# Patient Record
Sex: MALE | Race: WHITE | Employment: OTHER | URBAN - METROPOLITAN AREA
[De-identification: names, ages, dates, MRNs, and addresses within clinical notes are randomized per-mention and may not be internally consistent; named-entity substitution may affect disease eponyms.]

---

## 2017-06-28 PROBLEM — Z79.01 ANTICOAGULANT LONG-TERM USE: Status: ACTIVE | Noted: 2017-06-28

## 2017-09-25 PROCEDURE — 88305 TISSUE EXAM BY PATHOLOGIST: CPT | Performed by: SURGERY

## 2017-09-26 ENCOUNTER — HOSPITAL ENCOUNTER (OUTPATIENT)
Dept: LAB | Age: 82
Discharge: HOME | End: 2017-09-26

## 2018-09-19 ENCOUNTER — HOSPITAL ENCOUNTER (OUTPATIENT)
Dept: CT IMAGING | Age: 83
Discharge: HOME | End: 2018-09-19
Attending: NURSE PRACTITIONER
Payer: MEDICARE

## 2018-09-19 DIAGNOSIS — R31.29 MICROHEMATURIA: ICD-10-CM

## 2018-09-19 PROCEDURE — 74176 CT ABD & PELVIS W/O CONTRAST: CPT

## 2018-10-02 PROBLEM — R31.29 MICROHEMATURIA: Status: ACTIVE | Noted: 2018-10-02

## 2018-10-02 PROBLEM — Z92.29 HX OF LONG TERM USE OF BLOOD THINNERS: Status: ACTIVE | Noted: 2018-10-02

## 2018-10-02 PROBLEM — Z87.438 HISTORY OF BPH: Status: ACTIVE | Noted: 2018-10-02

## 2019-04-08 PROBLEM — R06.09 DYSPNEA ON EXERTION: Status: ACTIVE | Noted: 2019-04-08

## 2019-09-17 ENCOUNTER — APPOINTMENT (OUTPATIENT)
Dept: GENERAL RADIOLOGY | Age: 84
DRG: 069 | End: 2019-09-17
Attending: EMERGENCY MEDICINE
Payer: MEDICARE

## 2019-09-17 ENCOUNTER — HOSPITAL ENCOUNTER (INPATIENT)
Age: 84
LOS: 1 days | Discharge: HOME | DRG: 069 | End: 2019-09-19
Attending: EMERGENCY MEDICINE | Admitting: INTERNAL MEDICINE
Payer: MEDICARE

## 2019-09-17 ENCOUNTER — APPOINTMENT (OUTPATIENT)
Dept: CT IMAGING | Age: 84
DRG: 069 | End: 2019-09-17
Attending: EMERGENCY MEDICINE
Payer: MEDICARE

## 2019-09-17 DIAGNOSIS — G45.9 TIA (TRANSIENT ISCHEMIC ATTACK): Primary | ICD-10-CM

## 2019-09-17 LAB
APTT PPP: 44.9 SEC (ref 24.7–39.8)
BASOPHILS # BLD: 0 K/UL (ref 0–0.2)
BASOPHILS NFR BLD: 0 % (ref 0–2)
DIFFERENTIAL METHOD BLD: ABNORMAL
EOSINOPHIL # BLD: 0.1 K/UL (ref 0–0.8)
EOSINOPHIL NFR BLD: 1 % (ref 0.5–7.8)
ERYTHROCYTE [DISTWIDTH] IN BLOOD BY AUTOMATED COUNT: 17.2 % (ref 11.9–14.6)
GLUCOSE BLD STRIP.AUTO-MCNC: 226 MG/DL (ref 65–100)
HCT VFR BLD AUTO: 36.2 % (ref 41.1–50.3)
HGB BLD-MCNC: 11.5 G/DL (ref 13.6–17.2)
IMM GRANULOCYTES # BLD AUTO: 0.1 K/UL (ref 0–0.5)
IMM GRANULOCYTES NFR BLD AUTO: 1 % (ref 0–5)
INR PPP: 3.1
LYMPHOCYTES # BLD: 0.8 K/UL (ref 0.5–4.6)
LYMPHOCYTES NFR BLD: 5 % (ref 13–44)
MAGNESIUM SERPL-MCNC: 1.8 MG/DL (ref 1.8–2.4)
MCH RBC QN AUTO: 31.4 PG (ref 26.1–32.9)
MCHC RBC AUTO-ENTMCNC: 31.8 G/DL (ref 31.4–35)
MCV RBC AUTO: 98.9 FL (ref 79.6–97.8)
MONOCYTES # BLD: 1.4 K/UL (ref 0.1–1.3)
MONOCYTES NFR BLD: 9 % (ref 4–12)
NEUTS SEG # BLD: 12.9 K/UL (ref 1.7–8.2)
NEUTS SEG NFR BLD: 84 % (ref 43–78)
NRBC # BLD: 0 K/UL (ref 0–0.2)
PLATELET # BLD AUTO: 219 K/UL (ref 150–450)
PMV BLD AUTO: 9.7 FL (ref 9.4–12.3)
PROTHROMBIN TIME: 32.5 SEC (ref 11.7–14.5)
RBC # BLD AUTO: 3.66 M/UL (ref 4.23–5.6)
WBC # BLD AUTO: 15.3 K/UL (ref 4.3–11.1)

## 2019-09-17 PROCEDURE — 85730 THROMBOPLASTIN TIME PARTIAL: CPT

## 2019-09-17 PROCEDURE — 99285 EMERGENCY DEPT VISIT HI MDM: CPT | Performed by: EMERGENCY MEDICINE

## 2019-09-17 PROCEDURE — 82962 GLUCOSE BLOOD TEST: CPT

## 2019-09-17 PROCEDURE — 93005 ELECTROCARDIOGRAM TRACING: CPT | Performed by: EMERGENCY MEDICINE

## 2019-09-17 PROCEDURE — 80053 COMPREHEN METABOLIC PANEL: CPT

## 2019-09-17 PROCEDURE — 70450 CT HEAD/BRAIN W/O DYE: CPT

## 2019-09-17 PROCEDURE — 74011250636 HC RX REV CODE- 250/636: Performed by: EMERGENCY MEDICINE

## 2019-09-17 PROCEDURE — 71045 X-RAY EXAM CHEST 1 VIEW: CPT

## 2019-09-17 PROCEDURE — 83735 ASSAY OF MAGNESIUM: CPT

## 2019-09-17 PROCEDURE — 85610 PROTHROMBIN TIME: CPT

## 2019-09-17 PROCEDURE — 85025 COMPLETE CBC W/AUTO DIFF WBC: CPT

## 2019-09-17 RX ADMIN — SODIUM CHLORIDE 1000 ML: 900 INJECTION, SOLUTION INTRAVENOUS at 01:15

## 2019-09-18 ENCOUNTER — APPOINTMENT (OUTPATIENT)
Dept: ULTRASOUND IMAGING | Age: 84
DRG: 069 | End: 2019-09-18
Attending: INTERNAL MEDICINE
Payer: MEDICARE

## 2019-09-18 PROBLEM — N17.9 ACUTE RENAL FAILURE (ARF) (HCC): Status: ACTIVE | Noted: 2019-09-18

## 2019-09-18 PROBLEM — I48.19 PERSISTENT ATRIAL FIBRILLATION (HCC): Status: ACTIVE | Noted: 2019-09-18

## 2019-09-18 PROBLEM — G45.9 TIA (TRANSIENT ISCHEMIC ATTACK): Status: ACTIVE | Noted: 2019-09-18

## 2019-09-18 PROBLEM — R20.0 NUMBNESS AND TINGLING IN RIGHT HAND: Status: ACTIVE | Noted: 2019-09-18

## 2019-09-18 PROBLEM — R20.2 NUMBNESS AND TINGLING IN RIGHT HAND: Status: ACTIVE | Noted: 2019-09-18

## 2019-09-18 LAB
ALBUMIN SERPL-MCNC: 3.4 G/DL (ref 3.2–4.6)
ALBUMIN/GLOB SERPL: 1 {RATIO} (ref 1.2–3.5)
ALP SERPL-CCNC: 125 U/L (ref 50–136)
ALT SERPL-CCNC: 34 U/L (ref 12–65)
ANION GAP SERPL CALC-SCNC: 6 MMOL/L (ref 7–16)
APPEARANCE UR: CLEAR
AST SERPL-CCNC: 23 U/L (ref 15–37)
ATRIAL RATE: 187 BPM
BACTERIA URNS QL MICRO: 0 /HPF
BILIRUB SERPL-MCNC: 0.9 MG/DL (ref 0.2–1.1)
BILIRUB UR QL: NEGATIVE
BUN SERPL-MCNC: 23 MG/DL (ref 8–23)
CALCIUM SERPL-MCNC: 8.9 MG/DL (ref 8.3–10.4)
CALCULATED R AXIS, ECG10: -103 DEGREES
CALCULATED T AXIS, ECG11: 87 DEGREES
CASTS URNS QL MICRO: 0 /LPF
CHLORIDE SERPL-SCNC: 104 MMOL/L (ref 98–107)
CO2 SERPL-SCNC: 27 MMOL/L (ref 21–32)
COLOR UR: YELLOW
CREAT SERPL-MCNC: 1.93 MG/DL (ref 0.8–1.5)
DIAGNOSIS, 93000: NORMAL
EPI CELLS #/AREA URNS HPF: 0 /HPF
GLOBULIN SER CALC-MCNC: 3.3 G/DL (ref 2.3–3.5)
GLUCOSE BLD STRIP.AUTO-MCNC: 114 MG/DL (ref 65–100)
GLUCOSE BLD STRIP.AUTO-MCNC: 189 MG/DL (ref 65–100)
GLUCOSE BLD STRIP.AUTO-MCNC: 191 MG/DL (ref 65–100)
GLUCOSE BLD STRIP.AUTO-MCNC: 227 MG/DL (ref 65–100)
GLUCOSE SERPL-MCNC: 235 MG/DL (ref 65–100)
GLUCOSE UR STRIP.AUTO-MCNC: NEGATIVE MG/DL
HGB UR QL STRIP: ABNORMAL
KETONES UR QL STRIP.AUTO: NEGATIVE MG/DL
LACTATE SERPL-SCNC: 1.1 MMOL/L (ref 0.4–2)
LEUKOCYTE ESTERASE UR QL STRIP.AUTO: NEGATIVE
NITRITE UR QL STRIP.AUTO: NEGATIVE
PH UR STRIP: 6 [PH] (ref 5–9)
POTASSIUM SERPL-SCNC: 4.3 MMOL/L (ref 3.5–5.1)
PROT SERPL-MCNC: 6.7 G/DL (ref 6.3–8.2)
PROT UR STRIP-MCNC: NEGATIVE MG/DL
Q-T INTERVAL, ECG07: 436 MS
QRS DURATION, ECG06: 170 MS
QTC CALCULATION (BEZET), ECG08: 467 MS
RBC #/AREA URNS HPF: ABNORMAL /HPF
SODIUM SERPL-SCNC: 137 MMOL/L (ref 136–145)
SP GR UR REFRACTOMETRY: 1.01 (ref 1–1.02)
TSH SERPL DL<=0.005 MIU/L-ACNC: 1.85 UIU/ML (ref 0.36–3.74)
UROBILINOGEN UR QL STRIP.AUTO: 0.2 EU/DL (ref 0.2–1)
VENTRICULAR RATE, ECG03: 69 BPM
WBC URNS QL MICRO: 0 /HPF

## 2019-09-18 PROCEDURE — 96361 HYDRATE IV INFUSION ADD-ON: CPT | Performed by: EMERGENCY MEDICINE

## 2019-09-18 PROCEDURE — 99218 HC RM OBSERVATION: CPT

## 2019-09-18 PROCEDURE — 97161 PT EVAL LOW COMPLEX 20 MIN: CPT

## 2019-09-18 PROCEDURE — 92610 EVALUATE SWALLOWING FUNCTION: CPT

## 2019-09-18 PROCEDURE — C8929 TTE W OR WO FOL WCON,DOPPLER: HCPCS

## 2019-09-18 PROCEDURE — 99222 1ST HOSP IP/OBS MODERATE 55: CPT | Performed by: PSYCHIATRY & NEUROLOGY

## 2019-09-18 PROCEDURE — 81001 URINALYSIS AUTO W/SCOPE: CPT

## 2019-09-18 PROCEDURE — 74011250637 HC RX REV CODE- 250/637: Performed by: HOSPITALIST

## 2019-09-18 PROCEDURE — 74011250636 HC RX REV CODE- 250/636: Performed by: HOSPITALIST

## 2019-09-18 PROCEDURE — 84443 ASSAY THYROID STIM HORMONE: CPT

## 2019-09-18 PROCEDURE — 82962 GLUCOSE BLOOD TEST: CPT

## 2019-09-18 PROCEDURE — 87086 URINE CULTURE/COLONY COUNT: CPT

## 2019-09-18 PROCEDURE — 36415 COLL VENOUS BLD VENIPUNCTURE: CPT

## 2019-09-18 PROCEDURE — 93880 EXTRACRANIAL BILAT STUDY: CPT

## 2019-09-18 PROCEDURE — 87186 SC STD MICRODIL/AGAR DIL: CPT

## 2019-09-18 PROCEDURE — 87088 URINE BACTERIA CULTURE: CPT

## 2019-09-18 PROCEDURE — 96360 HYDRATION IV INFUSION INIT: CPT | Performed by: EMERGENCY MEDICINE

## 2019-09-18 PROCEDURE — 97530 THERAPEUTIC ACTIVITIES: CPT

## 2019-09-18 PROCEDURE — 83605 ASSAY OF LACTIC ACID: CPT

## 2019-09-18 PROCEDURE — 97165 OT EVAL LOW COMPLEX 30 MIN: CPT

## 2019-09-18 PROCEDURE — 74011250637 HC RX REV CODE- 250/637: Performed by: EMERGENCY MEDICINE

## 2019-09-18 PROCEDURE — 74011636637 HC RX REV CODE- 636/637: Performed by: HOSPITALIST

## 2019-09-18 RX ORDER — METOPROLOL SUCCINATE 50 MG/1
50 TABLET, EXTENDED RELEASE ORAL DAILY
Status: DISCONTINUED | OUTPATIENT
Start: 2019-09-18 | End: 2019-09-19 | Stop reason: HOSPADM

## 2019-09-18 RX ORDER — NITROGLYCERIN 0.4 MG/1
0.4 TABLET SUBLINGUAL AS NEEDED
Status: DISCONTINUED | OUTPATIENT
Start: 2019-09-18 | End: 2019-09-19 | Stop reason: HOSPADM

## 2019-09-18 RX ORDER — FERROUS SULFATE 324(65)MG
TABLET, DELAYED RELEASE (ENTERIC COATED) ORAL 2 TIMES DAILY WITH MEALS
COMMUNITY
End: 2021-06-22 | Stop reason: ALTCHOICE

## 2019-09-18 RX ORDER — ASPIRIN 325 MG
325 TABLET ORAL
Status: COMPLETED | OUTPATIENT
Start: 2019-09-18 | End: 2019-09-18

## 2019-09-18 RX ORDER — FINASTERIDE 5 MG/1
5 TABLET, FILM COATED ORAL DAILY
Status: DISCONTINUED | OUTPATIENT
Start: 2019-09-18 | End: 2019-09-19 | Stop reason: HOSPADM

## 2019-09-18 RX ORDER — SODIUM CHLORIDE 0.9 % (FLUSH) 0.9 %
5-40 SYRINGE (ML) INJECTION EVERY 8 HOURS
Status: DISCONTINUED | OUTPATIENT
Start: 2019-09-18 | End: 2019-09-19 | Stop reason: HOSPADM

## 2019-09-18 RX ORDER — SODIUM CHLORIDE 0.9 % (FLUSH) 0.9 %
5-40 SYRINGE (ML) INJECTION AS NEEDED
Status: DISCONTINUED | OUTPATIENT
Start: 2019-09-18 | End: 2019-09-19 | Stop reason: HOSPADM

## 2019-09-18 RX ORDER — INSULIN LISPRO 100 [IU]/ML
INJECTION, SOLUTION INTRAVENOUS; SUBCUTANEOUS
Status: DISCONTINUED | OUTPATIENT
Start: 2019-09-18 | End: 2019-09-19 | Stop reason: HOSPADM

## 2019-09-18 RX ORDER — ATORVASTATIN CALCIUM 40 MG/1
40 TABLET, FILM COATED ORAL
Status: DISCONTINUED | OUTPATIENT
Start: 2019-09-18 | End: 2019-09-19 | Stop reason: HOSPADM

## 2019-09-18 RX ORDER — SODIUM CHLORIDE, SODIUM LACTATE, POTASSIUM CHLORIDE, CALCIUM CHLORIDE 600; 310; 30; 20 MG/100ML; MG/100ML; MG/100ML; MG/100ML
100 INJECTION, SOLUTION INTRAVENOUS CONTINUOUS
Status: DISCONTINUED | OUTPATIENT
Start: 2019-09-18 | End: 2019-09-18

## 2019-09-18 RX ORDER — ONDANSETRON 2 MG/ML
4 INJECTION INTRAMUSCULAR; INTRAVENOUS
Status: DISCONTINUED | OUTPATIENT
Start: 2019-09-18 | End: 2019-09-19 | Stop reason: HOSPADM

## 2019-09-18 RX ORDER — ASPIRIN 81 MG/1
81 TABLET ORAL DAILY
Status: DISCONTINUED | OUTPATIENT
Start: 2019-09-18 | End: 2019-09-19 | Stop reason: HOSPADM

## 2019-09-18 RX ORDER — AMOXICILLIN 875 MG/1
500 TABLET, FILM COATED ORAL 3 TIMES DAILY
COMMUNITY
Start: 2019-09-16 | End: 2019-09-23

## 2019-09-18 RX ORDER — NATEGLINIDE 120 MG/1
120 TABLET ORAL
Status: DISCONTINUED | OUTPATIENT
Start: 2019-09-18 | End: 2019-09-19 | Stop reason: HOSPADM

## 2019-09-18 RX ADMIN — INSULIN LISPRO 2 UNITS: 100 INJECTION, SOLUTION INTRAVENOUS; SUBCUTANEOUS at 16:53

## 2019-09-18 RX ADMIN — ATORVASTATIN CALCIUM 40 MG: 40 TABLET, FILM COATED ORAL at 20:42

## 2019-09-18 RX ADMIN — METOPROLOL SUCCINATE 50 MG: 50 TABLET, EXTENDED RELEASE ORAL at 09:29

## 2019-09-18 RX ADMIN — RIVAROXABAN 20 MG: 20 TABLET, FILM COATED ORAL at 16:53

## 2019-09-18 RX ADMIN — INSULIN LISPRO 2 UNITS: 100 INJECTION, SOLUTION INTRAVENOUS; SUBCUTANEOUS at 21:55

## 2019-09-18 RX ADMIN — SODIUM CHLORIDE, SODIUM LACTATE, POTASSIUM CHLORIDE, AND CALCIUM CHLORIDE 100 ML/HR: 600; 310; 30; 20 INJECTION, SOLUTION INTRAVENOUS at 03:54

## 2019-09-18 RX ADMIN — Medication 10 ML: at 13:30

## 2019-09-18 RX ADMIN — FINASTERIDE 5 MG: 5 TABLET, FILM COATED ORAL at 09:29

## 2019-09-18 RX ADMIN — ASPIRIN 325 MG ORAL TABLET 325 MG: 325 PILL ORAL at 02:14

## 2019-09-18 RX ADMIN — INSULIN LISPRO 4 UNITS: 100 INJECTION, SOLUTION INTRAVENOUS; SUBCUTANEOUS at 11:52

## 2019-09-18 RX ADMIN — Medication 10 ML: at 20:54

## 2019-09-18 RX ADMIN — PERFLUTREN 1 ML: 6.52 INJECTION, SUSPENSION INTRAVENOUS at 11:38

## 2019-09-18 RX ADMIN — ASPIRIN 81 MG: 81 TABLET ORAL at 09:29

## 2019-09-18 NOTE — PROGRESS NOTES
09/18/19 0529   NIH Stroke Scale   Interval Other (comment)  (arrived to floor)   LOC 0   LOC Questions 0   LOC Commands 0   Best Gaze 0   Visual 0   Facial Palsy 0   Motor Right Arm 0   Motor Left Arm 0   Motor Right Leg 0   Motor Left Leg 0   Limb Ataxia 0   Sensory 1  (right hand)   Best Language 0   Dysarthria 0   Extinction and Inattention 0   Total 1     Ischemic Stroke without Activase/TIA    VTE Prophylaxis: No    Antiplatelet: Yes: Xarelto    Statin if LDL Greater Than or Equal to100: Yes: Lipitor    BP Parameters: Less Than 220/120 for 24 hours, then consult MD for parameters    Controlled With: Scheduled PO    Dysphagia Screen Completed: Yes: Pass  Dysphagia Screening  Vocal Quality/Secretions: Normal  History of Dysphagia: No  O2 Saturation: Normal  Alertness: Normal  Pre-Swallow Assessment Score: 0  Purees: No difficulty noted  Water by Cup: No difficulty noted  Water by Straw: No difficulty noted    Patient has PEG, NG Tube, Feeding Tube: No    Medication orders per above route: PO per orders    Nutrition Status: PO    NIH Stroke Scale Complete: Yes: 1    Frequency of Vital Signs: Every 4 hours     Frequency of Neuro Checks: Every 4 hours    Daily Education/Care Plan Updated: Yes    Reynaldo Ash

## 2019-09-18 NOTE — ED NOTES
TRANSFER - OUT REPORT:    Verbal report given to Carlos Saunders RN on [address]  being transferred to [facility] 7th floor for routine progression of care       Report consisted of patients Situation, Background, Assessment and   Recommendations(SBAR). Information from the following report(s) SBAR, Kardex, ED Summary and MAR was reviewed with the receiving nurse. Lines:   Peripheral IV 09/17/19 Left Antecubital (Active)   Site Assessment Clean, dry, & intact 9/17/2019 11:23 PM   Phlebitis Assessment 0 9/17/2019 11:23 PM   Infiltration Assessment 0 9/17/2019 11:23 PM   Dressing Status Clean, dry, & intact 9/17/2019 11:23 PM   Dressing Type Transparent 9/17/2019 11:23 PM   Hub Color/Line Status Green 9/17/2019 11:23 PM        Opportunity for questions and clarification was provided.       Patient transported with:   Registered Nurse

## 2019-09-18 NOTE — PROGRESS NOTES
Problem: Mobility Impaired (Adult and Pediatric)  Goal: *Acute Goals and Plan of Care  Description  Acute PT Goals:  (1.)Claude Cleavon Apgar  will move from supine to sit and sit to supine , scoot up and down and roll side to side with INDEPENDENCE within 7 treatment day(s). (2.)Claude Cleavon Apgar will transfer from bed to chair and chair to bed with MODIFIED INDEPENDENCE using the least restrictive device within 7 treatment day(s). (3.)Claude Cleavon Apgar will ambulate with MODIFIED INDEPENDENCE for 500+ feet with the least restrictive device within 7 treatment day(s). (4.)Claude Cleavon Apgar will perform standing static and dynamic balance activities x 25 minutes with MODIFIED INDEPENDENCE to improve safety and activity tolerance within 7 treatment day(s). (5.)Claude Cleavon Apgar will ascend and descend 1 stairs using no hand rail(s) with MODIFIED INDEPENDENCE to improve functional mobility and safety within 7 treatment day(s). (6.)Claude Cleavon Apgar will perform bilateral lower extremity exercises x 20 min for HEP with INDEPENDENCE to improve strength, endurance, and functional mobility within 7 treatment day(s).      PHYSICAL THERAPY: Initial Assessment, Daily Note and PM 2019  OBSERVATION: PT Visit Days : 1  [place] / [place] / Product Type: Medicare /       NAME/AGE/GENDER: Alejandro Malone is a 80 y.o. male   PRIMARY DIAGNOSIS: TIA (transient ischemic attack) [G45.9]  Numbness and tingling in right hand [R20.0, R20.2]  Persistent atrial fibrillation (HCC) [I48.1]  Acute renal failure (ARF) ([facility] [N17.9] <principal problem not specified>   <principal problem not specified>          ICD-10: Treatment Diagnosis:   Other lack of cordination (R27.8)  Difficulty in walking, Not elsewhere classified (R26.2)  Other abnormalities of gait and mobility (R26.89)   Precaution/Allergies:  Iodinated contrast media      ASSESSMENT:     Mr. Ayana Zepeda is an 80year old M who presented to hospital with R hand numbness and intermittent speech difficulty past two days. Admitted for TIA workup. PMH includes DM-2, HTN, A-Fib, and systolic CHF. Prior to hospital admission pt lives alone in a one story home with one step(s) to enter. Pt endorses one  fall in past 6 months. He does report experiencing unsteadiness on his feet, particularly when his eyes are closed. Prior to admission Mr. Adalgisa Cm uses no DME for mobility. He works part time. Upon entering, pt resting in bed, agreeable to PT evaluation. he reports no pain at rest. BLE assessment indicates sensation to light touch decreased distally BLE, AROM WFL, and strength WFL. Pt performed supine > sit with SBA, sitting EOB with good sitting balance control. Sit > stand with CGA using no assistive device. Gait x 10 ft in room with CGA. Treatment initiated to include gait x250 ft CGA with no assistive device in hallway. Cues for safety awareness and activity and breathing pacing as pt became noticeably SOB with exertion. O2 >90% even with activity on room air. At end of session pt sitting up in bedside chair with all needs within reach, alarm activated for safety, RN notified, OT entering room to perform evaluation. Pt presents as functioning slightly below his baseline, with deficits in mobility including transfers, gait, balance, and activity tolerance. Pt will benefit from skilled therapy services to address stated deficits to promote return to highest level of function, independence, and safety. Will continue to follow.     This section established at most recent assessment   PROBLEM LIST (Impairments causing functional limitations):  Decreased Transfer Abilities  Decreased Ambulation Ability/Technique  Decreased Balance  Decreased Activity Tolerance   INTERVENTIONS PLANNED: (Benefits and precautions of physical therapy have been discussed with the patient.)  Balance Exercise  Bed Mobility  Family Education  Gait Training  Home Exercise Program (HEP)  Neuromuscular Re-education/Strengthening  Range of Motion (ROM)  Therapeutic Activites  Therapeutic Exercise/Strengthening  Transfer Training     TREATMENT PLAN: Frequency/Duration: 3 times a week for duration of hospital stay  Rehabilitation Potential For Stated Goals: Good     REHAB RECOMMENDATIONS (at time of discharge pending progress):    Placement: It is my opinion, based on this patient's performance to date, that Mr. Carrillo Romo may benefit from [facility] after discharge due to the functional deficits listed above that are likely to improve with skilled rehabilitation because because he/she is able to safely attend regular and reoccurring therapy sessions at an outpatient facility and because he/she will benefit from the individualized approach tailored to his/her deficits vs. NO NEEDS pending progress with therapy, MD recommendations and pt wishes. Equipment:   None at this time              HISTORY:   History of Present Injury/Illness (Reason for Referral):  Per Chart: \" Patient is an 80years old male with hx of HTN, Atrial fib/flutter s/p AICD (on [address] [place] [address] Road), systolic CHF, DM-2, dyslipidemia, presented with right hand numbness and intermittent speech difficulty for past 2 days. currently pt is asymptomatic, but earlier he experienced right hand numbness and tingling, along with abnormal speech. These symptoms lasted for few minutes and resolved completely. Pt denies chest pain, SOB, headache, blurry/double vision, nausea/vomiting, headaches, weakness in extrimities, falls, lightheadedness or dizziness, palpitations. Pt is being admitted for TIA work up. \"  Past Medical History/Comorbidities:   Mr. Carrillo Romo  has a past medical history of Abnormal EKG, Acute diastolic heart failure [facility]), Atrial fibrillation ([facility] (9/10/2011), Atrial flutter ([facility] (9/2013), Automatic implantable cardioverter-defibrillator in situ (3/30/2016), Breast lump, CAD (coronary artery disease), Cardiomyopathy, ischemic (3/30/2016), Chest pain, Chronic systolic heart failure ([facility] CKD (chronic kidney disease) stage 3, GFR 30-59 ml/min: Metformin discontinued (9/12/2011), Coronary artery disease (9/10/2011), Diabetes ([facility] (diag 2002), Heart attack ([facility] (1995/2008), History of kidney stones, Hypercholesteremia (9/10/2011), Hypercholesterolemia, Hypertension (diag 1981), Kidney stone, LBBB (left bundle branch block) (9/17/2013), Orthostatic hypotension (3/30/2016), Osteoarthritis, Pneumonia (9/10/2011), Preop cardiovascular exam, Renal insufficiency, S/P total knee arthroplasty (12/11/2015), TIA (transient ischemic attack) (9/18/2019), and Unstable angina ([facility] He also has no past medical history of Adverse effect of anesthesia, Dementia, Difficult intubation, Gastrointestinal disorder, Infectious disease, Malignant hyperthermia due to anesthesia, Nausea & vomiting, Pseudocholinesterase deficiency, Sleep disorder, or Unspecified adverse effect of anesthesia. Mr. Andrew Gordillo  has a past surgical history that includes hx coronary artery bypass graft (12/19/2008); hx carpal tunnel release (Bilateral, 1972); hx colonoscopy (2003/2010); hx implantable cardioverter defibrillator (9/17/2013); hx bunionectomy (Left, 2001); hx hip replacement (Right, 12/28/2005); hx breast biopsy (Right, 7/2013); hx other surgical (2002); hx lithotripsy; pr total knee arthroplasty (Left, 4/8/2010); hx knee arthroscopy (Left, 9/17/2009); hx ankle fracture tx (Right, 5/11/2005); hx knee arthroscopy (Right, 4/30/2014); hx knee replacement (Right, 12/2015); hx orthopaedic (2005); hx cataract removal (2006/2009); and hx pacemaker.   Social History/Living Environment:   Home Environment: Private residence  # Steps to Enter: 1  Wheelchair Ramp: No  One/Two Story Residence: One story  Living Alone: Yes  Support Systems: Child(aleksey)  Patient Expects to be Discharged to[de-identified] Private residence  Current DME Used/Available at Home: jade Camargo Walker, rolling  Prior Level of Function/Work/Activity:  Prior to hospital admission pt lives alone in a one story home with one step(s) to enter. His son lives nearby. Pt endorses one  fall in past 6 months. He does report experiencing unsteadiness on his feet, particularly when his eyes are closed. Prior to admission Mr. Shahana Zamorano uses no DME for mobility. He works part time. Dominant Side:         RIGHT   Number of Personal Factors/Comorbidities that affect the Plan of Care: 1-2: MODERATE COMPLEXITY   EXAMINATION:   Most Recent Physical Functioning:   Gross Assessment:  AROM: Within functional limits  Strength: Within functional limits  Sensation: Impaired(decreased to light touch distally BLE (neuropathy))               Posture:     Balance:  Sitting: Intact  Standing: Impaired  Standing - Static: Good  Standing - Dynamic : Fair Bed Mobility:  Rolling: Stand-by assistance  Supine to Sit: Stand-by assistance  Sit to Supine: Stand-by assistance  Scooting: Stand-by assistance  Wheelchair Mobility:     Transfers:  Sit to Stand: Contact guard assistance  Stand to Sit: Contact guard assistance  Bed to Chair: Contact guard assistance  Interventions: Safety awareness training;Verbal cues  Gait:     Base of Support: Center of gravity altered  Step Length: Right shortened;Left shortened  Gait Abnormalities: Decreased step clearance; Path deviations;Trunk sway increased; Shuffling gait  Distance (ft): 250 Feet (ft)  Assistive Device: Gait belt  Ambulation - Level of Assistance: Contact guard assistance      Body Structures Involved:  Nerves  Heart  Muscles Body Functions Affected:  Neuromusculoskeletal  Movement Related Activities and Participation Affected:  General Tasks and Demands  Mobility   Number of elements that affect the Plan of Care: 4+: HIGH COMPLEXITY   CLINICAL PRESENTATION:   Presentation: Stable and uncomplicated: LOW COMPLEXITY   CLINICAL DECISION MAKIN [facility] [address] [id number] AM-PAC 6 Clicks   Basic Mobility Inpatient Short Form  How much difficulty does the patient currently have. .. Unable A Lot A Little None   1. Turning over in bed (including adjusting bedclothes, sheets and blankets)? ? 1   ? 2   ? 3   ? 4   2. Sitting down on and standing up from a chair with arms ( e.g., wheelchair, bedside commode, etc.)   ? 1   ? 2   ? 3   ? 4   3. Moving from lying on back to sitting on the side of the bed?   ? 1   ? 2   ? 3   ? 4   How much help from another person does the patient currently need. .. Total A Lot A Little None   4. Moving to and from a bed to a chair (including a wheelchair)? ? 1   ? 2   ? 3   ? 4   5. Need to walk in hospital room? ? 1   ? 2   ? 3   ? 4   6. Climbing 3-5 steps with a railing? ? 1   ? 2   ? 3   ? 4   © 2007, Trustees of [address] [place] [address], under license to [employer]. All rights reserved      Score:  Initial: 20 Most Recent: X (Date: -- )    Interpretation of Tool:  Represents activities that are increasingly more difficult (i.e. Bed mobility, Transfers, Gait). Medical Necessity:     Patient is expected to demonstrate progress in strength, balance, coordination and functional technique   to improve safety during all mobility   . Reason for Services/Other Comments:  Patient continues to require skilled intervention due to medical complications and mobility deficits which impact his level of function, safety, and independence as indicated above. .   Use of outcome tool(s) and clinical judgement create a POC that gives a: Clear prediction of patient's progress: LOW COMPLEXITY        TREATMENT:   (In addition to Assessment/Re-Assessment sessions the following treatments were rendered)   Pre-treatment Symptoms/Complaints:  No complaints, pleasant & agreeable  Pain: Initial:   Pain Intensity 1: 0  Post Session:  0/10     Therapeutic Activity: (   10 Minutes):   Therapeutic activities including Bed transfers, Chair transfers, Ambulation on level ground and instruction in activity pacing and breathing technique to improve mobility, strength, balance, coordination and activity tolerance . Required minimal   cues/instruction with CGA to promote static and dynamic balance in standing and promote coordination of bilateral, lower extremity(s). Braces/Orthotics/Lines/Etc:   IV  O2 Device: Room air  Treatment/Session Assessment:    Response to Treatment:  See above  Interdisciplinary Collaboration:   Physical Therapist  Registered Nurse  After treatment position/precautions:   Up in chair  Bed alarm/tab alert on  Bed/Chair-wheels locked  Call light within reach  RN notified  OT entering room    Compliance with Program/Exercises: Will assess as treatment progresses  Recommendations/Intent for next treatment session: \"Next visit will focus on advancements to more challenging activities and reduction in assistance provided\".     Total Treatment Duration:  PT Patient Time In/Time Out  Time In: 4651  Time Out: [address] PT

## 2019-09-18 NOTE — H&P
HOSPITALIST H&P/CONSULT  NAME:  Brittney Littlejohn   Age:  80 y.o.  :   1934   MRN:   [id number]  PCP: Amanda Lam MD  Consulting MD:  Treatment Team: Attending Provider: Sammy Aguilar MD; Primary Nurse: Gina Mixon RN  HPI:   Patient is an 80years old male with hx of HTN, Atrial fib/flutter s/p AICD (on [address]), systolic CHF, DM-2, dyslipidemia, presented with right hand numbness and intermittent speech difficulty for past 2 days. currently pt is asymptomatic, but earlier he experienced right hand numbness and tingling, along with abnormal speech. These symptoms lasted for few minutes and resolved completely. Pt denies chest pain, SOB, headache, blurry/double vision, nausea/vomiting, headaches, weakness in extrimities, falls, lightheadedness or dizziness, palpitations. Pt is being admitted for TIA work up. Complete ROS done and is as stated in HPI or otherwise negative  Past Medical History:   Diagnosis Date    Abnormal EKG     Acute diastolic heart failure (HCC)     Atrial fibrillation ([address] 9/10/2011    Atrial flutter ([address] 2013    Biotronik biventricular implantable cardioverter defibrillator    Automatic implantable cardioverter-defibrillator in situ 3/30/2016    Breast lump     right- pt states bx was neg-- m. d. \"released\" him    CAD (coronary artery disease)     mi w angioplasty------ mi then cabg--    Cardiomyopathy, ischemic 3/30/2016    Chronic    Chest pain     Chronic systolic heart failure ([address]     \"stable\" per cardiology office note (2015) and on lasix daily    CKD (chronic kidney disease) stage 3, GFR 30-59 ml/min: Metformin discontinued 2011    Coronary artery disease 9/10/2011    Diabetes ([address] diag     type2, oral meds, range 96-98, does not know last hgba1c, hypo s/s <70    Heart attack ([address]     History of kidney stones     none in years     Hypercholesteremia 9/10/2011    Hypercholesterolemia     no meds currently  Hypertension diag 1981    controlled with med    Kidney stone     LBBB (left bundle branch block) 9/17/2013    Orthostatic hypotension 3/30/2016    Osteoarthritis     hands    Pneumonia 9/10/2011    Preop cardiovascular exam     Renal insufficiency     S/P total knee arthroplasty 12/11/2015    TIA (transient ischemic attack) 9/18/2019    Unstable angina [facility])       Past Surgical History:   Procedure Laterality Date    HX ANKLE FRACTURE TX Right 5/11/2005    HX BREAST BIOPSY Right 7/2013    HX BUNIONECTOMY Left 2001    HX CARPAL TUNNEL RELEASE Bilateral 1972    HX CATARACT REMOVAL  2006/2009    HX COLONOSCOPY  2003/2010    HX CORONARY ARTERY BYPASS GRAFT  12/19/2008    3 vessel    HX HIP REPLACEMENT Right 12/28/2005    HX IMPLANTABLE CARDIOVERTER DEFIBRILLATOR  9/17/2013    biotronik    HX KNEE ARTHROSCOPY Left 9/17/2009    HX KNEE ARTHROSCOPY Right 4/30/2014    HX KNEE REPLACEMENT Right 12/2015    HX LITHOTRIPSY      x 5    HX ORTHOPAEDIC  2005    right hip    HX OTHER SURGICAL  2002    hydrocele repair and circumcision    HX PACEMAKER      pacemaker- defib    TOTAL KNEE ARTHROPLASTY Left 4/8/2010      Prior to Admission Medications   Prescriptions Last Dose Informant Patient Reported? Taking? FOLIC ACID/MULTIVIT-MIN/LUTEIN (CENTRUM SILVER PO)   Yes No   Sig: Take  by mouth daily. Last taken 4/7/16   TYLENOL ARTHRITIS PAIN 650 mg TbSR  Self Yes No   Sig: Take 650 mg by mouth as needed. 2 tabs twice daily   aspirin delayed-release 81 mg tablet   Yes No   Sig: Take  by mouth daily. finasteride (PROSCAR) 5 mg tablet   No No   Sig: Take 1 Tab by mouth daily. furosemide (LASIX) 40 mg tablet  Self Yes No   Sig: Take  by mouth every morning. glucosamine (GLUCOSAMINE RELIEF) 1,000 mg tab   Yes No   Sig: Take 1,000 mg by mouth two (2) times a day. metFORMIN (GLUCOPHAGE) 500 mg tablet   Yes No   Sig: Take 500 mg by mouth two (2) times daily (with meals).    metoprolol succinate (TOPROL-XL) 50 mg XL tablet   Yes No   Sig: Take 50 mg by mouth daily. nateglinide (STARLIX) 120 mg tablet   Yes No   Sig: Take 120 mg by mouth Before breakfast, lunch, and dinner. Indications: TYPE 2 DIABETES MELLITUS   nitroglycerin (NITROSTAT) 0.4 mg SL tablet   No No   Sig: Place 1 sl under the tongue q 5 min prn cp, max 3 sl in a 15-min time period. Call 911 if no relief after the 3rd sl.   rivaroxaban (XARELTO) 20 mg tab tablet   No No   Sig: Take 1 Tab by mouth nightly. Facility-Administered Medications: None     Allergies   Allergen Reactions    Iodinated Contrast Media Swelling      Social History     Tobacco Use    Smoking status: Never Smoker    Smokeless tobacco: Never Used   Substance Use Topics    Alcohol use: No      Family History   Problem Relation Age of Onset    Heart Disease Sister     Heart Disease Brother     Cancer Brother     Heart Disease Brother     Stroke Sister     Asthma Father     Heart Disease Other     Malignant Hyperthermia Neg Hx     Pseudocholinesterase Deficiency Neg Hx     Delayed Awakening Neg Hx     Post-op Nausea/Vomiting Neg Hx     Emergence Delirium Neg Hx     Post-op Cognitive Dysfunction Neg Hx     Other Neg Hx       Objective:     Visit Vitals  /72   Pulse 69   Temp 98.6 °F (37 °C)   Resp 13   Ht 6' 1\" (1.854 m)   Wt 93.4 kg (206 lb)   SpO2 95%   BMI 27.18 kg/m²      Temp (24hrs), Av.6 °F (37 °C), Min:98.6 °F (37 °C), Max:98.6 °F (37 °C)    Oxygen Therapy  O2 Sat (%): 95 % (19 0358)  Pulse via Oximetry: 70 beats per minute (19 0358)  O2 Device: Room air (19 8495)  Physical Exam:  General:    Alert, cooperative, no distress, appears stated age. Head:   Normocephalic, without obvious abnormality, atraumatic. Nose:  Nares normal. No drainage or sinus tenderness. Lungs:   Clear to auscultation bilaterally. No Wheezing or Rhonchi. No rales. Heart:   IRIR,  no murmur, rub or gallop. Abdomen:   Soft, non-tender.  Not distended. Bowel sounds normal.   Extremities: No cyanosis. No edema. No clubbing  Skin:     Texture, turgor normal. No rashes or lesions. Not Jaundiced  Neurologic: Alert and oriented x 3, no focal deficits   Data Review:   Recent Results (from the past 24 hour(s))   GLUCOSE, POC    Collection Time: 09/17/19 11:22 PM   Result Value Ref Range    Glucose (POC) 226 (H) 65 - 100 mg/dL   CBC WITH AUTOMATED DIFF    Collection Time: 09/17/19 11:24 PM   Result Value Ref Range    WBC 15.3 (H) 4.3 - 11.1 K/uL    RBC 3.66 (L) 4.23 - 5.6 M/uL    HGB 11.5 (L) 13.6 - 17.2 g/dL    HCT 36.2 (L) 41.1 - 50.3 %    MCV 98.9 (H) 79.6 - 97.8 FL    MCH 31.4 26.1 - 32.9 PG    MCHC 31.8 31.4 - 35.0 g/dL    RDW 17.2 (H) 11.9 - 14.6 %    PLATELET 383 442 - 689 K/uL    MPV 9.7 9.4 - 12.3 FL    ABSOLUTE NRBC 0.00 0.0 - 0.2 K/uL    DF AUTOMATED      NEUTROPHILS 84 (H) 43 - 78 %    LYMPHOCYTES 5 (L) 13 - 44 %    MONOCYTES 9 4.0 - 12.0 %    EOSINOPHILS 1 0.5 - 7.8 %    BASOPHILS 0 0.0 - 2.0 %    IMMATURE GRANULOCYTES 1 0.0 - 5.0 %    ABS. NEUTROPHILS 12.9 (H) 1.7 - 8.2 K/UL    ABS. LYMPHOCYTES 0.8 0.5 - 4.6 K/UL    ABS. MONOCYTES 1.4 (H) 0.1 - 1.3 K/UL    ABS. EOSINOPHILS 0.1 0.0 - 0.8 K/UL    ABS. BASOPHILS 0.0 0.0 - 0.2 K/UL    ABS. IMM.  GRANS. 0.1 0.0 - 0.5 K/UL   PROTHROMBIN TIME + INR    Collection Time: 09/17/19 11:24 PM   Result Value Ref Range    Prothrombin time 32.5 (H) 11.7 - 14.5 sec    INR 3.1     PTT    Collection Time: 09/17/19 11:24 PM   Result Value Ref Range    aPTT 44.9 (H) 24.7 - 49.9 SEC   METABOLIC PANEL, COMPREHENSIVE    Collection Time: 09/17/19 11:24 PM   Result Value Ref Range    Sodium 137 136 - 145 mmol/L    Potassium 4.3 3.5 - 5.1 mmol/L    Chloride 104 98 - 107 mmol/L    CO2 27 21 - 32 mmol/L    Anion gap 6 (L) 7 - 16 mmol/L    Glucose 235 (H) 65 - 100 mg/dL    BUN 23 8 - 23 MG/DL    Creatinine 1.93 (H) 0.8 - 1.5 MG/DL    GFR est AA 43 (L) >60 ml/min/1.73m2    GFR est non-AA 35 (L) >60 ml/min/1.73m2    Calcium 8.9 8.3 - 10.4 MG/DL    Bilirubin, total 0.9 0.2 - 1.1 MG/DL    ALT (SGPT) 34 12 - 65 U/L    AST (SGOT) 23 15 - 37 U/L    Alk. phosphatase 125 50 - 136 U/L    Protein, total 6.7 6.3 - 8.2 g/dL    Albumin 3.4 3.2 - 4.6 g/dL    Globulin 3.3 2.3 - 3.5 g/dL    A-G Ratio 1.0 (L) 1.2 - 3.5     MAGNESIUM    Collection Time: 09/17/19 11:24 PM   Result Value Ref Range    Magnesium 1.8 1.8 - 2.4 mg/dL   LACTIC ACID    Collection Time: 09/18/19  2:12 AM   Result Value Ref Range    Lactic acid 1.1 0.4 - 2.0 MMOL/L     Imaging /Procedures /Studies   All diagnostic imaging personally reviewed by me. Assessment and Plan: Active Hospital Problems    Diagnosis Date Noted    TIA (transient ischemic attack) 09/18/2019    Persistent atrial fibrillation (HCC) 09/18/2019    Numbness and tingling in right hand 09/18/2019    Acute renal failure (ARF) ([address] 09/18/2019       PLAN:  Admit as obs for TIA work up and ARF  f/u with MRI and MRA brain  remote tele monitoring  continue [address] and aspirin  check A1c and lipid panel  start high intensity statin  Neurology eval in AM  pt is on [facility] medical maangement.    cautious IV fluids for ARF  continue other home meds as reconciled in [facility]  pt/ot eval  DVT prophylaxis with xarelto    Code Status: full    Anticipated discharge: 1-2 days    Signed By: Kasie Mcdonald MD     September 18, 2019

## 2019-09-18 NOTE — PROGRESS NOTES
Spiritual Care Visit, initial visit. Visited with patient at bedside. Prayed for patient's healing and health. Visit by Yifan Sood, Staff .  Tristin, Jason.B., B.A.

## 2019-09-18 NOTE — PROGRESS NOTES
09/18/19 1849   NIH Stroke Scale   Interval Other (comment)   LOC 0   LOC Questions 0   LOC Commands 0   Best Gaze 0   Visual 0   Facial Palsy 0   Motor Right Arm 0   Motor Left Arm 0   Motor Right Leg 0   Motor Left Leg 0   Limb Ataxia 0   Sensory 0   Best Language 0   Dysarthria 0   Extinction and Inattention 0   Total 0

## 2019-09-18 NOTE — PROGRESS NOTES
Pt admitted earlier this AM for TIA eval. Unable to obtain MRI due to PPM. ECHO, neuro consult pending.

## 2019-09-18 NOTE — PROGRESS NOTES
Pt is an 81 yo male admitted due to a TIA/CVA. Pt reports that he lives alone in his own home. His son lives 4 miles away. 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 Pt reported that he was independent with his ADL's and ambulation prior to admissions. PT/OT evaluations pending. Pt could not think of any discharge needs at present. No apparent needs at this time. SW will continue to monitor and assist as needed.       Care Management Interventions  Mode of Transport at Discharge: Self(family)  Transition of Care Consult (CM Consult): Discharge Planning  Discharge Durable Medical Equipment: No  Physical Therapy Consult: Yes  Occupational Therapy Consult: Yes  Speech Therapy Consult: Yes  Current Support Network: Own Home, Family Lives Nearby, Lives Alone  Confirm Follow Up Transport: Self  Plan discussed with Pt/Family/Caregiver: Yes  Freedom of Choice Offered: Yes  Discharge Location  Discharge Placement: Home

## 2019-09-18 NOTE — CONSULTS
Consult    Patient: Laura Mueller MRN: [id number]  SSN: [id number]    YOB: 1934  Age: 80 y.o. Sex: male        Assessment:     TIA versus cerebral infarction with transient symptoms. Since at least 1 of the episodes lasted several hours it is highly probable that a degree of cerebral infarction is present. 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 However the patient also has chronic A. fib and has been off of anticoagulation for a few days.     Chronic iron deficiency anemia requiring iron replacement therapy    Type 2 DM  Hospital Problems  Date Reviewed: 5/23/2019          Codes Class Noted POA    TIA (transient ischemic attack) ICD-10-CM: G45.9  ICD-9-CM: 435.9  9/18/2019 Unknown        Persistent atrial fibrillation ([facility] ICD-10-CM: I48.1  ICD-9-CM: 427.31  9/18/2019 Unknown        Numbness and tingling in right hand ICD-10-CM: R20.0, R20.2  ICD-9-CM: 782.0  9/18/2019 Unknown        Acute renal failure (ARF) ([facility] ICD-10-CM: N17.9  ICD-9-CM: 584.9  9/18/2019 Unknown              Plan:     Patient should be seen by cardiology as an outpatient for consideration of watchman device due to combination of chronic A. fib iron deficiency anemia    Continue DOAC    If the patient is successfully treated with a watchman device with consider long-term antiplatelet therapy    · Start ASA 81 mg daily   · Initiate high intensity statin   · Neurochecks Q4H  · Carotid doppler  · Bedside swallow test   · Labs: A1c, FLP, TSH, Cardiac enzymes, BMP, CBC  ·  echocardiogram with bubble study  · PT/OT/ST  · DVT prophylaxis   · BP management - normotensive, with long-term goal <140/90  · Smoking cessation if applicable   · Diabetes education if applicable   · Depression Screening prior to discharge      Subjective:      Laura Mueller is a 80 y.o. male who is being seen for neurological consultation for strokelike symptoms. The patient states that he experienced several episodes of slurred speech and weakness of the right hand beginning on September 16, the longest lasting over an hour. The patient is known to have chronic atrial fibrillation and has been treated successfully with rivaroxaban complicated by chronic iron deficiency anemia requiring iron replacement therapy. He is never had an overt GI hemorrhage. Evaluation for sources of GI blood loss is unclear. Patient denies any prior history of stroke or TIA symptoms    Past Medical History:   Diagnosis Date    Abnormal EKG     Acute diastolic heart failure (HCC)     Atrial fibrillation ([address] 9/10/2011    Atrial flutter ([address] 9/2013    Biotronik biventricular implantable cardioverter defibrillator    Automatic implantable cardioverter-defibrillator in situ 3/30/2016    Breast lump     right- pt states bx was neg-- m. d. \"released\" him    CAD (coronary artery disease)     mi w angioplasty--1995---- mi then cabg--2008    Cardiomyopathy, ischemic 3/30/2016    Chronic    Chest pain     Chronic systolic heart failure ([address]     \"stable\" per cardiology office note (1/2015) and on lasix daily    CKD (chronic kidney disease) stage 3, GFR 30-59 ml/min: Metformin discontinued 9/12/2011    Coronary artery disease 9/10/2011    Diabetes ([address] diag 2002    type2, oral meds, range 96-98, does not know last hgba1c, hypo s/s <70    Heart attack ([address] 1995/2008    History of kidney stones     none in years     Hypercholesteremia 9/10/2011    Hypercholesterolemia     no meds currently    Hypertension diag 1981    controlled with med    Kidney stone     LBBB (left bundle branch block) 9/17/2013    Orthostatic hypotension 3/30/2016    Osteoarthritis     hands    Pneumonia 9/10/2011    Preop cardiovascular exam     Renal insufficiency     S/P total knee arthroplasty 12/11/2015    TIA (transient ischemic attack) 9/18/2019    Unstable angina [facility])      Past Surgical History:   Procedure Laterality Date    HX ANKLE FRACTURE TX Right 5/11/2005    HX BREAST BIOPSY Right 7/2013    HX BUNIONECTOMY Left 2001    HX CARPAL TUNNEL RELEASE Bilateral 1972    HX CATARACT REMOVAL  2006/2009    HX COLONOSCOPY  2003/2010    HX CORONARY ARTERY BYPASS GRAFT  12/19/2008    3 vessel    HX HIP REPLACEMENT Right 12/28/2005    HX IMPLANTABLE CARDIOVERTER DEFIBRILLATOR  9/17/2013    biotronik    HX KNEE ARTHROSCOPY Left 9/17/2009    HX KNEE ARTHROSCOPY Right 4/30/2014    HX KNEE REPLACEMENT Right 12/2015    HX LITHOTRIPSY      x 5    HX ORTHOPAEDIC  2005    right hip    HX OTHER SURGICAL  2002    hydrocele repair and circumcision    HX PACEMAKER      pacemaker- defib    TOTAL KNEE ARTHROPLASTY Left 4/8/2010      Family History   Problem Relation Age of Onset    Heart Disease Sister     Heart Disease Brother     Cancer Brother     Heart Disease Brother     Stroke Sister     Asthma Father     Heart Disease Other     Malignant Hyperthermia Neg Hx     Pseudocholinesterase Deficiency Neg Hx     Delayed Awakening Neg Hx     Post-op Nausea/Vomiting Neg Hx     Emergence Delirium Neg Hx     Post-op Cognitive Dysfunction Neg Hx     Other Neg Hx      Social History     Tobacco Use    Smoking status: Never Smoker    Smokeless tobacco: Never Used   Substance Use Topics    Alcohol use: No      Current Facility-Administered Medications   Medication Dose Route Frequency Provider Last Rate Last Dose    aspirin delayed-release tablet 81 mg  81 mg Oral DAILY Guero Crowell MD   81 mg at 09/18/19 3537    finasteride (PROSCAR) tablet 5 mg  5 mg Oral DAILY Guero Crowell MD   5 mg at 09/18/19 0929    metoprolol succinate (TOPROL-XL) XL tablet 50 mg  50 mg Oral DAILY Guero Crowell MD   50 mg at 09/18/19 0929    nateglinide (STARLIX) 120 mg tablet 120 mg (Patient Supplied)  120 mg Oral TID WITH MEALS Bala Lackey MD        nitroglycerin (NITROSTAT) tablet 0.4 mg  0.4 mg SubLINGual PRN Bala Lackey MD        rivaroxaban (XARELTO) tablet 20 mg  20 mg Oral DAILY WITH Foy Reins, Leopoldo Duster, MD        sodium chloride (NS) flush 5-40 mL  5-40 mL IntraVENous Q8H Bala Lackey MD        sodium chloride (NS) flush 5-40 mL  5-40 mL IntraVENous PRN Bala Lackey MD        ondansetron [facility]) injection 4 mg  4 mg IntraVENous Q6H PRN Bala Lackey MD        atorvastatin (LIPITOR) tablet 40 mg  40 mg Oral QHS Bala Lackey MD        insulin lispro (HUMALOG) injection   SubCUTAneous AC&HS Bala Lackey MD   4 Units at 09/18/19 1152    influenza vaccine 2019-20 (6 mos+)(PF) (FLUARIX/FLULAVAL/FLUZONE QUAD) injection 0.5 mL  0.5 mL IntraMUSCular PRIOR TO DISCHARGE Bala Lackey MD            Allergies   Allergen Reactions    Iodinated Contrast Media Swelling       Review of Systems:  12 point review of systems otherwise negative    Objective:     Vitals:    09/18/19 0358 09/18/19 0458 09/18/19 0529 09/18/19 0800   BP: 154/72 152/70 158/72 161/78   Pulse: 69 73 72 70   Resp: 13 17 18 18   Temp:  98.3 °F (36.8 °C) 97.4 °F (36.3 °C) 98.2 °F (36.8 °C)   SpO2: 95% 97% 95% 97%   Weight:       Height:            Physical Exam:      General: well nourished, appears stated age    Eyes: no proptosis or exophthalmos; conjunctivae clear, sclerae non-icteric    Chest: clear to auscultation    Cardiac: normal S1 S2; no murmurs gallop or rubs    Neurological:    MSE: alert, oriented times 3; fluent speech; no paraphasic errors; follows commands without difficulty    CN 2: visual fields full; no afferent pupillary defect; VA not checked; fundoscopic exam ... CN 3,4,6: Pupils symmetrical in size, reactive to light directly and consensually; no ptosis; full versions and ductions  CN 5: facial sensation intact to light touch and pin prick. Corneal reflex . ..   CN 7: Symmetrical facial tone and movements  CN 8 responds to spoken voice  CN 9,10; palate symmetrical gag intact  CN 11: head turn and shoulder shrug intact  CN 12: tongue midline without atrophy or fasiculations    Motor:  Power 5/5 UE and LE proximal to distal  Fine motor movements symmetrical  Tone normal  Atrophy: absent  Gait: symmetrical arm swing, rises on heels and toes    Cerebellar:  Finger to nose; heel to shin intact  Tandem intact    Sensory  Romberg negative  Intact to primary modalities in all 4 extremities    Reflexes    Symmetrical and normally active at 2+ in UE and LE  Plantar response flexor bilaterally    Recent Results (from the past 24 hour(s))   GLUCOSE, POC    Collection Time: 09/17/19 11:22 PM   Result Value Ref Range    Glucose (POC) 226 (H) 65 - 100 mg/dL   CBC WITH AUTOMATED DIFF    Collection Time: 09/17/19 11:24 PM   Result Value Ref Range    WBC 15.3 (H) 4.3 - 11.1 K/uL    RBC 3.66 (L) 4.23 - 5.6 M/uL    HGB 11.5 (L) 13.6 - 17.2 g/dL    HCT 36.2 (L) 41.1 - 50.3 %    MCV 98.9 (H) 79.6 - 97.8 FL    MCH 31.4 26.1 - 32.9 PG    MCHC 31.8 31.4 - 35.0 g/dL    RDW 17.2 (H) 11.9 - 14.6 %    PLATELET 357 279 - 560 K/uL    MPV 9.7 9.4 - 12.3 FL    ABSOLUTE NRBC 0.00 0.0 - 0.2 K/uL    DF AUTOMATED      NEUTROPHILS 84 (H) 43 - 78 %    LYMPHOCYTES 5 (L) 13 - 44 %    MONOCYTES 9 4.0 - 12.0 %    EOSINOPHILS 1 0.5 - 7.8 %    BASOPHILS 0 0.0 - 2.0 %    IMMATURE GRANULOCYTES 1 0.0 - 5.0 %    ABS. NEUTROPHILS 12.9 (H) 1.7 - 8.2 K/UL    ABS. LYMPHOCYTES 0.8 0.5 - 4.6 K/UL    ABS. MONOCYTES 1.4 (H) 0.1 - 1.3 K/UL    ABS. EOSINOPHILS 0.1 0.0 - 0.8 K/UL    ABS. BASOPHILS 0.0 0.0 - 0.2 K/UL    ABS. IMM.  GRANS. 0.1 0.0 - 0.5 K/UL   PROTHROMBIN TIME + INR    Collection Time: 09/17/19 11:24 PM   Result Value Ref Range    Prothrombin time 32.5 (H) 11.7 - 14.5 sec    INR 3.1     PTT    Collection Time: 09/17/19 11:24 PM   Result Value Ref Range    aPTT 44.9 (H) 24.7 - 75.4 SEC   METABOLIC PANEL, COMPREHENSIVE    Collection Time: 09/17/19 11:24 PM   Result Value Ref Range    Sodium 137 136 - 145 mmol/L    Potassium 4.3 3.5 - 5.1 mmol/L    Chloride 104 98 - 107 mmol/L    CO2 27 21 - 32 mmol/L    Anion gap 6 (L) 7 - 16 mmol/L    Glucose 235 (H) 65 - 100 mg/dL    BUN 23 8 - 23 MG/DL    Creatinine 1.93 (H) 0.8 - 1.5 MG/DL    GFR est AA 43 (L) >60 ml/min/1.73m2    GFR est non-AA 35 (L) >60 ml/min/1.73m2    Calcium 8.9 8.3 - 10.4 MG/DL    Bilirubin, total 0.9 0.2 - 1.1 MG/DL    ALT (SGPT) 34 12 - 65 U/L    AST (SGOT) 23 15 - 37 U/L    Alk.  phosphatase 125 50 - 136 U/L    Protein, total 6.7 6.3 - 8.2 g/dL    Albumin 3.4 3.2 - 4.6 g/dL    Globulin 3.3 2.3 - 3.5 g/dL    A-G Ratio 1.0 (L) 1.2 - 3.5     MAGNESIUM    Collection Time: 09/17/19 11:24 PM   Result Value Ref Range    Magnesium 1.8 1.8 - 2.4 mg/dL   EKG, 12 LEAD, INITIAL    Collection Time: 09/17/19 11:53 PM   Result Value Ref Range    Ventricular Rate 69 BPM    Atrial Rate 187 BPM    QRS Duration 170 ms    Q-T Interval 436 ms    QTC Calculation (Bezet) 467 ms    Calculated R Axis -103 degrees    Calculated T Axis 87 degrees    Diagnosis       Electronic ventricular pacemaker    Confirmed by Lesli Bagley ([id number]) on 9/18/2019 7:33:21 AM     LACTIC ACID    Collection Time: 09/18/19  2:12 AM   Result Value Ref Range    Lactic acid 1.1 0.4 - 2.0 MMOL/L   URINALYSIS W/ RFLX MICROSCOPIC    Collection Time: 09/18/19  4:59 AM   Result Value Ref Range    Color YELLOW      Appearance CLEAR      Specific gravity 1.014 1.001 - 1.023      pH (UA) 6.0 5.0 - 9.0      Protein NEGATIVE  NEG mg/dL    Glucose NEGATIVE  mg/dL    Ketone NEGATIVE  NEG mg/dL    Bilirubin NEGATIVE  NEG      Blood MODERATE (A) NEG      Urobilinogen 0.2 0.2 - 1.0 EU/dL    Nitrites NEGATIVE  NEG      Leukocyte Esterase NEGATIVE  NEG      WBC 0 0 /hpf    RBC 10-20 0 /hpf    Epithelial cells 0 0 /hpf    Bacteria 0 0 /hpf    Casts 0 0 /lpf   CULTURE, URINE    Collection Time: 09/18/19  4:59 AM   Result Value Ref Range    Special Requests: NO SPECIAL REQUESTS      Culture result:        NO GROWTH AFTER SHORT PERIOD OF INCUBATION. FURTHER RESULTS TO FOLLOW AFTER OVERNIGHT INCUBATION. GLUCOSE, POC    Collection Time: 09/18/19  7:17 AM   Result Value Ref Range    Glucose (POC) 114 (H) 65 - 100 mg/dL   GLUCOSE, POC    Collection Time: 09/18/19 11:42 AM   Result Value Ref Range    Glucose (POC) 227 (H) 65 - 100 mg/dL       No results found for: CHOL, CHOLPOCT, CHOLX, CHLST, CHOLV, HDL, HDLPOC, HDLP, LDL, LDLCPOC, LDLC, DLDLP, VLDLC, VLDL, TGLX, TRIGL, TRIGP, TGLPOCT, CHHD, CHHDX     Lab Results   Component Value Date/Time    Hemoglobin A1c 7.5 (H) 12/12/2015 04:43 AM        CT Results (most recent):  Results from [place] encounter on 09/17/19   CT HEAD WO CONT    Narrative CT HEAD WITHOUT CONTRAST     HISTORY:  CVA. COMPARISON: 10/30/2015    TECHNIQUE: Axial imaging was performed without intravenous contrast utilizing  5mm slice thickness. Sagittal and coronal reformats were performed. Radiation  dose reduction techniques were used for this study. Our CT scanner uses one or  all of the following:    Automated exposure control, adjustment of the MAS or KUB according to patient's  size and iterative reconstruction. FINDINGS:        *BRAIN:      -  There are no early signs of territorial or lacunar infarction by CT.     -  No intracranial mass, hematoma, or hydrocephalus. -  For patient's age, the scattered areas of white matter hypodensities may  represent a chronic small vessel white matter ischemia. However this is  nonspecific. *VISUALIZED PARANASAL SINUSES: Well aerated. *MASTOIDS:  Clear. *CALVARIUM AND SCALP: Unremarkable. Impression IMPRESSION:    No acute intracranial abnormalities.     Date of Dictation: 9/18/2019 12:16 AM         Results for orders placed or performed during the hospital encounter of 09/17/19   EKG, 12 LEAD, INITIAL   Result Value Ref Range    Ventricular Rate 69 BPM    Atrial Rate 187 BPM    QRS Duration 170 ms    Q-T Interval 436 ms    QTC Calculation (Bezet) 467 ms    Calculated R Axis -103 degrees    Calculated T Axis 87 degrees    Diagnosis       Electronic ventricular pacemaker    Confirmed by Jacqui Santos ([id number]) on 9/18/2019 7:33:21 AM          MRI Results (most recent): Unable to have MRI due to ACDF    CTA not performed due to renal insufficiency        Most recent MRI  No results found for this or any previous visit.         Signed By: Asha Lopez MD     September 18, 2019

## 2019-09-18 NOTE — INTERDISCIPLINARY ROUNDS
Interdisciplinary team rounds were held 9/18/2019 with the following team members:  Care Management, Physical Therapy, Physician and . Plan of care discussed. See clinical pathway and/or care plan for interventions and desired outcomes.

## 2019-09-18 NOTE — PROGRESS NOTES
OCCUPATIONAL THERAPY: Initial Assessment and Discharge 9/18/2019  OBSERVATION:    [place] / [place] / Product Type: Medicare /      NAME/AGE/GENDER: Alejandro Malone is a 80 y.o. male   PRIMARY DIAGNOSIS:  TIA (transient ischemic attack) [G45.9]  Numbness and tingling in right hand [R20.0, R20.2]  Persistent atrial fibrillation (HCC) [I48.1]  Acute renal failure (ARF) ([facility] [N17.9] <principal problem not specified>   <principal problem not specified>          ICD-10: Treatment Diagnosis:    Other lack of cordination (R27.8)   Precautions/Allergies:     Iodinated contrast media      ASSESSMENT:     Mr. Ayana Zepeda is an 80year old male admitted with R hand numbness, weakness, and difficulty speaking. Currently undergoing workup for possible TIA. At baseline patient lives alone, but his son lives nearby. 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 He is R hand dominant. Patient seated in chair upon arrival after having just finished with PT. Pleasant and agreeable to OT evaluation. Reports no pain. BUE assessment reveals ROM, strength, coordination are all WFL. Sensation intact to light touch. Balance is intact in sitting, impaired in standing. Defer to PT to address balance/ activity tolerance. In terms of ADLs, patient appears to be functioning at or very near his functional baseline. He feels that his RUE deficits have resolved. We discussed warning signs an symptoms of CVA for future reference. No additional OT needs identified at this time. Will sign off.      This section established at most recent assessment   PROBLEM LIST (Impairments causing functional limitations):  Decreased Balance  Decreased Activity Tolerance  (DEFER TO PT TO ADDRESS)    INTERVENTIONS PLANNED: (Benefits and precautions of occupational therapy have been discussed with the patient.)  NONE           REHAB RECOMMENDATIONS (at time of discharge pending progress):    Placement: It is my opinion, based on this patient's performance to date, that Mr. Chelo Daniels may benefit from being discharged with NO further skilled therapy due to the high likelihood of returning to baseline. Equipment:   None at this time              [facility]:   History of Present Injury/Illness (Reason for Referral):  TIA/ CVA workup  Past Medical History/Comorbidities:   Mr. Chelo Daniels  has a past medical history of Abnormal EKG, Acute diastolic heart failure ([address] Atrial fibrillation ([address] (9/10/2011), Atrial flutter ([address] (9/2013), Automatic implantable cardioverter-defibrillator in situ (3/30/2016), Breast lump, CAD (coronary artery disease), Cardiomyopathy, ischemic (3/30/2016), Chest pain, Chronic systolic heart failure ([address] CKD (chronic kidney disease) stage 3, GFR 30-59 ml/min: Metformin discontinued (9/12/2011), Coronary artery disease (9/10/2011), Diabetes ([address] (diag 2002), Heart attack ([address] (1995/2008), History of kidney stones, Hypercholesteremia (9/10/2011), Hypercholesterolemia, Hypertension (diag 1981), Kidney stone, LBBB (left bundle branch block) (9/17/2013), Orthostatic hypotension (3/30/2016), Osteoarthritis, Pneumonia (9/10/2011), Preop cardiovascular exam, Renal insufficiency, S/P total knee arthroplasty (12/11/2015), TIA (transient ischemic attack) (9/18/2019), and Unstable angina ([address] He also has no past medical history of Adverse effect of anesthesia, Dementia, Difficult intubation, Gastrointestinal disorder, Infectious disease, Malignant hyperthermia due to anesthesia, Nausea & vomiting, Pseudocholinesterase deficiency, Sleep disorder, or Unspecified adverse effect of anesthesia.   Mr. Chelo Daniels  has a past surgical history that includes hx coronary artery bypass graft (12/19/2008); hx carpal tunnel release (Bilateral, 1972); hx colonoscopy (2003/2010); hx implantable cardioverter defibrillator (9/17/2013); hx bunionectomy (Left, 2001); hx hip replacement (Right, 12/28/2005); hx breast biopsy (Right, 7/2013); hx other surgical (2002); hx lithotripsy; pr total knee arthroplasty (Left, 4/8/2010); hx knee arthroscopy (Left, 9/17/2009); hx ankle fracture tx (Right, 5/11/2005); hx knee arthroscopy (Right, 4/30/2014); hx knee replacement (Right, 12/2015); hx orthopaedic (2005); hx cataract removal (2006/2009); and hx pacemaker. Social History/Living Environment:   Home Environment: Private residence  # Steps to Enter: 1  Wheelchair Ramp: No  One/Two Story Residence: One story  Living Alone: Yes  Support Systems: Child(aleksey)  Patient Expects to be Discharged to[de-identified] Private residence  Current DME Used/Available at Home: Cane, straight, Walker, rolling  Prior Level of Function/Work/Activity:  At baseline patient lives alone, but his son lives nearby. 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 He is R hand dominant. Number of Personal Factors/Comorbidities that affect the Plan of Care: Brief history (0):  LOW COMPLEXITY   ASSESSMENT OF OCCUPATIONAL PERFORMANCE[de-identified]   Activities of Daily Living:   Basic ADLs (From Assessment) Complex ADLs (From Assessment)   Feeding: Independent  Oral Facial Hygiene/Grooming: Independent  Bathing: Modified independent  Upper Body Dressing: Independent  Lower Body Dressing: Modified independent  Toileting: Modified independent Instrumental ADL  Meal Preparation: Minimum assistance  Homemaking: Maximum assistance   Grooming/Bathing/Dressing Activities of Daily Living     Cognitive Retraining  Safety/Judgement: Awareness of environment; Fall prevention                       Bed/Mat Mobility  Rolling: Stand-by assistance  Supine to Sit: Stand-by assistance  Sit to Supine: Stand-by assistance  Sit to Stand: Contact guard assistance  Stand to Sit: Contact guard assistance  Bed to Chair: Contact guard assistance  Scooting: Stand-by assistance     Most Recent Physical Functioning:   Gross Assessment:  AROM: Within functional limits  Strength: Within functional limits  Coordination: Within functional limits  Sensation: Intact               Posture:     Balance:  Sitting: Intact  Standing: Impaired  Standing - Static: Good  Standing - Dynamic : Fair Bed Mobility:  Rolling: Stand-by assistance  Supine to Sit: Stand-by assistance  Sit to Supine: Stand-by assistance  Scooting: Stand-by assistance  Wheelchair Mobility:     Transfers:  Sit to Stand: Contact guard assistance  Stand to Sit: Contact guard assistance  Bed to Chair: Contact guard assistance  Interventions: Safety awareness training;Verbal cues            Patient Vitals for the past 6 hrs:   BP BP Patient Position SpO2 Pulse   19 1200 156/75 At rest 94 % 70   19 1404 -- -- 97 % 68       Mental Status  Neurologic State: Alert  Orientation Level: Oriented X4  Cognition: Follows commands  Perception: Appears intact  Perseveration: No perseveration noted  Safety/Judgement: Awareness of environment, Fall prevention                          Physical Skills Involved:  Balance  Activity Tolerance Cognitive Skills Affected (resulting in the inability to perform in a timely and safe manner):  NONE  Psychosocial Skills Affected:  Habits/Routines  Self-Awareness   Number of elements that affect the Plan of Care: 3-5:  MODERATE COMPLEXITY   CLINICAL DECISION MAKIN [facility] [address] [address] AM-PAC 6 Clicks   Daily Activity Inpatient Short Form  How much help from another person does the patient currently need. .. Total A Lot A Little None   1. Putting on and taking off regular lower body clothing? ? 1   ? 2   ? 3   ? 4   2. Bathing (including washing, rinsing, drying)? ? 1   ? 2   ? 3   ? 4   3. Toileting, which includes using toilet, bedpan or urinal?   ? 1   ? 2   ? 3   ? 4   4. Putting on and taking off regular upper body clothing? ? 1   ? 2   ? 3   ? 4   5.  Taking care of personal grooming such as brushing teeth? ? 1   ? 2   ? 3   ? 4   6. Eating meals? ? 1   ? 2   ? 3   ? 4   © 2007, Trustees of [address] [place] [address] [address] [address], under license to [employer]. All rights reserved      Score:  Initial: 24 Most Recent: X (Date: -- )    Interpretation of Tool:  Represents activities that are increasingly more difficult (i.e. Bed mobility, Transfers, Gait).        Use of outcome tool(s) and clinical judgement create a POC that gives a: LOW COMPLEXITY         TREATMENT:   (In addition to Assessment/Re-Assessment sessions the following treatments were rendered)     Pre-treatment Symptoms/Complaints:    Pain: Initial:   Pain Intensity 1: 0  Post Session:  none      Assessment/Reassessment only, no treatment provided today    Braces/Orthotics/Lines/Etc:   IV  O2 Device: Room air  Treatment/Session Assessment:    Response to Treatment:  tolerated well   Interdisciplinary Collaboration:   Physical Therapist  Occupational Therapist  Registered Nurse  After treatment position/precautions:   Up in chair  Bed alarm/tab alert on  Bed/Chair-wheels locked  Call light within reach  RN notified     Total Treatment Duration:  OT Patient Time In/Time Out  Time In: 1431  Time Out: [address], OTR/L

## 2019-09-18 NOTE — PROGRESS NOTES
09/18/19 0529   Dual Skin Pressure Injury Assessment   Dual Skin Pressure Injury Assessment WDL   Second Care Provider (Based on [address] [place] [address]) [place]   Skin Integumentary   Skin Integumentary (WDL) X   Skin Color Appropriate for ethnicity   Skin Condition/Temp Warm;Dry;Petechiae  (red spots all over abd, back, neck head, scabs on back)   Skin Integrity Abrasion  (above right eyebrow)   Turgor Non-tenting   Hair Growth Present   Varicosities Present   Wound Prevention and Protection Methods   Orientation of Wound Prevention Posterior   Location of Wound Prevention Sacrum/Coccyx   Dressing Present  No   Wound Offloading (Prevention Methods) Repositioning  (turns self)

## 2019-09-18 NOTE — PROGRESS NOTES
TRANSFER - IN REPORT:    Verbal report received from Luis E Abreu on [address]  being received from ER for routine progression of care      Report consisted of patients Situation, Background, Assessment and   Recommendations(SBAR). Information from the following report(s) SBAR, Kardex, ED Summary, Intake/Output, MAR and Recent Results was reviewed with the receiving nurse. Opportunity for questions and clarification was provided. Assessment will be completed completed upon patients arrival to unit and care assumed.

## 2019-09-18 NOTE — PROGRESS NOTES
Problem: Dysphagia (Adult)  Goal: *Acute Goals and Plan of Care (Insert Text)  Description  LTG: Patient will tolerate least restrictive diet without overt signs or symptoms of airway compromise. STG: Patient will tolerate regular diet and thin liquids without overt signs or symptoms of airway compromise. STG: Patient will participate in modified barium swallow study as clinically indicated. Outcome: Progressing Towards Goal  STG: Patient will participate in speech/language/cognitive evaluation if deemed appropriate pending imaging   OBSERVATION STATUS  SPEECH LANGUAGE PATHOLOGY: DYSPHAGIA- Initial Assessment    NAME/AGE/GENDER: Karen Diez is a 80 y.o. male  DATE: 9/18/2019  PRIMARY DIAGNOSIS: TIA (transient ischemic attack) [G45.9]  Numbness and tingling in right hand [R20.0, R20.2]  Persistent atrial fibrillation ([address] [I48.1]  Acute renal failure (ARF) ([address] [N17.9]       ICD-10: Treatment Diagnosis: R13.12 Dysphagia, Oropharyngeal Phase    INTERDISCIPLINARY COLLABORATION: n/a   PRECAUTIONS/ALLERGIES: Iodinated contrast media       SUBJECTIVE   Sitting edge of bed upon entry, just finished breakfast tray. Pleasant and very talkative throughout evaluation. Reports some word finding difficulty.       Diet Prior to Evaluation: diabetic regular/thin liquids      History of Present Injury/Illness: Mr. Chelo Daniels  has a past medical history of Abnormal EKG, Acute diastolic heart failure ([address] Atrial fibrillation ([address] (9/10/2011), Atrial flutter ([address] (9/2013), Automatic implantable cardioverter-defibrillator in situ (3/30/2016), Breast lump, CAD (coronary artery disease), Cardiomyopathy, ischemic (3/30/2016), Chest pain, Chronic systolic heart failure ([address] CKD (chronic kidney disease) stage 3, GFR 30-59 ml/min: Metformin discontinued (9/12/2011), Coronary artery disease (9/10/2011), Diabetes ([address] (diag 2002), Heart attack ([address] (1995/2008), History of kidney stones, Hypercholesteremia (9/10/2011), Hypercholesterolemia, Hypertension (diag 1981), Kidney stone, LBBB (left bundle branch block) (9/17/2013), Orthostatic hypotension (3/30/2016), Osteoarthritis, Pneumonia (9/10/2011), Preop cardiovascular exam, Renal insufficiency, S/P total knee arthroplasty (12/11/2015), TIA (transient ischemic attack) (9/18/2019), and Unstable angina ([facility] He also has no past medical history of Adverse effect of anesthesia, Dementia, Difficult intubation, Gastrointestinal disorder, Infectious disease, Malignant hyperthermia due to anesthesia, Nausea & vomiting, Pseudocholinesterase deficiency, Sleep disorder, or Unspecified adverse effect of anesthesia. Symonea January He also  has a past surgical history that includes hx coronary artery bypass graft (12/19/2008); hx carpal tunnel release (Bilateral, 1972); hx colonoscopy (2003/2010); hx implantable cardioverter defibrillator (9/17/2013); hx bunionectomy (Left, 2001); hx hip replacement (Right, 12/28/2005); hx breast biopsy (Right, 7/2013); hx other surgical (2002); hx lithotripsy; pr total knee arthroplasty (Left, 4/8/2010); hx knee arthroscopy (Left, 9/17/2009); hx ankle fracture tx (Right, 5/11/2005); hx knee arthroscopy (Right, 4/30/2014); hx knee replacement (Right, 12/2015); hx orthopaedic (2005); hx cataract removal (2006/2009); and hx pacemaker. Previous Dysphagia: NONE REPORTED    Problem List:  (Impairments causing functional limitations):  Functional oral  ? pharyngeal    Orientation:   Person  Place  Time  Situation     Pain: Pain Scale 1: Numeric (0 - 10)  Pain Intensity 1: 0         OBJECTIVE   Oral Motor Assessment:  Labial: No impairment  Dentition: Natural and upper and lower partials at home   Oral Hygiene: Adequate  Lingual: No impairment     Swallow assessment:   Patient presented with thin liquid via cup and straw, mixed, and solid consistencies. Appropriate oral prep with all textures. Timely swallow initiation, and single swallows upon palpation.  Adequate oral clearing. Throat clearing on 2 occasions across container of pears and cracker. Single delayed cough with initial serial sips of thin liquids, however tolerated additional 6oz via straw without overt s/sx of airway compromise. ASSESSMENT   Patient presents with functional oral phase, despite missing partials and ? Pharyngeal dysphagia. Throat clearing did not appear directly related to po intake and patient tolerated additional serial sips in isolation and as liquid wash without difficulty. Continue current diet: regular/thin liquids. Medications with liquid wash. Will follow up x1 for diet tolerance. Tool Used: Dysphagia Outcome and Severity Scale (CHAR)    Score Comments   Normal Diet  ? 7 With no strategies or extra time needed   Functional Swallow  ? 6 May have mild oral or pharyngeal delay   Mild Dysphagia  ? 5 Which may require one diet consistency restricted    Mild-Moderate Dysphagia  ? 4 With 1-2 diet consistencies restricted   Moderate Dysphagia  ? 3 With 2 or more diet consistencies restricted   Moderate-Severe Dysphagia  ? 2 With partial PO strategies (trials with ST only)   Severe Dysphagia  ? 1 With inability to tolerate any PO safely      Score:  Initial: 6 Most Recent:  (Date 09/18/19 )   Interpretation of Tool: The Dysphagia Outcome and Severity Scale (CHAR) is a simple, easy-to-use, 7-point scale developed to systematically rate the functional severity of dysphagia based on objective assessment and make recommendations for diet level, independence level, and type of nutrition. Current Medications:   No current facility-administered medications on file prior to encounter. Current Outpatient Medications on File Prior to Encounter   Medication Sig Dispense Refill    ferrous sulfate 324 mg (65 mg iron) tablet Take  by mouth two (2) times daily (with meals). amoxicillin (AMOXIL) 875 mg tablet Take 500 mg by mouth three (3) times daily.  Indications: tooth infection glucosamine (GLUCOSAMINE RELIEF) 1,000 mg tab Take 1,000 mg by mouth two (2) times a day. rivaroxaban (XARELTO) 20 mg tab tablet Take 1 Tab by mouth nightly. 30 Tab 11    metoprolol succinate (TOPROL-XL) 50 mg XL tablet Take 50 mg by mouth daily. finasteride (PROSCAR) 5 mg tablet Take 1 Tab by mouth daily. 30 Tab 12    aspirin delayed-release 81 mg tablet Take  by mouth daily. FOLIC ACID/MULTIVIT-MIN/LUTEIN (CENTRUM SILVER PO) Take  by mouth daily. Last taken 4/7/16      nateglinide (STARLIX) 120 mg tablet Take 120 mg by mouth Before breakfast, lunch, and dinner. Indications: TYPE 2 DIABETES MELLITUS      TYLENOL ARTHRITIS PAIN 650 mg TbSR Take 650 mg by mouth as needed. 2 tabs twice daily      nitroglycerin (NITROSTAT) 0.4 mg SL tablet Place 1 sl under the tongue q 5 min prn cp, max 3 sl in a 15-min time period. Call 911 if no relief after the 3rd sl. 1 Bottle prn    metFORMIN (GLUCOPHAGE) 500 mg tablet Take 500 mg by mouth two (2) times daily (with meals). furosemide (LASIX) 40 mg tablet Take  by mouth every morning. PLAN    FREQUENCY/DURATION: Continue to follow patient 2 times a week for duration of hospital stay to address above goals. - Recommendations for next treatment session: Next treatment will address diet tolerance and cognitive linguistic evaluation      REHABILITATION POTENTIAL FOR STATED GOALS: Excellent     COMPLIANCE WITH PROGRAM/EXERCISES: Will assess as treatment progresses    CONTINUATION OF SKILLED SERVICES/MEDICAL NECESSITY:  Patient is expected to demonstrate progress in  swallow strength, swallow timeliness, swallow function, diet tolerance and swallow safety in order to  improve swallow safety and decrease aspiration risk. Patient continues to require skilled intervention due to inconsistent findings.           RECOMMENDATIONS   DIET:   PO:  Regular  Liquids:  regular thin    MEDICATIONS: With liquid     ASPIRATION PRECAUTIONS  Slow rate of intake  Small bites/sips  Upright at 90 degrees during meal     COMPENSATORY STRATEGIES/MODIFICATIONS  Small sips and bites     EDUCATION:  Recommendations discussed with Patient     RECOMMENDATIONS for CONTINUED SPEECH THERAPY:   YES: Anticipate need for ongoing speech therapy during this hospitalization. Potentially at next level pending cognitive linguistic evaluation.       SAFETY:  After treatment position/precautions:  Call light within reach      Total Treatment Duration:   Time In: 1057  Time Out: 100 [facility] [address] [facility]

## 2019-09-18 NOTE — ED TRIAGE NOTES
Pt arrives with son with c/o right hand numbness and difficulty speaking. Onset of symptoms Monday AM. States intermittent since onset with worsening tonight. Reports constant since approx 2030 tonight. Pt with delayed speech in triage, no slurring noted on arrival. Denies numbness other than to right hand. Denies chest pain or shortness of breath. Denies head pain, vision changes, dizziness. Ambulatory into triage without difficulty. Reports unsteady gait at home. States seen by pmd  This morning for same complaints, states is supposed to be scheduling MRI. Reports low hgb this AM. Hx DM. No facial droop, no arm drift, left  strength noted weak.  A/o x3. bgl [id number]

## 2019-09-18 NOTE — ED PROVIDER NOTES
80-year-old male presenting for transient episode of word finding difficulty, slurred speech and weak right hand. Most recent episode was about 930 lasted about an hour and has now resolved. He denies any vision changes, headache, nausea, vomiting, palpitations or any other symptoms along with it. He has had several episodes of this over the past 24 hours. He was actually seen by his doctor yesterday for weakness in the right hand that had resolved by the time he was seen and had an outpatient MRI scheduled. He is been mostly preoccupied recent with a tooth that is been hurting him and his inability to be seen by a dentist.      The history is provided by the patient and a relative. Numbness   This is a new problem. The current episode started 1 to 2 hours ago. The problem has been resolved. There was left upper extremity focality noted. Primary symptoms include focal weakness, slurred speech and speech difficulty. Pertinent negatives include no loss of sensation, no loss of balance, no memory loss, no movement disorder, no agitation, no visual change, no auditory change, no mental status change, no unresponsiveness and no disorientation. There has been no fever. Pertinent negatives include no shortness of breath, no chest pain, no vomiting, no altered mental status, no confusion, no headaches, no choking, no nausea, no bowel incontinence and no bladder incontinence. Associated medical issues do not include trauma, mood changes, bleeding disorder, seizures, dementia, CVA or clotting disorder. Past Medical History:   Diagnosis Date    Abnormal EKG     Acute diastolic heart failure (HCC)     Atrial fibrillation (HCC) 9/10/2011    Atrial flutter ([address] 9/2013    Biotronik biventricular implantable cardioverter defibrillator    Automatic implantable cardioverter-defibrillator in situ 3/30/2016    Breast lump     right- pt states bx was neg-- m. d. \"released\" him    CAD (coronary artery disease)     mi w angioplasty--1995---- mi then cabg--2008    Cardiomyopathy, ischemic 3/30/2016    Chronic    Chest pain     Chronic systolic heart failure (HCC)     \"stable\" per cardiology office note (1/2015) and on lasix daily    CKD (chronic kidney disease) stage 3, GFR 30-59 ml/min: Metformin discontinued 9/12/2011    Coronary artery disease 9/10/2011    Diabetes ([facility] diag 2002    type2, oral meds, range 96-98, does not know last hgba1c, hypo s/s <70    Heart attack ([facility] [address] 1995/2008    History of kidney stones     none in years     Hypercholesteremia 9/10/2011    Hypercholesterolemia     no meds currently    Hypertension diag 1981    controlled with med    Kidney stone     LBBB (left bundle branch block) 9/17/2013    Orthostatic hypotension 3/30/2016    Osteoarthritis     hands    Pneumonia 9/10/2011    Preop cardiovascular exam     Renal insufficiency     S/P total knee arthroplasty 12/11/2015    Unstable angina [facility])        Past Surgical History:   Procedure Laterality Date    HX ANKLE FRACTURE TX Right 5/11/2005    HX BREAST BIOPSY Right 7/2013    HX BUNIONECTOMY Left 2001    HX CARPAL TUNNEL RELEASE Bilateral 1972    HX CATARACT REMOVAL  2006/2009    HX COLONOSCOPY  2003/2010    HX CORONARY ARTERY BYPASS GRAFT  12/19/2008    3 vessel    HX HIP REPLACEMENT Right 12/28/2005    HX IMPLANTABLE CARDIOVERTER DEFIBRILLATOR  9/17/2013    biotronik    HX KNEE ARTHROSCOPY Left 9/17/2009    HX KNEE ARTHROSCOPY Right 4/30/2014    HX KNEE REPLACEMENT Right 12/2015    HX LITHOTRIPSY      x 5    HX ORTHOPAEDIC  2005    right hip    HX OTHER SURGICAL  2002    hydrocele repair and circumcision    HX PACEMAKER      pacemaker- defib    TOTAL KNEE ARTHROPLASTY Left 4/8/2010         Family History:   Problem Relation Age of Onset    Heart Disease Sister     Heart Disease Brother     Cancer Brother     Heart Disease Brother     Stroke Sister     Asthma Father     Heart Disease Other     Malignant Hyperthermia Neg Hx     Pseudocholinesterase Deficiency Neg Hx     Delayed Awakening Neg Hx     Post-op Nausea/Vomiting Neg Hx     Emergence Delirium Neg Hx     Post-op Cognitive Dysfunction Neg Hx     Other Neg Hx        Social History     Socioeconomic History    Marital status:      Spouse name: Not on file    Number of children: Not on file    Years of education: Not on file    Highest education level: Not on file   Occupational History    Not on file   Social Needs    Financial resource strain: Not on file    Food insecurity:     Worry: Not on file     Inability: Not on file    Transportation needs:     Medical: Not on file     Non-medical: Not on file   Tobacco Use    Smoking status: Never Smoker    Smokeless tobacco: Never Used   Substance and Sexual Activity    Alcohol use: No    Drug use: No    Sexual activity: Not on file   Lifestyle    Physical activity:     Days per week: Not on file     Minutes per session: Not on file    Stress: Not on file   Relationships    Social connections:     Talks on phone: Not on file     Gets together: Not on file     Attends Yarsani service: Not on file     Active member of club or organization: Not on file     Attends meetings of clubs or organizations: Not on file     Relationship status: Not on file    Intimate partner violence:     Fear of current or ex partner: Not on file     Emotionally abused: Not on file     Physically abused: Not on file     Forced sexual activity: Not on file   Other Topics Concern    Not on file   Social History Narrative    , lives with wife. Was in [address] [place] [address] for 4 years. Worked in sales. Now works with [employer] part time. ALLERGIES: Iodinated contrast media    Review of Systems   Respiratory: Negative for choking and shortness of breath. Cardiovascular: Negative for chest pain. Gastrointestinal: Negative for bowel incontinence, nausea and vomiting.    Genitourinary: Negative for bladder incontinence. Neurological: Positive for focal weakness, speech difficulty and numbness. Negative for headaches and loss of balance. Psychiatric/Behavioral: Negative for agitation, confusion and memory loss. All other systems reviewed and are negative. Vitals:    09/17/19 2314 09/17/19 2345 09/17/19 2351   BP: 157/67 138/65    Pulse: 72 70    Resp: 20 16    Temp: 98.6 °F (37 °C)     SpO2: 97% 95% 96%   Weight: 93.4 kg (206 lb)     Height: 6' 1\" (1.854 m)              Physical Exam   Constitutional: He is oriented to person, place, and time. He appears well-developed and well-nourished. HENT:   Head: Normocephalic and atraumatic. Eyes: Pupils are equal, round, and reactive to light. Conjunctivae and EOM are normal.   Neck: Normal range of motion. Neck supple. Cardiovascular: Normal rate, regular rhythm, normal heart sounds and intact distal pulses. Pulmonary/Chest: Effort normal and breath sounds normal.   Abdominal: Soft. Bowel sounds are normal.   Musculoskeletal: Normal range of motion. He exhibits no deformity. Neurological: He is alert and oriented to person, place, and time. No cranial nerve deficit. Skin: Skin is warm and dry. Psychiatric: He has a normal mood and affect. His behavior is normal.   Nursing note and vitals reviewed. MDM  Number of Diagnoses or Management Options  TIA (transient ischemic attack):   Diagnosis management comments: 80-year-old male presenting for transient episode of right hand weakness, slurred speech, and word finding if occult he. Concern for TIA given that his symptoms have resolved. He is on Xarelto so this precludes him from being a TPA candidate. We will get a head CT, chest x-ray, EKG, basic labs and likely bring the patient into the hospital for TIA work-up.        Amount and/or Complexity of Data Reviewed  Clinical lab tests: ordered and reviewed (Results for orders placed or performed during the hospital encounter of 09/17/19  -CBC WITH AUTOMATED DIFF       Result                      Value             Ref Range           WBC                         15.3 (H)          4.3 - 11.1 K*       RBC                         3.66 (L)          4.23 - 5.6 M*       HGB                         11.5 (L)          13.6 - 17.2 *       HCT                         36.2 (L)          41.1 - 50.3 %       MCV                         98.9 (H)          79.6 - 97.8 *       MCH                         31.4              26.1 - 32.9 *       MCHC                        31.8              31.4 - 35.0 *       RDW                         17.2 (H)          11.9 - 14.6 %       PLATELET                    219               150 - 450 K/*       MPV                         9.7               9.4 - 12.3 FL       ABSOLUTE NRBC               0.00              0.0 - 0.2 K/*       DF                          AUTOMATED                             NEUTROPHILS                 84 (H)            43 - 78 %           LYMPHOCYTES                 5 (L)             13 - 44 %           MONOCYTES                   9                 4.0 - 12.0 %        EOSINOPHILS                 1                 0.5 - 7.8 %         BASOPHILS                   0                 0.0 - 2.0 %         IMMATURE GRANULOCYTES       1                 0.0 - 5.0 %         ABS. NEUTROPHILS            12.9 (H)          1.7 - 8.2 K/*       ABS. LYMPHOCYTES            0.8               0.5 - 4.6 K/*       ABS. MONOCYTES              1.4 (H)           0.1 - 1.3 K/*       ABS. EOSINOPHILS            0.1               0.0 - 0.8 K/*       ABS. BASOPHILS              0.0               0.0 - 0.2 K/*       ABS. IMM.  GRANS.            0.1               0.0 - 0.5 K/*  -PROTHROMBIN TIME + INR       Result                      Value             Ref Range           Prothrombin time            32.5 (H)          11.7 - 14.5 *       INR                         3.1                              -PTT       Result                      Value             Ref Range           aPTT                        44.9 (H)          24.7 - 06.4 *  -METABOLIC PANEL, COMPREHENSIVE       Result                      Value             Ref Range           Sodium                      137               136 - 145 mm*       Potassium                   4.3               3.5 - 5.1 mm*       Chloride                    104               98 - 107 mmo*       CO2                         27                21 - 32 mmol*       Anion gap                   6 (L)             7 - 16 mmol/L       Glucose                     235 (H)           65 - 100 mg/*       BUN                         23                8 - 23 MG/DL        Creatinine                  1.93 (H)          0.8 - 1.5 MG*       GFR est AA                  43 (L)            >60 ml/min/1*       GFR est non-AA              35 (L)            >60 ml/min/1*       Calcium                     8.9               8.3 - 10.4 M*       Bilirubin, total            0.9               0.2 - 1.1 MG*       ALT (SGPT)                  34                12 - 65 U/L         AST (SGOT)                  23                15 - 37 U/L         Alk. phosphatase            125               50 - 136 U/L        Protein, total              6.7               6.3 - 8.2 g/*       Albumin                     3.4               3.2 - 4.6 g/*       Globulin                    3.3               2.3 - 3.5 g/*       A-G Ratio                   1.0 (L)           1.2 - 3.5      -MAGNESIUM       Result                      Value             Ref Range           Magnesium                   1.8               1.8 - 2.4 mg*  -GLUCOSE, POC       Result                      Value             Ref Range           Glucose (POC)               226 (H)           65 - 100 mg/*  )  Tests in the radiology section of CPT®: ordered and reviewed (Ct Head Wo Cont    Result Date: 9/18/2019  CT HEAD WITHOUT CONTRAST HISTORY:  CVA.  COMPARISON: 10/30/2015 TECHNIQUE: Axial imaging was performed without intravenous contrast utilizing 5mm slice thickness. Sagittal and coronal reformats were performed. Radiation dose reduction techniques were used for this study. Our CT scanner uses one or all of the following: Automated exposure control, adjustment of the MAS or KUB according to patient's size and iterative reconstruction. FINDINGS:    *BRAIN:    -  There are no early signs of territorial or lacunar infarction by CT.    -  No intracranial mass, hematoma, or hydrocephalus. -  For patient's age, the scattered areas of white matter hypodensities may represent a chronic small vessel white matter ischemia. However this is nonspecific. *VISUALIZED PARANASAL SINUSES: Well aerated. *MASTOIDS:  Clear. *CALVARIUM AND SCALP: Unremarkable. IMPRESSION: No acute intracranial abnormalities. Date of Dictation: 9/18/2019 12:16 AM     Xr Chest Port    Result Date: 9/18/2019  EXAM: XR CHEST PORT INDICATION: Patient alteration of awareness COMPARISON: 12/3/2015 FINDINGS: A portable AP radiograph of the chest was obtained at 0001 hours. The patient is on a cardiac monitor. The lungs are clear. The cardiac and mediastinal contours and pulmonary vascularity are normal.  Median sternotomy. Unremarkable AICD. IMPRESSION: No acute cardiopulmonary disease.    )  Tests in the medicine section of CPT®: ordered and reviewed  Decide to obtain previous medical records or to obtain history from someone other than the patient: yes  Discuss the patient with other providers: yes (Discussed with the hospitalist who will assume care for TIA work-up and risk stratification)  Independent visualization of images, tracings, or specimens: yes    Risk of Complications, Morbidity, and/or Mortality  Presenting problems: high  Diagnostic procedures: high  Management options: high  General comments: I personally reviewed the patient's vital signs, laboratory tests, and/or radiological findings.   I discussed these findings with the patient and their significance. I answered all questions and explained that given these findings there is significant concern for increased morbidity and/or mortality without immediate intervention. As a result, I recommended admission to the hospital, consulted the appropriate service, and transitioned care to that service in improved condition      Patient Progress  Patient progress: improved    ED Course as of Sep 18 0043   Wed Sep 18, 2019   0035 Labs do show hyperglycemia, mild LALY, leukocytosis of unclear etiology. He has no signs of infection other than complaints about his tooth so is possible its mostly from that.     [JS]   0036 Consulting the hospitalist for admission    [JS]   [phone] I discussed the patient's elevated white blood cell count and should blood glucose with him and he admits that he is recently treated with a course of steroids for some arthritis in his thumb    [JS]      ED Course User Index  [JS] Andrea Lawrence MD       Procedures

## 2019-09-18 NOTE — PROGRESS NOTES
09/18/19 0700   NIH Stroke Scale   Interval Other (comment)  (shift change with Neoma Rogelio)   LOC 0   LOC Questions 0   LOC Commands 0   Best Gaze 0   Visual 0   Facial Palsy 0   Motor Right Arm 0   Motor Left Arm 0   Motor Right Leg 0   Motor Left Leg 0   Limb Ataxia 0   Sensory 0   Best Language 0   Dysarthria 0   Extinction and Inattention 0   Total 0

## 2019-09-19 VITALS
WEIGHT: 206 LBS | DIASTOLIC BLOOD PRESSURE: 90 MMHG | OXYGEN SATURATION: 97 % | RESPIRATION RATE: 18 BRPM | HEIGHT: 73 IN | BODY MASS INDEX: 27.3 KG/M2 | HEART RATE: 72 BPM | TEMPERATURE: 98.1 F | SYSTOLIC BLOOD PRESSURE: 163 MMHG

## 2019-09-19 PROBLEM — N17.9 AKI (ACUTE KIDNEY INJURY) (HCC): Status: ACTIVE | Noted: 2019-09-19

## 2019-09-19 LAB
CHOLEST SERPL-MCNC: 104 MG/DL
CREAT SERPL-MCNC: 1.2 MG/DL (ref 0.8–1.5)
ERYTHROCYTE [DISTWIDTH] IN BLOOD BY AUTOMATED COUNT: 17.1 % (ref 11.9–14.6)
EST. AVERAGE GLUCOSE BLD GHB EST-MCNC: 166 MG/DL
GLUCOSE BLD STRIP.AUTO-MCNC: 165 MG/DL (ref 65–100)
GLUCOSE BLD STRIP.AUTO-MCNC: 192 MG/DL (ref 65–100)
HBA1C MFR BLD: 7.4 % (ref 4.8–6)
HCT VFR BLD AUTO: 33.2 % (ref 41.1–50.3)
HDLC SERPL-MCNC: 49 MG/DL (ref 40–60)
HDLC SERPL: 2.1 {RATIO}
HGB BLD-MCNC: 10.5 G/DL (ref 13.6–17.2)
LDLC SERPL CALC-MCNC: 40 MG/DL
LIPID PROFILE,FLP: NORMAL
MCH RBC QN AUTO: 31.5 PG (ref 26.1–32.9)
MCHC RBC AUTO-ENTMCNC: 31.6 G/DL (ref 31.4–35)
MCV RBC AUTO: 99.7 FL (ref 79.6–97.8)
NRBC # BLD: 0 K/UL (ref 0–0.2)
PLATELET # BLD AUTO: 177 K/UL (ref 150–450)
PMV BLD AUTO: 9.2 FL (ref 9.4–12.3)
RBC # BLD AUTO: 3.33 M/UL (ref 4.23–5.6)
TRIGL SERPL-MCNC: 75 MG/DL (ref 35–150)
VLDLC SERPL CALC-MCNC: 15 MG/DL (ref 6–23)
WBC # BLD AUTO: 8.2 K/UL (ref 4.3–11.1)

## 2019-09-19 PROCEDURE — 83036 HEMOGLOBIN GLYCOSYLATED A1C: CPT

## 2019-09-19 PROCEDURE — 36415 COLL VENOUS BLD VENIPUNCTURE: CPT

## 2019-09-19 PROCEDURE — 85027 COMPLETE CBC AUTOMATED: CPT

## 2019-09-19 PROCEDURE — 74011250637 HC RX REV CODE- 250/637: Performed by: HOSPITALIST

## 2019-09-19 PROCEDURE — 65660000000 HC RM CCU STEPDOWN

## 2019-09-19 PROCEDURE — 99218 HC RM OBSERVATION: CPT

## 2019-09-19 PROCEDURE — 82962 GLUCOSE BLOOD TEST: CPT

## 2019-09-19 PROCEDURE — 80061 LIPID PANEL: CPT

## 2019-09-19 PROCEDURE — 82565 ASSAY OF CREATININE: CPT

## 2019-09-19 PROCEDURE — 74011636637 HC RX REV CODE- 636/637: Performed by: HOSPITALIST

## 2019-09-19 RX ORDER — LISINOPRIL 20 MG/1
20 TABLET ORAL DAILY
Qty: 30 TAB | Refills: 0 | Status: SHIPPED | OUTPATIENT
Start: 2019-09-19 | End: 2019-11-06

## 2019-09-19 RX ORDER — ATORVASTATIN CALCIUM 40 MG/1
80 TABLET, FILM COATED ORAL
Qty: 30 TAB | Refills: 0 | Status: ON HOLD | OUTPATIENT
Start: 2019-09-19 | End: 2019-12-17 | Stop reason: DRUGHIGH

## 2019-09-19 RX ADMIN — NATEGLINIDE 120 MG: 120 TABLET ORAL at 08:00

## 2019-09-19 RX ADMIN — ASPIRIN 81 MG: 81 TABLET ORAL at 07:39

## 2019-09-19 RX ADMIN — METOPROLOL SUCCINATE 50 MG: 50 TABLET, EXTENDED RELEASE ORAL at 07:40

## 2019-09-19 RX ADMIN — FINASTERIDE 5 MG: 5 TABLET, FILM COATED ORAL at 07:39

## 2019-09-19 RX ADMIN — INSULIN LISPRO 2 UNITS: 100 INJECTION, SOLUTION INTRAVENOUS; SUBCUTANEOUS at 07:45

## 2019-09-19 RX ADMIN — Medication 10 ML: at 05:10

## 2019-09-19 NOTE — PROGRESS NOTES
Patient resting in bed side chair. Denies any discomfort. Will continue to monitor.        Rosalie PIMENTEL

## 2019-09-19 NOTE — DISCHARGE INSTRUCTIONS
Stroke: After Your Visit     Your Care Instructions     You have had a stroke. Risk factors for stroke include being overweight, smoking, and sedentary lifestyle. This means that the blood flow to a part of your brain was blocked for some time, which damages the nerve cells in that part of the brain. The part of your body controlled by that part of your brain may not function properly now. The brain is an amazing organ that can heal itself to some degree. The stroke you had damaged part of your brain, but other parts of your brain may take over in some way for the damaged areas. You have already started this process. Going home may be hard for you and your family. The more you can try to do for yourself, the better. Remember to take each day one at a time. Follow-up care is a key part of your treatment and safety. Be sure to make and go to all appointments, and call your doctor if you are having problems. Its also a good idea to know your test results and keep a list of the medicines you take. How can you care for yourself at home? Enter a stroke rehabilitation (rehab) program, if your doctor recommends it. Physical, speech, and occupational therapies can help you manage bathing, dressing, eating, and other basics of daily living. Eat a heart-healthy diet that is low in cholesterol, saturated fat, and salt. Eat lots of fresh fruits and vegetables and foods high in fiber. Increase your activities slowly. Take short rest breaks when you get tired. Gradually increase the amount you walk. Start out by walking a little more than you did the day before. Do not drive until your doctor says it is okay. It is normal to feel sad or depressed after a stroke. If the blues last, talk to your doctor. If you are having problems with urine leakage, go to the bathroom at regular times, including when you first wake up and at bedtime. Also, limit fluids after dinner.   If you are constipated, drink plenty of fluids, enough so that your urine is light yellow or clear like water. If you have kidney, heart, or liver disease and have to limit fluids, talk with your doctor before you increase the amount of fluids you drink. Set up a regular time for using the toilet. If you continue to have constipation, your doctor may suggest using a bulking agent, such as Metamucil, or a stool softener, laxative, or enema. Medicines  Take your medicines exactly as prescribed. Call your doctor if you think you are having a problem with your medicine. You may be taking several medicines. ACE (angiotensin-converting enzyme) inhibitors, angiotensin II receptor blockers (ARBs), beta-blockers, diuretics (water pills), and calcium channel blockers control your blood pressure. Statins help lower cholesterol. Your doctor may also prescribe medicines for depression, pain, sleep problems, anxiety, or agitation. If your doctor has given you medicine that prevents blood clots, such as warfarin (Coumadin), aspirin combined with extended-release dipyridamole (Aggrenox), clopidogrel (Plavix), or aspirin to prevent another stroke, you should:  Tell your dentist, pharmacist, and other health professionals that you take these medicines. Watch for unusual bruising or bleeding, such as blood in your urine, red or black stools, or bleeding from your nose or gums. Get regular blood tests to check your clotting time if you are taking Coumadin. Wear medical alert jewelry that says you take blood thinners. You can buy this at most drugstores. Do not take any over-the-counter medicines or herbal products without talking to your doctor first.  If you take birth control pills or hormone replacement therapy, talk to your doctor about whether they are right for you. For family members and caregivers  Make the home safe. Set up a room so that your loved one does not have to climb stairs. Be sure the bathroom is on the same floor.  Move throw rugs and furniture that could cause falls, and make sure that the lighting is good. Put grab bars and seats in tubs and showers. Find out what your loved one can do and what he or she needs help with. Try not to do things for your loved one that your loved one can do on his or her own. Help him or her learn and practice new skills. Visit and talk with your loved one often. Try doing activities together that you both enjoy, such as playing cards or board games. Keep in touch with your loved one's friends as much as you can, and encourage them to visit. Take care of yourself. Do not try to do everything yourself. Ask other family members to help. Eat well, get enough rest, and take time to do things that you enjoy. Keep up with your own doctor visits, and make sure to take your medicines regularly. Get out of the house as much as you can. Join a local support group. Find out if you qualify for home health care visits to help with rehab or for adult day care. When should you call for help? Call 911 anytime you think you may need emergency care. For example, call if:  You have signs of another stroke. These may include:  Sudden numbness, paralysis, or weakness in your face, arm, or leg, especially on only one side of your body. New problems with walking or balance. Sudden vision changes. Drooling or slurred speech. New problems speaking or understanding simple statements, or you feel confused. A sudden, severe headache that is different from past headaches. Call 911 even if these symptoms go away in a few minutes. You cough up blood. You vomit blood or what looks like coffee grounds. You pass maroon or very bloody stools. Call your doctor now or seek immediate medical care if:  You have new bruises or blood spots under your skin. You have a nosebleed. Your gums bleed when you brush your teeth. You have blood in your urine. Your stools are black and tarlike or have streaks of blood.   You have vaginal bleeding when you are not having your period, or heavy period bleeding. You have new symptoms that may be related to your stroke, such as falls or trouble swallowing. Watch closely for changes in your health, and be sure to contact your doctor if you have any problems. Where can you learn more? Go to DotBlu.be    Enter C294  in the search box to learn more about \"Stroke: After Your Visit\". © 4934-5100 Health[employer], [employer]orporated. Care instructions adapted under license by [facility] (which disclaims liability or warranty for this information). This care instruction is for use with your licensed healthcare professional. If you have questions about a medical condition or this instruction, always ask your healthcare professional. Aziza Dickson any warranty or liability for your use of this information. DISCHARGE SUMMARY from Nurse    PATIENT INSTRUCTIONS:    After general anesthesia or intravenous sedation, for 24 hours or while taking prescription Narcotics:  · Limit your activities  · Do not drive and operate hazardous machinery  · Do not make important personal or business decisions  · Do  not drink alcoholic beverages  · If you have not urinated within 8 hours after discharge, please contact your surgeon on call.     Report the following to your surgeon:  · Excessive pain, swelling, redness or odor of or around the surgical area  · Temperature over 100.5  · Nausea and vomiting lasting longer than 4 hours or if unable to take medications  · Any signs of decreased circulation or nerve impairment to extremity: change in color, persistent  numbness, tingling, coldness or increase pain  · Any questions    What to do at Home:  Recommended activity: Activity as tolerated, per MD instructions    If you experience any of the following symptoms fever > 100.5, nausea, vomiting, pain to MD, chest pain and/or shortness of breath and further numbness and tingling back to ER please follow up with MD.    *  Please give a list of your current medications to your Primary Care Provider. *  Please update this list whenever your medications are discontinued, doses are      changed, or new medications (including over-the-counter products) are added. *  Please carry medication information at all times in case of emergency situations. These are general instructions for a healthy lifestyle:    No smoking/ No tobacco products/ Avoid exposure to second hand smoke  Surgeon General's Warning:  Quitting smoking now greatly reduces serious risk to your health. Obesity, smoking, and sedentary lifestyle greatly increases your risk for illness    A healthy diet, regular physical exercise & weight monitoring are important for maintaining a healthy lifestyle    You may be retaining fluid if you have a history of heart failure or if you experience any of the following symptoms:  Weight gain of 3 pounds or more overnight or 5 pounds in a week, increased swelling in our hands or feet or shortness of breath while lying flat in bed. Please call your doctor as soon as you notice any of these symptoms; do not wait until your next office visit. The discharge information has been reviewed with the patient. The patient verbalized understanding. Discharge medications reviewed with the patient and appropriate educational materials and side effects teaching were provided.   ___________________________________________________________________________________________________________________________________

## 2019-09-19 NOTE — PROGRESS NOTES
Pt is medically cleared for discharge to home today. Pt declined a referral for outpatient therapy and verbalized understanding that if he changes his mind to just speak to his PCP about making a referral.  No other discharge needs or concerns identified or reported. Son at bedside and will transport pt home. Care Management Interventions  Mode of Transport at Discharge:  Other (see comment)(son)  Transition of Care Consult (CM Consult): Discharge Planning  Discharge Durable Medical Equipment: No  Physical Therapy Consult: Yes  Occupational Therapy Consult: Yes  Speech Therapy Consult: Yes  Current Support Network: Own Home, Family Lives Nearby, Lives Alone  Confirm Follow Up Transport: Self  Plan discussed with Pt/Family/Caregiver: Yes  Freedom of Choice Offered: Yes  Discharge Location  Discharge Placement: Home

## 2019-09-19 NOTE — PROGRESS NOTES
09/19/19 0705   NIH Stroke Scale   Interval Other (comment)  (bedside report/shift change with Hector Hwang RN)   LOC 0   LOC Questions 0   LOC Commands 0   Best Gaze 0   Visual 0   Facial Palsy 0   Motor Right Arm 0   Motor Left Arm 0   Motor Right Leg 0   Motor Left Leg 0   Limb Ataxia 0   Sensory 0   Best Language 0   Dysarthria 0   Extinction and Inattention 0   Total 0

## 2019-09-19 NOTE — DISCHARGE SUMMARY
Hospitalist Discharge Summary     Patient ID:  Petra Moore  [id number]  34 y.o.  1934  Admit date: 9/17/2019 11:15 PM  Discharge date and time: 9/19/2019  Attending: Ras Spain MD  PCP:  Joseline Sahni MD  Treatment Team: Attending Provider: Ras Spain MD; Consulting Provider: Gabriel Spears MD; Utilization Review: Lencho Andrea RN; Occupational Therapist: TRAVIS Lindo/EVANGELINA; Care Manager: Elzbieta Sism LMSW; Speech Language Pathologist: Gracie Ochoa; Physical Therapist: Mann Goldman PT    Principal Diagnosis <principal problem not specified>   Active Problems:    TIA (transient ischemic attack) (9/18/2019)      Persistent atrial fibrillation ([address] (9/18/2019)      Numbness and tingling in right hand (9/18/2019)      Acute renal failure (ARF) ([address] (9/18/2019)             Hospital Course:  Please refer to the admission H&P for details of presentation. In summary, the patient is 80 y.o. male with chronic Afib (on Xarelto - recently held for 2 days for dental extraction), chronic iron def anemia admitted with strokelike symptoms, including intermittent slurred speech and weakness of R hand. CT head unremarkable, unable to obtain CTA head/neck (due to Cr) and MRI (noncompatible ICD). Eval'd by neurology who feels this likely represents TIA vs CVA. Carotid doppler with 50-69% L ICA stenosis, no significant R ICA stenosis. ECHO with EF 30-35% (down from 45-50% 5/19). Neuro recommending continuing Xarelto, ASA 81mg, added high intensity statin. Also recommending f/u with cards to discuss possible Watchman in setting of iron def anemia of unknown source. Will also need f/u with cards regarding decreased EF from prior. Lisinopril added. No CP. Significant Diagnostic Studies:     DUPLEX CAROTID BILATERAL   Final Result   IMPRESSION:      50-69% stenosis of the left internal carotid artery.       High-grade greater than 70% stenosis of the right external carotid artery. PQRI: Indirect Method was used. The velocity criteria are extrapolated from   diameter data as defined by the Society of Radiologists in [place] [address] Radiology 2003; 988;586-277. Date Of Dictation: 2019 8:57 PM      XR CHEST PORT   Final Result   IMPRESSION: No acute cardiopulmonary disease. CT HEAD WO CONT   Final Result   IMPRESSION:      No acute intracranial abnormalities. Date of Dictation: 2019 12:16 AM           Results for orders placed or performed during the hospital encounter of 19   2D ECHO COMPLETE ADULT (TTE) [address]  [address] [facility], [address] [facility]  [phone]    Transthoracic Echocardiogram  2D, M-mode, Doppler, and Color Doppler    Patient: Hardie Rubinstein  MR #: [id number]  : 1934  Age: 80 years  Gender: Male  Study date: 18-Sep-2019  Account #: [de-identified]  Height: 73 in  Weight: 205.5 lb  BSA: 2.18 mï¾²  Status:Routine  Location: [facility]  BP: 158/ 72    Allergies: IODINATED CONTRAST MEDIA    Sonographer:  Melodie Ackerman RDCS  Group:  [facility]  Referring Physician:  Nathan Garcia MD  Reading Physician:  French Delgado MD [facility]    INDICATIONS: TIA Workup. PROCEDURE: This was a routine study. A transthoracic echocardiogram was  performed. The study included complete 2D imaging, M-mode, complete spectral  Doppler, and color Doppler. Intravenous contrast (Definity) was administered. Intravenous contrast (agitated saline) was administered. Image quality was  adequate. LEFT VENTRICLE: Size was normal. Systolic function was moderately reduced. Ejection fraction was estimated in the range of 30 % to 35 %. There was  moderate diffuse hypokinesis. Wall thickness was normal. The E/e' ratio was  12.45. There was diastolic dysfunction. Unable to assess severity.     RIGHT VENTRICLE: The size was normal. Systolic function was normal. Estimated  peak pressure was in the range of 45-50 mmHg. LEFT ATRIUM: The atrium was markedly dilated. ATRIAL SEPTUM: Contrast injection was performed 4x due to difficulty  visualizing bubbles entering the heart. The last 2 injections were best  visualized. There was no right-to-left shunt, with provocative maneuvers to  increase right atrial pressure. RIGHT ATRIUM: The atrium was moderately dilated. SYSTEMIC VEINS: IVC: The inferior vena cava was dilated. The respirophasic  change in diameter was less than 50%. AORTIC VALVE: The valve was trileaflet. Leaflets exhibited moderate   sclerosis. There was no evidence for stenosis. There was trivial regurgitation. MITRAL VALVE: Valve structure was normal. There was no evidence for stenosis. There was mild to moderate regurgitation. TRICUSPID VALVE: The valve structure was normal. There was no evidence for  stenosis. There was mild to moderate regurgitation. PULMONIC VALVE: Not well visualized. There was no evidence for stenosis. There  was no insufficiency. PERICARDIUM: There was no pericardial effusion. AORTA: The root exhibited normal size. SUMMARY:    -  Left ventricle: Systolic function was moderately reduced. Ejection   fraction  was estimated in the range of 30 % to 35 %. There was moderate diffuse  hypokinesis. -  Left atrium: The atrium was markedly dilated. -  Atrial septum: Contrast injection was performed 4x due to difficulty  visualizing bubbles entering the heart. The last 2 injections were best  visualized. There was no right-to-left shunt, with provocative maneuvers to  increase right atrial pressure.    -  Right atrium: The atrium was moderately dilated. -  Inferior vena cava, hepatic veins: The inferior vena cava was dilated. The  respirophasic change in diameter was less than 50%. -  Mitral valve: There was mild to moderate regurgitation.    -  Tricuspid valve: There was mild to moderate regurgitation.     SYSTEM MEASUREMENT TABLES    2D mode  AoR Diam (2D): 2.9 cm  LA Dimension (2D): 4.7 cm  Left Atrium Systolic Volume Index; Method of Disks, Biplane; 2D mode;: 60.6  ml/m2  IVS/LVPW (2D): 1.2  IVSd (2D): 1.3 cm  LVIDd (2D): 5.1 cm  LVIDs (2D): 4 cm  LVOT Area (2D): 3.1 cm2  LVPWd (2D): 1.1 cm    Tissue Doppler Imaging  LV Peak Early Taylor Tissue Zheng; Lateral MA (TDI): 10.5 cm/s  LV Peak Early Taylor Tissue Zheng; Medial MA (TDI): 5.5 cm/s    Unspecified Scan Mode  Peak Grad; Mean; Antegrade Flow: 8 mm[Hg]  Vmax; Antegrade Flow: 138 cm/s  LVOT Diam: 2 cm  MV Peak Zheng/LV Peak Tissue Zheng E-Wave; Lateral MA: 8.5  MV Peak Zheng/LV Peak Tissue Zheng E-Wave; Medial MA: 16.4  RVSP: 41 mm[Hg]  Vmax; Regurgitant Flow: 282 cm/s    Prepared and signed by    Chepe Mckeon MD [facility]  Signed 18-Sep-2019 13:45:49         Labs: Results:       Chemistry Recent Labs     09/19/19 0555 09/17/19 2324   GLU  --  235*   NA  --  137   K  --  4.3   CL  --  104   CO2  --  27   BUN  --  23   CREA 1.20 1.93*   CA  --  8.9   AGAP  --  6*   AP  --  125   TP  --  6.7   ALB  --  3.4   GLOB  --  3.3   AGRAT  --  1.0*      CBC w/Diff Recent Labs     09/19/19  0555 09/17/19 2324   WBC 8.2 15.3*   RBC 3.33* 3.66*   HGB 10.5* 11.5*   HCT 33.2* 36.2*    219   GRANS  --  84*   LYMPH  --  5*   EOS  --  1      Cardiac Enzymes No results for input(s): CPK, CKND1, KELLEE in the last 72 hours. No lab exists for component: CKRMB, TROIP   Coagulation Recent Labs     09/17/19 2324   PTP 32.5*   INR 3.1   APTT 44.9*       Lipid Panel Lab Results   Component Value Date/Time    Cholesterol, total 104 09/19/2019 05:55 AM    HDL Cholesterol 49 09/19/2019 05:55 AM    LDL, calculated 40 09/19/2019 05:55 AM    VLDL, calculated 15 09/19/2019 05:55 AM    Triglyceride 75 09/19/2019 05:55 AM    CHOL/HDL Ratio 2.1 09/19/2019 05:55 AM      BNP No results for input(s): BNPP in the last 72 hours.    Liver Enzymes Recent Labs     09/17/19  2324   TP 6.7   ALB 3.4      SGOT 23 Thyroid Studies Lab Results   Component Value Date/Time    TSH 1.850 09/18/2019 03:09 PM            Discharge Exam:  Visit Vitals  /90   Pulse 72   Temp 98.1 °F (36.7 °C)   Resp 18   Ht 6' 1\" (1.854 m)   Wt 93.4 kg (206 lb)   SpO2 97%   BMI 27.18 kg/m²     General appearance: alert, cooperative, no distress, appears stated age  Lungs: clear to auscultation bilaterally  Heart: irregularly irregular  Abdomen: soft, non-tender. Bowel sounds normal. No masses,  no organomegaly  Extremities: no cyanosis or edema  Neurologic: Grossly normal    Disposition: home  Discharge Condition: stable  Patient Instructions:   Current Discharge Medication List      START taking these medications    Details   atorvastatin (LIPITOR) 40 mg tablet Take 2 Tabs by mouth nightly. Qty: 30 Tab, Refills: 0      lisinopril (PRINIVIL, ZESTRIL) 20 mg tablet Take 1 Tab by mouth daily. Qty: 30 Tab, Refills: 0         CONTINUE these medications which have NOT CHANGED    Details   ferrous sulfate 324 mg (65 mg iron) tablet Take  by mouth two (2) times daily (with meals). amoxicillin (AMOXIL) 875 mg tablet Take 500 mg by mouth three (3) times daily. Indications: tooth infection      glucosamine (GLUCOSAMINE RELIEF) 1,000 mg tab Take 1,000 mg by mouth two (2) times a day. rivaroxaban (XARELTO) 20 mg tab tablet Take 1 Tab by mouth nightly. Qty: 30 Tab, Refills: 11      metoprolol succinate (TOPROL-XL) 50 mg XL tablet Take 50 mg by mouth daily. finasteride (PROSCAR) 5 mg tablet Take 1 Tab by mouth daily. Qty: 30 Tab, Refills: 12      aspirin delayed-release 81 mg tablet Take  by mouth daily. FOLIC ACID/MULTIVIT-MIN/LUTEIN (CENTRUM SILVER PO) Take  by mouth daily. Last taken 4/7/16      nateglinide (STARLIX) 120 mg tablet Take 120 mg by mouth Before breakfast, lunch, and dinner. Indications: TYPE 2 DIABETES MELLITUS      TYLENOL ARTHRITIS PAIN 650 mg TbSR Take 650 mg by mouth as needed.  2 tabs twice daily      nitroglycerin (NITROSTAT) 0.4 mg SL tablet Place 1 sl under the tongue q 5 min prn cp, max 3 sl in a 15-min time period. Call 911 if no relief after the 3rd sl. Qty: 1 Bottle, Refills: prn      metFORMIN (GLUCOPHAGE) 500 mg tablet Take 500 mg by mouth two (2) times daily (with meals). furosemide (LASIX) 40 mg tablet Take  by mouth every morning.              Activity: Activity as tolerated  Diet: Cardiac Diet  Wound Care: None needed    Follow-up with PCP in 1 week, cards at first available  ·     Time spent to discharge patient 37 minutes  Signed:  Tim Dan MD  9/19/2019  9:54 AM

## 2019-09-20 ENCOUNTER — PATIENT OUTREACH (OUTPATIENT)
Dept: CASE MANAGEMENT | Age: 84
End: 2019-09-20

## 2019-09-20 NOTE — PROGRESS NOTES
This note will not be viewable in [address] Transition of Care Discharge Follow-up Questionnaire   Date/Time of Call:   9/20/19   1127am   What was the patient hospitalized for? TIA     Does the patient understand his/her diagnosis and/or treatment and what happened during the hospitalization? Yes, spoke with patient, he states understanding of diagnosis and treatment; and is agreeable to call. Patient states he is doing well, taking it easy and will return to work on monday   Did the patient receive discharge instructions? Yes    CM Assessed Risk for Readmission:       Patient stated Risk for Readmission:      Low r/t diagnosis and/or comorbidities       none stated   Review any discharge instructions (see discharge instructions/AVS in [facility]). Ask patient if they understand these. Do they have any questions? Reviewed, understanding is stated, no questions at this time       Were home services ordered (nursing, PT, OT, ST, etc.)? No    If so, has the first visit occurred? If not, why? (Assist with coordination of services if necessary.)   N/A   Was any DME ordered? No      If so, has it been received? If not, why?  (Assist patient in obtaining DME orders &/or equipment if necessary.) N/A   Complete a review of all medications (new, continued and discontinued meds per the D/C instructions and medication tab in [facility]). Completed  START taking:  atorvastatin 40 mg tablet (LIPITOR)  lisinopril 20 mg tablet (PRINIVIL, ZESTRIL)   Were all new prescriptions filled? If not, why?  (Assist patient in obtaining medications if necessary  escalate for CCM &/or SW if ongoing issues are verbalized by pt or anticipated)   Yes    Does the patient understand the purpose and dosing instructions for all medications?   (If patient has questions, provide explanation and education.)   Yes      Does the patient have any problems in performing ADLs? (If patient is unable to perform ADLs  what is the limiting factor(s)? Do they have a support system that can assist? If no support system is present, discuss possible assistance that they may be able to obtain. Escalate for CCM/SW if ongoing issues are verbalized by pt or anticipated)   Independent with ADLs   Does the patient have all follow-up appointments scheduled? 7 day f/up with PCP?   (f/up with PCP may be w/in 14 days if patient has a f/up with their specialist w/in 7 days)    7-14 day f/up with specialist?   (or per discharge instructions)    If f/up has not been made  what actions has the care coordinator made to accomplish this? Has transportation been arranged? Yes        Dr. Jr Toure 10/2/19    Dr. Jay Noriega 10/3/19                  Yes, no transportation needs identified. Any other questions or concerns expressed by the patient? No other needs or concerns identified. Patient states his gratitude for follow up. Contact information for Care Coordinator was given, instructed to call with new questions or concerns. Schedule next appointment with JESSICA Alanis or refer to RN Case Manager/ per the workflow guidelines. When is care coordinators next follow-up call scheduled? If referred for CCM  what RN care manager was the referral assigned? Care Coordinator will follow per workflow guidelines.           Within 14 days   MACK Call Completed By: Keyana Kendall LPN  Care Coordinator

## 2019-09-21 LAB
BACTERIA SPEC CULT: ABNORMAL
BACTERIA SPEC CULT: ABNORMAL
SERVICE CMNT-IMP: ABNORMAL

## 2019-10-02 ENCOUNTER — TELEPHONE (OUTPATIENT)
Dept: CARDIAC CATH/INVASIVE PROCEDURES | Age: 84
End: 2019-10-02

## 2019-10-02 NOTE — TELEPHONE ENCOUNTER
The patient was contacted to discuss [address]. The watchman process and procedure was explained in depth. The patient has a watchman consult with Dr. Alvin Gutierrez on 10/3/19.

## 2019-10-04 ENCOUNTER — PATIENT OUTREACH (OUTPATIENT)
Dept: CASE MANAGEMENT | Age: 84
End: 2019-10-04

## 2019-10-04 NOTE — PROGRESS NOTES
This note will not be viewable in [address] Transitions of Care  Follow up Outreach Note   Outreach type Phone call: spoke with patient  Home visit:   Date/Time of Outreach: 10/4/19 1253pm     Has patient attended PCP or specialist follow-up appointments since last contact? What was outcome of appointment? When is next follow-up scheduled? Patient states he is doing well, working today. Patient has attended follow ups as scheduled. Patient will be scheduled for CONNER and watchman implant, he is waiting on a call from Dr. Latonya Mendoza for scheduling   Review medications. Any medication changes since last outreach? Does patient have any questions or issues related to their medications? None stated      No      Home health active? If yes  any issue? Progress? No      Referrals needed?  (CM, SW, HH, etc. )   No   Other issues/Miscellaneous? (Transportation, access to meals, ability to perform ADLs, adequate caregiver support, etc.) No other needs or concerns at this time. Patient states his gratitude for follow up. Next Outreach Scheduled?     Graduation from program?   N/A    Yes        Next Steps/Goals (if applicable):   N/A     Outreach completed by:   Abraham Lucero LPN  Care Coordinator

## 2019-10-28 ENCOUNTER — HOSPITAL ENCOUNTER (OUTPATIENT)
Dept: CARDIAC CATH/INVASIVE PROCEDURES | Age: 84
Discharge: HOME | End: 2019-10-28
Attending: INTERNAL MEDICINE | Admitting: INTERNAL MEDICINE
Payer: MEDICARE

## 2019-10-28 VITALS
WEIGHT: 180 LBS | SYSTOLIC BLOOD PRESSURE: 155 MMHG | BODY MASS INDEX: 24.38 KG/M2 | DIASTOLIC BLOOD PRESSURE: 74 MMHG | HEIGHT: 72 IN | RESPIRATION RATE: 16 BRPM | OXYGEN SATURATION: 98 % | HEART RATE: 71 BPM

## 2019-10-28 LAB
ANION GAP SERPL CALC-SCNC: 8 MMOL/L (ref 7–16)
ATRIAL RATE: 72 BPM
BUN SERPL-MCNC: 28 MG/DL (ref 8–23)
CALCIUM SERPL-MCNC: 9.8 MG/DL (ref 8.3–10.4)
CALCULATED R AXIS, ECG10: -116 DEGREES
CALCULATED T AXIS, ECG11: 68 DEGREES
CHLORIDE SERPL-SCNC: 109 MMOL/L (ref 98–107)
CO2 SERPL-SCNC: 24 MMOL/L (ref 21–32)
CREAT SERPL-MCNC: 1.55 MG/DL (ref 0.8–1.5)
DIAGNOSIS, 93000: NORMAL
ERYTHROCYTE [DISTWIDTH] IN BLOOD BY AUTOMATED COUNT: 14.7 % (ref 11.9–14.6)
GLUCOSE SERPL-MCNC: 160 MG/DL (ref 65–100)
HCT VFR BLD AUTO: 36.7 % (ref 41.1–50.3)
HGB BLD-MCNC: 11.7 G/DL (ref 13.6–17.2)
INR PPP: 2.4
MAGNESIUM SERPL-MCNC: 1.9 MG/DL (ref 1.8–2.4)
MCH RBC QN AUTO: 31.9 PG (ref 26.1–32.9)
MCHC RBC AUTO-ENTMCNC: 31.9 G/DL (ref 31.4–35)
MCV RBC AUTO: 100 FL (ref 79.6–97.8)
NRBC # BLD: 0 K/UL (ref 0–0.2)
PLATELET # BLD AUTO: 181 K/UL (ref 150–450)
PMV BLD AUTO: 10.1 FL (ref 9.4–12.3)
POTASSIUM SERPL-SCNC: 4 MMOL/L (ref 3.5–5.1)
PROTHROMBIN TIME: 26.6 SEC (ref 11.7–14.5)
Q-T INTERVAL, ECG07: 496 MS
QRS DURATION, ECG06: 182 MS
QTC CALCULATION (BEZET), ECG08: 535 MS
RBC # BLD AUTO: 3.67 M/UL (ref 4.23–5.6)
SODIUM SERPL-SCNC: 141 MMOL/L (ref 136–145)
VENTRICULAR RATE, ECG03: 70 BPM
WBC # BLD AUTO: 9.2 K/UL (ref 4.3–11.1)

## 2019-10-28 PROCEDURE — 99152 MOD SED SAME PHYS/QHP 5/>YRS: CPT

## 2019-10-28 PROCEDURE — 83735 ASSAY OF MAGNESIUM: CPT

## 2019-10-28 PROCEDURE — 93005 ELECTROCARDIOGRAM TRACING: CPT | Performed by: INTERNAL MEDICINE

## 2019-10-28 PROCEDURE — 80048 BASIC METABOLIC PNL TOTAL CA: CPT

## 2019-10-28 PROCEDURE — 85027 COMPLETE CBC AUTOMATED: CPT

## 2019-10-28 PROCEDURE — 93312 ECHO TRANSESOPHAGEAL: CPT

## 2019-10-28 PROCEDURE — 85610 PROTHROMBIN TIME: CPT

## 2019-10-28 PROCEDURE — 74011250636 HC RX REV CODE- 250/636: Performed by: INTERNAL MEDICINE

## 2019-10-28 RX ORDER — SODIUM CHLORIDE 0.9 % (FLUSH) 0.9 %
5 SYRINGE (ML) INJECTION AS NEEDED
Status: DISCONTINUED | OUTPATIENT
Start: 2019-10-28 | End: 2019-10-28 | Stop reason: HOSPADM

## 2019-10-28 RX ORDER — MIDAZOLAM HYDROCHLORIDE 1 MG/ML
.5-5 INJECTION, SOLUTION INTRAMUSCULAR; INTRAVENOUS
Status: DISCONTINUED | OUTPATIENT
Start: 2019-10-28 | End: 2019-10-28 | Stop reason: HOSPADM

## 2019-10-28 RX ORDER — SODIUM CHLORIDE 9 MG/ML
75 INJECTION, SOLUTION INTRAVENOUS CONTINUOUS
Status: DISCONTINUED | OUTPATIENT
Start: 2019-10-28 | End: 2019-10-28 | Stop reason: HOSPADM

## 2019-10-28 RX ORDER — FENTANYL CITRATE 50 UG/ML
25-100 INJECTION, SOLUTION INTRAMUSCULAR; INTRAVENOUS
Status: DISCONTINUED | OUTPATIENT
Start: 2019-10-28 | End: 2019-10-28 | Stop reason: HOSPADM

## 2019-10-28 RX ADMIN — FENTANYL CITRATE 50 MCG: 50 INJECTION, SOLUTION INTRAMUSCULAR; INTRAVENOUS at 09:34

## 2019-10-28 RX ADMIN — MIDAZOLAM 2 MG: 1 INJECTION INTRAMUSCULAR; INTRAVENOUS at 09:40

## 2019-10-28 RX ADMIN — MIDAZOLAM 2 MG: 1 INJECTION INTRAMUSCULAR; INTRAVENOUS at 09:34

## 2019-10-28 NOTE — DISCHARGE INSTRUCTIONS
AFTER YOU TRANSESOPHAGEAL ECHOCARDIOGRAM    Be sure someone else drives you home. You may feel drowsy for several hours. Do not eat or drink for at least two hours after your procedure. Your throat will be numb and there is a risk you might have difficulty swallowing for a while. Be careful when you do eat or drink for the first time especially with hot fluids since you could easily burn your throat. Call your doctor if:    · You are bleeding from your throat or mouth. · You have trouble breathing all of a sudden. · You have chest pain or any pain that spreads to your neck, jaw, or arms. · You have questions or concerns. · You have a fever greater than 101°F.    Doctor: [facility]    Special Instructions:    No driving for 24 hours. Do not eat or drink until 1200.

## 2019-10-28 NOTE — PROGRESS NOTES
Pre watchman CONNER with Dr Luz Ackerman  ASA II Mallampati II  Versed 4mg   Fentanyl 50mcg  Pt tolerated procedure well

## 2019-10-28 NOTE — PROGRESS NOTES
TRANSFER - IN REPORT:    Verbal report received from [facility] on [address]  being received from cath lab(unit) for routine progression of care      Report consisted of patients Situation, Background, Assessment and   Recommendations(SBAR). Information from the following report(s) Procedure Summary was reviewed with the receiving nurse. Opportunity for questions and clarification was provided. Assessment completed upon patients arrival to unit and care assumed.

## 2019-11-06 PROBLEM — E11.21 TYPE 2 DIABETES WITH NEPHROPATHY (HCC): Status: ACTIVE | Noted: 2019-11-06

## 2019-12-16 ENCOUNTER — ANESTHESIA EVENT (OUTPATIENT)
Dept: SURGERY | Age: 84
DRG: 274 | End: 2019-12-16
Payer: MEDICARE

## 2019-12-16 ENCOUNTER — HOSPITAL ENCOUNTER (OUTPATIENT)
Dept: LAB | Age: 84
Discharge: HOME | DRG: 274 | End: 2019-12-16
Payer: MEDICARE

## 2019-12-16 DIAGNOSIS — I48.19 PERSISTENT ATRIAL FIBRILLATION (HCC): ICD-10-CM

## 2019-12-16 LAB
ANION GAP SERPL CALC-SCNC: 4 MMOL/L (ref 7–16)
BASOPHILS # BLD: 0.1 K/UL (ref 0–0.2)
BASOPHILS NFR BLD: 1 % (ref 0–2)
BUN SERPL-MCNC: 39 MG/DL (ref 8–23)
CALCIUM SERPL-MCNC: 9.4 MG/DL (ref 8.3–10.4)
CHLORIDE SERPL-SCNC: 106 MMOL/L (ref 98–107)
CO2 SERPL-SCNC: 30 MMOL/L (ref 21–32)
CREAT SERPL-MCNC: 1.7 MG/DL (ref 0.8–1.5)
DIFFERENTIAL METHOD BLD: ABNORMAL
EOSINOPHIL # BLD: 0.4 K/UL (ref 0–0.8)
EOSINOPHIL NFR BLD: 5 % (ref 0.5–7.8)
ERYTHROCYTE [DISTWIDTH] IN BLOOD BY AUTOMATED COUNT: 15.1 % (ref 11.9–14.6)
GLUCOSE SERPL-MCNC: 329 MG/DL (ref 65–100)
HCT VFR BLD AUTO: 33.9 % (ref 41.1–50.3)
HGB BLD-MCNC: 11 G/DL (ref 13.6–17.2)
IMM GRANULOCYTES # BLD AUTO: 0 K/UL (ref 0–0.5)
IMM GRANULOCYTES NFR BLD AUTO: 0 % (ref 0–5)
INR PPP: 2.5
LYMPHOCYTES # BLD: 1.5 K/UL (ref 0.5–4.6)
LYMPHOCYTES NFR BLD: 19 % (ref 13–44)
MCH RBC QN AUTO: 33.1 PG (ref 26.1–32.9)
MCHC RBC AUTO-ENTMCNC: 32.4 G/DL (ref 31.4–35)
MCV RBC AUTO: 102.1 FL (ref 79.6–97.8)
MONOCYTES # BLD: 0.6 K/UL (ref 0.1–1.3)
MONOCYTES NFR BLD: 8 % (ref 4–12)
NEUTS SEG # BLD: 5.2 K/UL (ref 1.7–8.2)
NEUTS SEG NFR BLD: 67 % (ref 43–78)
NRBC # BLD: 0 K/UL (ref 0–0.2)
PLATELET # BLD AUTO: 228 K/UL (ref 150–450)
PMV BLD AUTO: 10 FL (ref 9.4–12.3)
POTASSIUM SERPL-SCNC: 3.9 MMOL/L (ref 3.5–5.1)
PROTHROMBIN TIME: 27.4 SEC (ref 11.7–14.5)
RBC # BLD AUTO: 3.32 M/UL (ref 4.23–5.6)
SODIUM SERPL-SCNC: 140 MMOL/L (ref 136–145)
WBC # BLD AUTO: 7.8 K/UL (ref 4.3–11.1)

## 2019-12-16 PROCEDURE — 86900 BLOOD TYPING SEROLOGIC ABO: CPT

## 2019-12-16 PROCEDURE — 80048 BASIC METABOLIC PNL TOTAL CA: CPT

## 2019-12-16 PROCEDURE — 36415 COLL VENOUS BLD VENIPUNCTURE: CPT

## 2019-12-16 PROCEDURE — 85025 COMPLETE CBC W/AUTO DIFF WBC: CPT

## 2019-12-16 PROCEDURE — 85610 PROTHROMBIN TIME: CPT

## 2019-12-16 PROCEDURE — 86923 COMPATIBILITY TEST ELECTRIC: CPT

## 2019-12-17 ENCOUNTER — ANESTHESIA (OUTPATIENT)
Dept: SURGERY | Age: 84
DRG: 274 | End: 2019-12-17
Payer: MEDICARE

## 2019-12-17 ENCOUNTER — HOSPITAL ENCOUNTER (INPATIENT)
Dept: CARDIAC CATH/INVASIVE PROCEDURES | Age: 84
Discharge: HOME | DRG: 274 | End: 2019-12-17
Payer: MEDICARE

## 2019-12-17 ENCOUNTER — HOSPITAL ENCOUNTER (INPATIENT)
Age: 84
LOS: 1 days | Discharge: HOME | DRG: 274 | End: 2019-12-18
Attending: INTERNAL MEDICINE | Admitting: INTERNAL MEDICINE
Payer: MEDICARE

## 2019-12-17 PROBLEM — I48.91 A-FIB (HCC): Status: RESOLVED | Noted: 2019-12-17 | Resolved: 2019-12-17

## 2019-12-17 PROBLEM — I48.91 A-FIB (HCC): Status: ACTIVE | Noted: 2019-12-17

## 2019-12-17 LAB
ATRIAL RATE: 258 BPM
CALCULATED R AXIS, ECG10: -131 DEGREES
CALCULATED T AXIS, ECG11: 68 DEGREES
DIAGNOSIS, 93000: NORMAL
GLUCOSE BLD STRIP.AUTO-MCNC: 128 MG/DL (ref 65–100)
GLUCOSE BLD STRIP.AUTO-MCNC: 284 MG/DL (ref 65–100)
Q-T INTERVAL, ECG07: 494 MS
QRS DURATION, ECG06: 178 MS
QTC CALCULATION (BEZET), ECG08: 533 MS
VENTRICULAR RATE, ECG03: 70 BPM

## 2019-12-17 PROCEDURE — 76210000006 HC OR PH I REC 0.5 TO 1 HR: Performed by: INTERNAL MEDICINE

## 2019-12-17 PROCEDURE — B24BZZ4 ULTRASONOGRAPHY OF HEART WITH AORTA, TRANSESOPHAGEAL: ICD-10-PCS | Performed by: INTERNAL MEDICINE

## 2019-12-17 PROCEDURE — 74011250637 HC RX REV CODE- 250/637: Performed by: INTERNAL MEDICINE

## 2019-12-17 PROCEDURE — 82962 GLUCOSE BLOOD TEST: CPT

## 2019-12-17 PROCEDURE — 77030013687 HC GD NDL BARD -B

## 2019-12-17 PROCEDURE — C1894 INTRO/SHEATH, NON-LASER: HCPCS

## 2019-12-17 PROCEDURE — 33340 PERQ CLSR TCAT L ATR APNDGE: CPT

## 2019-12-17 PROCEDURE — C1769 GUIDE WIRE: HCPCS

## 2019-12-17 PROCEDURE — 85347 COAGULATION TIME ACTIVATED: CPT

## 2019-12-17 PROCEDURE — 77030020407 HC IV BLD WRMR ST 3M -A: Performed by: ANESTHESIOLOGY

## 2019-12-17 PROCEDURE — 77030013794 HC KT TRNSDUC BLD EDWD -B: Performed by: ANESTHESIOLOGY

## 2019-12-17 PROCEDURE — 74011250636 HC RX REV CODE- 250/636: Performed by: NURSE ANESTHETIST, CERTIFIED REGISTERED

## 2019-12-17 PROCEDURE — 74011000250 HC RX REV CODE- 250: Performed by: NURSE ANESTHETIST, CERTIFIED REGISTERED

## 2019-12-17 PROCEDURE — 77030020506 HC NDL TRNSPTL NRG BAYL -F

## 2019-12-17 PROCEDURE — 93005 ELECTROCARDIOGRAM TRACING: CPT | Performed by: INTERNAL MEDICINE

## 2019-12-17 PROCEDURE — 77030013292 HC BOWL MX PRSM J&J -A: Performed by: ANESTHESIOLOGY

## 2019-12-17 PROCEDURE — 74011250636 HC RX REV CODE- 250/636: Performed by: INTERNAL MEDICINE

## 2019-12-17 PROCEDURE — C1760 CLOSURE DEV, VASC: HCPCS

## 2019-12-17 PROCEDURE — 93312 ECHO TRANSESOPHAGEAL: CPT

## 2019-12-17 PROCEDURE — 77030004559 HC CATH ANGI DX SUPT CARD -B

## 2019-12-17 PROCEDURE — 74011636320 HC RX REV CODE- 636/320: Performed by: INTERNAL MEDICINE

## 2019-12-17 PROCEDURE — 65660000000 HC RM CCU STEPDOWN

## 2019-12-17 PROCEDURE — 76060000033 HC ANESTHESIA 1 TO 1.5 HR: Performed by: INTERNAL MEDICINE

## 2019-12-17 PROCEDURE — 77030039425 HC BLD LARYNG TRULITE DISP TELE -A: Performed by: ANESTHESIOLOGY

## 2019-12-17 PROCEDURE — 77030005401 HC CATH RAD ARRO -A: Performed by: ANESTHESIOLOGY

## 2019-12-17 PROCEDURE — 74011636637 HC RX REV CODE- 636/637: Performed by: NURSE ANESTHETIST, CERTIFIED REGISTERED

## 2019-12-17 PROCEDURE — 02L73DK OCCLUSION OF LEFT ATRIAL APPENDAGE WITH INTRALUMINAL DEVICE, PERCUTANEOUS APPROACH: ICD-10-PCS | Performed by: INTERNAL MEDICINE

## 2019-12-17 PROCEDURE — 77030037088 HC TUBE ENDOTRACH ORAL NSL COVD-A: Performed by: ANESTHESIOLOGY

## 2019-12-17 PROCEDURE — C1893 INTRO/SHEATH, FIXED,NON-PEEL: HCPCS

## 2019-12-17 PROCEDURE — 77030019908 HC STETH ESOPH SIMS -A: Performed by: ANESTHESIOLOGY

## 2019-12-17 PROCEDURE — 74011000250 HC RX REV CODE- 250: Performed by: INTERNAL MEDICINE

## 2019-12-17 PROCEDURE — 77030038112 HC IMPL LAA WATCHMAN 30MM BSC -L

## 2019-12-17 RX ORDER — OXYCODONE HYDROCHLORIDE 5 MG/1
10 TABLET ORAL
Status: DISCONTINUED | OUTPATIENT
Start: 2019-12-17 | End: 2019-12-17

## 2019-12-17 RX ORDER — SODIUM CHLORIDE, SODIUM LACTATE, POTASSIUM CHLORIDE, CALCIUM CHLORIDE 600; 310; 30; 20 MG/100ML; MG/100ML; MG/100ML; MG/100ML
1000 INJECTION, SOLUTION INTRAVENOUS CONTINUOUS
Status: DISCONTINUED | OUTPATIENT
Start: 2019-12-17 | End: 2019-12-17

## 2019-12-17 RX ORDER — SODIUM CHLORIDE 9 MG/ML
INJECTION, SOLUTION INTRAVENOUS
Status: DISCONTINUED | OUTPATIENT
Start: 2019-12-17 | End: 2019-12-17 | Stop reason: HOSPADM

## 2019-12-17 RX ORDER — SODIUM CHLORIDE 9 MG/ML
75 INJECTION, SOLUTION INTRAVENOUS CONTINUOUS
Status: DISCONTINUED | OUTPATIENT
Start: 2019-12-17 | End: 2019-12-18 | Stop reason: HOSPADM

## 2019-12-17 RX ORDER — DIPHENHYDRAMINE HYDROCHLORIDE 50 MG/ML
12.5 INJECTION, SOLUTION INTRAMUSCULAR; INTRAVENOUS ONCE
Status: DISCONTINUED | OUTPATIENT
Start: 2019-12-17 | End: 2019-12-17

## 2019-12-17 RX ORDER — ACETAMINOPHEN 325 MG/1
650 TABLET ORAL
Status: DISCONTINUED | OUTPATIENT
Start: 2019-12-17 | End: 2019-12-18 | Stop reason: HOSPADM

## 2019-12-17 RX ORDER — LOSARTAN POTASSIUM 50 MG/1
100 TABLET ORAL DAILY
Status: DISCONTINUED | OUTPATIENT
Start: 2019-12-18 | End: 2019-12-18 | Stop reason: HOSPADM

## 2019-12-17 RX ORDER — HYDROMORPHONE HYDROCHLORIDE 2 MG/ML
0.5 INJECTION, SOLUTION INTRAMUSCULAR; INTRAVENOUS; SUBCUTANEOUS
Status: DISCONTINUED | OUTPATIENT
Start: 2019-12-17 | End: 2019-12-17

## 2019-12-17 RX ORDER — FINASTERIDE 5 MG/1
5 TABLET, FILM COATED ORAL DAILY
Status: DISCONTINUED | OUTPATIENT
Start: 2019-12-18 | End: 2019-12-18 | Stop reason: HOSPADM

## 2019-12-17 RX ORDER — SODIUM CHLORIDE 0.9 % (FLUSH) 0.9 %
5-40 SYRINGE (ML) INJECTION AS NEEDED
Status: DISCONTINUED | OUTPATIENT
Start: 2019-12-17 | End: 2019-12-18 | Stop reason: HOSPADM

## 2019-12-17 RX ORDER — ONDANSETRON 2 MG/ML
INJECTION INTRAMUSCULAR; INTRAVENOUS AS NEEDED
Status: DISCONTINUED | OUTPATIENT
Start: 2019-12-17 | End: 2019-12-17 | Stop reason: HOSPADM

## 2019-12-17 RX ORDER — ROCURONIUM BROMIDE 10 MG/ML
INJECTION, SOLUTION INTRAVENOUS AS NEEDED
Status: DISCONTINUED | OUTPATIENT
Start: 2019-12-17 | End: 2019-12-17 | Stop reason: HOSPADM

## 2019-12-17 RX ORDER — DIPHENHYDRAMINE HCL 25 MG
50 CAPSULE ORAL ONCE
Status: COMPLETED | OUTPATIENT
Start: 2019-12-17 | End: 2019-12-17

## 2019-12-17 RX ORDER — NATEGLINIDE 120 MG/1
120 TABLET ORAL
Status: DISCONTINUED | OUTPATIENT
Start: 2019-12-17 | End: 2019-12-18 | Stop reason: HOSPADM

## 2019-12-17 RX ORDER — METOPROLOL SUCCINATE 50 MG/1
50 TABLET, EXTENDED RELEASE ORAL DAILY
Status: DISCONTINUED | OUTPATIENT
Start: 2019-12-18 | End: 2019-12-18 | Stop reason: HOSPADM

## 2019-12-17 RX ORDER — ACETAMINOPHEN 500 MG
500 TABLET ORAL ONCE
Status: DISCONTINUED | OUTPATIENT
Start: 2019-12-17 | End: 2019-12-17

## 2019-12-17 RX ORDER — SODIUM CHLORIDE, SODIUM LACTATE, POTASSIUM CHLORIDE, CALCIUM CHLORIDE 600; 310; 30; 20 MG/100ML; MG/100ML; MG/100ML; MG/100ML
INJECTION, SOLUTION INTRAVENOUS
Status: DISCONTINUED | OUTPATIENT
Start: 2019-12-17 | End: 2019-12-17 | Stop reason: HOSPADM

## 2019-12-17 RX ORDER — MIDAZOLAM HYDROCHLORIDE 1 MG/ML
2 INJECTION, SOLUTION INTRAMUSCULAR; INTRAVENOUS
Status: DISCONTINUED | OUTPATIENT
Start: 2019-12-17 | End: 2019-12-17

## 2019-12-17 RX ORDER — SODIUM CHLORIDE 0.9 % (FLUSH) 0.9 %
5-40 SYRINGE (ML) INJECTION EVERY 8 HOURS
Status: DISCONTINUED | OUTPATIENT
Start: 2019-12-17 | End: 2019-12-18 | Stop reason: HOSPADM

## 2019-12-17 RX ORDER — ATORVASTATIN CALCIUM 40 MG/1
80 TABLET, FILM COATED ORAL
Status: DISCONTINUED | OUTPATIENT
Start: 2019-12-17 | End: 2019-12-18 | Stop reason: HOSPADM

## 2019-12-17 RX ORDER — PHYTONADIONE 5 MG/1
5 TABLET ORAL
Status: DISPENSED | OUTPATIENT
Start: 2019-12-17 | End: 2019-12-18

## 2019-12-17 RX ORDER — NALOXONE HYDROCHLORIDE 0.4 MG/ML
0.1 INJECTION, SOLUTION INTRAMUSCULAR; INTRAVENOUS; SUBCUTANEOUS AS NEEDED
Status: DISCONTINUED | OUTPATIENT
Start: 2019-12-17 | End: 2019-12-17

## 2019-12-17 RX ORDER — PROPOFOL 10 MG/ML
INJECTION, EMULSION INTRAVENOUS AS NEEDED
Status: DISCONTINUED | OUTPATIENT
Start: 2019-12-17 | End: 2019-12-17 | Stop reason: HOSPADM

## 2019-12-17 RX ORDER — SODIUM CHLORIDE, SODIUM LACTATE, POTASSIUM CHLORIDE, CALCIUM CHLORIDE 600; 310; 30; 20 MG/100ML; MG/100ML; MG/100ML; MG/100ML
75 INJECTION, SOLUTION INTRAVENOUS CONTINUOUS
Status: DISCONTINUED | OUTPATIENT
Start: 2019-12-17 | End: 2019-12-17

## 2019-12-17 RX ORDER — DM/PE/ACETAMINOPHEN/CHLORPHENR 10-5-325-2
1500 TABLET, SEQUENTIAL ORAL 2 TIMES DAILY
COMMUNITY
End: 2021-06-22 | Stop reason: ALTCHOICE

## 2019-12-17 RX ORDER — DEXAMETHASONE SODIUM PHOSPHATE 100 MG/10ML
INJECTION INTRAMUSCULAR; INTRAVENOUS AS NEEDED
Status: DISCONTINUED | OUTPATIENT
Start: 2019-12-17 | End: 2019-12-17 | Stop reason: HOSPADM

## 2019-12-17 RX ORDER — LIDOCAINE HYDROCHLORIDE 20 MG/ML
INJECTION, SOLUTION EPIDURAL; INFILTRATION; INTRACAUDAL; PERINEURAL AS NEEDED
Status: DISCONTINUED | OUTPATIENT
Start: 2019-12-17 | End: 2019-12-17 | Stop reason: HOSPADM

## 2019-12-17 RX ORDER — LIDOCAINE HYDROCHLORIDE 10 MG/ML
0.1 INJECTION INFILTRATION; PERINEURAL AS NEEDED
Status: DISCONTINUED | OUTPATIENT
Start: 2019-12-17 | End: 2019-12-17

## 2019-12-17 RX ORDER — HYDROCODONE BITARTRATE AND ACETAMINOPHEN 5; 325 MG/1; MG/1
1 TABLET ORAL
Status: DISCONTINUED | OUTPATIENT
Start: 2019-12-17 | End: 2019-12-18 | Stop reason: HOSPADM

## 2019-12-17 RX ORDER — FENTANYL CITRATE 50 UG/ML
INJECTION, SOLUTION INTRAMUSCULAR; INTRAVENOUS AS NEEDED
Status: DISCONTINUED | OUTPATIENT
Start: 2019-12-17 | End: 2019-12-17 | Stop reason: HOSPADM

## 2019-12-17 RX ORDER — ASPIRIN 81 MG/1
81 TABLET ORAL DAILY
Status: DISCONTINUED | OUTPATIENT
Start: 2019-12-18 | End: 2019-12-18 | Stop reason: HOSPADM

## 2019-12-17 RX ORDER — ONDANSETRON 2 MG/ML
4 INJECTION INTRAMUSCULAR; INTRAVENOUS ONCE
Status: DISCONTINUED | OUTPATIENT
Start: 2019-12-17 | End: 2019-12-17

## 2019-12-17 RX ORDER — FENTANYL CITRATE 50 UG/ML
100 INJECTION, SOLUTION INTRAMUSCULAR; INTRAVENOUS AS NEEDED
Status: DISCONTINUED | OUTPATIENT
Start: 2019-12-17 | End: 2019-12-17

## 2019-12-17 RX ORDER — CEFAZOLIN SODIUM/WATER 2 G/20 ML
2 SYRINGE (ML) INTRAVENOUS
Status: COMPLETED | OUTPATIENT
Start: 2019-12-17 | End: 2019-12-17

## 2019-12-17 RX ORDER — OXYCODONE HYDROCHLORIDE 5 MG/1
5 TABLET ORAL
Status: DISCONTINUED | OUTPATIENT
Start: 2019-12-17 | End: 2019-12-17

## 2019-12-17 RX ORDER — DIPHENHYDRAMINE HYDROCHLORIDE 50 MG/ML
INJECTION, SOLUTION INTRAMUSCULAR; INTRAVENOUS AS NEEDED
Status: DISCONTINUED | OUTPATIENT
Start: 2019-12-17 | End: 2019-12-17

## 2019-12-17 RX ORDER — LANOLIN ALCOHOL/MO/W.PET/CERES
325 CREAM (GRAM) TOPICAL 2 TIMES DAILY WITH MEALS
Status: DISCONTINUED | OUTPATIENT
Start: 2019-12-17 | End: 2019-12-18 | Stop reason: HOSPADM

## 2019-12-17 RX ORDER — HEPARIN SODIUM 200 [USP'U]/100ML
3 INJECTION, SOLUTION INTRAVENOUS CONTINUOUS
Status: DISCONTINUED | OUTPATIENT
Start: 2019-12-17 | End: 2019-12-17

## 2019-12-17 RX ORDER — NITROGLYCERIN 0.4 MG/1
0.4 TABLET SUBLINGUAL
Status: DISCONTINUED | OUTPATIENT
Start: 2019-12-17 | End: 2019-12-18 | Stop reason: HOSPADM

## 2019-12-17 RX ORDER — HEPARIN SODIUM 1000 [USP'U]/ML
INJECTION, SOLUTION INTRAVENOUS; SUBCUTANEOUS AS NEEDED
Status: DISCONTINUED | OUTPATIENT
Start: 2019-12-17 | End: 2019-12-17 | Stop reason: HOSPADM

## 2019-12-17 RX ORDER — HYDRALAZINE HYDROCHLORIDE 20 MG/ML
20 INJECTION INTRAMUSCULAR; INTRAVENOUS ONCE
Status: COMPLETED | OUTPATIENT
Start: 2019-12-17 | End: 2019-12-17

## 2019-12-17 RX ORDER — HYDROCORTISONE SODIUM SUCCINATE 100 MG/2ML
100 INJECTION, POWDER, FOR SOLUTION INTRAMUSCULAR; INTRAVENOUS ONCE
Status: COMPLETED | OUTPATIENT
Start: 2019-12-17 | End: 2019-12-17

## 2019-12-17 RX ORDER — ATORVASTATIN CALCIUM 80 MG/1
80 TABLET, FILM COATED ORAL
COMMUNITY
End: 2020-01-22 | Stop reason: DRUGHIGH

## 2019-12-17 RX ORDER — NICARDIPINE HYDROCHLORIDE 0.1 MG/ML
INJECTION INTRAVENOUS AS NEEDED
Status: DISCONTINUED | OUTPATIENT
Start: 2019-12-17 | End: 2019-12-17 | Stop reason: HOSPADM

## 2019-12-17 RX ORDER — PROTAMINE SULFATE 10 MG/ML
INJECTION, SOLUTION INTRAVENOUS AS NEEDED
Status: DISCONTINUED | OUTPATIENT
Start: 2019-12-17 | End: 2019-12-17 | Stop reason: HOSPADM

## 2019-12-17 RX ADMIN — PROTAMINE SULFATE 30 MG: 10 INJECTION, SOLUTION INTRAVENOUS at 13:40

## 2019-12-17 RX ADMIN — FERROUS SULFATE TAB 325 MG (65 MG ELEMENTAL FE) 325 MG: 325 (65 FE) TAB at 17:00

## 2019-12-17 RX ADMIN — Medication 2 G: at 11:52

## 2019-12-17 RX ADMIN — DIPHENHYDRAMINE HYDROCHLORIDE 50 MG: 25 CAPSULE ORAL at 09:53

## 2019-12-17 RX ADMIN — PROPOFOL 150 MG: 10 INJECTION, EMULSION INTRAVENOUS at 11:33

## 2019-12-17 RX ADMIN — IOPAMIDOL 40 ML: 755 INJECTION, SOLUTION INTRAVENOUS at 12:29

## 2019-12-17 RX ADMIN — HYDRALAZINE HYDROCHLORIDE 20 MG: 20 INJECTION, SOLUTION INTRAMUSCULAR; INTRAVENOUS at 15:23

## 2019-12-17 RX ADMIN — ROCURONIUM BROMIDE 10 MG: 10 INJECTION, SOLUTION INTRAVENOUS at 11:59

## 2019-12-17 RX ADMIN — SODIUM CHLORIDE: 900 INJECTION, SOLUTION INTRAVENOUS at 11:26

## 2019-12-17 RX ADMIN — HEPARIN SODIUM 1000 UNITS: 1000 INJECTION INTRAVENOUS; SUBCUTANEOUS at 12:18

## 2019-12-17 RX ADMIN — FAMOTIDINE 20 MG: 10 INJECTION INTRAVENOUS at 09:52

## 2019-12-17 RX ADMIN — HEPARIN SODIUM 4000 UNITS: 1000 INJECTION INTRAVENOUS; SUBCUTANEOUS at 12:06

## 2019-12-17 RX ADMIN — HUMAN INSULIN 7 UNITS: 100 INJECTION, SOLUTION SUBCUTANEOUS at 12:09

## 2019-12-17 RX ADMIN — SUGAMMADEX 200 MG: 100 INJECTION, SOLUTION INTRAVENOUS at 12:29

## 2019-12-17 RX ADMIN — DEXAMETHASONE SODIUM PHOSPHATE 4 MG: 10 INJECTION INTRAMUSCULAR; INTRAVENOUS at 12:14

## 2019-12-17 RX ADMIN — ROCURONIUM BROMIDE 10 MG: 10 INJECTION, SOLUTION INTRAVENOUS at 12:02

## 2019-12-17 RX ADMIN — HEPARIN SODIUM 3 ML/HR: 200 INJECTION, SOLUTION INTRAVENOUS at 11:45

## 2019-12-17 RX ADMIN — ROCURONIUM BROMIDE 30 MG: 10 INJECTION, SOLUTION INTRAVENOUS at 11:33

## 2019-12-17 RX ADMIN — SODIUM CHLORIDE, SODIUM LACTATE, POTASSIUM CHLORIDE, AND CALCIUM CHLORIDE: 600; 310; 30; 20 INJECTION, SOLUTION INTRAVENOUS at 11:26

## 2019-12-17 RX ADMIN — HYDROCORTISONE SODIUM SUCCINATE 100 MG: 100 INJECTION, POWDER, FOR SOLUTION INTRAMUSCULAR; INTRAVENOUS at 09:52

## 2019-12-17 RX ADMIN — Medication 5 ML: at 22:02

## 2019-12-17 RX ADMIN — FENTANYL CITRATE 100 MCG: 50 INJECTION INTRAMUSCULAR; INTRAVENOUS at 11:32

## 2019-12-17 RX ADMIN — ATORVASTATIN CALCIUM 80 MG: 40 TABLET, FILM COATED ORAL at 22:02

## 2019-12-17 RX ADMIN — NATEGLINIDE 120 MG: 120 TABLET ORAL at 17:00

## 2019-12-17 RX ADMIN — HEPARIN SODIUM 3 ML/HR: 200 INJECTION, SOLUTION INTRAVENOUS at 11:47

## 2019-12-17 RX ADMIN — NICARDIPINE HYDROCHLORIDE 200 MCG: 0.1 INJECTION, SOLUTION INTRAVENOUS at 12:41

## 2019-12-17 RX ADMIN — LIDOCAINE HYDROCHLORIDE 60 MG: 20 INJECTION, SOLUTION EPIDURAL; INFILTRATION; INTRACAUDAL; PERINEURAL at 11:33

## 2019-12-17 RX ADMIN — ONDANSETRON 4 MG: 2 INJECTION INTRAMUSCULAR; INTRAVENOUS at 12:14

## 2019-12-17 RX ADMIN — Medication 5 ML: at 16:40

## 2019-12-17 RX ADMIN — HEPARIN SODIUM 3000 UNITS: 1000 INJECTION INTRAVENOUS; SUBCUTANEOUS at 12:07

## 2019-12-17 NOTE — PROGRESS NOTES
TRANSFER - OUT REPORT:    Verbal report given to RN on [address]  being transferred to PACU for routine progression of care       Report consisted of patients Situation, Background, Assessment and Recommendations(SBAR). Information from the following report(s) SBAR, Kardex, Procedure Summary and MAR was reviewed with the receiving nurse. Opportunity for questions and clarification was provided. TASHA with Dr Zach العلي.    Successful watchman  16F LFV perclose

## 2019-12-17 NOTE — ANESTHESIA PREPROCEDURE EVALUATION
Anesthetic History   No history of anesthetic complications            Review of Systems / Medical History  Patient summary reviewed and pertinent labs reviewed    Pulmonary  Within defined limits                 Neuro/Psych   Within defined limits           Cardiovascular    Hypertension      CHF (EF 30% in past with improvement to 50% on recent echo's): NYHA Classification II  Dysrhythmias (A flutter)   Pacemaker (BiV Pacer/ICD VVIR), past MI (1995) and CAD  Pertinent negatives: CABG/stent: CABG 2008. Exercise tolerance: <4 METS  Comments: Pulmonary HTN   GI/Hepatic/Renal         Renal disease: stones and CRI       Endo/Other    Diabetes    Arthritis     Other Findings   Comments: IV contrast allergy. Getting pretreatment           Physical Exam    Airway  Mallampati: II  TM Distance: 4 - 6 cm  Neck ROM: normal range of motion   Mouth opening: Normal     Cardiovascular  Regular rate and rhythm,  S1 and S2 normal,  no murmur, click, rub, or gallop  Rhythm: regular  Rate: normal         Dental    Dentition: Upper partial plate and Lower partial plate     Pulmonary  Breath sounds clear to auscultation               Abdominal  GI exam deferred       Other Findings            Anesthetic Plan    ASA: 4  Anesthesia type: general    Monitoring Plan: Arterial line        Anesthetic plan and risks discussed with: Patient      Discussed contrast allergy with Surgical team. Premedication administered. Will use minimal amount and give test dose prior to proceeding.

## 2019-12-17 NOTE — PROGRESS NOTES
TRANSFER - IN REPORT:    [facility] on [address] being received from PACU for routine progression of care. Report consisted of patients Situation, Background, Assessment and Recommendations(SBAR). Information from the following report(s) SBAR, Kardex, Procedure Summary, Intake/Output, MAR and Recent Results was reviewed. Opportunity for questions and clarification was provided. Assessment completed upon patients arrival to unit and care assumed. Patient received to room 330. Patient connected to monitor and assessment completed. Plan of care reviewed. Patient oriented to room and call light. Patient aware to use call light to communicate any chest pain or needs. 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 BUE and BLE have various bruises. Face has some red, rash type marks on it, not new. Sacrum to be visualized when pt stands, as pt had episodes of groin bleeding post-procedure. Heels intact. Trace swelling to BLE.

## 2019-12-17 NOTE — ANESTHESIA PROCEDURE NOTES
Arterial Line Placement    Start time: 12/17/2019 11:34 AM  End time: 12/17/2019 11:37 AM  Performed by: Rosalinda Xiao CRNA  Authorized by: Abdirahman Alba MD     Pre-Procedure  Indications:  Arterial pressure monitoring and blood sampling  Preanesthetic Checklist: patient identified, risks and benefits discussed, anesthesia consent, site marked, patient being monitored, timeout performed and patient being monitored    Timeout Time: 11:34        Procedure:   Prep:  ChloraPrep  Seldinger Technique?: Yes    Orientation:  Left  Location:  Radial artery  Catheter size:  20 G  Number of attempts:  1  Cont Cardiac Output Sensor: No      Assessment:   Post-procedure:  Sterile dressing applied and line secured  Patient Tolerance:  Patient tolerated the procedure well with no immediate complications

## 2019-12-17 NOTE — PROGRESS NOTES
Patient received to [address] [place] [address] room # 5  Ambulatory from [facility]. Patient scheduled for LAAO today with Dr Trent Patino. Procedure reviewed & questions answered, voiced good understanding consent obtained & placed on chart. All medications and medical history reviewed. Will prep patient per orders. Patient & family updated on plan of care. The patient has a fraility score of 3-MANAGING WELL, based on patient A&Ox3, patient able to ambulate to room without difficulty.

## 2019-12-17 NOTE — OP NOTES
PROCEDURE/OPERATIVE REPORT    Patient: Reagan Brock MRN: [id number]  SSN: [id number]    YOB: 1934  Age: 80 y.o. Sex: male      DATE OF PROCEDURE: 12/17/2019     PROCEDURE: Left atrial appendage occlusion with Watchman device. Pre-procedural Diagnosis  1. Atrial fibrillation  2. Intolerant to long term anticoagulation     Procedure Performed  1. Transesophageal echocardiogram  2. Transeptal puncture   3. Watchman implantation    Cardiac Electrophysiologist: Crow Cross. Kami Blue MD  Interventional Cardiologist: Jim Snowden MD    Anesthesia: General     Estimated Blood Loss: Less than 10 mL     Specimens: * No specimens in log *    Procedure in Detail:  The patient was brought to the [facility] OR in the fasting state. The patient was intubated by anesthesiology, invasive arterial blood pressure monitoring obtained, a willson catheter inserted. A tranesophageal echocardiogram was performed directly prior to the procedure and was negative for a SYLVIE thrombus (see full report in chart). Dr. Kami Blue obtained venous access and deployed a single Perclose in a \"preclose\" fashion prior to 16 Syrian sheath placement. He then placed an SLO and performed the transseptal as outlined in his procedure note. I assisted with this portion of this portion of the procedure. As the primary implanting phyisician, I prepped and draped the Watchman delivery sheath and exchanged for the SLO via an Amplatz super stiff wire located in the left upper pulmonary vein. A pigtail catheter was then inserted into the delivery sheath and used to guide the delivery sheath into the appendage. Depth measurements were performed with fluoroscopy and depth and size measurements determined via CONNER. The sheath was then advanced over the pigtail catheter into position within the SYLVIE. The Watchman device was then prepped per protocol and placed into the delivery sheath via a wet to wet connection and advanced into the sheath. The sheath was then pulled back to allow delivery of the Watchman device within the left atrial appendage. Successful delivery of a 30 mm device revealed excellent PASS criteria. A contrast appendogram was performed in 2 views revealing a adequate seal. After extensive evaluation including a excellent tug test, the device was deployed. Further measurements were taken post implant. The sheath was removed and Dr. Sandra Connolly deployed Perclose with good hemostasis. The patient tolerated the procedure well with no acute complications recognized. Just prior to pulling shealths, the CONNER was used to obtain ultrasound images and revealed no evidence of pericardial effusion. Complications: None    Summary:   1.  Successful Watchman implantation of a 30 device      Martina Carballo MD

## 2019-12-17 NOTE — PROGRESS NOTES
Bedside and Verbal shift change report given to Cristal Koenig RN (oncoming nurse) by self Ping [facility] ). Report included the following information SBAR, Kardex, Procedure Summary, Intake/Output, MAR, Recent Results and Cardiac Rhythm Paced. R groin site visualized. C/d/i no hematoma with pressure dressing on.   Pts bedrest ends at 10pm.

## 2019-12-17 NOTE — PROCEDURES
Pre-Electrophysiology Diagnosis  1. Longstanding persistent atrial fibrillation  2. Intolerant to long term anticoagulation     Procedure Performed  1. Transesophageal echocardiogram  2. Transeptal puncture   3. Watchman implantation    Cardiac Electrophysiologist: Crow Cross. Kami Blue MD  Interventional Cardiologist: Jim Snowden MD    Anesthesia: General     Estimated Blood Loss: Less than 10 mL     Specimens: * No specimens in log *    Procedure in Detail:  The patient was brought to the hybrid OR suite in the fasting state. The patient was intubated by anesthesiology and invasive arterial blood pressure monitoring obtained. A tranesophageal echocardiogram was performed directly prior to the procedure and was negative for a SYLVIE thrombus (see full report in chart). As the secondary , I obtained venous access under ultrasound guidance x 1 using modified Seldinger technique with placement of a 16Fr short sidearm sheath into the right femoral vein. Prior to placing the 16 Fr sheath, a Perclose device was deployed in a \"preclose\" fashion and will be used for hemostasis post procedure. I placed an SL-O 63cm long braided sheath through the 16 Fr sheath in the right femoral vein and guided over a wire to the RA. Next, I inserted a trans-septal needle into the SLO and it was used to perform a trans-septal puncture with assistance from CONNER, as well as fluoroscopy. The SLO sheath was advanced into the LA. Total weight based heparin bolus was administered (1/2 prior to transeptal puncture and 1/2 just after transeptal access) and systemic blood pressure monitored invasively. The ACT target was 250-300. As the primary implanting physician, Dr. Sarah Villalobos prepped the Watchman delivery sheath and delivered a 30 mm device per his procedure note with my assistance. I was present and assisted with this portion of the procedure.   A contrast appendogram was performed revealing an adequate seal. After extensive evaluation including an excellent tug test, the device was deployed. Further measurements were taken post implant. The sheath was removed. At the completion of the Watchman implant procedure, all catheters were removed, and the Perclose device was fully deployed for adequate hemostasis and sheaths were pulled. The patient tolerated the procedure well with no acute complications recognized. Just prior to pulling shealths, the CONNER was used to obtain ultrasound images and revealed no evidence of pericardial effusion. Complications: None    Summary:   1. Successful Watchman implantation  2. Family updated. Asa Kim MD, MS  Clinical Cardiac Electrophysiology

## 2019-12-17 NOTE — ANESTHESIA POSTPROCEDURE EVALUATION
Procedure(s):  LEFT ATRIAL APPENDAGE CLOSURE.    general    Anesthesia Post Evaluation      Multimodal analgesia: multimodal analgesia used between 6 hours prior to anesthesia start to PACU discharge  Patient location during evaluation: bedside  Patient participation: complete - patient participated  Level of consciousness: responsive to verbal stimuli  Pain management: adequate  Airway patency: patent  Anesthetic complications: no  Cardiovascular status: hemodynamically stable  Respiratory status: spontaneous ventilation  Hydration status: stable        Vitals Value Taken Time   /81 12/17/2019  2:22 PM   Temp 36.4 °C (97.6 °F) 12/17/2019  1:31 PM   Pulse 69 12/17/2019  2:23 PM   Resp 15 12/17/2019  2:07 PM   SpO2 95 % 12/17/2019  2:23 PM   Vitals shown include unvalidated device data.

## 2019-12-17 NOTE — PROGRESS NOTES
Bedside and Verbal shift change report given to self, RN (oncoming nurse) by Doc Forbes RN (offgoing nurse). Report included the following information SBAR, Kardex, Intake/Output, MAR and Recent Results. Right groin site visualized with off going RN. Dressing CDI.

## 2019-12-17 NOTE — PERIOP NOTES
Bleeding noted to increase to right groin. Manual pressure held and MD notified.
Hemostasis achieved to right groin site after manual compression. New dressing applied with initial serosanguinous drainage present. At 1500 oozing increased and manual compression held. At 1530 after >1hour manual compression total, rolled 4x4 placed with pressure tape. No noted oozing through dressing at this time.
TRANSFER - OUT REPORT:    Verbal report given to [facility] RN on Sabina Cadena  being transferred to [facility] for routine post - op       Report consisted of patients Situation, Background, Assessment and   Recommendations(SBAR). Information from the following report(s) OR Summary, Procedure Summary, Intake/Output and MAR was reviewed with the receiving nurse. Lines:   Peripheral IV 12/17/19 Anterior;Right Forearm (Active)   Site Assessment Clean, dry, & intact 12/17/2019  1:31 PM   Phlebitis Assessment 0 12/17/2019  1:31 PM   Infiltration Assessment 0 12/17/2019  1:31 PM   Dressing Status Clean, dry, & intact 12/17/2019  1:31 PM   Dressing Type Tape;Transparent 12/17/2019  1:31 PM   Hub Color/Line Status Green; Infusing;Patent 12/17/2019  1:31 PM   Alcohol Cap Used No 12/17/2019  1:31 PM       Peripheral IV 12/17/19 Anterior; Left Forearm (Active)   Site Assessment Clean, dry, & intact 12/17/2019  1:31 PM   Phlebitis Assessment 0 12/17/2019  1:31 PM   Infiltration Assessment 0 12/17/2019  1:31 PM   Dressing Status Clean, dry, & intact 12/17/2019  1:31 PM   Dressing Type Tape;Transparent 12/17/2019  1:31 PM   Hub Color/Line Status Green;Capped; Patent 12/17/2019  1:31 PM   Alcohol Cap Used No 12/17/2019  1:31 PM        Opportunity for questions and clarification was provided. Patient transported with:   Monitor  Registered Nurse    VTE prophylaxis orders have not been written for Sabina Cadena. Patient and family given floor number and nurses name. Family updated re: pt status after security code verified. [facility] updated on patient status and Meds given since previous report.
fsbs 128
brother

## 2019-12-18 VITALS
HEART RATE: 71 BPM | BODY MASS INDEX: 26.64 KG/M2 | WEIGHT: 196.7 LBS | DIASTOLIC BLOOD PRESSURE: 58 MMHG | SYSTOLIC BLOOD PRESSURE: 136 MMHG | HEIGHT: 72 IN | TEMPERATURE: 97.9 F | RESPIRATION RATE: 18 BRPM | OXYGEN SATURATION: 95 %

## 2019-12-18 LAB
ACT BLD: 219 SECS (ref 70–128)
ANION GAP SERPL CALC-SCNC: 9 MMOL/L (ref 7–16)
BASOPHILS # BLD: 0 K/UL (ref 0–0.2)
BASOPHILS NFR BLD: 0 % (ref 0–2)
BUN SERPL-MCNC: 34 MG/DL (ref 8–23)
CALCIUM SERPL-MCNC: 9.2 MG/DL (ref 8.3–10.4)
CHLORIDE SERPL-SCNC: 106 MMOL/L (ref 98–107)
CHOLEST SERPL-MCNC: 96 MG/DL
CO2 SERPL-SCNC: 24 MMOL/L (ref 21–32)
CREAT SERPL-MCNC: 1.85 MG/DL (ref 0.8–1.5)
DIFFERENTIAL METHOD BLD: ABNORMAL
EOSINOPHIL # BLD: 0.2 K/UL (ref 0–0.8)
EOSINOPHIL NFR BLD: 2 % (ref 0.5–7.8)
ERYTHROCYTE [DISTWIDTH] IN BLOOD BY AUTOMATED COUNT: 15 % (ref 11.9–14.6)
GLUCOSE SERPL-MCNC: 248 MG/DL (ref 65–100)
HCT VFR BLD AUTO: 32.1 % (ref 41.1–50.3)
HDLC SERPL-MCNC: 55 MG/DL (ref 40–60)
HDLC SERPL: 1.7 {RATIO}
HGB BLD-MCNC: 10.4 G/DL (ref 13.6–17.2)
IMM GRANULOCYTES # BLD AUTO: 0 K/UL (ref 0–0.5)
IMM GRANULOCYTES NFR BLD AUTO: 0 % (ref 0–5)
INR PPP: 1.3
LDLC SERPL CALC-MCNC: 30.8 MG/DL
LIPID PROFILE,FLP: NORMAL
LYMPHOCYTES # BLD: 1 K/UL (ref 0.5–4.6)
LYMPHOCYTES NFR BLD: 11 % (ref 13–44)
MAGNESIUM SERPL-MCNC: 1.8 MG/DL (ref 1.8–2.4)
MCH RBC QN AUTO: 32.4 PG (ref 26.1–32.9)
MCHC RBC AUTO-ENTMCNC: 32.4 G/DL (ref 31.4–35)
MCV RBC AUTO: 100 FL (ref 79.6–97.8)
MONOCYTES # BLD: 0.6 K/UL (ref 0.1–1.3)
MONOCYTES NFR BLD: 7 % (ref 4–12)
NEUTS SEG # BLD: 7.2 K/UL (ref 1.7–8.2)
NEUTS SEG NFR BLD: 80 % (ref 43–78)
NRBC # BLD: 0 K/UL (ref 0–0.2)
PLATELET # BLD AUTO: 201 K/UL (ref 150–450)
PMV BLD AUTO: 10.1 FL (ref 9.4–12.3)
POTASSIUM SERPL-SCNC: 4.2 MMOL/L (ref 3.5–5.1)
PROTHROMBIN TIME: 16.2 SEC (ref 11.7–14.5)
RBC # BLD AUTO: 3.21 M/UL (ref 4.23–5.6)
SODIUM SERPL-SCNC: 139 MMOL/L (ref 136–145)
TRIGL SERPL-MCNC: 51 MG/DL (ref 35–150)
VLDLC SERPL CALC-MCNC: 10.2 MG/DL (ref 6–23)
WBC # BLD AUTO: 9.1 K/UL (ref 4.3–11.1)

## 2019-12-18 PROCEDURE — 85025 COMPLETE CBC W/AUTO DIFF WBC: CPT

## 2019-12-18 PROCEDURE — 85610 PROTHROMBIN TIME: CPT

## 2019-12-18 PROCEDURE — 36415 COLL VENOUS BLD VENIPUNCTURE: CPT

## 2019-12-18 PROCEDURE — 80061 LIPID PANEL: CPT

## 2019-12-18 PROCEDURE — 80048 BASIC METABOLIC PNL TOTAL CA: CPT

## 2019-12-18 PROCEDURE — 74011250637 HC RX REV CODE- 250/637: Performed by: INTERNAL MEDICINE

## 2019-12-18 PROCEDURE — 83735 ASSAY OF MAGNESIUM: CPT

## 2019-12-18 RX ADMIN — LOSARTAN POTASSIUM 100 MG: 50 TABLET, FILM COATED ORAL at 08:03

## 2019-12-18 RX ADMIN — FINASTERIDE 5 MG: 5 TABLET, FILM COATED ORAL at 08:03

## 2019-12-18 RX ADMIN — FERROUS SULFATE TAB 325 MG (65 MG ELEMENTAL FE) 325 MG: 325 (65 FE) TAB at 08:03

## 2019-12-18 RX ADMIN — Medication 5 ML: at 05:47

## 2019-12-18 RX ADMIN — METOPROLOL SUCCINATE 50 MG: 50 TABLET, EXTENDED RELEASE ORAL at 08:03

## 2019-12-18 RX ADMIN — NATEGLINIDE 120 MG: 120 TABLET ORAL at 07:55

## 2019-12-18 RX ADMIN — ASPIRIN 81 MG: 81 TABLET ORAL at 08:03

## 2019-12-18 NOTE — DISCHARGE INSTRUCTIONS
DISPOSITION: The patient is being discharged home in stable condition on a low saturated fat, low cholesterol and low salt diet. The patient is instructed to advance activities as tolerated to the limit of fatigue or shortness of breath. The patient is instructed to avoid lifting anything heavier than 10 lbs for 1 weeks. The patient is instructed to avoid any straining, stooping or squatting for 2 weeks. The patient is instructed not to drive for 1 week. The patient is instructed to watch the groin site for bleeding/oozing; if seen, the patient is instructed to apply firm pressure with a clean cloth and call [facility] at [phone]. The patient is instructed to watch for signs of infection which include: increasing area of redness, fever/hot to touch or purulent drainage at the groin site. The patient is instructed not to soak in a bathtub for 7-10 days, but is cleared to shower. The patient is instructed to return to the ER immediately for any severe pain, color change, or temperature change in leg.      The patient is informed not to stop any medications without discussing with our office and to contact our office if any dental work or possible surgeries are expected        Watchman Discharge Instructions      Activity:     Follow your doctors recommendations   Return to normal activities gradually, pacing yourself as you feel better, resting when tired. · Activity should be limited for the next 48 hours. Climb stairs as little as possible and avoid any stooping, bending or strenuous activity for 48 hours. No heavy lifting (anything over 10 pounds) for three days. · Do not drive for 48 hoursHave a responsible person drive you home and stay with you for at least 24 hours after your heart catheterization/angiography. · Check the puncture site frequently for swelling or bleeding. If you see any bleeding, lie down and apply pressure over the area with a clean town or washcloth.  Notify your doctor for any redness, swelling, drainage or oozing from the puncture site. Notify your doctor for any fever or chills. · You may resume your usual diet. Drink more fluids than usual.        Incision / Wound Care       Cleanse wounds with mild soap and water. Keep wound dry.  A small amount of bloody or clear drainage is normal.   Watch for redness, swelling, incision site hot to touch, foul or colored drainage from the incision site, these are all signs of infection and should be reported immediately to the physicians.  If there is site concern please notify your implanting physician.  You may remove the bandage from your Right and Groin in 24 hours. You may shower in 24 hours. No tub baths, hot tubs or swimming for one week. Do not place any lotions, creams, powders, ointments over the puncture site for one week. You may place a clean band-aid over the puncture site each day for 5 days. Change this daily. Continued        Medications:   DO NOT STOP TAKING YOUR WARFARIN OR ASPIRIN  (and/or PLAVIX) WITHOUT SPEAKING WITH YOUR CARDIOLOGIST FIRST!  Take your medications as ordered at the time of discharge.  Do NOT stop taking any medications without first discussing it with your doctor.  Notify all your doctors of current medication lists. Follow instructions on medication administration especially if blood thinning medications are prescribed. Your doctor will monitor your medications and advise you when or if you can stop taking them. Notify your doctor immediately if any of the following:   Sudden weight gain   Increasing shortness of breath   Pain, change in color, temperature or swelling in lower legs or feet.  Fevers greater than 101 degrees, redness, swelling, incision site hot to touch, foul or colored drainage from the incision site, these are all signs of infection and should be reported immediately to the physicians.     Post Watchman Discharge Instructions Timeline     After a minimum of 45 days from date of procedure you will need to return to hospital to have an outpatient CONNER performed to determine if the implant has closed the opening of the appendage. At this time you will see the [place] [id number]. Coordinator for a follow up. If the CONNER reveals the appendage is not fully closed - you will require another CONNER at a later date to reassess. Once the results from CONNER have been reviewed the physicians will determine what medication changes need to be made. Your [facility] Coordinator can facilitate arranging these appointments.  6 months post implant you will need to see the [facility] Coordinator and visit the physician for a routine follow up and potentially EKG, and lab work. Your Coordinator can facilitate with setting up these appointments.  At 1 and 2 years post implant you will be contacted by your [place] [id number]. Coordinator for a brief interview. This allows us to complete required patient monitoring.  Prior to any dental work or surgery notify your dentist or surgeon about your Watchman implant and the medications you are on.     Maintain regular follow up visits with your cardiologist     Keep all bloodwork appointments. Thank you for entrusting us with your care! Patient Education        Atrial Fibrillation: Care Instructions  Your Care Instructions    Atrial fibrillation is an irregular and often fast heartbeat. Treating this condition is important for several reasons. It can cause blood clots, which can travel from your heart to your brain and cause a stroke. If you have a fast heartbeat, you may feel lightheaded, dizzy, and weak. An irregular heartbeat can also increase your risk for heart failure. Atrial fibrillation is often the result of another heart condition, such as high blood pressure or coronary artery disease.  Making changes to improve your heart condition will help you stay healthy and active. Follow-up care is a key part of your treatment and safety. Be sure to make and go to all appointments, and call your doctor if you are having problems. It's also a good idea to know your test results and keep a list of the medicines you take. How can you care for yourself at home? Medicines    · Take your medicines exactly as prescribed. Call your doctor if you think you are having a problem with your medicine. You will get more details on the specific medicines your doctor prescribes.     · If your doctor has given you a blood thinner to prevent a stroke, be sure you get instructions about how to take your medicine safely. Blood thinners can cause serious bleeding problems.     · Do not take any vitamins, over-the-counter drugs, or herbal products without talking to your doctor first.    Lifestyle changes    · Do not smoke. Smoking can increase your chance of a stroke and heart attack. If you need help quitting, talk to your doctor about stop-smoking programs and medicines. These can increase your chances of quitting for good.     · Eat a heart-healthy diet.     · Stay at a healthy weight. Lose weight if you need to.     · Limit alcohol to 2 drinks a day for men and 1 drink a day for women. Too much alcohol can cause health problems.     · Avoid colds and flu. Get a pneumococcal vaccine shot. If you have had one before, ask your doctor whether you need another dose. Get a flu shot every year. If you must be around people with colds or flu, wash your hands often. Activity    · If your doctor recommends it, get more exercise. Walking is a good choice. Bit by bit, increase the amount you walk every day. Try for at least 30 minutes on most days of the week. You also may want to swim, bike, or do other activities.  Your doctor may suggest that you join a cardiac rehabilitation program so that you can have help increasing your physical activity safely.     · Start light exercise if your doctor says it is okay. Even a small amount will help you get stronger, have more energy, and manage stress. Walking is an easy way to get exercise. Start out by walking a little more than you did in the hospital. Gradually increase the amount you walk.     · When you exercise, watch for signs that your heart is working too hard. You are pushing too hard if you cannot talk while you are exercising. If you become short of breath or dizzy or have chest pain, sit down and rest immediately.     · Check your pulse regularly. Place two fingers on the artery at the palm side of your wrist, in line with your thumb. If your heartbeat seems uneven or fast, talk to your doctor. When should you call for help? Call 911 anytime you think you may need emergency care. For example, call if:    · You have symptoms of a heart attack. These may include:  ? Chest pain or pressure, or a strange feeling in the chest.  ? Sweating. ? Shortness of breath. ? Nausea or vomiting. ? Pain, pressure, or a strange feeling in the back, neck, jaw, or upper belly or in one or both shoulders or arms. ? Lightheadedness or sudden weakness. ? A fast or irregular heartbeat. After you call 911, the  may tell you to chew 1 adult-strength or 2 to 4 low-dose aspirin. Wait for an ambulance. Do not try to drive yourself.     · You have symptoms of a stroke. These may include:  ? Sudden numbness, tingling, weakness, or loss of movement in your face, arm, or leg, especially on only one side of your body. ? Sudden vision changes. ? Sudden trouble speaking. ? Sudden confusion or trouble understanding simple statements. ? Sudden problems with walking or balance.   ? A sudden, severe headache that is different from past headaches.     · You passed out (lost consciousness).    Call your doctor now or seek immediate medical care if:    · You have new or increased shortness of breath.     · You feel dizzy or lightheaded, or you feel like you may faint.     · Your heart rate becomes irregular.     · You can feel your heart flutter in your chest or skip heartbeats. Tell your doctor if these symptoms are new or worse.    Watch closely for changes in your health, and be sure to contact your doctor if you have any problems. Where can you learn more? Go to http://elpidio-yulia.info/. Enter U020 in the search box to learn more about \"Atrial Fibrillation: Care Instructions. \"  Current as of: April 9, 2019  Content Version: 12.2  © 8942-5670 [employer]. Care instructions adapted under license by [employer] (which disclaims liability or warranty for this information). If you have questions about a medical condition or this instruction, always ask your healthcare professional. [address] any warranty or liability for your use of this information. Patient Education   Ã¯Â»Â¿  Anticoagulants: After Your Visit  Your Care Instructions  Your doctor prescribed an anticoagulant medicine. Anticoagulants, often called blood thinners, prevent new blood clots from forming and keep existing clots from getting larger. They do not actually thin the blood, but they make the blood take longer to clot. This lowers the risk of a blood clot moving to the lungs (pulmonary embolism) or moving to the brain and causing a stroke. Blood thinners come in two forms. Heparin is given by shot, either under your skin or through a needle in your vein, and starts working right away. Warfarin (Coumadin) comes in pill form and takes longer to work. Your doctor may have you begin taking both forms at the same time. As soon as the pills start to work, you will stop the shots but continue to take the pills. If you have a blood clot in your leg, you may need to take warfarin for several months. People who have heart conditions such as atrial fibrillation often need to take it for the rest of their lives.   The right dose of this medicine is different for each person. You will need regular blood tests to see if your dose is correct. Follow-up care is a key part of your treatment and safety. Be sure to make and go to all appointments, and call your doctor if you are having problems. Itâs also a good idea to know your test results and keep a list of the medicines you take. How do you take blood thinners? · Take your medicines exactly as prescribed. Call your doctor if you think you are having a problem with your medicine. · Call your doctor if you are not sure what to do if you missed a dose of blood thinner. ¨ Your doctor can tell you exactly what to do so you do not take too much or too little blood thinner. Then you will be as safe as possible. · Some general rules for what to do if you miss a dose:  ¨ If you remember it in the same day, take the missed dose. Then go back to your regular schedule. ¨ If it is the next day, or almost time to take the next dose, do not take the missed dose. Do not double the dose to make up for the missed one. At your next regularly scheduled time, take your normal dose. ¨ If you miss your dose for 2 or more days, call your doctor. · To help you stay on schedule, use a calendar to remind you when to take your blood thinner. When you take the medicine, note it on the calendar. · If you are going to give yourself shots, your doctor will give you instructions for how to safely inject the medicine. Follow the directions carefully. · Do not take any vitamins, over-the-counter medicines, or herbal products without talking to your doctor first.  · Avoid contact sports and other activities that could lead to injury. Make your home safe and take other measures to reduce your risk of falling. Always wear a seat belt while in a car. · Do not suddenly change your intake of vitamin Kârich foods, such as broccoli, cabbage, asparagus, lettuce, and spinach. This will help blood thinners work evenly from day to day.   · Limit alcohol to 2 drinks a day for men and 1 drink a day for women. Alcohol may interfere with blood thinners. It also increases your risk of falls, which can cause bruising and bleeding. · Tell your dentist, pharmacist, and other health professionals that you take blood thinners. Wear medical alert jewelry that says you take blood thinners. You can buy this at most drugstores. When should you call for help? Call 911 anytime you think you may need emergency care. For example, call if:  · You cough up blood. · You vomit blood or what looks like coffee grounds. · You pass maroon or very bloody stools. Call your doctor now or seek immediate medical care if:  · You have new bruises or blood spots under your skin. · You have a nosebleed. · Your gums bleed when you brush your teeth. · You have blood in your urine. · Your stools are black and tarlike or have streaks of blood. · You have vaginal bleeding when you are not having your period, or heavy period bleeding. Watch closely for changes in your health, and be sure to contact your doctor if:  · You have questions about your medicine. Where can you learn more? Go to [employer]  Enter S090 in the search box to learn more about \"Anticoagulants: After Your Visit. \"   Â© 3534-3659 [employer]. Care instructions adapted under license by [employer] (which disclaims liability or warranty for this information). This care instruction is for use with your licensed healthcare professional. If you have questions about a medical condition or this instruction, always ask your healthcare professional. [place] [address] any warranty or liability for your use of this information.   Content Version: 8.9.56932; Last Revised: July 1, 2009     Patient Education   Rivaroxaban (By mouth)   Rivaroxaban ([id number]-p-ZMW-a-ban)  Treats and prevents blood clots, which lowers the risk of stroke, deep vein thrombosis (DVT), pulmonary embolism (PE), and similar conditions. This medicine is a blood thinner. Brand Name(s): Xarelto, Xarelto Starter Pack   There may be other brand names for this medicine. When This Medicine Should Not Be Used: This medicine is not right for everyone. Do not use it if you had an allergic reaction to rivaroxaban, or you have severe bleeding. How to Use This Medicine:   Tablet  · Take this medicine as directed, and take it at the same time each day. · 10-milligram (mg) tablet: Take with or without food. · 15-mg or 20-mg tablet: Take with food. · If you cannot swallow the tablets, you may crush the tablet and mix it with applesauce. Eat some food after you swallow the mixture. · Tube feeding: You may crush the tablet and mix the medicine in 50 milliliters (mL) of water before giving it via the tube. This must be followed by a feeding. · This medicine should come with a Medication Guide. Ask your pharmacist for a copy if you do not have one. · Missed dose:   ¨ Ask your doctor or pharmacist if you are not sure what to do if you miss a dose. ¨ Once-daily dose: If you miss a dose or forget to use your medicine, use it as soon as you can on the same day. Do not use extra medicine to make up for a missed dose. ¨ Twice-daily dose to treat a blood clot (15-mg tablet): If you miss a dose or forget to use your medicine, use it as soon as you can on the same day. You may take 2 doses at the same time to make up for the missed dose. This is only for people who take a total of 30 mg per day. · Store the medicine in a closed container at room temperature, away from heat, moisture, and direct light. Drugs and Foods to Avoid:   Ask your doctor or pharmacist before using any other medicine, including over-the-counter medicines, vitamins, and herbal products. · Some foods and medicines can affect how rivaroxaban works.  Tell your doctor if you are using any of the following:  ¨ NSAID medicine (including aspirin, celecoxib, diclofenac, ibuprofen, naproxen)  ¨ Ketoconazole, itraconazole, lopinavir, ritonavir, indinavir, conivaptan, carbamazepine, phenytoin, rifampin, Howard's wort  ¨ Another blood thinner (including clopidogrel, enoxaparin, heparin, warfarin)  Warnings While Using This Medicine:   · Tell your doctor if you are pregnant or breastfeeding, or if you have kidney disease, liver disease, bleeding problems, or an artificial heart valve. · This medicine may increase your risk of bleeding. Be careful to avoid injuries that could cause bleeding. Stay away from rough sports or other situations where you could be bruised, cut, or hurt. Brush and floss your teeth gently. Be careful when using sharp objects, including razors and fingernail clippers. Avoid picking your nose. If you need to blow your nose, blow it gently. · This medicine may cause nerve damage if you have a medical procedure done to your back, including anesthesia or a spinal puncture. This is more likely to happen if you have a history of back injury, back surgery, problems with your spine, or procedures or punctures to your back. Tell your doctor if you are also taking another blood thinner, because this also increases the risk. · Do not stop using this medicine suddenly without asking your doctor. You might have a higher risk of stroke for a short time after you stop using this medicine. · Tell any doctor or dentist who treats you that you are using this medicine. · Your doctor will do lab tests at regular visits to check on the effects of this medicine. Keep all appointments. · Keep all medicine out of the reach of children. Never share your medicine with anyone.   Possible Side Effects While Using This Medicine:   Call your doctor right away if you notice any of these side effects:  · Allergic reaction: Itching or hives, swelling in your face or hands, swelling or tingling in your mouth or throat, chest tightness, trouble breathing  · Blistering, peeling, or red skin rash  · Decrease in how much or how often you urinate  · Heavy menstrual bleeding, or vaginal bleeding  · Red or brown urine, bloody or black, tarry stools  · Unusual bleeding or bruising, including frequent nosebleeds  · Vomiting blood or material that looks like coffee grounds  If you notice other side effects that you think are caused by this medicine, tell your doctor. Call your doctor for medical advice about side effects. You may report side effects to FDA at 8-984-XEL-0622  © 2017 [facility] Information is for End User's use only and may not be sold, redistributed or otherwise used for commercial purposes. The above information is an  only. It is not intended as medical advice for individual conditions or treatments. Talk to your doctor, nurse or pharmacist before following any medical regimen to see if it is safe and effective for you. DISCHARGE SUMMARY from Nurse    PATIENT INSTRUCTIONS:    After general anesthesia or intravenous sedation, for 24 hours or while taking prescription Narcotics:  · Limit your activities  · Do not drive and operate hazardous machinery  · Do not make important personal or business decisions  · Do  not drink alcoholic beverages  · If you have not urinated within 8 hours after discharge, please contact your surgeon on call. Report the following to your surgeon:  · Excessive pain, swelling, redness or odor of or around the surgical area  · Temperature over 100.5  · Nausea and vomiting lasting longer than 4 hours or if unable to take medications  · Any signs of decreased circulation or nerve impairment to extremity: change in color, persistent  numbness, tingling, coldness or increase pain  · Any questions    What to do at Home:  Recommended activity: {discharge activity:72776}, ***    If you experience any of the following symptoms ***, please follow up with ***.     *  Please give a list of your current medications to your Primary Care Provider. *  Please update this list whenever your medications are discontinued, doses are      changed, or new medications (including over-the-counter products) are added. *  Please carry medication information at all times in case of emergency situations. These are general instructions for a healthy lifestyle:    No smoking/ No tobacco products/ Avoid exposure to second hand smoke  Surgeon General's Warning:  Quitting smoking now greatly reduces serious risk to your health. Obesity, smoking, and sedentary lifestyle greatly increases your risk for illness    A healthy diet, regular physical exercise & weight monitoring are important for maintaining a healthy lifestyle    You may be retaining fluid if you have a history of heart failure or if you experience any of the following symptoms:  Weight gain of 3 pounds or more overnight or 5 pounds in a week, increased swelling in our hands or feet or shortness of breath while lying flat in bed. Please call your doctor as soon as you notice any of these symptoms; do not wait until your next office visit. The discharge information has been reviewed with the {PATIENT PARENT GUARDIAN:99770}. The {PATIENT PARENT GUARDIAN:07052} verbalized understanding. Discharge medications reviewed with the {Dishcarge meds reviewed RNBF:41492} and appropriate educational materials and side effects teaching were provided.   ___________________________________________________________________________________________________________________________________

## 2019-12-18 NOTE — DISCHARGE SUMMARY
[facility] Cardiology Discharge Summary     Patient ID:  Rhoda Calzada  [id number]  29 y.o.  1934    Admit date: 12/17/2019    Discharge date:  12/18/19    Admitting Physician: Max Johnson MD     Discharge Physician: Ashtyn Dan NP/Dr. Askew    Admission Diagnoses: Atrial fibrillation, unspecified type ([facility] [I48.91]  A-fib [facility]) [I48.91]    Discharge Diagnoses:    Diagnosis    Type 2 diabetes with nephropathy ([facility]    LALY (acute kidney injury) ([facility]    TIA (transient ischemic attack)    Persistent atrial fibrillation    Numbness and tingling in right hand    Acute [facility] failure ([facility]F) ([facility])    Dyspnea on exertion    Hx of long term use of blood thinners    History of BPH    Microhematuria    Anticoagulant long-term use    Breast lump    History of kidney stones    Preop cardiovascular exam    Acute diastolic heart failure (HCC)    Abnormal EKG    Renal insufficiency    Chest pain    Automatic implantable cardioverter-defibrillator in situ    Cardiomyopathy, ischemic    Orthostatic hypotension    S/P total knee arthroplasty    Osteoarthritis    Atrial flutter (HCC)    Chronic systolic heart failure (HCC)    LBBB (left bundle branch block)    CKD (chronic kidney disease) stage 3, GFR 30-59 ml/min: Metformin discontinued    Longstanding persistent atrial fibrillation    Diabetes ([facility]    Coronary artery disease    Hypercholesteremia    Hypertension    Pneumonia       Cardiology Procedures this admission:  CONNER, Implantation of Watchman device, Echocardiogram    Consults: None    Hospital Course: Patient was seen at the office of [facility] Cardiology by Dr. Felix Howe in follow up for consideration for watchman device. The patient presented for procedure. CONNER was performed directly prior to procedure that was negative for SYLVIE thrombus. The patient underwent successful implantation of a 30 mm Watchman device.   The patient was monitored closely in the CV stepdown. An post procedure CONNER showed the following:        -  Left ventricle: Systolic function was normal. Ejection fraction was  estimated in the range of 55 % to 60 %.    -  Left atrium: The atrium was markedly dilated.     -  Left atrial appendage: Pre-Procedure: No thrombus was identified.     Post-Proceudre: A 30mm Watchman device was successfully deployed. There was no  evidence of peridevice flow noted.     -  Mitral valve: There was mild to moderate regurgitation.     -  Tricuspid valve: There was mild to moderate regurgitation.       The morning of , patient was up feeling well without any complaints of chest pain or shortness of breath. Patient's labs were stable. Patient was seen and examined by Dr. Mary Francois and determined stable and ready for discharge. Patient was instructed on the importance of medication compliance. The patient will be discharged [facility] on [facility] at renal dosing and 81 mg ASA for at least 45 days until LAAA seal is examined using CONNER. Once SYLVIE is noted to be sealed, Xarelto ([address] [place] [address]) will be discountinued, and Plavix 75 mg will be continued until 6 months post watchman implant. This protocol is due to hemorraghic complication. The patient will have repeat CONNER performed in 45 days. The patient will follow up with [facility] cardiology Dr. Lola Fontanez: The patient is being discharged home in stable condition on a low saturated fat, low cholesterol and low salt diet. The patient is instructed to advance activities as tolerated to the limit of fatigue or shortness of breath. The patient is instructed to avoid lifting anything heavier than 10 lbs for 1 weeks. The patient is instructed to avoid any straining, stooping or squatting for 2 weeks. The patient is instructed not to drive for 1 week.  The patient is instructed to watch the groin site for bleeding/oozing; if seen, the patient is instructed to apply firm pressure with a clean cloth and call [facility] Cardiology at [phone]. The patient is instructed to watch for signs of infection which include: increasing area of redness, fever/hot to touch or purulent drainage at the groin site. The patient is instructed not to soak in a bathtub for 7-10 days, but is cleared to shower. The patient is instructed to return to the ER immediately for any severe pain, color change, or temperature change in leg. The patient is informed not to stop any medications without discussing with our office and to contact our office if any dental work or possible surgeries are expected    Discharge Exam:   Visit Vitals  /57 (BP 1 Location: Left arm, BP Patient Position: At rest)   Pulse 69   Temp 98.1 °F (36.7 °C)   Resp 18   Ht 6' (1.829 m)   Wt 89.2 kg (196 lb 11.2 oz)   SpO2 93%   BMI 26.68 kg/m²     Patient has been seen by Dr. Geovanna Paredes: see his progress note for exam details.     Recent Results (from the past 24 hour(s))   EKG, 12 LEAD, INITIAL    Collection Time: 12/17/19  9:56 AM   Result Value Ref Range    Ventricular Rate 70 BPM    Atrial Rate 258 BPM    QRS Duration 178 ms    Q-T Interval 494 ms    QTC Calculation (Bezet) 533 ms    Calculated R Axis -131 degrees    Calculated T Axis 68 degrees    Diagnosis       Ventricular-paced rhythm  Abnormal ECG  When compared with ECG of 28-OCT-2019 09:13,  No significant change was found  Confirmed by ST TAVO ALMANZAR MD (), KEEGAN ZAMUDIO ([id number]) on 12/17/2019 11:44:18 AM     GLUCOSE, POC    Collection Time: 12/17/19 11:16 AM   Result Value Ref Range    Glucose (POC) 284 (H) 65 - 100 mg/dL   POC ACTIVATED CLOTTING TIME    Collection Time: 12/17/19 12:14 PM   Result Value Ref Range    Activated Clotting Time (POC) 219 (H) 70 - 128 SECS   GLUCOSE, POC    Collection Time: 12/17/19  1:02 PM   Result Value Ref Range    Glucose (POC) 128 (H) 65 - 535 mg/dL   METABOLIC PANEL, BASIC    Collection Time: 12/18/19  4:46 AM   Result Value Ref Range    Sodium 139 136 - 145 mmol/L    Potassium 4.2 3.5 - 5.1 mmol/L Chloride 106 98 - 107 mmol/L    CO2 24 21 - 32 mmol/L    Anion gap 9 7 - 16 mmol/L    Glucose 248 (H) 65 - 100 mg/dL    BUN 34 (H) 8 - 23 MG/DL    Creatinine 1.85 (H) 0.8 - 1.5 MG/DL    GFR est AA 45 (L) >60 ml/min/1.73m2    GFR est non-AA 37 (L) >60 ml/min/1.73m2    Calcium 9.2 8.3 - 10.4 MG/DL   LIPID PANEL    Collection Time: 12/18/19  4:46 AM   Result Value Ref Range    LIPID PROFILE          Cholesterol, total 96 <200 MG/DL    Triglyceride 51 35 - 150 MG/DL    HDL Cholesterol 55 40 - 60 MG/DL    LDL, calculated 30.8 <100 MG/DL    VLDL, calculated 10.2 6.0 - 23.0 MG/DL    CHOL/HDL Ratio 1.7     MAGNESIUM    Collection Time: 12/18/19  4:46 AM   Result Value Ref Range    Magnesium 1.8 1.8 - 2.4 mg/dL   CBC WITH AUTOMATED DIFF    Collection Time: 12/18/19  4:46 AM   Result Value Ref Range    WBC 9.1 4.3 - 11.1 K/uL    RBC 3.21 (L) 4.23 - 5.6 M/uL    HGB 10.4 (L) 13.6 - 17.2 g/dL    HCT 32.1 (L) 41.1 - 50.3 %    .0 (H) 79.6 - 97.8 FL    MCH 32.4 26.1 - 32.9 PG    MCHC 32.4 31.4 - 35.0 g/dL    RDW 15.0 (H) 11.9 - 14.6 %    PLATELET 822 135 - 692 K/uL    MPV 10.1 9.4 - 12.3 FL    ABSOLUTE NRBC 0.00 0.0 - 0.2 K/uL    DF AUTOMATED      NEUTROPHILS 80 (H) 43 - 78 %    LYMPHOCYTES 11 (L) 13 - 44 %    MONOCYTES 7 4.0 - 12.0 %    EOSINOPHILS 2 0.5 - 7.8 %    BASOPHILS 0 0.0 - 2.0 %    IMMATURE GRANULOCYTES 0 0.0 - 5.0 %    ABS. NEUTROPHILS 7.2 1.7 - 8.2 K/UL    ABS. LYMPHOCYTES 1.0 0.5 - 4.6 K/UL    ABS. MONOCYTES 0.6 0.1 - 1.3 K/UL    ABS. EOSINOPHILS 0.2 0.0 - 0.8 K/UL    ABS. BASOPHILS 0.0 0.0 - 0.2 K/UL    ABS. IMM. GRANS. 0.0 0.0 - 0.5 K/UL   PROTHROMBIN TIME + INR    Collection Time: 12/18/19  4:46 AM   Result Value Ref Range    Prothrombin time 16.2 (H) 11.7 - 14.5 sec    INR 1.3           Patient Instructions:     Current Discharge Medication List      CONTINUE these medications which have CHANGED    Details   rivaroxaban (XARELTO) 15 mg tab tablet Take 1 Tab by mouth daily (with dinner).   Qty: 30 Tab, Refills: 1         CONTINUE these medications which have NOT CHANGED    Details   atorvastatin (LIPITOR) 80 mg tablet Take 80 mg by mouth nightly. glucosamine (GLUCOSAMINE RELIEF) 1,000 mg tab Take 1,500 mg by mouth two (2) times a day. metFORMIN (GLUCOPHAGE) 500 mg tablet Take 500 mg by mouth two (2) times daily (with meals). finasteride (PROSCAR) 5 mg tablet Take 1 Tab by mouth daily. Qty: 30 Tab, Refills: 12      mirabegron ER (MYRBETRIQ) 25 mg ER tablet Take 1 Tab by mouth daily. Qty: 60 Tab, Refills: 0      montelukast (SINGULAIR) 10 mg tablet Take 10 mg by mouth daily. losartan (COZAAR) 100 mg tablet Take 100 mg by mouth daily. ferrous sulfate 324 mg (65 mg iron) tablet Take  by mouth two (2) times daily (with meals). metoprolol succinate (TOPROL-XL) 50 mg XL tablet Take 50 mg by mouth daily. aspirin delayed-release 81 mg tablet Take  by mouth daily. nateglinide (STARLIX) 120 mg tablet Take 120 mg by mouth Before breakfast, lunch, and dinner. Indications: TYPE 2 DIABETES MELLITUS      furosemide (LASIX) 40 mg tablet Take  by mouth every morning. TYLENOL ARTHRITIS PAIN 650 mg TbSR Take 650 mg by mouth as needed. 2 tabs twice daily      nitroglycerin (NITROSTAT) 0.4 mg SL tablet Place 1 sl under the tongue q 5 min prn cp, max 3 sl in a 15-min time period. Call 911 if no relief after the 3rd sl.   Qty: 1 Bottle, Refills: prn         STOP taking these medications       FOLIC ACID/MULTIVIT-MIN/LUTEIN (CENTRUM SILVER PO) Comments:   Reason for Stopping:               Signed:  Mal Anderson NP  12/18/2019  7:45 AM

## 2019-12-18 NOTE — PROGRESS NOTES
CONTINUE these medications which have CHANGED     Details   rivaroxaban (XARELTO) 15 mg tab tablet Take 1 Tab by mouth daily (with dinner). Qty: 30 Tab, Refills: 1     Discharge instructions were reviewed with patient. An opportunity was given for questions. All medications were reviewed, and information was given on the new medications. Patient verbalized understanding, and has no questions at this time.      IV and monitor out by primary RN at time of d/c

## 2019-12-18 NOTE — PROGRESS NOTES
Bedside and Verbal shift change report given to Sneha Sandhu, RN (oncoming nurse) by self, RN (offgoing nurse). Report included the following information SBAR, Kardex, Intake/Output, MAR, Recent Results and Cardiac Rhythm Paced.

## 2019-12-18 NOTE — PROGRESS NOTES
Attempted to make PCP appointment for 1 week. No answer when called multiple times. Will instruct patient to call for 1 week appointment per [facility] ACO Guidelines.

## 2019-12-18 NOTE — PROGRESS NOTES
Chart screened by  for discharge planning. No needs identified at this time. Please consult  if any new issues arise.       Care Management Interventions  Transition of Care Consult (CM Consult): Discharge Planning  MyChart Signup: No  Discharge Durable Medical Equipment: No  Physical Therapy Consult: No  Occupational Therapy Consult: No  Speech Therapy Consult: No  Discharge Location  Discharge Placement: Home

## 2019-12-19 ENCOUNTER — PATIENT OUTREACH (OUTPATIENT)
Dept: CASE MANAGEMENT | Age: 84
End: 2019-12-19

## 2019-12-19 LAB
ABO + RH BLD: NORMAL
BLD PROD TYP BPU: NORMAL
BLOOD GROUP ANTIBODIES SERPL: NORMAL
BPU ID: NORMAL
CROSSMATCH RESULT,%XM: NORMAL
SPECIMEN EXP DATE BLD: NORMAL
STATUS OF UNIT,%ST: NORMAL
UNIT DIVISION, %UDIV: 0

## 2019-12-19 NOTE — PROGRESS NOTES
Transition of Care Hospital Discharge Follow-Up      Date/Time:  2019 10:28 AM    Patient was admitted to [facility] on  and discharged on  for watchman implant. The physician discharge summary was available at the time of outreach. Patient was contacted within 1 business days of discharge. Advance Care Planning:   Does patient have an Advance Directive:  reviewed and current          Inpatient RRAT score: >21  Was this a readmission? no   Patient stated reason for the readmission: ulises    Care Transition Nurse (CTN) contacted the patient by telephone to perform post hospital discharge assessment. Verified name and  with patient as identifiers. Provided introduction to self, and explanation of the CTN role. Patient received hospital discharge instructions. CTN reviewed discharge instructions and red flags with patient who verbalized understanding. Patient given an opportunity to ask questions and does not have any further questions or concerns at this time. The patient agrees to contact the PCP office for questions related to their healthcare. CTN provided contact information for future reference. Disease Specific:   N/A    Patients top risk factors for readmission:  medical condition, medicaiton management,     Home Health orders at discharge: [address]:   Date of initial visit:     Durable Medical Equipment ordered at discharge: none  [address] received: na    Medication(s):   Medications at Discharge: patient has all medications    Medication reconciliation was performed with patient, who verbalizes understanding of administration of home medications. There were no barriers to obtaining medications identified at this time. Current Outpatient Medications   Medication Sig    rivaroxaban (XARELTO) 15 mg tab tablet Take 1 Tab by mouth daily (with dinner).     atorvastatin (LIPITOR) 80 mg tablet Take 80 mg by mouth nightly.  glucosamine (GLUCOSAMINE RELIEF) 1,000 mg tab Take 1,500 mg by mouth two (2) times a day.  metFORMIN (GLUCOPHAGE) 500 mg tablet Take 500 mg by mouth two (2) times daily (with meals).  finasteride (PROSCAR) 5 mg tablet Take 1 Tab by mouth daily.  mirabegron ER (MYRBETRIQ) 25 mg ER tablet Take 1 Tab by mouth daily.  montelukast (SINGULAIR) 10 mg tablet Take 10 mg by mouth daily.  losartan (COZAAR) 100 mg tablet Take 100 mg by mouth daily.  nitroglycerin (NITROSTAT) 0.4 mg SL tablet Place 1 sl under the tongue q 5 min prn cp, max 3 sl in a 15-min time period. Call 911 if no relief after the 3rd sl.  ferrous sulfate 324 mg (65 mg iron) tablet Take  by mouth two (2) times daily (with meals).  metoprolol succinate (TOPROL-XL) 50 mg XL tablet Take 50 mg by mouth daily.  aspirin delayed-release 81 mg tablet Take  by mouth daily.  nateglinide (STARLIX) 120 mg tablet Take 120 mg by mouth Before breakfast, lunch, and dinner. Indications: TYPE 2 DIABETES MELLITUS    furosemide (LASIX) 40 mg tablet Take  by mouth every morning.  TYLENOL ARTHRITIS PAIN 650 mg TbSR Take 650 mg by mouth as needed. 2 tabs twice daily     No current facility-administered medications for this visit. There are no discontinued medications. [facility] follow up appointment(s):   Future Appointments   Date Time Provider Shira Callie   1/8/2020  2:30 PM Campbell Duverney, FNP [id number] [facility]   1/22/2020  8:00 AM Gracie Askew MD [facility]   2/25/2020 10:15 AM Liliana Mccrary MD [facility]   [facility]   7/10/2020 10:15 AM Campbell Duverney, FNP [id number] PGU      Non-[facility] follow up appointment(s): na   7 Day follow up with PCP or Specialist: PCP 1 week, patient stated he called and verified appts this am. Labwork for 23rd  Transportation:         Goals    None          Next Outreach Scheduled: 12/16 follow up.  Patient able to verbalize medications, appts and has transportation. Reviewed post procedure instructions and patient verbalized understanding. Patient has number for cardiology and is followed  By [facility] coordinator as well.

## 2019-12-23 ENCOUNTER — HOSPITAL ENCOUNTER (OUTPATIENT)
Dept: LAB | Age: 84
Discharge: HOME | End: 2019-12-23
Payer: MEDICARE

## 2019-12-23 LAB
ANION GAP SERPL CALC-SCNC: 6 MMOL/L (ref 7–16)
BUN SERPL-MCNC: 29 MG/DL (ref 8–23)
CALCIUM SERPL-MCNC: 9.7 MG/DL (ref 8.3–10.4)
CHLORIDE SERPL-SCNC: 106 MMOL/L (ref 98–107)
CO2 SERPL-SCNC: 28 MMOL/L (ref 21–32)
CREAT SERPL-MCNC: 1.55 MG/DL (ref 0.8–1.5)
GLUCOSE SERPL-MCNC: 272 MG/DL (ref 65–100)
POTASSIUM SERPL-SCNC: 4.2 MMOL/L (ref 3.5–5.1)
SODIUM SERPL-SCNC: 140 MMOL/L (ref 136–145)

## 2019-12-23 PROCEDURE — 80048 BASIC METABOLIC PNL TOTAL CA: CPT

## 2019-12-23 PROCEDURE — 36415 COLL VENOUS BLD VENIPUNCTURE: CPT

## 2019-12-31 ENCOUNTER — PATIENT OUTREACH (OUTPATIENT)
Dept: CASE MANAGEMENT | Age: 84
End: 2019-12-31

## 2020-01-02 ENCOUNTER — PATIENT OUTREACH (OUTPATIENT)
Dept: CASE MANAGEMENT | Age: 85
End: 2020-01-02

## 2020-01-08 ENCOUNTER — PATIENT OUTREACH (OUTPATIENT)
Dept: CASE MANAGEMENT | Age: 85
End: 2020-01-08

## 2020-01-08 NOTE — PROGRESS NOTES
This note will not be viewable in [address] Transitions of Care  Follow up Outreach Note   Outreach type Phone call: Spoke with patient  Home visit:   Date/Time of Outreach: 1/8/2020 4:05 pm      Has patient attended PCP or specialist follow-up appointments since last contact? What was outcome of appointment? When is next follow-up scheduled? FU appointment completed with PCP and future appointments scheduled appropriately. Patient has a FU appointment with Cardiologist 1/17/2020 and 1/22/2020. Review medications. Any medication changes since last outreach? Does patient have any questions or issues related to their medications? Medications reviewed with patient with no changes in medications. Patient states he has no barriers to prevent him from obtaining or taking her medications. Home health active? If yes  any issue? Progress? No      Referrals needed?  (CM, SW, HH, etc. )   No    Other issues/Miscellaneous? (Transportation, access to meals, ability to perform ADLs, adequate caregiver support, etc.) No issues with transportation. Lives alone and able to perform all ADLs without added assistance. Next Outreach Scheduled? Graduation from program?   10 days      Next Steps/Goals (if applicable):         Outreach completed by:   Starr Ervin LPN Care Coordinator

## 2020-01-17 ENCOUNTER — HOSPITAL ENCOUNTER (OUTPATIENT)
Dept: CARDIAC CATH/INVASIVE PROCEDURES | Age: 85
Discharge: HOME | End: 2020-01-17
Attending: INTERNAL MEDICINE | Admitting: INTERNAL MEDICINE
Payer: MEDICARE

## 2020-01-17 VITALS
BODY MASS INDEX: 26.82 KG/M2 | HEIGHT: 72 IN | RESPIRATION RATE: 16 BRPM | OXYGEN SATURATION: 100 % | HEART RATE: 72 BPM | DIASTOLIC BLOOD PRESSURE: 81 MMHG | WEIGHT: 198 LBS | SYSTOLIC BLOOD PRESSURE: 165 MMHG

## 2020-01-17 LAB
ANION GAP SERPL CALC-SCNC: 5 MMOL/L (ref 7–16)
ATRIAL RATE: 65 BPM
BUN SERPL-MCNC: 26 MG/DL (ref 8–23)
CALCIUM SERPL-MCNC: 9.5 MG/DL (ref 8.3–10.4)
CALCULATED R AXIS, ECG10: -127 DEGREES
CALCULATED T AXIS, ECG11: 64 DEGREES
CHLORIDE SERPL-SCNC: 108 MMOL/L (ref 98–107)
CO2 SERPL-SCNC: 28 MMOL/L (ref 21–32)
CREAT SERPL-MCNC: 1.5 MG/DL (ref 0.8–1.5)
DIAGNOSIS, 93000: NORMAL
ERYTHROCYTE [DISTWIDTH] IN BLOOD BY AUTOMATED COUNT: 14.9 % (ref 11.9–14.6)
GLUCOSE SERPL-MCNC: 192 MG/DL (ref 65–100)
HCT VFR BLD AUTO: 35 % (ref 41.1–50.3)
HGB BLD-MCNC: 11.2 G/DL (ref 13.6–17.2)
INR PPP: 2.3
MAGNESIUM SERPL-MCNC: 1.7 MG/DL (ref 1.8–2.4)
MCH RBC QN AUTO: 32.7 PG (ref 26.1–32.9)
MCHC RBC AUTO-ENTMCNC: 32 G/DL (ref 31.4–35)
MCV RBC AUTO: 102 FL (ref 79.6–97.8)
NRBC # BLD: 0 K/UL (ref 0–0.2)
PLATELET # BLD AUTO: 196 K/UL (ref 150–450)
PMV BLD AUTO: 9.8 FL (ref 9.4–12.3)
POTASSIUM SERPL-SCNC: 3.9 MMOL/L (ref 3.5–5.1)
PROTHROMBIN TIME: 25.9 SEC (ref 11.7–14.5)
Q-T INTERVAL, ECG07: 496 MS
QRS DURATION, ECG06: 172 MS
QTC CALCULATION (BEZET), ECG08: 543 MS
RBC # BLD AUTO: 3.43 M/UL (ref 4.23–5.6)
SODIUM SERPL-SCNC: 141 MMOL/L (ref 136–145)
VENTRICULAR RATE, ECG03: 72 BPM
WBC # BLD AUTO: 8.4 K/UL (ref 4.3–11.1)

## 2020-01-17 PROCEDURE — 85610 PROTHROMBIN TIME: CPT

## 2020-01-17 PROCEDURE — 83735 ASSAY OF MAGNESIUM: CPT

## 2020-01-17 PROCEDURE — 93312 ECHO TRANSESOPHAGEAL: CPT

## 2020-01-17 PROCEDURE — 85027 COMPLETE CBC AUTOMATED: CPT

## 2020-01-17 PROCEDURE — 80048 BASIC METABOLIC PNL TOTAL CA: CPT

## 2020-01-17 PROCEDURE — 99152 MOD SED SAME PHYS/QHP 5/>YRS: CPT

## 2020-01-17 PROCEDURE — 74011250636 HC RX REV CODE- 250/636: Performed by: INTERNAL MEDICINE

## 2020-01-17 PROCEDURE — 93005 ELECTROCARDIOGRAM TRACING: CPT | Performed by: INTERNAL MEDICINE

## 2020-01-17 PROCEDURE — 74011000250 HC RX REV CODE- 250: Performed by: INTERNAL MEDICINE

## 2020-01-17 RX ORDER — CLOPIDOGREL BISULFATE 75 MG/1
75 TABLET ORAL DAILY
Qty: 30 TAB | Refills: 5 | Status: SHIPPED | OUTPATIENT
Start: 2020-01-17 | End: 2020-05-19 | Stop reason: SDUPTHER

## 2020-01-17 RX ORDER — MIDAZOLAM HYDROCHLORIDE 1 MG/ML
.5-2 INJECTION, SOLUTION INTRAMUSCULAR; INTRAVENOUS
Status: DISCONTINUED | OUTPATIENT
Start: 2020-01-17 | End: 2020-01-17 | Stop reason: HOSPADM

## 2020-01-17 RX ORDER — FENTANYL CITRATE 50 UG/ML
25-50 INJECTION, SOLUTION INTRAMUSCULAR; INTRAVENOUS
Status: DISCONTINUED | OUTPATIENT
Start: 2020-01-17 | End: 2020-01-17 | Stop reason: HOSPADM

## 2020-01-17 RX ORDER — LIDOCAINE HYDROCHLORIDE 20 MG/ML
15 SOLUTION OROPHARYNGEAL AS NEEDED
Status: DISCONTINUED | OUTPATIENT
Start: 2020-01-17 | End: 2020-01-17 | Stop reason: HOSPADM

## 2020-01-17 RX ORDER — SODIUM CHLORIDE 9 MG/ML
75 INJECTION, SOLUTION INTRAVENOUS CONTINUOUS
Status: DISCONTINUED | OUTPATIENT
Start: 2020-01-17 | End: 2020-01-17 | Stop reason: HOSPADM

## 2020-01-17 RX ORDER — MAGNESIUM SULFATE 1 G/100ML
1 INJECTION INTRAVENOUS ONCE
Status: COMPLETED | OUTPATIENT
Start: 2020-01-17 | End: 2020-01-17

## 2020-01-17 RX ADMIN — LIDOCAINE HYDROCHLORIDE 15 ML: 20 SOLUTION ORAL; TOPICAL at 09:30

## 2020-01-17 RX ADMIN — MAGNESIUM SULFATE 1 G: 1 INJECTION INTRAVENOUS at 10:27

## 2020-01-17 RX ADMIN — FENTANYL CITRATE 25 MCG: 50 INJECTION, SOLUTION INTRAMUSCULAR; INTRAVENOUS at 09:33

## 2020-01-17 RX ADMIN — MIDAZOLAM 2 MG: 1 INJECTION INTRAMUSCULAR; INTRAVENOUS at 09:33

## 2020-01-17 RX ADMIN — SODIUM CHLORIDE 75 ML/HR: 900 INJECTION, SOLUTION INTRAVENOUS at 08:52

## 2020-01-17 NOTE — DISCHARGE INSTRUCTIONS
AFTER YOUR TRANSESOPHAGEAL ECHOCARDIOGRAM    Be sure someone else drives you home. You may feel drowsy for several hours. Do not eat or drink for at least two hours after your procedure. Your throat will be numb and there is a risk you might have difficulty swallowing for a while. Be careful when you do eat or drink for the first time especially with hot fluids since you could easily burn your throat. Call your doctor if:    · You are bleeding from your throat or mouth. · You have trouble breathing all of a sudden. · You have chest pain or any pain that spreads to your neck, jaw, or arms. · You have questions or concerns. · You have a fever greater than 101°F.    Doctor: Hilda Marquez [phone]    Special Instructions:    No driving for 24 hours.

## 2020-01-17 NOTE — PROGRESS NOTES
TRANSFER - OUT REPORT:    Verbal report given to Marni Thomas RN on [address]  being transferred to [address] [place] [address] for routine progression of care       Report consisted of patients Situation, Background, Assessment and Recommendations(SBAR). Information from the following report(s) Procedure Summary was reviewed with the receiving nurse. Opportunity for questions and clarification was provided.

## 2020-01-17 NOTE — PROGRESS NOTES
Discharge instructions given per orders, voiced good understanding of post CONNER care, medications & follow up care.  Denies any questions

## 2020-01-17 NOTE — H&P
[facility], [address] [place] [address] 7, 187 [facility]      Patient:  Aron OLSEN AT [facility]  1934         SUBJECTIVE:  Jovani Williamson is a  80 y.o. male seen for a visit regarding the following:     No chief complaint on file. .     CC: 80 post watchman    HPI:   79 yo M Patient was seen at the office of [facility] by Dr. Geovanna Paredes in follow up for consideration for watchman device. The patient presented for procedure. CONNER was performed directly prior to procedure that was negative for SYLVIE thrombus. The patient underwent successful implantation of a 30 mm Watchman device. The patient was monitored closely in the CV stepdown. Here for post watchman scheduled CONNER. Past medical history, past surgical history, family history, social history, and medications were all reviewed with the patient today and updated as necessary. Outpatient Medications Marked as Taking for the 1/17/20 encounter Psychiatric Encounter) with [facility] CATH/CV FLOAT RN   Medication Sig Dispense Refill    MYRBETRIQ 50 mg ER tablet Take 1 Tab by mouth daily. 30 Tab 0    rivaroxaban (XARELTO) 15 mg tab tablet Take 1 Tab by mouth daily (with dinner). 30 Tab 1    atorvastatin (LIPITOR) 80 mg tablet Take 80 mg by mouth nightly.  glucosamine (GLUCOSAMINE RELIEF) 1,000 mg tab Take 1,500 mg by mouth two (2) times a day.  metFORMIN (GLUCOPHAGE) 500 mg tablet Take 500 mg by mouth two (2) times daily (with meals).  finasteride (PROSCAR) 5 mg tablet Take 1 Tab by mouth daily. 30 Tab 12    montelukast (SINGULAIR) 10 mg tablet Take 10 mg by mouth daily.  losartan (COZAAR) 100 mg tablet Take 100 mg by mouth daily.  ferrous sulfate 324 mg (65 mg iron) tablet Take  by mouth two (2) times daily (with meals).  metoprolol succinate (TOPROL-XL) 50 mg XL tablet Take 50 mg by mouth daily.  aspirin delayed-release 81 mg tablet Take  by mouth daily.       nateglinide (STARLIX) 120 mg tablet Take 120 mg by mouth Before breakfast, lunch, and dinner. Indications: TYPE 2 DIABETES MELLITUS      furosemide (LASIX) 40 mg tablet Take  by mouth every morning. Patient Active Problem List    Diagnosis    Type 2 diabetes with nephropathy ([facility]    LALY (acute kidney injury) ([facility]    TIA (transient ischemic attack)    Persistent atrial fibrillation    Numbness and tingling in right hand    Acute renal failure (ARF) (HCC)    Dyspnea on exertion    Hx of long term use of blood thinners    History of BPH    Microhematuria    Anticoagulant long-term use    Breast lump     right- pt states bx was neg-- m. d. \"released\" him      History of kidney stones     none in years       Preop cardiovascular exam    Acute diastolic heart failure (HCC)    Abnormal EKG    Renal insufficiency    Chest pain    Automatic implantable cardioverter-defibrillator in situ     No shocks      Cardiomyopathy, ischemic     Chronic      Orthostatic hypotension    S/P total knee arthroplasty    Osteoarthritis    Atrial flutter (HCC)    Chronic systolic heart failure (HCC)    LBBB (left bundle branch block)    CKD (chronic kidney disease) stage 3, GFR 30-59 ml/min: Metformin discontinued    Longstanding persistent atrial fibrillation     Ortho changed to warfarin post-op. Pt thinks for 1 mo.       Diabetes ([facility]    Coronary artery disease     Got through TKA surg      Hypercholesteremia    Hypertension    Pneumonia       Family History   Problem Relation Age of Onset    Heart Disease Sister     Heart Disease Brother     Cancer Brother     Heart Disease Brother     Stroke Sister     Asthma Father     Heart Disease Other     Malignant Hyperthermia Neg Hx     Pseudocholinesterase Deficiency Neg Hx     Delayed Awakening Neg Hx     Post-op Nausea/Vomiting Neg Hx     Emergence Delirium Neg Hx     Post-op Cognitive Dysfunction Neg Hx     Other Neg Hx        Social History     Tobacco Use    Smoking status: Never Smoker    Smokeless tobacco: Never Used   Substance Use Topics    Alcohol use: No       ROS - no chest pain, no dyspnea    PHYSICAL EXAM:    Visit Vitals  /77   Pulse 70   Resp 19   Ht 6' (1.829 m)   Wt 198 lb (89.8 kg)   SpO2 100%   BMI 26.85 kg/m²       Physical Exam   Constitutional: He is oriented to person, place, and time. He appears well-developed and well-nourished. HENT:   Head: Normocephalic and atraumatic. Nose: Nose normal.   Eyes: Conjunctivae are normal. Right eye exhibits no discharge. Left eye exhibits no discharge. No scleral icterus. Neck: Normal range of motion. No JVD present. No tracheal deviation present. Cardiovascular: Normal rate, regular rhythm, S1 normal and S2 normal.   Pulses:       Radial pulses are 2+ on the right side and 2+ on the left side. Paced rhythm   Pulmonary/Chest: Effort normal and breath sounds normal. He has no wheezes. Abdominal: Soft. He exhibits no distension. Musculoskeletal: Normal range of motion. General: No edema. Neurological: He is alert and oriented to person, place, and time. Skin: Skin is warm and dry. Psychiatric: He has a normal mood and affect. His behavior is normal. Judgment and thought content normal.   Vitals reviewed. Medical problems, medical history, and test results were reviewed with the patient today.      Recent Results (from the past 168 hour(s))   AMB POC URINALYSIS DIP STICK AUTO W/ MICRO (PGU)    Collection Time: 01/16/20  8:42 AM   Result Value Ref Range    Glucose (UA POC) 100  Negative mg/dL    Bilirubin (UA POC) Negative Negative    Ketones (UA POC) Negative Negative    Specific gravity (UA POC) 1.020 1.001 - 1.035    Blood (UA POC) Moderate Negative    pH (UA POC) 6.0 4.6 - 8.0    Protein (UA POC) Trace Negative    Urobilinogen (POC) 0.2 mg/dL     Nitrites (UA POC) Negative Negative    Leukocyte esterase (UA POC) Negative Negative   AMB POC PVR, UZMA,POST-VOID RES,US,NON-IMAGING    Collection Time: 01/16/20  8:44 AM   Result Value Ref Range    PVR 43 ML   CBC W/O DIFF    Collection Time: 01/17/20  8:39 AM   Result Value Ref Range    WBC 8.4 4.3 - 11.1 K/uL    RBC 3.43 (L) 4.23 - 5.6 M/uL    HGB 11.2 (L) 13.6 - 17.2 g/dL    HCT 35.0 (L) 41.1 - 50.3 %    .0 (H) 79.6 - 97.8 FL    MCH 32.7 26.1 - 32.9 PG    MCHC 32.0 31.4 - 35.0 g/dL    RDW 14.9 (H) 11.9 - 14.6 %    PLATELET 355 157 - 757 K/uL    MPV 9.8 9.4 - 12.3 FL    ABSOLUTE NRBC 0.00 0.0 - 0.2 K/uL   MAGNESIUM    Collection Time: 01/17/20  8:39 AM   Result Value Ref Range    Magnesium 1.7 (L) 1.8 - 2.4 mg/dL   METABOLIC PANEL, BASIC    Collection Time: 01/17/20  8:39 AM   Result Value Ref Range    Sodium 141 136 - 145 mmol/L    Potassium 3.9 3.5 - 5.1 mmol/L    Chloride 108 (H) 98 - 107 mmol/L    CO2 28 21 - 32 mmol/L    Anion gap 5 (L) 7 - 16 mmol/L    Glucose 192 (H) 65 - 100 mg/dL    BUN 26 (H) 8 - 23 MG/DL    Creatinine 1.50 0.8 - 1.5 MG/DL    GFR est AA 57 (L) >60 ml/min/1.73m2    GFR est non-AA 47 (L) >60 ml/min/1.73m2    Calcium 9.5 8.3 - 10.4 MG/DL   EKG, 12 LEAD, INITIAL    Collection Time: 01/17/20  8:53 AM   Result Value Ref Range    Ventricular Rate 72 BPM    Atrial Rate 65 BPM    QRS Duration 172 ms    Q-T Interval 496 ms    QTC Calculation (Bezet) 543 ms    Calculated R Axis -127 degrees    Calculated T Axis 64 degrees    Diagnosis       Ventricular-paced rhythm with occasional Premature ventricular complexes  Biventricular pacemaker detected  Abnormal ECG  When compared with ECG of 17-DEC-2019 09:56,  Premature ventricular complexes are now Present  Vent. rate has increased BY   2 BPM         ASSESSMENT/PLAN:    There are no diagnoses linked to this encounter.     Atrial fibrillation  - post watchman CNONER indication  - NPO  - consented  - questions answered proceed to CONNER    Problem List Items Addressed This Visit     None        Patient has been instructed and agrees to call our office with any issues or other concerns related to their cardiac condition(s) and/or complaint(s).         Asmita Castro,   1/17/2020

## 2020-01-17 NOTE — PROGRESS NOTES
CONNER by Dr Patel Fret  II ASA II Mallampati  2mg versed  25mcg fentanyl  Viscous Solution given at 0930. Pt tolerated well.

## 2020-01-17 NOTE — PROGRESS NOTES
Patient received to [address] [place] [address] room # 6  Ambulatory from [facility]. Patient scheduled for CONNER today with Dr Jose Alejandro Sherman. Procedure reviewed & questions answered, voiced good understanding consent obtained & placed on chart. All medications and medical history reviewed. Will prep patient per orders. Patient & family updated on plan of care. The patient has a fraility score of 3-MANAGING WELL, based on ability to perform ADLs independently.

## 2020-01-17 NOTE — PROGRESS NOTES
Transesophageal Echo Note  - pt underwent successful CONNER today in cath holding  - start 0932  - stop 0949  - sedation: 2 mg IV Midazolam, 25 mcg Fentanyl IV given by Marah Obrien RN under my direct supervision. Direct monitoring of vital signs and respiratory status throughout the procedure.    - No complications, pt in stable condition  - CONNER Brief Findings: Watchman device seen in appropriate positioning, no periprosthetic leak visualziedmild reduced LVEF  - Appropriate for DC Xarelto, start ASA/Plavix  - tolerated procedure well appropriate for discharge   - FU Jamilah as scheduled

## 2020-01-20 ENCOUNTER — PATIENT OUTREACH (OUTPATIENT)
Dept: CASE MANAGEMENT | Age: 85
End: 2020-01-20

## 2020-01-20 NOTE — PROGRESS NOTES
This note will not be viewable in [address] Attempted contacting patient for 30 day FU. No answer, on home number . Per CC patient has completed FU's with PCP and Cardiologist  with FU's scheduled appropriately. No further MACK outreach will be made as patient is following given POC. Episode closed.

## 2020-01-21 PROBLEM — Z95.818 PRESENCE OF WATCHMAN LEFT ATRIAL APPENDAGE CLOSURE DEVICE: Status: ACTIVE | Noted: 2020-01-21

## 2020-03-06 PROBLEM — N17.9 ACUTE RENAL FAILURE (ARF) (HCC): Status: RESOLVED | Noted: 2019-09-18 | Resolved: 2020-03-06

## 2020-03-06 PROBLEM — N17.9 AKI (ACUTE KIDNEY INJURY) (HCC): Status: RESOLVED | Noted: 2019-09-19 | Resolved: 2020-03-06

## 2020-06-18 ENCOUNTER — TELEPHONE (OUTPATIENT)
Dept: CARDIAC CATH/INVASIVE PROCEDURES | Age: 85
End: 2020-06-18

## 2020-06-18 NOTE — TELEPHONE ENCOUNTER
Patient contacted for [facility] 6 month follow up Phone Call. Patient currently taking [id number] and plavix 75mg. Patient can now stop plavix 75 mg per Dr. Ciera Perrin and the [facility] protocol. He will continue ASA 81mg indefinitely. He states that he feels really well. He has not had any hospitalizations or procedures since his 45 day follow up. Zo was d/c'ed at his 45 day follow up (1/17/2020). Patient has no questions or concerns at this time.      THE   BARTHEL INDEX   Activity Score  FEEDING  0 = unable  5 = needs help cutting, spreading butter, etc., or requires modified diet  10 = independent   10  BATHING  0 = dependent  5 = independent (or in shower)  5  GROOMING  0 = needs to help with personal care  5 = independent face/hair/teeth/shaving (implements provided)  5  DRESSING  0 = dependent  5 = needs help but can do about half unaided  10 = independent (including buttons, zips, laces, etc.)   10  BOWELS  0 = incontinent (or needs to be given enemas)  5 = occasional accident  10 = continent  10  BLADDER  0 = incontinent, or catheterized and unable to manage alone  5 = occasional accident  10 = continent  10  TOILET USE  0 = dependent  5 = needs some help, but can do something alone  10 = independent (on and off, dressing, wiping)  10  TRANSFERS (BED TO CHAIR AND BACK)  0 = unable, no sitting balance  5 = major help (one or two people, physical), can sit  10 = minor help (verbal or physical)  15 = independent   15  MOBILITY (ON LEVEL SURFACES)  0 = immobile or < 50 yards  5 = wheelchair independent, including corners, > 50 yards  10 = walks with help of one person (verbal or physical) > 50 yards  15 = independent (but may use any aid; for example, stick) > 50 yards   15  STAIRS  0 = unable  5 = needs help (verbal, physical, carrying aid)  10 = independent  10  TOTAL (0-100): 100

## 2021-06-22 PROBLEM — I42.0 DILATED CARDIOMYOPATHY (HCC): Status: ACTIVE | Noted: 2021-06-22

## 2021-06-29 ENCOUNTER — ANESTHESIA EVENT (OUTPATIENT)
Dept: CARDIAC CATH/INVASIVE PROCEDURES | Age: 86
End: 2021-06-29
Payer: MEDICARE

## 2021-06-29 RX ORDER — SODIUM CHLORIDE, SODIUM LACTATE, POTASSIUM CHLORIDE, CALCIUM CHLORIDE 600; 310; 30; 20 MG/100ML; MG/100ML; MG/100ML; MG/100ML
100 INJECTION, SOLUTION INTRAVENOUS CONTINUOUS
Status: CANCELLED | OUTPATIENT
Start: 2021-06-29

## 2021-06-29 NOTE — PROGRESS NOTES
Patient pre-assessment complete for BIV ICD Generator change with Dr Fantasma Rudd scheduled for 21 at 9:30am, arrival time 7:30am. Patient verified using . Patient instructed to bring all home medications in labeled bottles on the day of procedure. NPO status reinforced. Patient instructed to ONLY Take asa & metoprolol in am. Instructed they can take all other medications excluding vitamins & supplements. Patient verbalizes understanding of all instructions & denies any questions at this time.

## 2021-06-29 NOTE — PROGRESS NOTES
Called to pre-assess for Gen Change with Dr Akbar Diana, Scheduled 6/30/21. No answer & message left.

## 2021-06-30 ENCOUNTER — ANESTHESIA (OUTPATIENT)
Dept: CARDIAC CATH/INVASIVE PROCEDURES | Age: 86
End: 2021-06-30
Payer: MEDICARE

## 2021-06-30 ENCOUNTER — HOSPITAL ENCOUNTER (OUTPATIENT)
Age: 86
Setting detail: OUTPATIENT SURGERY
Discharge: HOME | End: 2021-06-30
Attending: INTERNAL MEDICINE | Admitting: INTERNAL MEDICINE
Payer: MEDICARE

## 2021-06-30 VITALS
HEIGHT: 72 IN | TEMPERATURE: 97.4 F | RESPIRATION RATE: 20 BRPM | BODY MASS INDEX: 25.73 KG/M2 | SYSTOLIC BLOOD PRESSURE: 170 MMHG | WEIGHT: 190 LBS | HEART RATE: 70 BPM | DIASTOLIC BLOOD PRESSURE: 97 MMHG | OXYGEN SATURATION: 90 %

## 2021-06-30 DIAGNOSIS — Z79.01 ANTICOAGULANT LONG-TERM USE: ICD-10-CM

## 2021-06-30 DIAGNOSIS — I42.0 DILATED CARDIOMYOPATHY (HCC): ICD-10-CM

## 2021-06-30 DIAGNOSIS — I25.5 CARDIOMYOPATHY, ISCHEMIC: ICD-10-CM

## 2021-06-30 DIAGNOSIS — I50.31 ACUTE DIASTOLIC HEART FAILURE (HCC): ICD-10-CM

## 2021-06-30 DIAGNOSIS — I48.11 LONGSTANDING PERSISTENT ATRIAL FIBRILLATION (HCC): ICD-10-CM

## 2021-06-30 DIAGNOSIS — I44.7 LBBB (LEFT BUNDLE BRANCH BLOCK): ICD-10-CM

## 2021-06-30 DIAGNOSIS — Z95.810 AUTOMATIC IMPLANTABLE CARDIOVERTER-DEFIBRILLATOR IN SITU: ICD-10-CM

## 2021-06-30 DIAGNOSIS — I48.92 ATRIAL FLUTTER, UNSPECIFIED TYPE (HCC): ICD-10-CM

## 2021-06-30 LAB
ANION GAP SERPL CALC-SCNC: 6 MMOL/L (ref 7–16)
ATRIAL RATE: 468 BPM
BUN SERPL-MCNC: 21 MG/DL (ref 8–23)
CALCIUM SERPL-MCNC: 9.6 MG/DL (ref 8.3–10.4)
CALCULATED R AXIS, ECG10: -139 DEGREES
CALCULATED T AXIS, ECG11: 50 DEGREES
CHLORIDE SERPL-SCNC: 111 MMOL/L (ref 98–107)
CO2 SERPL-SCNC: 24 MMOL/L (ref 21–32)
CREAT SERPL-MCNC: 1.58 MG/DL (ref 0.8–1.5)
DIAGNOSIS, 93000: NORMAL
ERYTHROCYTE [DISTWIDTH] IN BLOOD BY AUTOMATED COUNT: 16.5 % (ref 11.9–14.6)
GLUCOSE SERPL-MCNC: 200 MG/DL (ref 65–100)
HCT VFR BLD AUTO: 39.5 % (ref 41.1–50.3)
HGB BLD-MCNC: 12.4 G/DL (ref 13.6–17.2)
MCH RBC QN AUTO: 32.6 PG (ref 26.1–32.9)
MCHC RBC AUTO-ENTMCNC: 31.4 G/DL (ref 31.4–35)
MCV RBC AUTO: 103.9 FL (ref 79.6–97.8)
NRBC # BLD: 0 K/UL (ref 0–0.2)
PLATELET # BLD AUTO: 168 K/UL (ref 150–450)
PMV BLD AUTO: 10.4 FL (ref 9.4–12.3)
POTASSIUM SERPL-SCNC: 4.3 MMOL/L (ref 3.5–5.1)
Q-T INTERVAL, ECG07: 478 MS
QRS DURATION, ECG06: 174 MS
QTC CALCULATION (BEZET), ECG08: 523 MS
RBC # BLD AUTO: 3.8 M/UL (ref 4.23–5.6)
SODIUM SERPL-SCNC: 141 MMOL/L (ref 136–145)
VENTRICULAR RATE, ECG03: 72 BPM
WBC # BLD AUTO: 8 K/UL (ref 4.3–11.1)

## 2021-06-30 PROCEDURE — 77030041279 HC DRSG PRMSL AG MDII -B: Performed by: INTERNAL MEDICINE

## 2021-06-30 PROCEDURE — 33264 RMVL & RPLCMT DFB GEN MLT LD: CPT | Performed by: INTERNAL MEDICINE

## 2021-06-30 PROCEDURE — 74011000250 HC RX REV CODE- 250: Performed by: NURSE ANESTHETIST, CERTIFIED REGISTERED

## 2021-06-30 PROCEDURE — 77030022704 HC SUT VLOC COVD -B: Performed by: INTERNAL MEDICINE

## 2021-06-30 PROCEDURE — 80048 BASIC METABOLIC PNL TOTAL CA: CPT

## 2021-06-30 PROCEDURE — 93005 ELECTROCARDIOGRAM TRACING: CPT | Performed by: INTERNAL MEDICINE

## 2021-06-30 PROCEDURE — C1882 AICD, OTHER THAN SING/DUAL: HCPCS | Performed by: INTERNAL MEDICINE

## 2021-06-30 PROCEDURE — 77030033067 HC SUT PDO STRATFX SPIR J&J -B: Performed by: INTERNAL MEDICINE

## 2021-06-30 PROCEDURE — 76210000022 HC REC RM PH II 1.5 TO 2 HR: Performed by: INTERNAL MEDICINE

## 2021-06-30 PROCEDURE — 74011000250 HC RX REV CODE- 250: Performed by: INTERNAL MEDICINE

## 2021-06-30 PROCEDURE — 77030031304 HC WAVWGD EIGR DISP INVO -D: Performed by: INTERNAL MEDICINE

## 2021-06-30 PROCEDURE — 85027 COMPLETE CBC AUTOMATED: CPT

## 2021-06-30 PROCEDURE — 74011250636 HC RX REV CODE- 250/636: Performed by: NURSE ANESTHETIST, CERTIFIED REGISTERED

## 2021-06-30 PROCEDURE — 77030010507 HC ADH SKN DERMBND J&J -B: Performed by: INTERNAL MEDICINE

## 2021-06-30 PROCEDURE — 76060000033 HC ANESTHESIA 1 TO 1.5 HR: Performed by: INTERNAL MEDICINE

## 2021-06-30 PROCEDURE — 74011250636 HC RX REV CODE- 250/636: Performed by: INTERNAL MEDICINE

## 2021-06-30 DEVICE — IMPLANTABLE DEVICE
Type: IMPLANTABLE DEVICE | Status: FUNCTIONAL
Brand: INTICA 7 HF-T DF-1 IS-1 PROMRI

## 2021-06-30 RX ORDER — DOXYCYCLINE 100 MG/1
100 TABLET ORAL 2 TIMES DAILY
Qty: 10 TABLET | Refills: 0 | Status: SHIPPED | OUTPATIENT
Start: 2021-06-30 | End: 2021-07-05

## 2021-06-30 RX ORDER — SODIUM CHLORIDE, SODIUM LACTATE, POTASSIUM CHLORIDE, CALCIUM CHLORIDE 600; 310; 30; 20 MG/100ML; MG/100ML; MG/100ML; MG/100ML
75 INJECTION, SOLUTION INTRAVENOUS CONTINUOUS
Status: DISCONTINUED | OUTPATIENT
Start: 2021-06-30 | End: 2021-06-30 | Stop reason: HOSPADM

## 2021-06-30 RX ORDER — CEFAZOLIN SODIUM/WATER 2 G/20 ML
2 SYRINGE (ML) INTRAVENOUS ONCE
Status: COMPLETED | OUTPATIENT
Start: 2021-06-30 | End: 2021-06-30

## 2021-06-30 RX ORDER — SODIUM CHLORIDE, SODIUM LACTATE, POTASSIUM CHLORIDE, CALCIUM CHLORIDE 600; 310; 30; 20 MG/100ML; MG/100ML; MG/100ML; MG/100ML
100 INJECTION, SOLUTION INTRAVENOUS CONTINUOUS
Status: DISCONTINUED | OUTPATIENT
Start: 2021-06-30 | End: 2021-06-30 | Stop reason: HOSPADM

## 2021-06-30 RX ORDER — PROPOFOL 10 MG/ML
INJECTION, EMULSION INTRAVENOUS AS NEEDED
Status: DISCONTINUED | OUTPATIENT
Start: 2021-06-30 | End: 2021-06-30 | Stop reason: HOSPADM

## 2021-06-30 RX ORDER — LIDOCAINE HYDROCHLORIDE 20 MG/ML
INJECTION, SOLUTION EPIDURAL; INFILTRATION; INTRACAUDAL; PERINEURAL AS NEEDED
Status: DISCONTINUED | OUTPATIENT
Start: 2021-06-30 | End: 2021-06-30 | Stop reason: HOSPADM

## 2021-06-30 RX ORDER — EPHEDRINE SULFATE/0.9% NACL/PF 50 MG/5 ML
SYRINGE (ML) INTRAVENOUS AS NEEDED
Status: DISCONTINUED | OUTPATIENT
Start: 2021-06-30 | End: 2021-06-30 | Stop reason: HOSPADM

## 2021-06-30 RX ADMIN — PROPOFOL 50 MG: 10 INJECTION, EMULSION INTRAVENOUS at 10:14

## 2021-06-30 RX ADMIN — PROPOFOL 30 MG: 10 INJECTION, EMULSION INTRAVENOUS at 09:44

## 2021-06-30 RX ADMIN — PROPOFOL 50 MG: 10 INJECTION, EMULSION INTRAVENOUS at 09:53

## 2021-06-30 RX ADMIN — LIDOCAINE HYDROCHLORIDE 100 MG: 20 INJECTION, SOLUTION EPIDURAL; INFILTRATION; INTRACAUDAL; PERINEURAL at 09:38

## 2021-06-30 RX ADMIN — PROPOFOL 50 MG: 10 INJECTION, EMULSION INTRAVENOUS at 10:08

## 2021-06-30 RX ADMIN — PROPOFOL 50 MG: 10 INJECTION, EMULSION INTRAVENOUS at 09:38

## 2021-06-30 RX ADMIN — PROPOFOL 20 MG: 10 INJECTION, EMULSION INTRAVENOUS at 09:41

## 2021-06-30 RX ADMIN — PROPOFOL 50 MG: 10 INJECTION, EMULSION INTRAVENOUS at 09:58

## 2021-06-30 RX ADMIN — PROPOFOL 50 MG: 10 INJECTION, EMULSION INTRAVENOUS at 09:47

## 2021-06-30 RX ADMIN — SODIUM CHLORIDE, SODIUM LACTATE, POTASSIUM CHLORIDE, AND CALCIUM CHLORIDE: 600; 310; 30; 20 INJECTION, SOLUTION INTRAVENOUS at 09:33

## 2021-06-30 RX ADMIN — CEFAZOLIN 2 G: 1 INJECTION, POWDER, FOR SOLUTION INTRAVENOUS at 09:45

## 2021-06-30 RX ADMIN — Medication 10 MG: at 09:52

## 2021-06-30 RX ADMIN — PROPOFOL 50 MG: 10 INJECTION, EMULSION INTRAVENOUS at 10:03

## 2021-06-30 NOTE — DISCHARGE INSTRUCTIONS
Post Op Device Instructions        Please keep Aquacel dressing on wound until your 1-2 week follow up in device clinic. The dressing promotes healing and is meant to stay on the wound. Keep incision site completely dry for one week. During this week you may take a sponge bath, but no shower. After one week you may shower, cleaning the wound with soap and garcia. Do not use any lotions, creams, or ointments on the incision.         For four weeks after your implant    Do not lift anything heavier than a gallon of milk.    Do not raise your elbow above the level of your shoulder on the ICD implant side.    Avoid excessive pushing, pulling, or twisting.    Call you doctor for any of the following:    Any signs of infection, including redness, swelling or pain at the incision site, or a temperature of 101° F or greater.    If you have twitching chest muscles, hiccups that won't stop, or a swollen arm on the side of the incision.    Any feelings of light-headedness, chest pain, or shortness of breath.    Please notify your doctors and dentists that you have a defibrillator.         You should not drive until 1 week after your implant or after your first follow-up appointment, whichever comes first. At that time, you can discuss when you may return to your regular driving routine.         About 1 month after implant, you will receive a card by mail from the company who manufactured your ICD. Your device was manufactured by [employer]. You should carry this card with you at all times.         Avoid manipulating the device or leads with your fingers. Do not massage or excessively rub the implant site.         If you receive one shock from your device:   and you feel ok, you may call to let your physician know.    but feel poorly, please notify your doctor. You may need to be seen.    If you receive more than one shock in a 24 hour period, call your physician to schedule a visit or report to the emergency room.        Please call the device clinic or Wisam Stevens (EP Nurse) [phone] if you have questions or concerns about your device. Please call the hospital if it is after 5 pm or the weekend please page the on call cardiologist at [facility] at [phone]   Sharon Jama will need to have your device checked 1- 2 weeks after the procedure and should have an appointment with the device clinic.

## 2021-06-30 NOTE — MANAGEMENT PLAN
80year old female s/p BIV ICD changeout. Please see full report in the cardiology tab. Thank you for allowing me to participate in the electrophysiologic care of Mr. Shashi Jimenez. Please contact me if any questions or concerns were to arise. Ernie Becerril.  Madeline Landis MD, [address] [place] [address] Cardiac Electrophysiology  [facility]

## 2021-06-30 NOTE — PROGRESS NOTES
Discharge instructions given to patient. Patient gait steady. Incision site dressing remains dry and intact. No hematoma noted. Patient denies pain. Bilateral IV removed, tips intact. Patient transferred via wheelchair for discharge.

## 2021-06-30 NOTE — ANESTHESIA PREPROCEDURE EVALUATION
Anesthetic History   No history of anesthetic complications            Review of Systems / Medical History  Patient summary reviewed and pertinent labs reviewed    Pulmonary  Within defined limits                 Neuro/Psych   Within defined limits           Cardiovascular    Hypertension      CHF (EF 30% in past with improvement to 50% on recent echo's): NYHA Classification II  Dysrhythmias (A flutter) : atrial fibrillation  Pacemaker (BiV Pacer/ICD VVIR), past MI (1995) and CAD  Pertinent negatives: CABG/stent: CABG 2008. Exercise tolerance: <4 METS  Comments: Pulmonary HTN   GI/Hepatic/Renal         Renal disease: stones and CRI       Endo/Other    Diabetes    Arthritis     Other Findings   Comments: IV contrast allergy.  Getting pretreatment           Physical Exam    Airway  Mallampati: II  TM Distance: 4 - 6 cm  Neck ROM: normal range of motion   Mouth opening: Normal     Cardiovascular    Rhythm: regular  Rate: normal  Peripheral edema    Pertinent negatives: No murmur   Dental    Dentition: Upper partial plate and Lower partial plate     Pulmonary  Breath sounds clear to auscultation              Comments: sats 99% laying flat on RA, however he gets easily SOB with minimal exertion/walking on flat ground Abdominal  GI exam deferred       Other Findings            Anesthetic Plan    ASA: 3  Anesthesia type: total IV anesthesia            Anesthetic plan and risks discussed with: Patient

## 2021-10-15 ENCOUNTER — HOSPITAL ENCOUNTER (OUTPATIENT)
Dept: GENERAL RADIOLOGY | Age: 86
Discharge: HOME | End: 2021-10-15
Payer: MEDICARE

## 2021-10-15 DIAGNOSIS — M25.552 LEFT HIP PAIN: ICD-10-CM

## 2021-10-15 PROCEDURE — 73502 X-RAY EXAM HIP UNI 2-3 VIEWS: CPT

## 2021-12-16 ENCOUNTER — HOSPITAL ENCOUNTER (OUTPATIENT)
Dept: CT IMAGING | Age: 86
Discharge: HOME | End: 2021-12-16
Attending: STUDENT IN AN ORGANIZED HEALTH CARE EDUCATION/TRAINING PROGRAM
Payer: MEDICARE

## 2021-12-16 DIAGNOSIS — R51.9 NEW ONSET HEADACHE: ICD-10-CM

## 2021-12-16 PROCEDURE — 70450 CT HEAD/BRAIN W/O DYE: CPT

## 2022-01-01 ENCOUNTER — CARE COORDINATION (OUTPATIENT)
Dept: CARE COORDINATION | Facility: CLINIC | Age: 87
End: 2022-01-01

## 2022-01-01 ENCOUNTER — HOME CARE VISIT (OUTPATIENT)
Dept: SCHEDULING | Facility: HOME HEALTH | Age: 87
End: 2022-01-01
Payer: MEDICARE

## 2022-01-01 ENCOUNTER — APPOINTMENT (OUTPATIENT)
Dept: ULTRASOUND IMAGING | Age: 87
DRG: 682 | End: 2022-01-01
Payer: MEDICARE

## 2022-01-01 ENCOUNTER — APPOINTMENT (OUTPATIENT)
Dept: GENERAL RADIOLOGY | Age: 87
DRG: 871 | End: 2022-01-01
Payer: MEDICARE

## 2022-01-01 ENCOUNTER — HOSPICE ADMISSION (OUTPATIENT)
Dept: HOSPICE | Facility: HOSPICE | Age: 87
End: 2022-01-01
Payer: MEDICARE

## 2022-01-01 ENCOUNTER — HOSPITAL ENCOUNTER (INPATIENT)
Age: 87
LOS: 4 days | Discharge: NURSING, REHAB OR LONG-TERM CARE | DRG: 638 | End: 2022-06-03
Attending: EMERGENCY MEDICINE
Payer: MEDICARE

## 2022-01-01 ENCOUNTER — HOSPITAL ENCOUNTER (INPATIENT)
Age: 87
LOS: 4 days | Discharge: NURSING, REHAB OR LONG-TERM CARE | DRG: 871 | End: 2022-06-28
Attending: EMERGENCY MEDICINE
Payer: MEDICARE

## 2022-01-01 ENCOUNTER — TELEPHONE (OUTPATIENT)
Dept: FAMILY MEDICINE CLINIC | Facility: CLINIC | Age: 87
End: 2022-01-01

## 2022-01-01 ENCOUNTER — APPOINTMENT (OUTPATIENT)
Dept: GENERAL RADIOLOGY | Age: 87
DRG: 682 | End: 2022-01-01
Payer: MEDICARE

## 2022-01-01 ENCOUNTER — APPOINTMENT (OUTPATIENT)
Dept: CT IMAGING | Age: 87
DRG: 682 | End: 2022-01-01
Payer: MEDICARE

## 2022-01-01 ENCOUNTER — HOSPITAL ENCOUNTER (INPATIENT)
Age: 87
LOS: 5 days | Discharge: HOSPICE | DRG: 682 | End: 2022-07-28
Attending: EMERGENCY MEDICINE | Admitting: INTERNAL MEDICINE
Payer: MEDICARE

## 2022-01-01 ENCOUNTER — APPOINTMENT (OUTPATIENT)
Dept: ULTRASOUND IMAGING | Age: 87
DRG: 638 | End: 2022-01-01
Payer: MEDICARE

## 2022-01-01 ENCOUNTER — TELEPHONE (OUTPATIENT)
Dept: ORTHOPEDIC SURGERY | Age: 87
End: 2022-01-01

## 2022-01-01 ENCOUNTER — APPOINTMENT (OUTPATIENT)
Dept: ULTRASOUND IMAGING | Age: 87
DRG: 871 | End: 2022-01-01
Payer: MEDICARE

## 2022-01-01 ENCOUNTER — HOSPITAL ENCOUNTER (EMERGENCY)
Dept: GENERAL RADIOLOGY | Age: 87
Discharge: HOME | DRG: 638 | End: 2022-06-02
Payer: MEDICARE

## 2022-01-01 VITALS
RESPIRATION RATE: 17 BRPM | BODY MASS INDEX: 29.54 KG/M2 | HEART RATE: 70 BPM | DIASTOLIC BLOOD PRESSURE: 67 MMHG | OXYGEN SATURATION: 99 % | WEIGHT: 218.1 LBS | SYSTOLIC BLOOD PRESSURE: 136 MMHG | HEIGHT: 72 IN | TEMPERATURE: 97.3 F

## 2022-01-01 VITALS
DIASTOLIC BLOOD PRESSURE: 64 MMHG | RESPIRATION RATE: 18 BRPM | TEMPERATURE: 98.3 F | HEART RATE: 70 BPM | SYSTOLIC BLOOD PRESSURE: 154 MMHG

## 2022-01-01 VITALS
TEMPERATURE: 98.3 F | DIASTOLIC BLOOD PRESSURE: 84 MMHG | RESPIRATION RATE: 18 BRPM | BODY MASS INDEX: 29.53 KG/M2 | WEIGHT: 218 LBS | HEART RATE: 70 BPM | SYSTOLIC BLOOD PRESSURE: 138 MMHG | HEIGHT: 72 IN

## 2022-01-01 VITALS
HEART RATE: 70 BPM | WEIGHT: 167 LBS | TEMPERATURE: 98.1 F | HEIGHT: 72 IN | SYSTOLIC BLOOD PRESSURE: 144 MMHG | RESPIRATION RATE: 16 BRPM | OXYGEN SATURATION: 100 % | BODY MASS INDEX: 22.62 KG/M2 | DIASTOLIC BLOOD PRESSURE: 72 MMHG

## 2022-01-01 VITALS
BODY MASS INDEX: 24.01 KG/M2 | HEIGHT: 72 IN | OXYGEN SATURATION: 96 % | RESPIRATION RATE: 17 BRPM | HEART RATE: 68 BPM | DIASTOLIC BLOOD PRESSURE: 69 MMHG | WEIGHT: 177.25 LBS | SYSTOLIC BLOOD PRESSURE: 148 MMHG | TEMPERATURE: 97.7 F

## 2022-01-01 VITALS
HEART RATE: 74 BPM | DIASTOLIC BLOOD PRESSURE: 60 MMHG | SYSTOLIC BLOOD PRESSURE: 130 MMHG | TEMPERATURE: 97.3 F | RESPIRATION RATE: 18 BRPM

## 2022-01-01 DIAGNOSIS — R41.0 DELIRIUM: Primary | ICD-10-CM

## 2022-01-01 DIAGNOSIS — I50.9 CONGESTIVE HEART FAILURE, UNSPECIFIED HF CHRONICITY, UNSPECIFIED HEART FAILURE TYPE (HCC): ICD-10-CM

## 2022-01-01 DIAGNOSIS — E11.622 DIABETIC ULCER OF ANKLE (HCC): Primary | ICD-10-CM

## 2022-01-01 DIAGNOSIS — L03.115 CELLULITIS OF RIGHT LEG: ICD-10-CM

## 2022-01-01 DIAGNOSIS — J18.9 PNEUMONIA OF LEFT LOWER LOBE DUE TO INFECTIOUS ORGANISM: Primary | ICD-10-CM

## 2022-01-01 DIAGNOSIS — I50.43 ACUTE ON CHRONIC COMBINED SYSTOLIC AND DIASTOLIC CONGESTIVE HEART FAILURE (HCC): ICD-10-CM

## 2022-01-01 DIAGNOSIS — N17.9 ACUTE RENAL FAILURE, UNSPECIFIED ACUTE RENAL FAILURE TYPE (HCC): ICD-10-CM

## 2022-01-01 DIAGNOSIS — L97.309 DIABETIC ULCER OF ANKLE (HCC): Primary | ICD-10-CM

## 2022-01-01 DIAGNOSIS — R60.9 PERIPHERAL EDEMA: ICD-10-CM

## 2022-01-01 DIAGNOSIS — Z51.5 PALLIATIVE CARE PATIENT: ICD-10-CM

## 2022-01-01 DIAGNOSIS — A41.9 SEPSIS, DUE TO UNSPECIFIED ORGANISM, UNSPECIFIED WHETHER ACUTE ORGAN DYSFUNCTION PRESENT (HCC): ICD-10-CM

## 2022-01-01 DIAGNOSIS — A41.9 SEVERE SEPSIS (HCC): ICD-10-CM

## 2022-01-01 DIAGNOSIS — L03.90 CELLULITIS, UNSPECIFIED CELLULITIS SITE: ICD-10-CM

## 2022-01-01 DIAGNOSIS — R65.20 SEVERE SEPSIS (HCC): ICD-10-CM

## 2022-01-01 LAB
ACCESSION NUMBER, LLC1M: ABNORMAL
ACINETOBACTER CALCOAC BAUMANNII COMPLEX BY PCR: NOT DETECTED
ALBUMIN SERPL ELPH-MCNC: 2.3 G/DL (ref 2.9–4.4)
ALBUMIN SERPL-MCNC: 2 G/DL (ref 3.2–4.6)
ALBUMIN SERPL-MCNC: 2.5 G/DL (ref 3.2–4.6)
ALBUMIN SERPL-MCNC: 2.7 G/DL (ref 3.2–4.6)
ALBUMIN SERPL-MCNC: 2.9 G/DL (ref 3.2–4.6)
ALBUMIN SERPL-MCNC: 4.4 G/DL (ref 3.2–4.6)
ALBUMIN SERPL-MCNC: 4.4 G/DL (ref 3.2–4.6)
ALBUMIN/GLOB SERPL: 0.5 {RATIO} (ref 1.2–3.5)
ALBUMIN/GLOB SERPL: 0.5 {RATIO} (ref 1.2–3.5)
ALBUMIN/GLOB SERPL: 0.6 {RATIO} (ref 1.2–3.5)
ALBUMIN/GLOB SERPL: 0.6 {RATIO} (ref 1.2–3.5)
ALBUMIN/GLOB SERPL: 0.7 {RATIO} (ref 0.7–1.7)
ALBUMIN/GLOB SERPL: 1.6 {RATIO} (ref 1.2–3.5)
ALP SERPL-CCNC: 145 U/L (ref 50–136)
ALP SERPL-CCNC: 191 U/L (ref 50–136)
ALP SERPL-CCNC: 244 U/L (ref 50–136)
ALP SERPL-CCNC: 261 U/L (ref 50–136)
ALP SERPL-CCNC: 320 U/L (ref 50–136)
ALPHA1 GLOB SERPL ELPH-MCNC: 0.4 G/DL (ref 0–0.4)
ALPHA2 GLOB SERPL ELPH-MCNC: 0.9 G/DL (ref 0.4–1)
ALT SERPL-CCNC: 18 U/L (ref 12–65)
ALT SERPL-CCNC: 27 U/L (ref 12–65)
ALT SERPL-CCNC: 28 U/L (ref 12–65)
ALT SERPL-CCNC: 35 U/L (ref 12–65)
ALT SERPL-CCNC: 38 U/L (ref 12–65)
ANION GAP SERPL CALC-SCNC: 10 MMOL/L (ref 7–16)
ANION GAP SERPL CALC-SCNC: 11 MMOL/L (ref 7–16)
ANION GAP SERPL CALC-SCNC: 6 MMOL/L (ref 7–16)
ANION GAP SERPL CALC-SCNC: 6 MMOL/L (ref 7–16)
ANION GAP SERPL CALC-SCNC: 7 MMOL/L (ref 7–16)
ANION GAP SERPL CALC-SCNC: 8 MMOL/L (ref 7–16)
ANION GAP SERPL CALC-SCNC: 8 MMOL/L (ref 7–16)
ANION GAP SERPL CALC-SCNC: 9 MMOL/L (ref 7–16)
ANION GAP SERPL CALC-SCNC: 9 MMOL/L (ref 7–16)
APPEARANCE UR: ABNORMAL
APPEARANCE UR: ABNORMAL
APPEARANCE UR: CLEAR
APTT PPP: 39.7 SEC (ref 24.1–35.1)
AST SERPL-CCNC: 21 U/L (ref 15–37)
AST SERPL-CCNC: 25 U/L (ref 15–37)
AST SERPL-CCNC: 39 U/L (ref 15–37)
AST SERPL-CCNC: 45 U/L (ref 15–37)
AST SERPL-CCNC: 58 U/L (ref 15–37)
B PERT DNA SPEC QL NAA+PROBE: NOT DETECTED
B-GLOBULIN SERPL ELPH-MCNC: 0.7 G/DL (ref 0.7–1.3)
BACTERIA SPEC CULT: ABNORMAL
BACTERIA SPEC CULT: ABNORMAL
BACTERIA SPEC CULT: NORMAL
BACTERIA URNS QL MICRO: 0 /HPF
BACTERIA URNS QL MICRO: 0 /HPF
BACTERIA URNS QL MICRO: NORMAL /HPF
BACTEROIDES FRAGILIS BY PCR: NOT DETECTED
BASOPHILS # BLD: 0 K/UL (ref 0–0.2)
BASOPHILS # BLD: 0.1 K/UL (ref 0–0.2)
BASOPHILS NFR BLD: 0 % (ref 0–2)
BASOPHILS NFR BLD: 1 % (ref 0–2)
BILIRUB DIRECT SERPL-MCNC: 0.3 MG/DL
BILIRUB SERPL-MCNC: 0.5 MG/DL (ref 0.2–1.1)
BILIRUB SERPL-MCNC: 0.6 MG/DL (ref 0.2–1.1)
BILIRUB SERPL-MCNC: 0.7 MG/DL (ref 0.2–1.1)
BILIRUB SERPL-MCNC: 0.8 MG/DL (ref 0.2–1.1)
BILIRUB SERPL-MCNC: 1 MG/DL (ref 0.2–1.1)
BILIRUB UR QL: NEGATIVE
BIOFIRE TEST COMMENT: ABNORMAL
BLACTX-M ISLT/SPM QL: NOT DETECTED
BLAIMP ISLT/SPM QL: NOT DETECTED
BLAKPC BLD POS QL NAA+NON-PROBE: NOT DETECTED
BLAOXA-48-LIKE ISLT/SPM QL: NOT DETECTED
BLAVIM ISLT/SPM QL: NOT DETECTED
BORDETELLA PARAPERTUSSIS BY PCR: NOT DETECTED
BUN SERPL-MCNC: 109 MG/DL (ref 8–23)
BUN SERPL-MCNC: 112 MG/DL (ref 8–23)
BUN SERPL-MCNC: 114 MG/DL (ref 8–23)
BUN SERPL-MCNC: 116 MG/DL (ref 8–23)
BUN SERPL-MCNC: 118 MG/DL (ref 8–23)
BUN SERPL-MCNC: 120 MG/DL (ref 8–23)
BUN SERPL-MCNC: 122 MG/DL (ref 8–23)
BUN SERPL-MCNC: 27 MG/DL (ref 8–23)
BUN SERPL-MCNC: 29 MG/DL (ref 8–23)
C ALBICANS DNA BLD POS QL NAA+NON-PROBE: NOT DETECTED
C GLABRATA DNA BLD POS QL NAA+NON-PROBE: NOT DETECTED
C KRUSEI DNA BLD POS QL NAA+NON-PROBE: NOT DETECTED
C PARAP DNA BLD POS QL NAA+NON-PROBE: NOT DETECTED
C PNEUM DNA SPEC QL NAA+PROBE: NOT DETECTED
C TROPICLS DNA BLD POS QL NAA+NON-PROBE: NOT DETECTED
CALCIUM SERPL-MCNC: 9 MG/DL (ref 8.3–10.4)
CALCIUM SERPL-MCNC: 9.1 MG/DL (ref 8.3–10.4)
CALCIUM SERPL-MCNC: 9.2 MG/DL (ref 8.3–10.4)
CALCIUM SERPL-MCNC: 9.2 MG/DL (ref 8.3–10.4)
CALCIUM SERPL-MCNC: 9.3 MG/DL (ref 8.3–10.4)
CALCIUM SERPL-MCNC: 9.7 MG/DL (ref 8.3–10.4)
CALCIUM SERPL-MCNC: 9.8 MG/DL (ref 8.3–10.4)
CANDIDA AURIS BY PCR: NOT DETECTED
CASTS URNS QL MICRO: ABNORMAL /LPF
CASTS URNS QL MICRO: ABNORMAL /LPF
CASTS URNS QL MICRO: NORMAL /LPF
CHLORIDE SERPL-SCNC: 102 MMOL/L (ref 98–107)
CHLORIDE SERPL-SCNC: 104 MMOL/L (ref 98–107)
CHLORIDE SERPL-SCNC: 105 MMOL/L (ref 98–107)
CHLORIDE SERPL-SCNC: 105 MMOL/L (ref 98–107)
CHLORIDE SERPL-SCNC: 106 MMOL/L (ref 98–107)
CHLORIDE SERPL-SCNC: 106 MMOL/L (ref 98–107)
CHLORIDE SERPL-SCNC: 108 MMOL/L (ref 98–107)
CHLORIDE SERPL-SCNC: 108 MMOL/L (ref 98–107)
CHLORIDE SERPL-SCNC: 109 MMOL/L (ref 98–107)
CO2 SERPL-SCNC: 17 MMOL/L (ref 21–32)
CO2 SERPL-SCNC: 18 MMOL/L (ref 21–32)
CO2 SERPL-SCNC: 20 MMOL/L (ref 21–32)
CO2 SERPL-SCNC: 22 MMOL/L (ref 21–32)
CO2 SERPL-SCNC: 22 MMOL/L (ref 21–32)
CO2 SERPL-SCNC: 24 MMOL/L (ref 21–32)
CO2 SERPL-SCNC: 26 MMOL/L (ref 21–32)
COLOR UR: ABNORMAL
COLOR UR: ABNORMAL
COLOR UR: YELLOW
CREAT SERPL-MCNC: 1.4 MG/DL (ref 0.8–1.5)
CREAT SERPL-MCNC: 1.6 MG/DL (ref 0.8–1.5)
CREAT SERPL-MCNC: 2.8 MG/DL (ref 0.8–1.5)
CREAT SERPL-MCNC: 3 MG/DL (ref 0.8–1.5)
CREAT SERPL-MCNC: 3.1 MG/DL (ref 0.8–1.5)
CREAT SERPL-MCNC: 3.1 MG/DL (ref 0.8–1.5)
CREAT SERPL-MCNC: 3.2 MG/DL (ref 0.8–1.5)
CREAT SERPL-MCNC: 4.2 MG/DL (ref 0.8–1.5)
CREAT SERPL-MCNC: 4.4 MG/DL (ref 0.8–1.5)
CREAT SERPL-MCNC: 4.4 MG/DL (ref 0.8–1.5)
CRP SERPL-MCNC: 10.1 MG/DL (ref 0–0.9)
CRYPTOCOCCUS NEOFORMANS/GATTII BY PCR: NOT DETECTED
CRYSTALS URNS QL MICRO: 0 /LPF
DIFFERENTIAL METHOD BLD: ABNORMAL
E CLOAC COMP DNA BLD POS NAA+NON-PROBE: NOT DETECTED
E COLI DNA BLD POS QL NAA+NON-PROBE: NOT DETECTED
EKG ATRIAL RATE: 91 BPM
EKG DIAGNOSIS: NORMAL
EKG P AXIS: 56 DEGREES
EKG P-R INTERVAL: 250 MS
EKG Q-T INTERVAL: 403 MS
EKG QRS DURATION: 152 MS
EKG QTC CALCULATION (BAZETT): 482 MS
EKG R AXIS: 18 DEGREES
EKG T AXIS: 209 DEGREES
EKG VENTRICULAR RATE: 86 BPM
ENTEROBACTERALES BY PCR: DETECTED
ENTEROCOCCUS FAECALIS BY PCR: NOT DETECTED
ENTEROCOCCUS FAECIUM BY PCR: NOT DETECTED
EOSINOPHIL # BLD: 0 K/UL (ref 0–0.8)
EOSINOPHIL # BLD: 0.1 K/UL (ref 0–0.8)
EOSINOPHIL # BLD: 0.2 K/UL (ref 0–0.8)
EOSINOPHIL # BLD: 0.2 K/UL (ref 0–0.8)
EOSINOPHIL # BLD: 0.3 K/UL (ref 0–0.8)
EOSINOPHIL # BLD: 0.9 K/UL (ref 0–0.8)
EOSINOPHIL NFR BLD: 0 % (ref 0.5–7.8)
EOSINOPHIL NFR BLD: 1 % (ref 0.5–7.8)
EOSINOPHIL NFR BLD: 13 % (ref 0.5–7.8)
EOSINOPHIL NFR BLD: 2 % (ref 0.5–7.8)
EOSINOPHIL NFR BLD: 4 % (ref 0.5–7.8)
EPI CELLS #/AREA URNS HPF: NORMAL /HPF
ERYTHROCYTE [DISTWIDTH] IN BLOOD BY AUTOMATED COUNT: 14.9 % (ref 11.9–14.6)
ERYTHROCYTE [DISTWIDTH] IN BLOOD BY AUTOMATED COUNT: 15 % (ref 11.9–14.6)
ERYTHROCYTE [DISTWIDTH] IN BLOOD BY AUTOMATED COUNT: 15.9 % (ref 11.9–14.6)
ERYTHROCYTE [DISTWIDTH] IN BLOOD BY AUTOMATED COUNT: 16 % (ref 11.9–14.6)
ERYTHROCYTE [DISTWIDTH] IN BLOOD BY AUTOMATED COUNT: 17.1 % (ref 11.9–14.6)
ERYTHROCYTE [DISTWIDTH] IN BLOOD BY AUTOMATED COUNT: 17.4 % (ref 11.9–14.6)
EST. AVERAGE GLUCOSE BLD GHB EST-MCNC: 148 MG/DL
FERRITIN SERPL-MCNC: 248 NG/ML (ref 8–388)
FLUAV SUBTYP SPEC NAA+PROBE: NOT DETECTED
FLUBV RNA SPEC QL NAA+PROBE: NOT DETECTED
FOLATE SERPL-MCNC: 52.5 NG/ML (ref 3.1–17.5)
GAMMA GLOB SERPL ELPH-MCNC: 1.6 G/DL (ref 0.4–1.8)
GLOBULIN SER CALC-MCNC: 2.8 G/DL (ref 2.3–3.5)
GLOBULIN SER CALC-MCNC: 4.4 G/DL (ref 2.3–3.5)
GLOBULIN SER CALC-MCNC: 4.5 G/DL (ref 2.3–3.5)
GLOBULIN SER CALC-MCNC: 4.7 G/DL (ref 2.3–3.5)
GLOBULIN SER CALC-MCNC: 5.5 G/DL (ref 2.3–3.5)
GLOBULIN SER-MCNC: 3.6 G/DL (ref 2.2–3.9)
GLUCOSE BLD STRIP.AUTO-MCNC: 139 MG/DL (ref 65–100)
GLUCOSE BLD STRIP.AUTO-MCNC: 140 MG/DL (ref 65–100)
GLUCOSE BLD STRIP.AUTO-MCNC: 140 MG/DL (ref 65–100)
GLUCOSE BLD STRIP.AUTO-MCNC: 146 MG/DL (ref 65–100)
GLUCOSE BLD STRIP.AUTO-MCNC: 150 MG/DL (ref 65–100)
GLUCOSE BLD STRIP.AUTO-MCNC: 155 MG/DL (ref 65–100)
GLUCOSE BLD STRIP.AUTO-MCNC: 156 MG/DL (ref 65–100)
GLUCOSE BLD STRIP.AUTO-MCNC: 157 MG/DL (ref 65–100)
GLUCOSE BLD STRIP.AUTO-MCNC: 160 MG/DL (ref 65–100)
GLUCOSE BLD STRIP.AUTO-MCNC: 165 MG/DL (ref 65–100)
GLUCOSE BLD STRIP.AUTO-MCNC: 194 MG/DL (ref 65–100)
GLUCOSE BLD STRIP.AUTO-MCNC: 207 MG/DL (ref 65–100)
GLUCOSE BLD STRIP.AUTO-MCNC: 234 MG/DL (ref 65–100)
GLUCOSE BLD STRIP.AUTO-MCNC: 241 MG/DL (ref 65–100)
GLUCOSE BLD STRIP.AUTO-MCNC: 242 MG/DL (ref 65–100)
GLUCOSE BLD STRIP.AUTO-MCNC: 242 MG/DL (ref 65–100)
GLUCOSE BLD STRIP.AUTO-MCNC: 245 MG/DL (ref 65–100)
GLUCOSE BLD STRIP.AUTO-MCNC: 245 MG/DL (ref 65–100)
GLUCOSE BLD STRIP.AUTO-MCNC: 263 MG/DL (ref 65–100)
GLUCOSE BLD STRIP.AUTO-MCNC: 271 MG/DL (ref 65–100)
GLUCOSE BLD STRIP.AUTO-MCNC: 276 MG/DL (ref 65–100)
GLUCOSE BLD STRIP.AUTO-MCNC: 291 MG/DL (ref 65–100)
GLUCOSE BLD STRIP.AUTO-MCNC: 327 MG/DL (ref 65–100)
GLUCOSE BLD STRIP.AUTO-MCNC: 328 MG/DL (ref 65–100)
GLUCOSE BLD STRIP.AUTO-MCNC: 329 MG/DL (ref 65–100)
GLUCOSE BLD STRIP.AUTO-MCNC: 340 MG/DL (ref 65–100)
GLUCOSE BLD STRIP.AUTO-MCNC: 346 MG/DL (ref 65–100)
GLUCOSE BLD STRIP.AUTO-MCNC: 389 MG/DL (ref 65–100)
GLUCOSE BLD STRIP.AUTO-MCNC: 390 MG/DL (ref 65–100)
GLUCOSE BLD STRIP.AUTO-MCNC: 398 MG/DL (ref 65–100)
GLUCOSE BLD STRIP.AUTO-MCNC: 399 MG/DL (ref 65–100)
GLUCOSE BLD STRIP.AUTO-MCNC: 400 MG/DL (ref 65–100)
GLUCOSE BLD STRIP.AUTO-MCNC: 405 MG/DL (ref 65–100)
GLUCOSE BLD STRIP.AUTO-MCNC: 468 MG/DL (ref 65–100)
GLUCOSE SERPL-MCNC: 135 MG/DL (ref 65–100)
GLUCOSE SERPL-MCNC: 151 MG/DL (ref 65–100)
GLUCOSE SERPL-MCNC: 160 MG/DL (ref 65–100)
GLUCOSE SERPL-MCNC: 189 MG/DL (ref 65–100)
GLUCOSE SERPL-MCNC: 190 MG/DL (ref 65–100)
GLUCOSE SERPL-MCNC: 221 MG/DL (ref 65–100)
GLUCOSE SERPL-MCNC: 268 MG/DL (ref 65–100)
GLUCOSE SERPL-MCNC: 307 MG/DL (ref 65–100)
GLUCOSE SERPL-MCNC: 363 MG/DL (ref 65–100)
GLUCOSE UR STRIP.AUTO-MCNC: 500 MG/DL
GLUCOSE UR STRIP.AUTO-MCNC: 500 MG/DL
GLUCOSE UR STRIP.AUTO-MCNC: NEGATIVE MG/DL
GP B STREP DNA BLD POS QL NAA+NON-PROBE: NOT DETECTED
GRAM STN SPEC: ABNORMAL
H PYLORI AG STL QL IA: NEGATIVE
HADV DNA SPEC QL NAA+PROBE: NOT DETECTED
HAEM INFLU DNA BLD POS QL NAA+NON-PROBE: NOT DETECTED
HBA1C MFR BLD: 6.8 % (ref 4.2–6.3)
HCOV 229E RNA SPEC QL NAA+PROBE: NOT DETECTED
HCOV HKU1 RNA SPEC QL NAA+PROBE: NOT DETECTED
HCOV NL63 RNA SPEC QL NAA+PROBE: NOT DETECTED
HCOV OC43 RNA SPEC QL NAA+PROBE: NOT DETECTED
HCT VFR BLD AUTO: 22.3 % (ref 41.1–50.3)
HCT VFR BLD AUTO: 25.9 % (ref 41.1–50.3)
HCT VFR BLD AUTO: 26.2 % (ref 41.1–50.3)
HCT VFR BLD AUTO: 26.4 % (ref 41.1–50.3)
HCT VFR BLD AUTO: 27.2 % (ref 41.1–50.3)
HCT VFR BLD AUTO: 28.2 % (ref 41.1–50.3)
HCT VFR BLD AUTO: 31.3 % (ref 41.1–50.3)
HCT VFR BLD AUTO: 33.5 % (ref 41.1–50.3)
HCT VFR BLD AUTO: 36.9 % (ref 41.1–50.3)
HGB BLD-MCNC: 10 G/DL (ref 13.6–17.2)
HGB BLD-MCNC: 10.1 G/DL (ref 13.6–17.2)
HGB BLD-MCNC: 10.7 G/DL (ref 13.6–17.2)
HGB BLD-MCNC: 11.6 G/DL (ref 13.6–17.2)
HGB BLD-MCNC: 11.8 G/DL (ref 13.6–17.2)
HGB BLD-MCNC: 7 G/DL (ref 13.6–17.2)
HGB BLD-MCNC: 8.5 G/DL (ref 13.6–17.2)
HGB BLD-MCNC: 8.5 G/DL (ref 13.6–17.2)
HGB BLD-MCNC: 8.6 G/DL (ref 13.6–17.2)
HGB BLD-MCNC: 8.7 G/DL (ref 13.6–17.2)
HGB BLD-MCNC: 9 G/DL (ref 13.6–17.2)
HGB UR QL STRIP: ABNORMAL
HMPV RNA SPEC QL NAA+PROBE: NOT DETECTED
HPIV1 RNA SPEC QL NAA+PROBE: NOT DETECTED
HPIV2 RNA SPEC QL NAA+PROBE: NOT DETECTED
HPIV3 RNA SPEC QL NAA+PROBE: NOT DETECTED
HPIV4 RNA SPEC QL NAA+PROBE: NOT DETECTED
IGA SERPL-MCNC: 591 MG/DL (ref 61–437)
IGG SERPL-MCNC: 1453 MG/DL (ref 603–1613)
IGM SERPL-MCNC: 36 MG/DL (ref 15–143)
IMM GRANULOCYTES # BLD AUTO: 0.1 K/UL (ref 0–0.5)
IMM GRANULOCYTES # BLD AUTO: 0.3 K/UL (ref 0–0.5)
IMM GRANULOCYTES # BLD AUTO: 0.3 K/UL (ref 0–0.5)
IMM GRANULOCYTES NFR BLD AUTO: 0 % (ref 0–5)
IMM GRANULOCYTES NFR BLD AUTO: 1 % (ref 0–5)
IMM GRANULOCYTES NFR BLD AUTO: 2 % (ref 0–5)
IMM GRANULOCYTES NFR BLD AUTO: 2 % (ref 0–5)
INR PPP: 1.3
INTERPRETATION SERPL IEP-IMP: ABNORMAL
IRON SATN MFR SERPL: 20 %
IRON SERPL-MCNC: 47 UG/DL (ref 35–150)
K OXYTOCA DNA BLD POS QL NAA+NON-PROBE: NOT DETECTED
KAPPA LC FREE SER-MCNC: 139.3 MG/L (ref 3.3–19.4)
KAPPA LC FREE/LAMBDA FREE SER: 1.47 {RATIO} (ref 0.26–1.65)
KETONES UR QL STRIP.AUTO: ABNORMAL MG/DL
KETONES UR QL STRIP.AUTO: NEGATIVE MG/DL
KETONES UR QL STRIP.AUTO: NEGATIVE MG/DL
KLEBSIELLA AEROGENES BY PCR: NOT DETECTED
KLEBSIELLA PNEUMONIAE GROUP BY PCR: NOT DETECTED
L MONOCYTOG DNA BLD POS QL NAA+NON-PROBE: NOT DETECTED
LACTATE SERPL-SCNC: 1.1 MMOL/L (ref 0.4–2)
LACTATE SERPL-SCNC: 1.2 MMOL/L (ref 0.4–2)
LACTATE SERPL-SCNC: 1.3 MMOL/L (ref 0.4–2)
LACTATE SERPL-SCNC: 1.4 MMOL/L (ref 0.4–2)
LACTATE SERPL-SCNC: 1.5 MMOL/L (ref 0.4–2)
LACTATE SERPL-SCNC: 1.6 MMOL/L (ref 0.4–2)
LACTATE SERPL-SCNC: 1.6 MMOL/L (ref 0.4–2)
LACTATE SERPL-SCNC: 1.8 MMOL/L (ref 0.4–2)
LACTATE SERPL-SCNC: 2.3 MMOL/L (ref 0.4–2)
LACTATE SERPL-SCNC: 2.6 MMOL/L (ref 0.4–2)
LAMBDA LC FREE SERPL-MCNC: 94.8 MG/L (ref 5.7–26.3)
LEUKOCYTE ESTERASE UR QL STRIP.AUTO: ABNORMAL
LEUKOCYTE ESTERASE UR QL STRIP.AUTO: ABNORMAL
LEUKOCYTE ESTERASE UR QL STRIP.AUTO: NEGATIVE
LYMPHOCYTES # BLD: 0.5 K/UL (ref 0.5–4.6)
LYMPHOCYTES # BLD: 0.8 K/UL (ref 0.5–4.6)
LYMPHOCYTES # BLD: 1 K/UL (ref 0.5–4.6)
LYMPHOCYTES # BLD: 1.1 K/UL (ref 0.5–4.6)
LYMPHOCYTES # BLD: 1.3 K/UL (ref 0.5–4.6)
LYMPHOCYTES # BLD: 1.3 K/UL (ref 0.5–4.6)
LYMPHOCYTES # BLD: 1.6 K/UL (ref 0.5–4.6)
LYMPHOCYTES NFR BLD: 12 % (ref 13–44)
LYMPHOCYTES NFR BLD: 13 % (ref 13–44)
LYMPHOCYTES NFR BLD: 14 % (ref 13–44)
LYMPHOCYTES NFR BLD: 16 % (ref 13–44)
LYMPHOCYTES NFR BLD: 4 % (ref 13–44)
LYMPHOCYTES NFR BLD: 5 % (ref 13–44)
LYMPHOCYTES NFR BLD: 8 % (ref 13–44)
M PNEUMO DNA SPEC QL NAA+PROBE: NOT DETECTED
M PROTEIN SERPL ELPH-MCNC: ABNORMAL G/DL
MCH RBC QN AUTO: 31.3 PG (ref 26.1–32.9)
MCH RBC QN AUTO: 31.4 PG (ref 26.1–32.9)
MCH RBC QN AUTO: 31.6 PG (ref 26.1–32.9)
MCH RBC QN AUTO: 31.6 PG (ref 26.1–32.9)
MCH RBC QN AUTO: 31.9 PG (ref 26.1–32.9)
MCH RBC QN AUTO: 31.9 PG (ref 26.1–32.9)
MCH RBC QN AUTO: 32 PG (ref 26.1–32.9)
MCH RBC QN AUTO: 32.4 PG (ref 26.1–32.9)
MCH RBC QN AUTO: 32.4 PG (ref 26.1–32.9)
MCHC RBC AUTO-ENTMCNC: 31.4 G/DL (ref 31.4–35)
MCHC RBC AUTO-ENTMCNC: 31.9 G/DL (ref 31.4–35)
MCHC RBC AUTO-ENTMCNC: 32 G/DL (ref 31.4–35)
MCHC RBC AUTO-ENTMCNC: 32 G/DL (ref 31.4–35)
MCHC RBC AUTO-ENTMCNC: 32.4 G/DL (ref 31.4–35)
MCHC RBC AUTO-ENTMCNC: 32.6 G/DL (ref 31.4–35)
MCHC RBC AUTO-ENTMCNC: 32.8 G/DL (ref 31.4–35)
MCV RBC AUTO: 100 FL (ref 79.6–97.8)
MCV RBC AUTO: 100 FL (ref 79.6–97.8)
MCV RBC AUTO: 101.3 FL (ref 79.6–97.8)
MCV RBC AUTO: 101.4 FL (ref 79.6–97.8)
MCV RBC AUTO: 97.4 FL (ref 79.6–97.8)
MCV RBC AUTO: 97.4 FL (ref 79.6–97.8)
MCV RBC AUTO: 97.8 FL (ref 79.6–97.8)
MCV RBC AUTO: 97.8 FL (ref 79.6–97.8)
MCV RBC AUTO: 98.8 FL (ref 79.6–97.8)
MM INDURATION, POC: 0 MM (ref 0–5)
MM INDURATION, POC: 0 MM (ref 0–5)
MONOCYTES # BLD: 0.4 K/UL (ref 0.1–1.3)
MONOCYTES # BLD: 0.4 K/UL (ref 0.1–1.3)
MONOCYTES # BLD: 0.6 K/UL (ref 0.1–1.3)
MONOCYTES # BLD: 0.9 K/UL (ref 0.1–1.3)
MONOCYTES # BLD: 0.9 K/UL (ref 0.1–1.3)
MONOCYTES # BLD: 1 K/UL (ref 0.1–1.3)
MONOCYTES # BLD: 1 K/UL (ref 0.1–1.3)
MONOCYTES # BLD: 1.1 K/UL (ref 0.1–1.3)
MONOCYTES # BLD: 1.2 K/UL (ref 0.1–1.3)
MONOCYTES NFR BLD: 10 % (ref 4–12)
MONOCYTES NFR BLD: 13 % (ref 4–12)
MONOCYTES NFR BLD: 13 % (ref 4–12)
MONOCYTES NFR BLD: 3 % (ref 4–12)
MONOCYTES NFR BLD: 3 % (ref 4–12)
MONOCYTES NFR BLD: 5 % (ref 4–12)
MONOCYTES NFR BLD: 6 % (ref 4–12)
MONOCYTES NFR BLD: 7 % (ref 4–12)
MONOCYTES NFR BLD: 8 % (ref 4–12)
MUCOUS THREADS URNS QL MICRO: 0 /LPF
N MEN DNA BLD POS QL NAA+NON-PROBE: NOT DETECTED
NEUTS SEG # BLD: 11.7 K/UL (ref 1.7–8.2)
NEUTS SEG # BLD: 12.1 K/UL (ref 1.7–8.2)
NEUTS SEG # BLD: 13.1 K/UL (ref 1.7–8.2)
NEUTS SEG # BLD: 13.2 K/UL (ref 1.7–8.2)
NEUTS SEG # BLD: 16 K/UL (ref 1.7–8.2)
NEUTS SEG # BLD: 4.2 K/UL (ref 1.7–8.2)
NEUTS SEG # BLD: 4.8 K/UL (ref 1.7–8.2)
NEUTS SEG # BLD: 8 K/UL (ref 1.7–8.2)
NEUTS SEG # BLD: 9.4 K/UL (ref 1.7–8.2)
NEUTS SEG NFR BLD: 56 % (ref 43–78)
NEUTS SEG NFR BLD: 67 % (ref 43–78)
NEUTS SEG NFR BLD: 74 % (ref 43–78)
NEUTS SEG NFR BLD: 78 % (ref 43–78)
NEUTS SEG NFR BLD: 86 % (ref 43–78)
NEUTS SEG NFR BLD: 88 % (ref 43–78)
NEUTS SEG NFR BLD: 88 % (ref 43–78)
NEUTS SEG NFR BLD: 90 % (ref 43–78)
NEUTS SEG NFR BLD: 92 % (ref 43–78)
NITRITE UR QL STRIP.AUTO: NEGATIVE
NRBC # BLD: 0 K/UL (ref 0–0.2)
NT PRO BNP: ABNORMAL PG/ML
OTHER OBSERVATIONS: NORMAL
P AERUGINOSA DNA BLD POS NAA+NON-PROBE: NOT DETECTED
PH UR STRIP: 5 [PH] (ref 5–9)
PH UR STRIP: 5 [PH] (ref 5–9)
PH UR STRIP: 6 [PH] (ref 5–9)
PHOSPHATE SERPL-MCNC: 3.5 MG/DL (ref 2.3–3.7)
PLATELET # BLD AUTO: 180 K/UL (ref 150–450)
PLATELET # BLD AUTO: 186 K/UL (ref 150–450)
PLATELET # BLD AUTO: 196 K/UL (ref 150–450)
PLATELET # BLD AUTO: 210 K/UL (ref 150–450)
PLATELET # BLD AUTO: 219 K/UL (ref 150–450)
PLATELET # BLD AUTO: 227 K/UL (ref 150–450)
PLATELET # BLD AUTO: 248 K/UL (ref 150–450)
PLATELET # BLD AUTO: 262 K/UL (ref 150–450)
PLATELET # BLD AUTO: 301 K/UL (ref 150–450)
PMV BLD AUTO: 10 FL (ref 9.4–12.3)
PMV BLD AUTO: 10.1 FL (ref 9.4–12.3)
PMV BLD AUTO: 10.3 FL (ref 9.4–12.3)
PMV BLD AUTO: 9.1 FL (ref 9.4–12.3)
PMV BLD AUTO: 9.4 FL (ref 9.4–12.3)
PMV BLD AUTO: 9.6 FL (ref 9.4–12.3)
PMV BLD AUTO: 9.7 FL (ref 9.4–12.3)
PMV BLD AUTO: 9.9 FL (ref 9.4–12.3)
PMV BLD AUTO: 9.9 FL (ref 9.4–12.3)
POTASSIUM SERPL-SCNC: 3.6 MMOL/L (ref 3.5–5.1)
POTASSIUM SERPL-SCNC: 3.9 MMOL/L (ref 3.5–5.1)
POTASSIUM SERPL-SCNC: 4.4 MMOL/L (ref 3.5–5.1)
POTASSIUM SERPL-SCNC: 4.5 MMOL/L (ref 3.5–5.1)
POTASSIUM SERPL-SCNC: 4.5 MMOL/L (ref 3.5–5.1)
POTASSIUM SERPL-SCNC: 4.8 MMOL/L (ref 3.5–5.1)
POTASSIUM SERPL-SCNC: 4.8 MMOL/L (ref 3.5–5.1)
POTASSIUM SERPL-SCNC: 4.9 MMOL/L (ref 3.5–5.1)
POTASSIUM SERPL-SCNC: 5 MMOL/L (ref 3.5–5.1)
PPD, POC: NEGATIVE
PPD, POC: NEGATIVE
PROCALCITONIN SERPL-MCNC: 0.06 NG/ML (ref 0–0.49)
PROCALCITONIN SERPL-MCNC: 1.69 NG/ML (ref 0–0.49)
PROT SERPL-MCNC: 5.9 G/DL (ref 6–8.5)
PROT SERPL-MCNC: 6.4 G/DL (ref 6.3–8.2)
PROT SERPL-MCNC: 7.2 G/DL (ref 6.3–8.2)
PROT SERPL-MCNC: 7.4 G/DL (ref 6.3–8.2)
PROT SERPL-MCNC: 7.4 G/DL (ref 6.3–8.2)
PROT SERPL-MCNC: 8 G/DL (ref 6.3–8.2)
PROT UR STRIP-MCNC: 100 MG/DL
PROT UR STRIP-MCNC: 30 MG/DL
PROT UR STRIP-MCNC: NEGATIVE MG/DL
PROTEUS SP DNA BLD POS QL NAA+NON-PROBE: DETECTED
PROTHROMBIN TIME: 16.8 SEC (ref 12.6–14.5)
RBC # BLD AUTO: 2.23 M/UL (ref 4.23–5.6)
RBC # BLD AUTO: 2.66 M/UL (ref 4.23–5.6)
RBC # BLD AUTO: 2.69 M/UL (ref 4.23–5.6)
RBC # BLD AUTO: 2.7 M/UL (ref 4.23–5.6)
RBC # BLD AUTO: 2.78 M/UL (ref 4.23–5.6)
RBC # BLD AUTO: 2.82 M/UL (ref 4.23–5.6)
RBC # BLD AUTO: 3.09 M/UL (ref 4.23–5.6)
RBC # BLD AUTO: 3.39 M/UL (ref 4.23–5.6)
RBC # BLD AUTO: 3.64 M/UL (ref 4.23–5.6)
RBC #/AREA URNS HPF: ABNORMAL /HPF
RBC #/AREA URNS HPF: ABNORMAL /HPF
RBC #/AREA URNS HPF: NORMAL /HPF
RESISTANT GENE NDM BY PCR: NOT DETECTED
RESISTANT GENE TARGETS: ABNORMAL
RSV RNA SPEC QL NAA+PROBE: NOT DETECTED
RV+EV RNA SPEC QL NAA+PROBE: NOT DETECTED
S AUREUS DNA BLD POS QL NAA+NON-PROBE: NOT DETECTED
S AUREUS+CONS DNA BLD POS NAA+NON-PROBE: NOT DETECTED
S MARCESCENS DNA BLD POS NAA+NON-PROBE: NOT DETECTED
S PNEUM DNA BLD POS QL NAA+NON-PROBE: NOT DETECTED
S PYO DNA BLD POS QL NAA+NON-PROBE: NOT DETECTED
SALMONELLA SPECIES BY PCR: NOT DETECTED
SARS-COV-2 RDRP RESP QL NAA+PROBE: NOT DETECTED
SARS-COV-2 RNA RESP QL NAA+PROBE: DETECTED
SERVICE CMNT-IMP: ABNORMAL
SERVICE CMNT-IMP: NORMAL
SODIUM SERPL-SCNC: 133 MMOL/L (ref 136–145)
SODIUM SERPL-SCNC: 133 MMOL/L (ref 136–145)
SODIUM SERPL-SCNC: 134 MMOL/L (ref 136–145)
SODIUM SERPL-SCNC: 134 MMOL/L (ref 138–145)
SODIUM SERPL-SCNC: 135 MMOL/L (ref 136–145)
SODIUM SERPL-SCNC: 135 MMOL/L (ref 136–145)
SODIUM SERPL-SCNC: 135 MMOL/L (ref 138–145)
SODIUM SERPL-SCNC: 135 MMOL/L (ref 138–145)
SODIUM SERPL-SCNC: 137 MMOL/L (ref 138–145)
SODIUM SERPL-SCNC: 139 MMOL/L (ref 138–145)
SOURCE: NORMAL
SP GR UR REFRACTOMETRY: 1.02 (ref 1–1.02)
SPECIMEN SOURCE: NORMAL
STAPHYLOCOCCUS EPIDERMIDIS BY PCR: NOT DETECTED
STAPHYLOCOCCUS LUGDUNENSIS BY PCR: NOT DETECTED
STENOTROPHOMONAS MALTOPHILIA BY PCR: NOT DETECTED
STREPTOCOCCUS DNA BLD POS NAA+NON-PROBE: NOT DETECTED
T4 FREE SERPL-MCNC: 0.9 NG/DL (ref 0.78–1.46)
TIBC SERPL-MCNC: 235 UG/DL (ref 250–450)
TROPONIN I SERPL HS-MCNC: 101.6 PG/ML (ref 0–14)
TROPONIN I SERPL HS-MCNC: 113 PG/ML (ref 0–14)
TROPONIN I SERPL HS-MCNC: 124.7 PG/ML (ref 0–14)
TROPONIN I SERPL HS-MCNC: 127.8 PG/ML (ref 0–14)
TROPONIN I SERPL HS-MCNC: 42.6 PG/ML (ref 0–14)
TROPONIN I SERPL HS-MCNC: 44.7 PG/ML (ref 0–14)
TROPONIN I SERPL HS-MCNC: 49.2 PG/ML (ref 0–14)
TROPONIN I SERPL HS-MCNC: 87 PG/ML (ref 0–14)
TSH W FREE THYROID IF ABNORMAL: 17.5 UIU/ML (ref 0.36–3.74)
UROBILINOGEN UR QL STRIP.AUTO: 0.2 EU/DL (ref 0.2–1)
UROBILINOGEN UR QL STRIP.AUTO: 1 EU/DL (ref 0.2–1)
UROBILINOGEN UR QL STRIP.AUTO: 1 EU/DL (ref 0.2–1)
VANCOMYCIN SERPL-MCNC: 16.1 UG/ML
VANCOMYCIN SERPL-MCNC: 16.3 UG/ML
VANCOMYCIN SERPL-MCNC: 16.6 UG/ML
VANCOMYCIN SERPL-MCNC: 20.3 UG/ML
VIT B12 SERPL-MCNC: 946 PG/ML (ref 193–986)
WBC # BLD AUTO: 10.7 K/UL (ref 4.3–11.1)
WBC # BLD AUTO: 12.3 K/UL (ref 4.3–11.1)
WBC # BLD AUTO: 12.9 K/UL (ref 4.3–11.1)
WBC # BLD AUTO: 13.1 K/UL (ref 4.3–11.1)
WBC # BLD AUTO: 14.9 K/UL (ref 4.3–11.1)
WBC # BLD AUTO: 15 K/UL (ref 4.3–11.1)
WBC # BLD AUTO: 18.4 K/UL (ref 4.3–11.1)
WBC # BLD AUTO: 7 K/UL (ref 4.3–11.1)
WBC # BLD AUTO: 7.3 K/UL (ref 4.3–11.1)
WBC URNS QL MICRO: ABNORMAL /HPF
WBC URNS QL MICRO: ABNORMAL /HPF
WBC URNS QL MICRO: NORMAL /HPF

## 2022-01-01 PROCEDURE — 6370000000 HC RX 637 (ALT 250 FOR IP): Performed by: INTERNAL MEDICINE

## 2022-01-01 PROCEDURE — 36415 COLL VENOUS BLD VENIPUNCTURE: CPT

## 2022-01-01 PROCEDURE — 2700000000 HC OXYGEN THERAPY PER DAY

## 2022-01-01 PROCEDURE — 94760 N-INVAS EAR/PLS OXIMETRY 1: CPT

## 2022-01-01 PROCEDURE — 83605 ASSAY OF LACTIC ACID: CPT

## 2022-01-01 PROCEDURE — 84145 PROCALCITONIN (PCT): CPT

## 2022-01-01 PROCEDURE — 2000000000 HC ICU R&B

## 2022-01-01 PROCEDURE — 1100000003 HC PRIVATE W/ TELEMETRY

## 2022-01-01 PROCEDURE — 85018 HEMOGLOBIN: CPT

## 2022-01-01 PROCEDURE — 85025 COMPLETE CBC W/AUTO DIFF WBC: CPT

## 2022-01-01 PROCEDURE — 82746 ASSAY OF FOLIC ACID SERUM: CPT

## 2022-01-01 PROCEDURE — 97530 THERAPEUTIC ACTIVITIES: CPT

## 2022-01-01 PROCEDURE — 80202 ASSAY OF VANCOMYCIN: CPT

## 2022-01-01 PROCEDURE — 2580000003 HC RX 258: Performed by: INTERNAL MEDICINE

## 2022-01-01 PROCEDURE — G0299 HHS/HOSPICE OF RN EA 15 MIN: HCPCS

## 2022-01-01 PROCEDURE — 71045 X-RAY EXAM CHEST 1 VIEW: CPT

## 2022-01-01 PROCEDURE — 97166 OT EVAL MOD COMPLEX 45 MIN: CPT

## 2022-01-01 PROCEDURE — 97161 PT EVAL LOW COMPLEX 20 MIN: CPT

## 2022-01-01 PROCEDURE — 80048 BASIC METABOLIC PNL TOTAL CA: CPT

## 2022-01-01 PROCEDURE — 83036 HEMOGLOBIN GLYCOSYLATED A1C: CPT

## 2022-01-01 PROCEDURE — 92610 EVALUATE SWALLOWING FUNCTION: CPT

## 2022-01-01 PROCEDURE — 6360000002 HC RX W HCPCS: Performed by: INTERNAL MEDICINE

## 2022-01-01 PROCEDURE — 84484 ASSAY OF TROPONIN QUANT: CPT

## 2022-01-01 PROCEDURE — 6370000000 HC RX 637 (ALT 250 FOR IP): Performed by: EMERGENCY MEDICINE

## 2022-01-01 PROCEDURE — 6500000001 HSPC ELECTION

## 2022-01-01 PROCEDURE — 99222 1ST HOSP IP/OBS MODERATE 55: CPT | Performed by: PHYSICIAN ASSISTANT

## 2022-01-01 PROCEDURE — 82962 GLUCOSE BLOOD TEST: CPT

## 2022-01-01 PROCEDURE — 0202U NFCT DS 22 TRGT SARS-COV-2: CPT

## 2022-01-01 PROCEDURE — 87040 BLOOD CULTURE FOR BACTERIA: CPT

## 2022-01-01 PROCEDURE — 73610 X-RAY EXAM OF ANKLE: CPT

## 2022-01-01 PROCEDURE — 80076 HEPATIC FUNCTION PANEL: CPT

## 2022-01-01 PROCEDURE — 87338 HPYLORI STOOL AG IA: CPT

## 2022-01-01 PROCEDURE — 84295 ASSAY OF SERUM SODIUM: CPT

## 2022-01-01 PROCEDURE — 82040 ASSAY OF SERUM ALBUMIN: CPT

## 2022-01-01 PROCEDURE — 96365 THER/PROPH/DIAG IV INF INIT: CPT

## 2022-01-01 PROCEDURE — 83540 ASSAY OF IRON: CPT

## 2022-01-01 PROCEDURE — 2500000003 HC RX 250 WO HCPCS: Performed by: FAMILY MEDICINE

## 2022-01-01 PROCEDURE — 82607 VITAMIN B-12: CPT

## 2022-01-01 PROCEDURE — 83880 ASSAY OF NATRIURETIC PEPTIDE: CPT

## 2022-01-01 PROCEDURE — 94762 N-INVAS EAR/PLS OXIMTRY CONT: CPT

## 2022-01-01 PROCEDURE — 2400000000

## 2022-01-01 PROCEDURE — 51702 INSERT TEMP BLADDER CATH: CPT

## 2022-01-01 PROCEDURE — G0156 HHCP-SVS OF AIDE,EA 15 MIN: HCPCS

## 2022-01-01 PROCEDURE — 93005 ELECTROCARDIOGRAM TRACING: CPT | Performed by: EMERGENCY MEDICINE

## 2022-01-01 PROCEDURE — 97535 SELF CARE MNGMENT TRAINING: CPT

## 2022-01-01 PROCEDURE — 82728 ASSAY OF FERRITIN: CPT

## 2022-01-01 PROCEDURE — 70450 CT HEAD/BRAIN W/O DYE: CPT

## 2022-01-01 PROCEDURE — 86140 C-REACTIVE PROTEIN: CPT

## 2022-01-01 PROCEDURE — 6360000002 HC RX W HCPCS: Performed by: EMERGENCY MEDICINE

## 2022-01-01 PROCEDURE — 0651 HSPC ROUTINE HOME CARE

## 2022-01-01 PROCEDURE — 81015 MICROSCOPIC EXAM OF URINE: CPT

## 2022-01-01 PROCEDURE — 81001 URINALYSIS AUTO W/SCOPE: CPT

## 2022-01-01 PROCEDURE — 1100000000 HC RM PRIVATE

## 2022-01-01 PROCEDURE — 97162 PT EVAL MOD COMPLEX 30 MIN: CPT

## 2022-01-01 PROCEDURE — 6370000000 HC RX 637 (ALT 250 FOR IP): Performed by: NURSE PRACTITIONER

## 2022-01-01 PROCEDURE — 6360000002 HC RX W HCPCS: Performed by: NURSE PRACTITIONER

## 2022-01-01 PROCEDURE — 87635 SARS-COV-2 COVID-19 AMP PRB: CPT

## 2022-01-01 PROCEDURE — 80053 COMPREHEN METABOLIC PANEL: CPT

## 2022-01-01 PROCEDURE — 2500000001 HSPC NON INJECTABLE MED

## 2022-01-01 PROCEDURE — 84439 ASSAY OF FREE THYROXINE: CPT

## 2022-01-01 PROCEDURE — P9047 ALBUMIN (HUMAN), 25%, 50ML: HCPCS | Performed by: INTERNAL MEDICINE

## 2022-01-01 PROCEDURE — 86334 IMMUNOFIX E-PHORESIS SERUM: CPT

## 2022-01-01 PROCEDURE — 99285 EMERGENCY DEPT VISIT HI MDM: CPT

## 2022-01-01 PROCEDURE — 96375 TX/PRO/DX INJ NEW DRUG ADDON: CPT

## 2022-01-01 PROCEDURE — 87205 SMEAR GRAM STAIN: CPT

## 2022-01-01 PROCEDURE — 87077 CULTURE AEROBIC IDENTIFY: CPT

## 2022-01-01 PROCEDURE — G0155 HHCP-SVS OF CSW,EA 15 MIN: HCPCS

## 2022-01-01 PROCEDURE — 99222 1ST HOSP IP/OBS MODERATE 55: CPT | Performed by: NURSE PRACTITIONER

## 2022-01-01 PROCEDURE — 76775 US EXAM ABDO BACK WALL LIM: CPT

## 2022-01-01 PROCEDURE — 3331090004 HSPC SERVICE INTENSITY ADD-ON

## 2022-01-01 PROCEDURE — 73630 X-RAY EXAM OF FOOT: CPT

## 2022-01-01 PROCEDURE — 99231 SBSQ HOSP IP/OBS SF/LOW 25: CPT | Performed by: SURGERY

## 2022-01-01 PROCEDURE — 84443 ASSAY THYROID STIM HORMONE: CPT

## 2022-01-01 PROCEDURE — 6370000000 HC RX 637 (ALT 250 FOR IP): Performed by: FAMILY MEDICINE

## 2022-01-01 PROCEDURE — 83521 IG LIGHT CHAINS FREE EACH: CPT

## 2022-01-01 PROCEDURE — 76770 US EXAM ABDO BACK WALL COMP: CPT

## 2022-01-01 PROCEDURE — 87150 DNA/RNA AMPLIFIED PROBE: CPT

## 2022-01-01 PROCEDURE — 6370000000 HC RX 637 (ALT 250 FOR IP): Performed by: HOSPITALIST

## 2022-01-01 PROCEDURE — 82565 ASSAY OF CREATININE: CPT

## 2022-01-01 PROCEDURE — 97112 NEUROMUSCULAR REEDUCATION: CPT

## 2022-01-01 PROCEDURE — 51798 US URINE CAPACITY MEASURE: CPT

## 2022-01-01 PROCEDURE — 93925 LOWER EXTREMITY STUDY: CPT

## 2022-01-01 PROCEDURE — 2500000003 HC RX 250 WO HCPCS: Performed by: INTERNAL MEDICINE

## 2022-01-01 PROCEDURE — 2580000003 HC RX 258: Performed by: EMERGENCY MEDICINE

## 2022-01-01 PROCEDURE — 6360000002 HC RX W HCPCS: Performed by: STUDENT IN AN ORGANIZED HEALTH CARE EDUCATION/TRAINING PROGRAM

## 2022-01-01 PROCEDURE — 84100 ASSAY OF PHOSPHORUS: CPT

## 2022-01-01 PROCEDURE — 85730 THROMBOPLASTIN TIME PARTIAL: CPT

## 2022-01-01 PROCEDURE — 85610 PROTHROMBIN TIME: CPT

## 2022-01-01 PROCEDURE — 81003 URINALYSIS AUTO W/O SCOPE: CPT

## 2022-01-01 PROCEDURE — 87186 SC STD MICRODIL/AGAR DIL: CPT

## 2022-01-01 PROCEDURE — 96361 HYDRATE IV INFUSION ADD-ON: CPT

## 2022-01-01 RX ORDER — INSULIN LISPRO 100 [IU]/ML
0-4 INJECTION, SOLUTION INTRAVENOUS; SUBCUTANEOUS NIGHTLY
Status: DISCONTINUED | OUTPATIENT
Start: 2022-01-01 | End: 2022-01-01 | Stop reason: HOSPADM

## 2022-01-01 RX ORDER — TRAMADOL HYDROCHLORIDE 50 MG/1
50 TABLET ORAL EVERY 6 HOURS PRN
Status: DISCONTINUED | OUTPATIENT
Start: 2022-01-01 | End: 2022-01-01 | Stop reason: HOSPADM

## 2022-01-01 RX ORDER — ACETAMINOPHEN 325 MG/1
650 TABLET ORAL EVERY 6 HOURS PRN
Status: DISCONTINUED | OUTPATIENT
Start: 2022-01-01 | End: 2022-01-01 | Stop reason: HOSPADM

## 2022-01-01 RX ORDER — TRAMADOL HYDROCHLORIDE 50 MG/1
50 TABLET ORAL EVERY 12 HOURS PRN
Status: ON HOLD | COMMUNITY
End: 2022-01-01 | Stop reason: SDUPTHER

## 2022-01-01 RX ORDER — DEXTROSE MONOHYDRATE 50 MG/ML
100 INJECTION, SOLUTION INTRAVENOUS PRN
Status: DISCONTINUED | OUTPATIENT
Start: 2022-01-01 | End: 2022-01-01 | Stop reason: HOSPADM

## 2022-01-01 RX ORDER — INSULIN LISPRO 100 [IU]/ML
0-4 INJECTION, SOLUTION INTRAVENOUS; SUBCUTANEOUS
Status: DISCONTINUED | OUTPATIENT
Start: 2022-01-01 | End: 2022-01-01

## 2022-01-01 RX ORDER — METOPROLOL SUCCINATE 50 MG/1
50 TABLET, EXTENDED RELEASE ORAL DAILY
Status: DISCONTINUED | OUTPATIENT
Start: 2022-01-01 | End: 2022-01-01 | Stop reason: HOSPADM

## 2022-01-01 RX ORDER — FUROSEMIDE 40 MG/1
40 TABLET ORAL DAILY
Status: DISCONTINUED | OUTPATIENT
Start: 2022-01-01 | End: 2022-01-01 | Stop reason: HOSPADM

## 2022-01-01 RX ORDER — ONDANSETRON 2 MG/ML
4 INJECTION INTRAMUSCULAR; INTRAVENOUS EVERY 6 HOURS PRN
Status: DISCONTINUED | OUTPATIENT
Start: 2022-01-01 | End: 2022-01-01 | Stop reason: HOSPADM

## 2022-01-01 RX ORDER — LORAZEPAM 1 MG/1
1 TABLET ORAL EVERY 6 HOURS PRN
Qty: 12 TABLET | Refills: 0 | Status: SHIPPED | OUTPATIENT
Start: 2022-01-01 | End: 2022-01-01

## 2022-01-01 RX ORDER — SODIUM CHLORIDE 9 MG/ML
INJECTION, SOLUTION INTRAVENOUS PRN
Status: DISCONTINUED | OUTPATIENT
Start: 2022-01-01 | End: 2022-01-01 | Stop reason: HOSPADM

## 2022-01-01 RX ORDER — ASPIRIN 81 MG/1
81 TABLET ORAL DAILY
Status: DISCONTINUED | OUTPATIENT
Start: 2022-01-01 | End: 2022-01-01

## 2022-01-01 RX ORDER — LOSARTAN POTASSIUM 50 MG/1
50 TABLET ORAL DAILY
Status: DISCONTINUED | OUTPATIENT
Start: 2022-01-01 | End: 2022-01-01 | Stop reason: HOSPADM

## 2022-01-01 RX ORDER — HYDRALAZINE HYDROCHLORIDE 20 MG/ML
10 INJECTION INTRAMUSCULAR; INTRAVENOUS EVERY 6 HOURS PRN
Status: DISCONTINUED | OUTPATIENT
Start: 2022-01-01 | End: 2022-01-01 | Stop reason: HOSPADM

## 2022-01-01 RX ORDER — LORAZEPAM 1 MG/1
1 TABLET ORAL EVERY 4 HOURS PRN
Status: DISCONTINUED | OUTPATIENT
Start: 2022-01-01 | End: 2022-01-01 | Stop reason: HOSPADM

## 2022-01-01 RX ORDER — POLYETHYLENE GLYCOL 3350 17 G/17G
17 POWDER, FOR SOLUTION ORAL DAILY PRN
Status: DISCONTINUED | OUTPATIENT
Start: 2022-01-01 | End: 2022-01-01 | Stop reason: HOSPADM

## 2022-01-01 RX ORDER — INSULIN LISPRO 100 [IU]/ML
0-8 INJECTION, SOLUTION INTRAVENOUS; SUBCUTANEOUS
Qty: 1 EACH | Refills: 0 | Status: ON HOLD
Start: 2022-01-01 | End: 2022-01-01 | Stop reason: HOSPADM

## 2022-01-01 RX ORDER — FUROSEMIDE 10 MG/ML
60 INJECTION INTRAMUSCULAR; INTRAVENOUS ONCE
Status: COMPLETED | OUTPATIENT
Start: 2022-01-01 | End: 2022-01-01

## 2022-01-01 RX ORDER — SCOLOPAMINE TRANSDERMAL SYSTEM 1 MG/1
1 PATCH, EXTENDED RELEASE TRANSDERMAL ONCE
Status: DISCONTINUED | OUTPATIENT
Start: 2022-01-01 | End: 2022-01-01 | Stop reason: HOSPADM

## 2022-01-01 RX ORDER — INSULIN LISPRO 100 [IU]/ML
0-4 INJECTION, SOLUTION INTRAVENOUS; SUBCUTANEOUS NIGHTLY
Status: DISCONTINUED | OUTPATIENT
Start: 2022-01-01 | End: 2022-01-01

## 2022-01-01 RX ORDER — CEPHALEXIN 250 MG/1
500 CAPSULE ORAL EVERY 6 HOURS SCHEDULED
Status: DISCONTINUED | OUTPATIENT
Start: 2022-01-01 | End: 2022-01-01 | Stop reason: HOSPADM

## 2022-01-01 RX ORDER — SODIUM CHLORIDE 0.9 % (FLUSH) 0.9 %
5-40 SYRINGE (ML) INJECTION PRN
Status: DISCONTINUED | OUTPATIENT
Start: 2022-01-01 | End: 2022-01-01 | Stop reason: HOSPADM

## 2022-01-01 RX ORDER — DEXAMETHASONE 4 MG/1
6 TABLET ORAL DAILY
Status: DISCONTINUED | OUTPATIENT
Start: 2022-01-01 | End: 2022-01-01

## 2022-01-01 RX ORDER — MORPHINE SULFATE 2 MG/ML
2 INJECTION, SOLUTION INTRAMUSCULAR; INTRAVENOUS EVERY 4 HOURS PRN
Status: DISCONTINUED | OUTPATIENT
Start: 2022-01-01 | End: 2022-01-01

## 2022-01-01 RX ORDER — HYDROCORTISONE 25 MG/G
CREAM TOPICAL 2 TIMES DAILY
Status: DISCONTINUED | OUTPATIENT
Start: 2022-01-01 | End: 2022-01-01 | Stop reason: HOSPADM

## 2022-01-01 RX ORDER — ONDANSETRON 4 MG/1
4 TABLET, ORALLY DISINTEGRATING ORAL EVERY 8 HOURS PRN
Status: DISCONTINUED | OUTPATIENT
Start: 2022-01-01 | End: 2022-01-01 | Stop reason: HOSPADM

## 2022-01-01 RX ORDER — DIPHENHYDRAMINE HYDROCHLORIDE 50 MG/ML
25 INJECTION INTRAMUSCULAR; INTRAVENOUS EVERY 6 HOURS PRN
Status: DISCONTINUED | OUTPATIENT
Start: 2022-01-01 | End: 2022-01-01 | Stop reason: HOSPADM

## 2022-01-01 RX ORDER — LIDOCAINE 4 G/G
1 PATCH TOPICAL EVERY 24 HOURS
Status: DISCONTINUED | OUTPATIENT
Start: 2022-01-01 | End: 2022-01-01 | Stop reason: HOSPADM

## 2022-01-01 RX ORDER — LOSARTAN POTASSIUM 50 MG/1
25 TABLET ORAL DAILY
Status: DISCONTINUED | OUTPATIENT
Start: 2022-01-01 | End: 2022-01-01 | Stop reason: HOSPADM

## 2022-01-01 RX ORDER — FUROSEMIDE 10 MG/ML
40 INJECTION INTRAMUSCULAR; INTRAVENOUS DAILY
Status: DISCONTINUED | OUTPATIENT
Start: 2022-01-01 | End: 2022-01-01

## 2022-01-01 RX ORDER — SODIUM CHLORIDE, SODIUM LACTATE, POTASSIUM CHLORIDE, AND CALCIUM CHLORIDE .6; .31; .03; .02 G/100ML; G/100ML; G/100ML; G/100ML
30 INJECTION, SOLUTION INTRAVENOUS ONCE
Status: COMPLETED | OUTPATIENT
Start: 2022-01-01 | End: 2022-01-01

## 2022-01-01 RX ORDER — FUROSEMIDE 10 MG/ML
20 INJECTION INTRAMUSCULAR; INTRAVENOUS ONCE
Status: COMPLETED | OUTPATIENT
Start: 2022-01-01 | End: 2022-01-01

## 2022-01-01 RX ORDER — INSULIN LISPRO 100 [IU]/ML
6 INJECTION, SOLUTION INTRAVENOUS; SUBCUTANEOUS ONCE
Status: DISCONTINUED | OUTPATIENT
Start: 2022-01-01 | End: 2022-01-01

## 2022-01-01 RX ORDER — POLYETHYLENE GLYCOL 3350 17 G/17G
17 POWDER, FOR SOLUTION ORAL DAILY PRN
Status: DISCONTINUED | OUTPATIENT
Start: 2022-01-01 | End: 2022-01-01

## 2022-01-01 RX ORDER — POLYETHYLENE GLYCOL 3350 17 G/17G
17 POWDER, FOR SOLUTION ORAL DAILY PRN
Qty: 527 G | Refills: 1 | Status: SHIPPED | OUTPATIENT
Start: 2022-01-01 | End: 2022-01-01

## 2022-01-01 RX ORDER — SODIUM CHLORIDE 0.9 % (FLUSH) 0.9 %
5-40 SYRINGE (ML) INJECTION EVERY 12 HOURS SCHEDULED
Status: DISCONTINUED | OUTPATIENT
Start: 2022-01-01 | End: 2022-01-01 | Stop reason: HOSPADM

## 2022-01-01 RX ORDER — OXYCODONE HYDROCHLORIDE 5 MG/1
5 TABLET ORAL EVERY 6 HOURS PRN
Qty: 12 TABLET | Refills: 0 | Status: SHIPPED | OUTPATIENT
Start: 2022-01-01 | End: 2022-01-01

## 2022-01-01 RX ORDER — ASPIRIN 81 MG/1
81 TABLET ORAL DAILY
Status: DISCONTINUED | OUTPATIENT
Start: 2022-01-01 | End: 2022-01-01 | Stop reason: HOSPADM

## 2022-01-01 RX ORDER — ALLOPURINOL 100 MG/1
100 TABLET ORAL DAILY
Status: ON HOLD | COMMUNITY
End: 2022-01-01 | Stop reason: HOSPADM

## 2022-01-01 RX ORDER — ALBUMIN (HUMAN) 12.5 G/50ML
50 SOLUTION INTRAVENOUS EVERY 6 HOURS
Status: DISCONTINUED | OUTPATIENT
Start: 2022-01-01 | End: 2022-01-01

## 2022-01-01 RX ORDER — FUROSEMIDE 40 MG/1
40 TABLET ORAL DAILY
Qty: 1 TABLET | Refills: 0
Start: 2022-01-01 | End: 2022-01-01

## 2022-01-01 RX ORDER — TRAMADOL HYDROCHLORIDE 50 MG/1
50 TABLET ORAL EVERY 8 HOURS PRN
Qty: 10 TABLET | Refills: 0 | Status: SHIPPED | OUTPATIENT
Start: 2022-01-01 | End: 2022-01-01

## 2022-01-01 RX ORDER — GLUCAGON 1 MG/ML
1 KIT INJECTION PRN
Status: DISCONTINUED | OUTPATIENT
Start: 2022-01-01 | End: 2022-01-01 | Stop reason: HOSPADM

## 2022-01-01 RX ORDER — INSULIN LISPRO 100 [IU]/ML
0-8 INJECTION, SOLUTION INTRAVENOUS; SUBCUTANEOUS
Status: DISCONTINUED | OUTPATIENT
Start: 2022-01-01 | End: 2022-01-01 | Stop reason: HOSPADM

## 2022-01-01 RX ORDER — ATORVASTATIN CALCIUM 40 MG/1
80 TABLET, FILM COATED ORAL NIGHTLY
Status: DISCONTINUED | OUTPATIENT
Start: 2022-01-01 | End: 2022-01-01 | Stop reason: HOSPADM

## 2022-01-01 RX ORDER — POLYETHYLENE GLYCOL 3350 17 G/17G
17 POWDER, FOR SOLUTION ORAL DAILY
Status: DISCONTINUED | OUTPATIENT
Start: 2022-01-01 | End: 2022-01-01 | Stop reason: HOSPADM

## 2022-01-01 RX ORDER — ACETAMINOPHEN 650 MG/1
650 SUPPOSITORY RECTAL EVERY 6 HOURS PRN
Status: DISCONTINUED | OUTPATIENT
Start: 2022-01-01 | End: 2022-01-01 | Stop reason: HOSPADM

## 2022-01-01 RX ORDER — DOXYCYCLINE HYCLATE 100 MG/1
100 CAPSULE ORAL EVERY 12 HOURS SCHEDULED
Status: DISCONTINUED | OUTPATIENT
Start: 2022-01-01 | End: 2022-01-01 | Stop reason: HOSPADM

## 2022-01-01 RX ORDER — FUROSEMIDE 40 MG/1
40 TABLET ORAL 2 TIMES DAILY
Status: DISCONTINUED | OUTPATIENT
Start: 2022-01-01 | End: 2022-01-01

## 2022-01-01 RX ORDER — MONTELUKAST SODIUM 10 MG/1
10 TABLET ORAL DAILY
Status: DISCONTINUED | OUTPATIENT
Start: 2022-01-01 | End: 2022-01-01 | Stop reason: HOSPADM

## 2022-01-01 RX ORDER — LANOLIN ALCOHOL/MO/W.PET/CERES
3 CREAM (GRAM) TOPICAL NIGHTLY PRN
Status: DISCONTINUED | OUTPATIENT
Start: 2022-01-01 | End: 2022-01-01 | Stop reason: HOSPADM

## 2022-01-01 RX ORDER — DOXYCYCLINE HYCLATE 100 MG/1
100 CAPSULE ORAL EVERY 12 HOURS SCHEDULED
Qty: 7 CAPSULE | Refills: 0 | Status: SHIPPED | OUTPATIENT
Start: 2022-01-01 | End: 2022-01-01

## 2022-01-01 RX ORDER — ALLOPURINOL 100 MG/1
100 TABLET ORAL DAILY
Status: DISCONTINUED | OUTPATIENT
Start: 2022-01-01 | End: 2022-01-01 | Stop reason: HOSPADM

## 2022-01-01 RX ORDER — ALBUMIN (HUMAN) 12.5 G/50ML
50 SOLUTION INTRAVENOUS EVERY 6 HOURS
Status: DISCONTINUED | OUTPATIENT
Start: 2022-01-01 | End: 2022-01-01 | Stop reason: SDUPTHER

## 2022-01-01 RX ORDER — SODIUM CHLORIDE, SODIUM LACTATE, POTASSIUM CHLORIDE, CALCIUM CHLORIDE 600; 310; 30; 20 MG/100ML; MG/100ML; MG/100ML; MG/100ML
INJECTION, SOLUTION INTRAVENOUS CONTINUOUS
Status: DISCONTINUED | OUTPATIENT
Start: 2022-01-01 | End: 2022-01-01

## 2022-01-01 RX ORDER — FUROSEMIDE 10 MG/ML
40 INJECTION INTRAMUSCULAR; INTRAVENOUS ONCE
Status: COMPLETED | OUTPATIENT
Start: 2022-01-01 | End: 2022-01-01

## 2022-01-01 RX ORDER — FUROSEMIDE 10 MG/ML
40 INJECTION INTRAMUSCULAR; INTRAVENOUS DAILY
Status: DISCONTINUED | OUTPATIENT
Start: 2022-01-01 | End: 2022-01-01 | Stop reason: HOSPADM

## 2022-01-01 RX ORDER — MONTELUKAST SODIUM 10 MG/1
10 TABLET ORAL NIGHTLY
Status: DISCONTINUED | OUTPATIENT
Start: 2022-01-01 | End: 2022-01-01 | Stop reason: HOSPADM

## 2022-01-01 RX ORDER — ATORVASTATIN CALCIUM 40 MG/1
80 TABLET, FILM COATED ORAL DAILY
Status: DISCONTINUED | OUTPATIENT
Start: 2022-01-01 | End: 2022-01-01

## 2022-01-01 RX ORDER — INSULIN GLARGINE 100 [IU]/ML
10 INJECTION, SOLUTION SUBCUTANEOUS 2 TIMES DAILY
Status: DISCONTINUED | OUTPATIENT
Start: 2022-01-01 | End: 2022-01-01 | Stop reason: HOSPADM

## 2022-01-01 RX ORDER — ENOXAPARIN SODIUM 100 MG/ML
40 INJECTION SUBCUTANEOUS DAILY
Status: DISCONTINUED | OUTPATIENT
Start: 2022-01-01 | End: 2022-01-01

## 2022-01-01 RX ORDER — MONTELUKAST SODIUM 10 MG/1
10 TABLET ORAL DAILY
Status: DISCONTINUED | OUTPATIENT
Start: 2022-01-01 | End: 2022-01-01

## 2022-01-01 RX ORDER — ACETAMINOPHEN 500 MG
1000 TABLET ORAL
Status: COMPLETED | OUTPATIENT
Start: 2022-01-01 | End: 2022-01-01

## 2022-01-01 RX ORDER — CEPHALEXIN 500 MG/1
500 CAPSULE ORAL 4 TIMES DAILY
Qty: 12 CAPSULE | Refills: 0 | Status: SHIPPED | OUTPATIENT
Start: 2022-01-01 | End: 2022-01-01

## 2022-01-01 RX ORDER — HEPARIN SODIUM 5000 [USP'U]/ML
5000 INJECTION, SOLUTION INTRAVENOUS; SUBCUTANEOUS EVERY 12 HOURS
Status: DISCONTINUED | OUTPATIENT
Start: 2022-01-01 | End: 2022-01-01

## 2022-01-01 RX ORDER — LOSARTAN POTASSIUM 25 MG/1
50 TABLET ORAL DAILY
Qty: 30 TABLET | Refills: 0 | Status: ON HOLD
Start: 2022-01-01 | End: 2022-01-01 | Stop reason: HOSPADM

## 2022-01-01 RX ORDER — INSULIN LISPRO 100 [IU]/ML
4 INJECTION, SOLUTION INTRAVENOUS; SUBCUTANEOUS ONCE
Status: COMPLETED | OUTPATIENT
Start: 2022-01-01 | End: 2022-01-01

## 2022-01-01 RX ORDER — METOPROLOL SUCCINATE 50 MG/1
50 TABLET, EXTENDED RELEASE ORAL DAILY
Status: DISCONTINUED | OUTPATIENT
Start: 2022-01-01 | End: 2022-01-01

## 2022-01-01 RX ORDER — SODIUM CHLORIDE 9 MG/ML
INJECTION, SOLUTION INTRAVENOUS CONTINUOUS
Status: DISCONTINUED | OUTPATIENT
Start: 2022-01-01 | End: 2022-01-01

## 2022-01-01 RX ORDER — INSULIN LISPRO 100 [IU]/ML
0-4 INJECTION, SOLUTION INTRAVENOUS; SUBCUTANEOUS
Status: DISCONTINUED | OUTPATIENT
Start: 2022-01-01 | End: 2022-01-01 | Stop reason: HOSPADM

## 2022-01-01 RX ORDER — SODIUM CHLORIDE 0.9 % (FLUSH) 0.9 %
5-40 SYRINGE (ML) INJECTION EVERY 12 HOURS SCHEDULED
Status: DISCONTINUED | OUTPATIENT
Start: 2022-01-01 | End: 2022-01-01

## 2022-01-01 RX ORDER — INSULIN LISPRO 100 [IU]/ML
2 INJECTION, SOLUTION INTRAVENOUS; SUBCUTANEOUS ONCE
Status: COMPLETED | OUTPATIENT
Start: 2022-01-01 | End: 2022-01-01

## 2022-01-01 RX ORDER — ALLOPURINOL 100 MG/1
100 TABLET ORAL DAILY
Status: DISCONTINUED | OUTPATIENT
Start: 2022-01-01 | End: 2022-01-01

## 2022-01-01 RX ADMIN — SODIUM CHLORIDE, PRESERVATIVE FREE 10 ML: 5 INJECTION INTRAVENOUS at 22:44

## 2022-01-01 RX ADMIN — CEFEPIME HYDROCHLORIDE 1000 MG: 1 INJECTION, POWDER, FOR SOLUTION INTRAMUSCULAR; INTRAVENOUS at 06:03

## 2022-01-01 RX ADMIN — SODIUM CHLORIDE, PRESERVATIVE FREE 10 ML: 5 INJECTION INTRAVENOUS at 22:21

## 2022-01-01 RX ADMIN — DEXAMETHASONE 6 MG: 4 TABLET ORAL at 10:23

## 2022-01-01 RX ADMIN — INSULIN LISPRO 1 UNITS: 100 INJECTION, SOLUTION INTRAVENOUS; SUBCUTANEOUS at 09:18

## 2022-01-01 RX ADMIN — INSULIN LISPRO 2 UNITS: 100 INJECTION, SOLUTION INTRAVENOUS; SUBCUTANEOUS at 16:46

## 2022-01-01 RX ADMIN — ATORVASTATIN CALCIUM 80 MG: 40 TABLET, FILM COATED ORAL at 22:03

## 2022-01-01 RX ADMIN — DOXYCYCLINE HYCLATE 100 MG: 100 CAPSULE ORAL at 20:45

## 2022-01-01 RX ADMIN — GLYCERIN, PETROLATUM, PHENYLEPHRINE HCL, PRAMOXINE HCL 1 TUBE: 144; 2.5; 10; 15 CREAM TOPICAL at 09:20

## 2022-01-01 RX ADMIN — DEXAMETHASONE 6 MG: 4 TABLET ORAL at 08:59

## 2022-01-01 RX ADMIN — METOPROLOL SUCCINATE 50 MG: 50 TABLET, EXTENDED RELEASE ORAL at 08:53

## 2022-01-01 RX ADMIN — MORPHINE SULFATE 2 MG: 2 INJECTION, SOLUTION INTRAMUSCULAR; INTRAVENOUS at 09:04

## 2022-01-01 RX ADMIN — TRAMADOL HYDROCHLORIDE 50 MG: 50 TABLET, COATED ORAL at 08:38

## 2022-01-01 RX ADMIN — MORPHINE SULFATE 2 MG: 2 INJECTION, SOLUTION INTRAMUSCULAR; INTRAVENOUS at 22:10

## 2022-01-01 RX ADMIN — LOSARTAN POTASSIUM 50 MG: 50 TABLET, FILM COATED ORAL at 10:47

## 2022-01-01 RX ADMIN — POLYETHYLENE GLYCOL 3350 17 G: 17 POWDER, FOR SOLUTION ORAL at 09:22

## 2022-01-01 RX ADMIN — CEFEPIME HYDROCHLORIDE 1000 MG: 1 INJECTION, POWDER, FOR SOLUTION INTRAMUSCULAR; INTRAVENOUS at 16:58

## 2022-01-01 RX ADMIN — INSULIN LISPRO 8 UNITS: 100 INJECTION, SOLUTION INTRAVENOUS; SUBCUTANEOUS at 16:44

## 2022-01-01 RX ADMIN — PIPERACILLIN AND TAZOBACTAM 3375 MG: 3; .375 INJECTION, POWDER, LYOPHILIZED, FOR SOLUTION INTRAVENOUS at 08:33

## 2022-01-01 RX ADMIN — CEFEPIME HYDROCHLORIDE 1000 MG: 1 INJECTION, POWDER, FOR SOLUTION INTRAMUSCULAR; INTRAVENOUS at 05:13

## 2022-01-01 RX ADMIN — POLYETHYLENE GLYCOL 3350 17 G: 17 POWDER, FOR SOLUTION ORAL at 08:34

## 2022-01-01 RX ADMIN — FUROSEMIDE 40 MG: 40 TABLET ORAL at 08:55

## 2022-01-01 RX ADMIN — ATORVASTATIN CALCIUM 80 MG: 40 TABLET, FILM COATED ORAL at 21:18

## 2022-01-01 RX ADMIN — INSULIN LISPRO 8 UNITS: 100 INJECTION, SOLUTION INTRAVENOUS; SUBCUTANEOUS at 10:46

## 2022-01-01 RX ADMIN — MONTELUKAST 10 MG: 10 TABLET, FILM COATED ORAL at 21:10

## 2022-01-01 RX ADMIN — HYDROCORTISONE: 25 CREAM TOPICAL at 20:48

## 2022-01-01 RX ADMIN — HYDROCORTISONE: 25 CREAM TOPICAL at 08:41

## 2022-01-01 RX ADMIN — CEFEPIME HYDROCHLORIDE 1000 MG: 1 INJECTION, POWDER, FOR SOLUTION INTRAMUSCULAR; INTRAVENOUS at 05:18

## 2022-01-01 RX ADMIN — ACETAMINOPHEN 650 MG: 325 TABLET ORAL at 15:53

## 2022-01-01 RX ADMIN — METOPROLOL SUCCINATE 50 MG: 50 TABLET, EXTENDED RELEASE ORAL at 08:33

## 2022-01-01 RX ADMIN — VANCOMYCIN HYDROCHLORIDE 750 MG: 750 INJECTION, POWDER, LYOPHILIZED, FOR SOLUTION INTRAVENOUS at 04:19

## 2022-01-01 RX ADMIN — HYDROCORTISONE: 25 CREAM TOPICAL at 08:51

## 2022-01-01 RX ADMIN — ALBUMIN (HUMAN) 50 G: 0.25 INJECTION, SOLUTION INTRAVENOUS at 01:45

## 2022-01-01 RX ADMIN — SODIUM CHLORIDE, PRESERVATIVE FREE 10 ML: 5 INJECTION INTRAVENOUS at 09:06

## 2022-01-01 RX ADMIN — SODIUM CHLORIDE, PRESERVATIVE FREE 10 ML: 5 INJECTION INTRAVENOUS at 10:48

## 2022-01-01 RX ADMIN — MONTELUKAST 10 MG: 10 TABLET, FILM COATED ORAL at 20:45

## 2022-01-01 RX ADMIN — FUROSEMIDE 40 MG: 40 TABLET ORAL at 08:36

## 2022-01-01 RX ADMIN — INSULIN LISPRO 4 UNITS: 100 INJECTION, SOLUTION INTRAVENOUS; SUBCUTANEOUS at 22:15

## 2022-01-01 RX ADMIN — CEPHALEXIN 500 MG: 250 CAPSULE ORAL at 18:16

## 2022-01-01 RX ADMIN — TRAMADOL HYDROCHLORIDE 50 MG: 50 TABLET, COATED ORAL at 15:15

## 2022-01-01 RX ADMIN — ACETAMINOPHEN 650 MG: 325 TABLET ORAL at 22:19

## 2022-01-01 RX ADMIN — DOXYCYCLINE HYCLATE 100 MG: 100 CAPSULE ORAL at 08:33

## 2022-01-01 RX ADMIN — HYDROCORTISONE: 25 CREAM TOPICAL at 21:11

## 2022-01-01 RX ADMIN — INSULIN LISPRO 3 UNITS: 100 INJECTION, SOLUTION INTRAVENOUS; SUBCUTANEOUS at 18:10

## 2022-01-01 RX ADMIN — CEFTRIAXONE SODIUM 2000 MG: 2 INJECTION, POWDER, FOR SOLUTION INTRAMUSCULAR; INTRAVENOUS at 15:53

## 2022-01-01 RX ADMIN — SODIUM CHLORIDE, PRESERVATIVE FREE 10 ML: 5 INJECTION INTRAVENOUS at 21:11

## 2022-01-01 RX ADMIN — ATORVASTATIN CALCIUM 80 MG: 40 TABLET, FILM COATED ORAL at 21:11

## 2022-01-01 RX ADMIN — INSULIN LISPRO 4 UNITS: 100 INJECTION, SOLUTION INTRAVENOUS; SUBCUTANEOUS at 11:15

## 2022-01-01 RX ADMIN — ATORVASTATIN CALCIUM 80 MG: 40 TABLET, FILM COATED ORAL at 20:45

## 2022-01-01 RX ADMIN — ALLOPURINOL 100 MG: 100 TABLET ORAL at 09:22

## 2022-01-01 RX ADMIN — SODIUM CHLORIDE, PRESERVATIVE FREE 10 ML: 5 INJECTION INTRAVENOUS at 09:24

## 2022-01-01 RX ADMIN — DOXYCYCLINE HYCLATE 100 MG: 100 CAPSULE ORAL at 21:12

## 2022-01-01 RX ADMIN — PIPERACILLIN AND TAZOBACTAM 3375 MG: 3; .375 INJECTION, POWDER, LYOPHILIZED, FOR SOLUTION INTRAVENOUS at 08:56

## 2022-01-01 RX ADMIN — SODIUM CHLORIDE, PRESERVATIVE FREE 10 ML: 5 INJECTION INTRAVENOUS at 20:48

## 2022-01-01 RX ADMIN — INSULIN LISPRO 1 UNITS: 100 INJECTION, SOLUTION INTRAVENOUS; SUBCUTANEOUS at 12:09

## 2022-01-01 RX ADMIN — SODIUM CHLORIDE, POTASSIUM CHLORIDE, SODIUM LACTATE AND CALCIUM CHLORIDE: 600; 310; 30; 20 INJECTION, SOLUTION INTRAVENOUS at 09:20

## 2022-01-01 RX ADMIN — INSULIN GLARGINE 10 UNITS: 100 INJECTION, SOLUTION SUBCUTANEOUS at 10:46

## 2022-01-01 RX ADMIN — FUROSEMIDE 40 MG: 40 TABLET ORAL at 08:53

## 2022-01-01 RX ADMIN — ATORVASTATIN CALCIUM 80 MG: 40 TABLET, FILM COATED ORAL at 22:44

## 2022-01-01 RX ADMIN — DOXYCYCLINE HYCLATE 100 MG: 100 CAPSULE ORAL at 09:22

## 2022-01-01 RX ADMIN — ATORVASTATIN CALCIUM 80 MG: 40 TABLET, FILM COATED ORAL at 09:18

## 2022-01-01 RX ADMIN — LOSARTAN POTASSIUM 25 MG: 50 TABLET, FILM COATED ORAL at 08:33

## 2022-01-01 RX ADMIN — CEPHALEXIN 500 MG: 250 CAPSULE ORAL at 17:38

## 2022-01-01 RX ADMIN — INSULIN GLARGINE 10 UNITS: 100 INJECTION, SOLUTION SUBCUTANEOUS at 21:16

## 2022-01-01 RX ADMIN — INSULIN LISPRO 4 UNITS: 100 INJECTION, SOLUTION INTRAVENOUS; SUBCUTANEOUS at 21:15

## 2022-01-01 RX ADMIN — INSULIN LISPRO 4 UNITS: 100 INJECTION, SOLUTION INTRAVENOUS; SUBCUTANEOUS at 11:41

## 2022-01-01 RX ADMIN — INSULIN LISPRO 8 UNITS: 100 INJECTION, SOLUTION INTRAVENOUS; SUBCUTANEOUS at 13:40

## 2022-01-01 RX ADMIN — SODIUM CHLORIDE, PRESERVATIVE FREE 10 ML: 5 INJECTION INTRAVENOUS at 20:45

## 2022-01-01 RX ADMIN — SODIUM CHLORIDE, PRESERVATIVE FREE 10 ML: 5 INJECTION INTRAVENOUS at 09:21

## 2022-01-01 RX ADMIN — HYDROCORTISONE: 25 CREAM TOPICAL at 21:15

## 2022-01-01 RX ADMIN — HYDROCORTISONE: 25 CREAM TOPICAL at 20:45

## 2022-01-01 RX ADMIN — SODIUM CHLORIDE: 9 INJECTION, SOLUTION INTRAVENOUS at 17:16

## 2022-01-01 RX ADMIN — LOSARTAN POTASSIUM 25 MG: 50 TABLET, FILM COATED ORAL at 09:22

## 2022-01-01 RX ADMIN — PIPERACILLIN AND TAZOBACTAM 3375 MG: 3; .375 INJECTION, POWDER, LYOPHILIZED, FOR SOLUTION INTRAVENOUS at 17:11

## 2022-01-01 RX ADMIN — ASPIRIN 81 MG: 81 TABLET ORAL at 10:23

## 2022-01-01 RX ADMIN — FUROSEMIDE 40 MG: 40 TABLET ORAL at 08:33

## 2022-01-01 RX ADMIN — CEPHALEXIN 500 MG: 250 CAPSULE ORAL at 05:30

## 2022-01-01 RX ADMIN — METOPROLOL SUCCINATE 50 MG: 50 TABLET, EXTENDED RELEASE ORAL at 09:19

## 2022-01-01 RX ADMIN — ACETAMINOPHEN 650 MG: 325 TABLET ORAL at 02:24

## 2022-01-01 RX ADMIN — INSULIN LISPRO 2 UNITS: 100 INJECTION, SOLUTION INTRAVENOUS; SUBCUTANEOUS at 18:19

## 2022-01-01 RX ADMIN — METOPROLOL SUCCINATE 50 MG: 50 TABLET, EXTENDED RELEASE ORAL at 10:24

## 2022-01-01 RX ADMIN — LOSARTAN POTASSIUM 50 MG: 50 TABLET, FILM COATED ORAL at 15:38

## 2022-01-01 RX ADMIN — TRAMADOL HYDROCHLORIDE 50 MG: 50 TABLET, COATED ORAL at 23:42

## 2022-01-01 RX ADMIN — INSULIN LISPRO 1 UNITS: 100 INJECTION, SOLUTION INTRAVENOUS; SUBCUTANEOUS at 17:35

## 2022-01-01 RX ADMIN — INSULIN LISPRO 1 UNITS: 100 INJECTION, SOLUTION INTRAVENOUS; SUBCUTANEOUS at 17:10

## 2022-01-01 RX ADMIN — HYDROCORTISONE: 25 CREAM TOPICAL at 08:56

## 2022-01-01 RX ADMIN — ALBUMIN (HUMAN) 50 G: 0.25 INJECTION, SOLUTION INTRAVENOUS at 06:39

## 2022-01-01 RX ADMIN — CEFEPIME HYDROCHLORIDE 1000 MG: 1 INJECTION, POWDER, FOR SOLUTION INTRAMUSCULAR; INTRAVENOUS at 18:05

## 2022-01-01 RX ADMIN — SODIUM CHLORIDE, POTASSIUM CHLORIDE, SODIUM LACTATE AND CALCIUM CHLORIDE 2328 ML: 600; 310; 30; 20 INJECTION, SOLUTION INTRAVENOUS at 15:25

## 2022-01-01 RX ADMIN — ACETAMINOPHEN 650 MG: 325 TABLET ORAL at 20:45

## 2022-01-01 RX ADMIN — INSULIN LISPRO 1 UNITS: 100 INJECTION, SOLUTION INTRAVENOUS; SUBCUTANEOUS at 12:40

## 2022-01-01 RX ADMIN — ACETAMINOPHEN 650 MG: 325 TABLET ORAL at 19:24

## 2022-01-01 RX ADMIN — CEFEPIME HYDROCHLORIDE 1000 MG: 1 INJECTION, POWDER, FOR SOLUTION INTRAMUSCULAR; INTRAVENOUS at 05:00

## 2022-01-01 RX ADMIN — ACETAMINOPHEN 650 MG: 325 TABLET ORAL at 22:41

## 2022-01-01 RX ADMIN — DEXAMETHASONE 6 MG: 4 TABLET ORAL at 09:19

## 2022-01-01 RX ADMIN — ATORVASTATIN CALCIUM 80 MG: 40 TABLET, FILM COATED ORAL at 20:46

## 2022-01-01 RX ADMIN — FUROSEMIDE 20 MG: 10 INJECTION, SOLUTION INTRAMUSCULAR; INTRAVENOUS at 18:53

## 2022-01-01 RX ADMIN — POLYETHYLENE GLYCOL 3350 17 G: 17 POWDER, FOR SOLUTION ORAL at 13:04

## 2022-01-01 RX ADMIN — DIPHENHYDRAMINE HYDROCHLORIDE 25 MG: 50 INJECTION, SOLUTION INTRAMUSCULAR; INTRAVENOUS at 19:54

## 2022-01-01 RX ADMIN — SODIUM CHLORIDE 3000 MG: 900 INJECTION INTRAVENOUS at 17:18

## 2022-01-01 RX ADMIN — VANCOMYCIN HYDROCHLORIDE 2000 MG: 500 INJECTION, POWDER, LYOPHILIZED, FOR SOLUTION INTRAVENOUS at 18:45

## 2022-01-01 RX ADMIN — POLYETHYLENE GLYCOL 3350 17 G: 17 POWDER, FOR SOLUTION ORAL at 08:33

## 2022-01-01 RX ADMIN — VANCOMYCIN HYDROCHLORIDE 750 MG: 750 INJECTION, POWDER, LYOPHILIZED, FOR SOLUTION INTRAVENOUS at 23:39

## 2022-01-01 RX ADMIN — ASPIRIN 81 MG: 81 TABLET ORAL at 10:47

## 2022-01-01 RX ADMIN — DIPHENHYDRAMINE HYDROCHLORIDE 25 MG: 50 INJECTION, SOLUTION INTRAMUSCULAR; INTRAVENOUS at 02:40

## 2022-01-01 RX ADMIN — PIPERACILLIN AND TAZOBACTAM 4500 MG: 4; .5 INJECTION, POWDER, LYOPHILIZED, FOR SOLUTION INTRAVENOUS at 17:07

## 2022-01-01 RX ADMIN — ATORVASTATIN CALCIUM 80 MG: 40 TABLET, FILM COATED ORAL at 22:19

## 2022-01-01 RX ADMIN — HEPARIN SODIUM 5000 UNITS: 5000 INJECTION INTRAVENOUS; SUBCUTANEOUS at 20:54

## 2022-01-01 RX ADMIN — TUBERCULIN PURIFIED PROTEIN DERIVATIVE 5 UNITS: 5 INJECTION, SOLUTION INTRADERMAL at 13:05

## 2022-01-01 RX ADMIN — TUBERCULIN PURIFIED PROTEIN DERIVATIVE 5 UNITS: 5 INJECTION, SOLUTION INTRADERMAL at 10:53

## 2022-01-01 RX ADMIN — SODIUM CHLORIDE, PRESERVATIVE FREE 10 ML: 5 INJECTION INTRAVENOUS at 08:57

## 2022-01-01 RX ADMIN — METOPROLOL SUCCINATE 50 MG: 50 TABLET, EXTENDED RELEASE ORAL at 09:22

## 2022-01-01 RX ADMIN — MONTELUKAST 10 MG: 10 TABLET, FILM COATED ORAL at 09:20

## 2022-01-01 RX ADMIN — FUROSEMIDE 40 MG: 10 INJECTION, SOLUTION INTRAMUSCULAR; INTRAVENOUS at 10:48

## 2022-01-01 RX ADMIN — MONTELUKAST 10 MG: 10 TABLET, FILM COATED ORAL at 10:24

## 2022-01-01 RX ADMIN — MONTELUKAST 10 MG: 10 TABLET, FILM COATED ORAL at 20:47

## 2022-01-01 RX ADMIN — LOSARTAN POTASSIUM 25 MG: 50 TABLET, FILM COATED ORAL at 08:54

## 2022-01-01 RX ADMIN — ATORVASTATIN CALCIUM 80 MG: 40 TABLET, FILM COATED ORAL at 21:10

## 2022-01-01 RX ADMIN — CEPHALEXIN 500 MG: 250 CAPSULE ORAL at 23:31

## 2022-01-01 RX ADMIN — PIPERACILLIN AND TAZOBACTAM 3.75 MG: 3; .375 INJECTION, POWDER, LYOPHILIZED, FOR SOLUTION INTRAVENOUS at 23:35

## 2022-01-01 RX ADMIN — ASPIRIN 81 MG: 81 TABLET ORAL at 09:18

## 2022-01-01 RX ADMIN — MONTELUKAST 10 MG: 10 TABLET, FILM COATED ORAL at 10:47

## 2022-01-01 RX ADMIN — ACETAMINOPHEN 1000 MG: 500 TABLET ORAL at 17:06

## 2022-01-01 RX ADMIN — ACETAMINOPHEN 650 MG: 325 TABLET ORAL at 22:45

## 2022-01-01 RX ADMIN — ACETAMINOPHEN 650 MG: 325 TABLET ORAL at 21:18

## 2022-01-01 RX ADMIN — SODIUM CHLORIDE, PRESERVATIVE FREE 5 ML: 5 INJECTION INTRAVENOUS at 09:19

## 2022-01-01 RX ADMIN — METOPROLOL SUCCINATE 50 MG: 50 TABLET, EXTENDED RELEASE ORAL at 10:47

## 2022-01-01 RX ADMIN — ACETAMINOPHEN 650 MG: 325 TABLET ORAL at 04:05

## 2022-01-01 RX ADMIN — LORAZEPAM 1 MG: 1 TABLET ORAL at 06:14

## 2022-01-01 RX ADMIN — FUROSEMIDE 40 MG: 40 TABLET ORAL at 09:23

## 2022-01-01 RX ADMIN — FUROSEMIDE 40 MG: 10 INJECTION, SOLUTION INTRAMUSCULAR; INTRAVENOUS at 18:11

## 2022-01-01 RX ADMIN — CEPHALEXIN 500 MG: 250 CAPSULE ORAL at 15:12

## 2022-01-01 RX ADMIN — ALLOPURINOL 100 MG: 100 TABLET ORAL at 09:18

## 2022-01-01 RX ADMIN — FUROSEMIDE 40 MG: 40 TABLET ORAL at 11:33

## 2022-01-01 RX ADMIN — MONTELUKAST 10 MG: 10 TABLET, FILM COATED ORAL at 21:11

## 2022-01-01 RX ADMIN — SODIUM CHLORIDE, PRESERVATIVE FREE 10 ML: 5 INJECTION INTRAVENOUS at 08:37

## 2022-01-01 RX ADMIN — SODIUM CHLORIDE, PRESERVATIVE FREE 10 ML: 5 INJECTION INTRAVENOUS at 10:24

## 2022-01-01 RX ADMIN — ASPIRIN 81 MG: 81 TABLET ORAL at 08:59

## 2022-01-01 RX ADMIN — ALLOPURINOL 100 MG: 100 TABLET ORAL at 08:33

## 2022-01-01 RX ADMIN — SODIUM CHLORIDE, PRESERVATIVE FREE 10 ML: 5 INJECTION INTRAVENOUS at 21:18

## 2022-01-01 RX ADMIN — ALBUMIN (HUMAN) 50 G: 0.25 INJECTION, SOLUTION INTRAVENOUS at 01:41

## 2022-01-01 RX ADMIN — SODIUM CHLORIDE, PRESERVATIVE FREE 10 ML: 5 INJECTION INTRAVENOUS at 11:07

## 2022-01-01 RX ADMIN — INSULIN LISPRO 2 UNITS: 100 INJECTION, SOLUTION INTRAVENOUS; SUBCUTANEOUS at 12:52

## 2022-01-01 RX ADMIN — ACETAMINOPHEN 650 MG: 325 TABLET ORAL at 13:08

## 2022-01-01 RX ADMIN — PIPERACILLIN AND TAZOBACTAM 3375 MG: 3; .375 INJECTION, POWDER, LYOPHILIZED, FOR SOLUTION INTRAVENOUS at 01:24

## 2022-01-01 RX ADMIN — HEPARIN SODIUM 5000 UNITS: 5000 INJECTION INTRAVENOUS; SUBCUTANEOUS at 09:19

## 2022-01-01 RX ADMIN — FUROSEMIDE 40 MG: 10 INJECTION, SOLUTION INTRAMUSCULAR; INTRAVENOUS at 08:23

## 2022-01-01 RX ADMIN — VANCOMYCIN HYDROCHLORIDE 1750 MG: 10 INJECTION, POWDER, LYOPHILIZED, FOR SOLUTION INTRAVENOUS at 23:42

## 2022-01-01 RX ADMIN — ALLOPURINOL 100 MG: 100 TABLET ORAL at 08:53

## 2022-01-01 RX ADMIN — ALLOPURINOL 100 MG: 100 TABLET ORAL at 08:36

## 2022-01-01 RX ADMIN — ALBUMIN (HUMAN) 50 G: 0.25 INJECTION, SOLUTION INTRAVENOUS at 07:45

## 2022-01-01 RX ADMIN — CEPHALEXIN 500 MG: 250 CAPSULE ORAL at 00:05

## 2022-01-01 RX ADMIN — MONTELUKAST 10 MG: 10 TABLET, FILM COATED ORAL at 08:59

## 2022-01-01 RX ADMIN — MORPHINE SULFATE 2 MG: 2 INJECTION, SOLUTION INTRAMUSCULAR; INTRAVENOUS at 16:47

## 2022-01-01 RX ADMIN — ASPIRIN 81 MG: 81 TABLET ORAL at 09:19

## 2022-01-01 RX ADMIN — SODIUM CHLORIDE, PRESERVATIVE FREE 5 ML: 5 INJECTION INTRAVENOUS at 20:58

## 2022-01-01 RX ADMIN — SODIUM CHLORIDE, PRESERVATIVE FREE 10 ML: 5 INJECTION INTRAVENOUS at 09:00

## 2022-01-01 RX ADMIN — ALBUMIN (HUMAN) 50 G: 0.25 INJECTION, SOLUTION INTRAVENOUS at 12:39

## 2022-01-01 RX ADMIN — Medication 3 MG: at 21:10

## 2022-01-01 RX ADMIN — ALBUMIN (HUMAN) 50 G: 0.25 INJECTION, SOLUTION INTRAVENOUS at 19:44

## 2022-01-01 RX ADMIN — FUROSEMIDE 40 MG: 40 TABLET ORAL at 10:10

## 2022-01-01 RX ADMIN — SODIUM CHLORIDE: 9 INJECTION, SOLUTION INTRAVENOUS at 03:30

## 2022-01-01 RX ADMIN — FUROSEMIDE 60 MG: 10 INJECTION, SOLUTION INTRAMUSCULAR; INTRAVENOUS at 10:36

## 2022-01-01 RX ADMIN — SODIUM CHLORIDE, PRESERVATIVE FREE 10 ML: 5 INJECTION INTRAVENOUS at 22:33

## 2022-01-01 RX ADMIN — CEPHALEXIN 500 MG: 250 CAPSULE ORAL at 05:49

## 2022-01-01 RX ADMIN — INSULIN LISPRO 3 UNITS: 100 INJECTION, SOLUTION INTRAVENOUS; SUBCUTANEOUS at 08:58

## 2022-01-01 RX ADMIN — CEPHALEXIN 500 MG: 250 CAPSULE ORAL at 12:09

## 2022-01-01 RX ADMIN — FUROSEMIDE 40 MG: 10 INJECTION, SOLUTION INTRAMUSCULAR; INTRAVENOUS at 09:19

## 2022-01-01 RX ADMIN — METOPROLOL SUCCINATE 50 MG: 50 TABLET, EXTENDED RELEASE ORAL at 08:59

## 2022-01-01 RX ADMIN — INSULIN LISPRO 2 UNITS: 100 INJECTION, SOLUTION INTRAVENOUS; SUBCUTANEOUS at 16:30

## 2022-01-01 RX ADMIN — METOPROLOL SUCCINATE 50 MG: 50 TABLET, EXTENDED RELEASE ORAL at 09:18

## 2022-01-01 RX ADMIN — MONTELUKAST 10 MG: 10 TABLET, FILM COATED ORAL at 09:19

## 2022-01-01 RX ADMIN — CEFEPIME HYDROCHLORIDE 2000 MG: 2 INJECTION, POWDER, FOR SOLUTION INTRAVENOUS at 17:00

## 2022-01-01 RX ADMIN — CEFEPIME HYDROCHLORIDE 1000 MG: 1 INJECTION, POWDER, FOR SOLUTION INTRAMUSCULAR; INTRAVENOUS at 18:48

## 2022-01-01 ASSESSMENT — PAIN DESCRIPTION - LOCATION
LOCATION: HIP;KNEE
LOCATION: KNEE
LOCATION: ANKLE
LOCATION: KNEE
LOCATION: GENERALIZED
LOCATION: FOOT
LOCATION: FOOT;HIP
LOCATION: KNEE
LOCATION: FOOT
LOCATION: LEG
LOCATION: KNEE;HIP
LOCATION: ANKLE

## 2022-01-01 ASSESSMENT — PAIN DESCRIPTION - PAIN TYPE
TYPE: CHRONIC PAIN;NEUROPATHIC PAIN

## 2022-01-01 ASSESSMENT — PAIN SCALES - GENERAL
PAINLEVEL_OUTOF10: 6
PAINLEVEL_OUTOF10: 3
PAINLEVEL_OUTOF10: 6
PAINLEVEL_OUTOF10: 1
PAINLEVEL_OUTOF10: 5
PAINLEVEL_OUTOF10: 0
PAINLEVEL_OUTOF10: 7
PAINLEVEL_OUTOF10: 0
PAINLEVEL_OUTOF10: 3
PAINLEVEL_OUTOF10: 6
PAINLEVEL_OUTOF10: 4
PAINLEVEL_OUTOF10: 0
PAINLEVEL_OUTOF10: 0
PAINLEVEL_OUTOF10: 3
PAINLEVEL_OUTOF10: 0
PAINLEVEL_OUTOF10: 0
PAINLEVEL_OUTOF10: 2
PAINLEVEL_OUTOF10: 0
PAINLEVEL_OUTOF10: 6
PAINLEVEL_OUTOF10: 0
PAINLEVEL_OUTOF10: 0
PAINLEVEL_OUTOF10: 2
PAINLEVEL_OUTOF10: 3
PAINLEVEL_OUTOF10: 0
PAINLEVEL_OUTOF10: 3
PAINLEVEL_OUTOF10: 0
PAINLEVEL_OUTOF10: 5
PAINLEVEL_OUTOF10: 0
PAINLEVEL_OUTOF10: 3
PAINLEVEL_OUTOF10: 0
PAINLEVEL_OUTOF10: 0
PAINLEVEL_OUTOF10: 6
PAINLEVEL_OUTOF10: 0
PAINLEVEL_OUTOF10: 0
PAINLEVEL_OUTOF10: 3
PAINLEVEL_OUTOF10: 0
PAINLEVEL_OUTOF10: 0
PAINLEVEL_OUTOF10: 3
PAINLEVEL_OUTOF10: 0
PAINLEVEL_OUTOF10: 3

## 2022-01-01 ASSESSMENT — ENCOUNTER SYMPTOMS
ABDOMINAL PAIN: 0
VOMITING: 0
SHORTNESS OF BREATH: 0
SHORTNESS OF BREATH: 1
BOWEL INCONTINENCE: 1
COUGH: 1
COUGH: 0
DYSPNEA ACTIVITY LEVEL: AFTER AMBULATING LESS THAN 10 FT
BACK PAIN: 0
GASTROINTESTINAL NEGATIVE: 1
HEMOPTYSIS: 0

## 2022-01-01 ASSESSMENT — PAIN DESCRIPTION - ORIENTATION
ORIENTATION: RIGHT
ORIENTATION: LEFT;RIGHT
ORIENTATION: RIGHT
ORIENTATION: RIGHT
ORIENTATION: LEFT
ORIENTATION: LEFT
ORIENTATION: RIGHT;LEFT
ORIENTATION: RIGHT
ORIENTATION: LEFT
ORIENTATION: OTHER (COMMENT)
ORIENTATION: LEFT
ORIENTATION: RIGHT;LEFT
ORIENTATION: LEFT
ORIENTATION: RIGHT

## 2022-01-01 ASSESSMENT — PAIN DESCRIPTION - FREQUENCY
FREQUENCY: CONTINUOUS
FREQUENCY: CONTINUOUS

## 2022-01-01 ASSESSMENT — PAIN DESCRIPTION - DESCRIPTORS
DESCRIPTORS: ACHING
DESCRIPTORS: THROBBING
DESCRIPTORS: BURNING
DESCRIPTORS: ACHING
DESCRIPTORS: ACHING
DESCRIPTORS: THROBBING
DESCRIPTORS: ACHING
DESCRIPTORS: ACHING;BURNING
DESCRIPTORS: ACHING
DESCRIPTORS: ACHING
DESCRIPTORS: BURNING

## 2022-01-01 ASSESSMENT — PAIN - FUNCTIONAL ASSESSMENT
PAIN_FUNCTIONAL_ASSESSMENT: NONE - DENIES PAIN
PAIN_FUNCTIONAL_ASSESSMENT: 0-10
PAIN_FUNCTIONAL_ASSESSMENT: 0-10
PAIN_FUNCTIONAL_ASSESSMENT: PREVENTS OR INTERFERES SOME ACTIVE ACTIVITIES AND ADLS
PAIN_FUNCTIONAL_ASSESSMENT: ACTIVITIES ARE NOT PREVENTED
PAIN_FUNCTIONAL_ASSESSMENT: PREVENTS OR INTERFERES SOME ACTIVE ACTIVITIES AND ADLS
PAIN_FUNCTIONAL_ASSESSMENT: PREVENTS OR INTERFERES SOME ACTIVE ACTIVITIES AND ADLS

## 2022-01-01 ASSESSMENT — PAIN SCALES - WONG BAKER
WONGBAKER_NUMERICALRESPONSE: 0
WONGBAKER_NUMERICALRESPONSE: 2
WONGBAKER_NUMERICALRESPONSE: 0
WONGBAKER_NUMERICALRESPONSE: 2
WONGBAKER_NUMERICALRESPONSE: 2

## 2022-01-01 ASSESSMENT — PAIN DESCRIPTION - ONSET
ONSET: ON-GOING
ONSET: ON-GOING

## 2022-03-18 PROBLEM — R06.09 DYSPNEA ON EXERTION: Status: ACTIVE | Noted: 2019-04-08

## 2022-03-18 PROBLEM — I42.0 DILATED CARDIOMYOPATHY (HCC): Status: ACTIVE | Noted: 2021-06-22

## 2022-03-19 PROBLEM — I48.19 PERSISTENT ATRIAL FIBRILLATION (HCC): Status: ACTIVE | Noted: 2019-09-18

## 2022-03-19 PROBLEM — Z92.29 HX OF LONG TERM USE OF BLOOD THINNERS: Status: ACTIVE | Noted: 2018-10-02

## 2022-03-19 PROBLEM — R20.2 NUMBNESS AND TINGLING IN RIGHT HAND: Status: ACTIVE | Noted: 2019-09-18

## 2022-03-19 PROBLEM — G45.9 TIA (TRANSIENT ISCHEMIC ATTACK): Status: ACTIVE | Noted: 2019-09-18

## 2022-03-19 PROBLEM — Z95.818 PRESENCE OF WATCHMAN LEFT ATRIAL APPENDAGE CLOSURE DEVICE: Status: ACTIVE | Noted: 2020-01-21

## 2022-03-19 PROBLEM — E11.21 TYPE 2 DIABETES WITH NEPHROPATHY (HCC): Status: ACTIVE | Noted: 2019-11-06

## 2022-03-19 PROBLEM — Z79.01 ANTICOAGULANT LONG-TERM USE: Status: ACTIVE | Noted: 2017-06-28

## 2022-03-19 PROBLEM — R20.0 NUMBNESS AND TINGLING IN RIGHT HAND: Status: ACTIVE | Noted: 2019-09-18

## 2022-03-19 PROBLEM — R31.29 MICROHEMATURIA: Status: ACTIVE | Noted: 2018-10-02

## 2022-03-20 PROBLEM — Z87.438 HISTORY OF BPH: Status: ACTIVE | Noted: 2018-10-02

## 2022-03-21 PROBLEM — R60.0 LOCALIZED EDEMA: Status: ACTIVE | Noted: 2022-03-21

## 2022-03-24 ENCOUNTER — HOSPITAL ENCOUNTER (EMERGENCY)
Age: 87
Discharge: HOME | End: 2022-03-24
Attending: EMERGENCY MEDICINE
Payer: MEDICARE

## 2022-03-24 ENCOUNTER — APPOINTMENT (OUTPATIENT)
Dept: GENERAL RADIOLOGY | Age: 87
End: 2022-03-24
Attending: EMERGENCY MEDICINE
Payer: MEDICARE

## 2022-03-24 VITALS
TEMPERATURE: 97.5 F | SYSTOLIC BLOOD PRESSURE: 154 MMHG | HEIGHT: 72 IN | HEART RATE: 70 BPM | DIASTOLIC BLOOD PRESSURE: 69 MMHG | BODY MASS INDEX: 24.38 KG/M2 | OXYGEN SATURATION: 100 % | WEIGHT: 180 LBS | RESPIRATION RATE: 18 BRPM

## 2022-03-24 DIAGNOSIS — R60.9 PERIPHERAL EDEMA: Primary | ICD-10-CM

## 2022-03-24 PROBLEM — R60.0 LOCALIZED EDEMA: Status: ACTIVE | Noted: 2022-01-01

## 2022-03-24 LAB
ALBUMIN SERPL-MCNC: 2.8 G/DL (ref 3.2–4.6)
ALBUMIN/GLOB SERPL: 0.8 {RATIO} (ref 1.2–3.5)
ALP SERPL-CCNC: 220 U/L (ref 50–136)
ALT SERPL-CCNC: 34 U/L (ref 12–65)
ANION GAP SERPL CALC-SCNC: 6 MMOL/L (ref 7–16)
AST SERPL-CCNC: 33 U/L (ref 15–37)
ATRIAL RATE: 66 BPM
BASOPHILS # BLD: 0.1 K/UL (ref 0–0.2)
BASOPHILS NFR BLD: 1 % (ref 0–2)
BILIRUB SERPL-MCNC: 0.9 MG/DL (ref 0.2–1.1)
BNP SERPL-MCNC: 2461 PG/ML
BUN SERPL-MCNC: 45 MG/DL (ref 8–23)
CALCIUM SERPL-MCNC: 10.3 MG/DL (ref 8.3–10.4)
CALCULATED R AXIS, ECG10: -113 DEGREES
CALCULATED T AXIS, ECG11: 68 DEGREES
CHLORIDE SERPL-SCNC: 107 MMOL/L (ref 98–107)
CO2 SERPL-SCNC: 25 MMOL/L (ref 21–32)
CREAT SERPL-MCNC: 2 MG/DL (ref 0.8–1.5)
DIAGNOSIS, 93000: NORMAL
DIFFERENTIAL METHOD BLD: ABNORMAL
EOSINOPHIL # BLD: 0.2 K/UL (ref 0–0.8)
EOSINOPHIL NFR BLD: 3 % (ref 0.5–7.8)
ERYTHROCYTE [DISTWIDTH] IN BLOOD BY AUTOMATED COUNT: 16.1 % (ref 11.9–14.6)
GLOBULIN SER CALC-MCNC: 3.6 G/DL (ref 2.3–3.5)
GLUCOSE SERPL-MCNC: 270 MG/DL (ref 65–100)
HCT VFR BLD AUTO: 35.7 % (ref 41.1–50.3)
HGB BLD-MCNC: 11.6 G/DL (ref 13.6–17.2)
IMM GRANULOCYTES # BLD AUTO: 0.1 K/UL (ref 0–0.5)
IMM GRANULOCYTES NFR BLD AUTO: 1 % (ref 0–5)
LYMPHOCYTES # BLD: 1.1 K/UL (ref 0.5–4.6)
LYMPHOCYTES NFR BLD: 16 % (ref 13–44)
MCH RBC QN AUTO: 33.4 PG (ref 26.1–32.9)
MCHC RBC AUTO-ENTMCNC: 32.5 G/DL (ref 31.4–35)
MCV RBC AUTO: 102.9 FL (ref 79.6–97.8)
MONOCYTES # BLD: 0.7 K/UL (ref 0.1–1.3)
MONOCYTES NFR BLD: 10 % (ref 4–12)
NEUTS SEG # BLD: 4.6 K/UL (ref 1.7–8.2)
NEUTS SEG NFR BLD: 69 % (ref 43–78)
NRBC # BLD: 0 K/UL (ref 0–0.2)
PLATELET # BLD AUTO: 239 K/UL (ref 150–450)
PMV BLD AUTO: 9 FL (ref 9.4–12.3)
POTASSIUM SERPL-SCNC: 4.1 MMOL/L (ref 3.5–5.1)
PROT SERPL-MCNC: 6.4 G/DL (ref 6.3–8.2)
Q-T INTERVAL, ECG07: 476 MS
QRS DURATION, ECG06: 182 MS
QTC CALCULATION (BEZET), ECG08: 524 MS
RBC # BLD AUTO: 3.47 M/UL (ref 4.23–5.6)
SODIUM SERPL-SCNC: 138 MMOL/L (ref 136–145)
VENTRICULAR RATE, ECG03: 73 BPM
WBC # BLD AUTO: 6.7 K/UL (ref 4.3–11.1)

## 2022-03-24 PROCEDURE — 96374 THER/PROPH/DIAG INJ IV PUSH: CPT

## 2022-03-24 PROCEDURE — 74011250636 HC RX REV CODE- 250/636: Performed by: EMERGENCY MEDICINE

## 2022-03-24 PROCEDURE — 74011000250 HC RX REV CODE- 250: Performed by: EMERGENCY MEDICINE

## 2022-03-24 PROCEDURE — 99285 EMERGENCY DEPT VISIT HI MDM: CPT

## 2022-03-24 PROCEDURE — 71046 X-RAY EXAM CHEST 2 VIEWS: CPT

## 2022-03-24 PROCEDURE — 83880 ASSAY OF NATRIURETIC PEPTIDE: CPT

## 2022-03-24 PROCEDURE — 85025 COMPLETE CBC W/AUTO DIFF WBC: CPT

## 2022-03-24 PROCEDURE — 80053 COMPREHEN METABOLIC PANEL: CPT

## 2022-03-24 PROCEDURE — 93005 ELECTROCARDIOGRAM TRACING: CPT | Performed by: EMERGENCY MEDICINE

## 2022-03-24 RX ORDER — SODIUM CHLORIDE 0.9 % (FLUSH) 0.9 %
5-10 SYRINGE (ML) INJECTION EVERY 8 HOURS
Status: DISCONTINUED | OUTPATIENT
Start: 2022-03-24 | End: 2022-03-24 | Stop reason: HOSPADM

## 2022-03-24 RX ORDER — SODIUM CHLORIDE 0.9 % (FLUSH) 0.9 %
5-10 SYRINGE (ML) INJECTION AS NEEDED
Status: DISCONTINUED | OUTPATIENT
Start: 2022-03-24 | End: 2022-03-24 | Stop reason: HOSPADM

## 2022-03-24 RX ORDER — FUROSEMIDE 10 MG/ML
100 INJECTION INTRAMUSCULAR; INTRAVENOUS
Status: COMPLETED | OUTPATIENT
Start: 2022-03-24 | End: 2022-03-24

## 2022-03-24 RX ADMIN — FUROSEMIDE 100 MG: 10 INJECTION, SOLUTION INTRAMUSCULAR; INTRAVENOUS at 04:36

## 2022-03-24 RX ADMIN — SODIUM CHLORIDE, PRESERVATIVE FREE 5 ML: 5 INJECTION INTRAVENOUS at 04:36

## 2022-03-24 NOTE — ED PROVIDER NOTES
Patient is an 80-year-old male presenting to the emergency department today complaining of weeping on his legs bilaterally. The patient followed up with cardiology on Monday and had his Lasix increased from 40 mg to 80 mg and was given a prescription for metolazone 2.5 mg to take daily. The patient got the medication yesterday and just started the new course of medicine today. The patient denies any chest pain but says he has had some increasing shortness of breath. He is primarily wheelchair-bound secondary to bone-on-bone in the legs bilaterally. The patient states that he tries to lay back in his recliner and get his legs elevated. He is not wearing compression stockings at this time           Past Medical History:   Diagnosis Date    A-fib [facility]) 12/17/2019    Abnormal EKG     Acute diastolic heart failure (HCC)     Atrial fibrillation ([address] 9/10/2011    Atrial flutter ([address] 9/2013    Biotronik biventricular implantable cardioverter defibrillator    Automatic implantable cardioverter-defibrillator in situ 3/30/2016    Breast lump     right- pt states bx was neg-- m. d. \"released\" him    CAD (coronary artery disease)     mi w angioplasty--1995---- mi then cabg--2008    Cardiomyopathy, ischemic 3/30/2016    Chronic    Chest pain     Chronic systolic heart failure ([address]     \"stable\" per cardiology office note (1/2015) and on lasix daily    CKD (chronic kidney disease) stage 3, GFR 30-59 ml/min: Metformin discontinued 9/12/2011    Coronary artery disease 9/10/2011    Diabetes ([address] diag 2002    type2, oral meds, range 96-98, does not know last hgba1c, hypo s/s <70    Heart attack ([address] 1995/2008    History of kidney stones     none in years     Hypercholesteremia 9/10/2011    Hypercholesterolemia     no meds currently    Hypertension diag 1981    controlled with med    Kidney stone     LBBB (left bundle branch block) 9/17/2013    Orthostatic hypotension 3/30/2016    Osteoarthritis     hands  Pneumonia 9/10/2011    Preop cardiovascular exam     Renal insufficiency     S/P total knee arthroplasty 12/11/2015    TIA (transient ischemic attack) 9/18/2019    Unstable angina [facility])        Past Surgical History:   Procedure Laterality Date    HX ANKLE FRACTURE TX Right 5/11/2005    HX BREAST BIOPSY Right 7/2013    HX BUNIONECTOMY Left 2001    HX CARPAL TUNNEL RELEASE Bilateral 1972    HX CATARACT REMOVAL  2006/2009    HX COLONOSCOPY  2003/2010    HX CORONARY ARTERY BYPASS GRAFT  12/19/2008    3 vessel    HX HIP REPLACEMENT Right 12/28/2005    HX IMPLANTABLE CARDIOVERTER DEFIBRILLATOR  9/17/2013    biotronik    HX KNEE ARTHROSCOPY Left 9/17/2009    HX KNEE ARTHROSCOPY Right 4/30/2014    HX KNEE REPLACEMENT Right 12/2015    HX LITHOTRIPSY      x 5    HX ORTHOPAEDIC  2005    right hip    HX OTHER SURGICAL  2002    hydrocele repair and circumcision    HX PACEMAKER      pacemaker- defib    NV TOTAL KNEE ARTHROPLASTY Left 4/8/2010         Family History:   Problem Relation Age of Onset    Heart Disease Sister     Heart Disease Brother     Cancer Brother     Heart Disease Brother     Stroke Sister     Asthma Father     Heart Disease Other     Malignant Hyperthermia Neg Hx     Pseudocholinesterase Deficiency Neg Hx     Delayed Awakening Neg Hx     Post-op Nausea/Vomiting Neg Hx     Emergence Delirium Neg Hx     Post-op Cognitive Dysfunction Neg Hx     Other Neg Hx        Social History     Socioeconomic History    Marital status:      Spouse name: Not on file    Number of children: Not on file    Years of education: Not on file    Highest education level: Not on file   Occupational History    Not on file   Tobacco Use    Smoking status: Never Smoker    Smokeless tobacco: Never Used   Vaping Use    Vaping Use: Never used   Substance and Sexual Activity    Alcohol use: No    Drug use: No    Sexual activity: Not on file   Other Topics Concern    Not on file Social History Narrative    , lives with wife. Was in [address] [place] [address] for 4 years. Worked in sales. Now works with [employer] part time. Social Determinants of Health     Financial Resource Strain:     Difficulty of Paying Living Expenses: Not on file   Food Insecurity:     Worried About Running Out of Food in the Last Year: Not on file    Lilian of Food in the Last Year: Not on file   Transportation Needs:     Lack of Transportation (Medical): Not on file    Lack of Transportation (Non-Medical): Not on file   Physical Activity:     Days of Exercise per Week: Not on file    Minutes of Exercise per Session: Not on file   Stress:     Feeling of Stress : Not on file   Social Connections:     Frequency of Communication with Friends and Family: Not on file    Frequency of Social Gatherings with Friends and Family: Not on file    Attends Rastafari Services: Not on file    Active Member of [address] [place] [address] or Organizations: Not on file    Attends Club or Organization Meetings: Not on file    Marital Status: Not on file   Intimate Partner Violence:     Fear of Current or Ex-Partner: Not on file    Emotionally Abused: Not on file    Physically Abused: Not on file    Sexually Abused: Not on file   Housing Stability:     Unable to Pay for Housing in the Last Year: Not on file    Number of [place] in the Last Year: Not on file    Unstable Housing in the Last Year: Not on file         ALLERGIES: Iodinated contrast media    Review of Systems   Respiratory: Positive for shortness of breath. Cardiovascular: Positive for leg swelling. All other systems reviewed and are negative. Vitals:    03/24/22 0101 03/24/22 0320 03/24/22 0321 03/24/22 0322   BP: (!) 130/100 (!) 160/62     Pulse: 70      Resp: 18      Temp:    97.5 °F (36.4 °C)   SpO2: 100%  100%    Weight: 81.6 kg (180 lb)      Height: 6' (1.829 m)               Physical Exam     GENERAL:The patient has Body mass index is 24.41 kg/m². Well-hydrated. VITAL SIGNS: Heart rate, blood pressure, respiratory rate reviewed as recorded in  nurse's notes  EYES: Pupils reactive. Extraocular motion intact. No conjunctival redness or drainage. NECK: Supple, no meningeal signs. Trachea midline. No masses or thyromegaly. LUNGS: Breath sounds clear and equal bilaterally no accessory muscle use. CHEST: No deformity  CARDIOVASCULAR: Regular rate and rhythm  EXTREMITIES: No clubbing or cyanosis. Normal muscle tone. No restricted range of motion appreciated. Significant pitting edema in the lower extremities bilaterally with chronic venous stasis color changes noted. NEUROLOGIC: Sensation is grossly intact. Cranial nerve exam reveals face is  symmetrical, tongue is midline speech is clear. SKIN: No rash or petechiae. Good skin turgor palpated. PSYCHIATRIC: Alert and oriented. Appropriate behavior and judgment. MDM  Number of Diagnoses or Management Options  Diagnosis management comments: Peripheral edema, congestive heart failure, pleural effusion, CHF exacerbation, cellulitis,       Amount and/or Complexity of Data Reviewed  Clinical lab tests: reviewed and ordered  Tests in the radiology section of CPT®: ordered and reviewed  Tests in the medicine section of CPT®: ordered and reviewed  Decide to obtain previous medical records or to obtain history from someone other than the patient: yes  Independent visualization of images, tracings, or specimens: yes      ED Course as of 03/24/22 0429   Thu Mar 24, 2022   0427 XR CHEST PA LAT    IMPRESSION     1. No evidence of pneumonia or pulmonary edema. [KH]   6925 I talked to the patient and his family about the findings in the emergency department. This point time I do not see any need for admission to the hospital.  The patient will be given a dose of Lasix IV here in the ER and encouraged to continue following the recommendations of the cardiologist with the new medication changes.   He has not to take his morning dose of Lasix because of the dose given to him here [KH]      ED Course User Index  [KH] Cat Caputo DO       Procedures

## 2022-03-24 NOTE — ED TRIAGE NOTES
Pt arrives with complaints of bilateral lower leg swelling and now blisters that are popping and legs are weaping. Recently saw cardiology who increased lasix and started an additional medication which he started yesterday.

## 2022-03-24 NOTE — DISCHARGE INSTRUCTIONS
Continue following the guidance of your cardiologist regarding your medication dosing for your diuretics. Start wearing the Ace wrap starting foot going up to the knee like we discussed. Do not take your morning dose of Lasix on March 24 because of the IV Lasix given to you in the ER.

## 2022-03-28 ENCOUNTER — APPOINTMENT (OUTPATIENT)
Dept: GENERAL RADIOLOGY | Age: 87
DRG: 291 | End: 2022-03-28
Attending: EMERGENCY MEDICINE
Payer: MEDICARE

## 2022-03-28 ENCOUNTER — HOME HEALTH ADMISSION (OUTPATIENT)
Dept: HOME HEALTH SERVICES | Facility: HOME HEALTH | Age: 87
End: 2022-03-28

## 2022-03-28 ENCOUNTER — APPOINTMENT (OUTPATIENT)
Dept: ULTRASOUND IMAGING | Age: 87
DRG: 291 | End: 2022-03-28
Attending: INTERNAL MEDICINE
Payer: MEDICARE

## 2022-03-28 ENCOUNTER — HOSPITAL ENCOUNTER (INPATIENT)
Age: 87
LOS: 9 days | Discharge: NURSING, REHAB OR LONG-TERM CARE | DRG: 291 | End: 2022-04-06
Attending: EMERGENCY MEDICINE | Admitting: INTERNAL MEDICINE
Payer: MEDICARE

## 2022-03-28 DIAGNOSIS — M79.609 PAIN IN EXTREMITY, UNSPECIFIED EXTREMITY: ICD-10-CM

## 2022-03-28 DIAGNOSIS — Z71.89 ACP (ADVANCE CARE PLANNING): ICD-10-CM

## 2022-03-28 DIAGNOSIS — Z95.810 AUTOMATIC IMPLANTABLE CARDIOVERTER-DEFIBRILLATOR IN SITU: ICD-10-CM

## 2022-03-28 DIAGNOSIS — I50.813 ACUTE ON CHRONIC RIGHT-SIDED CONGESTIVE HEART FAILURE (HCC): ICD-10-CM

## 2022-03-28 DIAGNOSIS — N17.9 AKI (ACUTE KIDNEY INJURY) (HCC): ICD-10-CM

## 2022-03-28 DIAGNOSIS — R53.83 FATIGUE, UNSPECIFIED TYPE: ICD-10-CM

## 2022-03-28 DIAGNOSIS — R53.81 DEBILITY: ICD-10-CM

## 2022-03-28 DIAGNOSIS — R60.0 BILATERAL LEG EDEMA: ICD-10-CM

## 2022-03-28 DIAGNOSIS — Z51.5 ENCOUNTER FOR PALLIATIVE CARE: ICD-10-CM

## 2022-03-28 DIAGNOSIS — E11.9 TYPE 2 DIABETES MELLITUS WITHOUT COMPLICATION, UNSPECIFIED WHETHER LONG TERM INSULIN USE (HCC): ICD-10-CM

## 2022-03-28 DIAGNOSIS — L03.115 CELLULITIS OF RIGHT LOWER EXTREMITY: ICD-10-CM

## 2022-03-28 DIAGNOSIS — I50.9 ACUTE ON CHRONIC CONGESTIVE HEART FAILURE, UNSPECIFIED HEART FAILURE TYPE (HCC): Primary | ICD-10-CM

## 2022-03-28 PROBLEM — I27.20 PULMONARY HTN (HCC): Status: ACTIVE | Noted: 2022-03-28

## 2022-03-28 PROBLEM — I27.20 PULMONARY HTN (HCC): Status: ACTIVE | Noted: 2022-01-01

## 2022-03-28 PROBLEM — I27.81 COR PULMONALE (CHRONIC) (HCC): Status: ACTIVE | Noted: 2022-03-28

## 2022-03-28 PROBLEM — I27.81 COR PULMONALE (CHRONIC) (HCC): Status: ACTIVE | Noted: 2022-01-01

## 2022-03-28 LAB
ALBUMIN SERPL-MCNC: 2.7 G/DL (ref 3.2–4.6)
ALBUMIN/GLOB SERPL: 0.6 {RATIO} (ref 1.2–3.5)
ALP SERPL-CCNC: 240 U/L (ref 50–136)
ALT SERPL-CCNC: 32 U/L (ref 12–65)
ANION GAP SERPL CALC-SCNC: 8 MMOL/L (ref 7–16)
AST SERPL-CCNC: 32 U/L (ref 15–37)
ATRIAL RATE: 60 BPM
BASOPHILS # BLD: 0.1 K/UL (ref 0–0.2)
BASOPHILS NFR BLD: 1 % (ref 0–2)
BILIRUB SERPL-MCNC: 0.8 MG/DL (ref 0.2–1.1)
BNP SERPL-MCNC: 2478 PG/ML
BUN SERPL-MCNC: 68 MG/DL (ref 8–23)
CALCIUM SERPL-MCNC: 10.2 MG/DL (ref 8.3–10.4)
CALCULATED R AXIS, ECG10: -143 DEGREES
CALCULATED T AXIS, ECG11: 24 DEGREES
CHLORIDE SERPL-SCNC: 100 MMOL/L (ref 98–107)
CO2 SERPL-SCNC: 28 MMOL/L (ref 21–32)
CREAT SERPL-MCNC: 2.4 MG/DL (ref 0.8–1.5)
DIAGNOSIS, 93000: NORMAL
DIFFERENTIAL METHOD BLD: ABNORMAL
EOSINOPHIL # BLD: 0.3 K/UL (ref 0–0.8)
EOSINOPHIL NFR BLD: 3 % (ref 0.5–7.8)
ERYTHROCYTE [DISTWIDTH] IN BLOOD BY AUTOMATED COUNT: 15.9 % (ref 11.9–14.6)
GLOBULIN SER CALC-MCNC: 4.6 G/DL (ref 2.3–3.5)
GLUCOSE BLD STRIP.AUTO-MCNC: 379 MG/DL (ref 65–100)
GLUCOSE SERPL-MCNC: 328 MG/DL (ref 65–100)
HCT VFR BLD AUTO: 37.3 % (ref 41.1–50.3)
HGB BLD-MCNC: 12.2 G/DL (ref 13.6–17.2)
IMM GRANULOCYTES # BLD AUTO: 0.1 K/UL (ref 0–0.5)
IMM GRANULOCYTES NFR BLD AUTO: 1 % (ref 0–5)
LACTATE SERPL-SCNC: 1.4 MMOL/L (ref 0.4–2)
LYMPHOCYTES # BLD: 1.3 K/UL (ref 0.5–4.6)
LYMPHOCYTES NFR BLD: 14 % (ref 13–44)
MAGNESIUM SERPL-MCNC: 2.1 MG/DL (ref 1.8–2.4)
MCH RBC QN AUTO: 33.7 PG (ref 26.1–32.9)
MCHC RBC AUTO-ENTMCNC: 32.7 G/DL (ref 31.4–35)
MCV RBC AUTO: 103 FL (ref 79.6–97.8)
MONOCYTES # BLD: 1 K/UL (ref 0.1–1.3)
MONOCYTES NFR BLD: 11 % (ref 4–12)
NEUTS SEG # BLD: 6.3 K/UL (ref 1.7–8.2)
NEUTS SEG NFR BLD: 70 % (ref 43–78)
NRBC # BLD: 0 K/UL (ref 0–0.2)
PHOSPHATE SERPL-MCNC: 5 MG/DL (ref 2.3–3.7)
PLATELET # BLD AUTO: 251 K/UL (ref 150–450)
PMV BLD AUTO: 9.1 FL (ref 9.4–12.3)
POTASSIUM SERPL-SCNC: 3.6 MMOL/L (ref 3.5–5.1)
PROT SERPL-MCNC: 7.3 G/DL (ref 6.3–8.2)
Q-T INTERVAL, ECG07: 472 MS
QRS DURATION, ECG06: 182 MS
QTC CALCULATION (BEZET), ECG08: 523 MS
RBC # BLD AUTO: 3.62 M/UL (ref 4.23–5.6)
SERVICE CMNT-IMP: ABNORMAL
SODIUM SERPL-SCNC: 136 MMOL/L (ref 136–145)
VENTRICULAR RATE, ECG03: 74 BPM
WBC # BLD AUTO: 8.9 K/UL (ref 4.3–11.1)

## 2022-03-28 PROCEDURE — 74011250636 HC RX REV CODE- 250/636: Performed by: INTERNAL MEDICINE

## 2022-03-28 PROCEDURE — 83880 ASSAY OF NATRIURETIC PEPTIDE: CPT

## 2022-03-28 PROCEDURE — 77030040829 HC CATH EXT URINE MDII -B

## 2022-03-28 PROCEDURE — 65660000000 HC RM CCU STEPDOWN

## 2022-03-28 PROCEDURE — 74011000258 HC RX REV CODE- 258: Performed by: INTERNAL MEDICINE

## 2022-03-28 PROCEDURE — 71045 X-RAY EXAM CHEST 1 VIEW: CPT

## 2022-03-28 PROCEDURE — 80053 COMPREHEN METABOLIC PANEL: CPT

## 2022-03-28 PROCEDURE — 74011636637 HC RX REV CODE- 636/637: Performed by: INTERNAL MEDICINE

## 2022-03-28 PROCEDURE — 85025 COMPLETE CBC W/AUTO DIFF WBC: CPT

## 2022-03-28 PROCEDURE — 77030040831 HC BAG URINE DRNG MDII -A

## 2022-03-28 PROCEDURE — 93005 ELECTROCARDIOGRAM TRACING: CPT | Performed by: EMERGENCY MEDICINE

## 2022-03-28 PROCEDURE — 99285 EMERGENCY DEPT VISIT HI MDM: CPT

## 2022-03-28 PROCEDURE — 94762 N-INVAS EAR/PLS OXIMTRY CONT: CPT

## 2022-03-28 PROCEDURE — 82962 GLUCOSE BLOOD TEST: CPT

## 2022-03-28 PROCEDURE — 84100 ASSAY OF PHOSPHORUS: CPT

## 2022-03-28 PROCEDURE — 74011000250 HC RX REV CODE- 250: Performed by: EMERGENCY MEDICINE

## 2022-03-28 PROCEDURE — 93970 EXTREMITY STUDY: CPT

## 2022-03-28 PROCEDURE — 83735 ASSAY OF MAGNESIUM: CPT

## 2022-03-28 PROCEDURE — 83605 ASSAY OF LACTIC ACID: CPT

## 2022-03-28 RX ORDER — MORPHINE SULFATE 2 MG/ML
2 INJECTION, SOLUTION INTRAMUSCULAR; INTRAVENOUS
Status: DISCONTINUED | OUTPATIENT
Start: 2022-03-28 | End: 2022-04-06 | Stop reason: HOSPADM

## 2022-03-28 RX ORDER — LOSARTAN POTASSIUM 50 MG/1
100 TABLET ORAL DAILY
Status: DISCONTINUED | OUTPATIENT
Start: 2022-03-29 | End: 2022-04-06 | Stop reason: HOSPADM

## 2022-03-28 RX ORDER — OXYCODONE HYDROCHLORIDE 5 MG/1
5 TABLET ORAL
Status: DISCONTINUED | OUTPATIENT
Start: 2022-03-28 | End: 2022-04-06 | Stop reason: HOSPADM

## 2022-03-28 RX ORDER — METOPROLOL SUCCINATE 25 MG/1
50 TABLET, EXTENDED RELEASE ORAL DAILY
Status: DISCONTINUED | OUTPATIENT
Start: 2022-03-29 | End: 2022-04-06 | Stop reason: HOSPADM

## 2022-03-28 RX ORDER — ONDANSETRON 2 MG/ML
4 INJECTION INTRAMUSCULAR; INTRAVENOUS
Status: DISCONTINUED | OUTPATIENT
Start: 2022-03-28 | End: 2022-04-06 | Stop reason: HOSPADM

## 2022-03-28 RX ORDER — ASPIRIN 81 MG/1
81 TABLET ORAL DAILY
Status: DISCONTINUED | OUTPATIENT
Start: 2022-03-29 | End: 2022-04-06 | Stop reason: HOSPADM

## 2022-03-28 RX ORDER — MAGNESIUM SULFATE 100 %
16 CRYSTALS MISCELLANEOUS AS NEEDED
Status: DISCONTINUED | OUTPATIENT
Start: 2022-03-28 | End: 2022-04-06 | Stop reason: HOSPADM

## 2022-03-28 RX ORDER — HEPARIN SODIUM 5000 [USP'U]/ML
5000 INJECTION, SOLUTION INTRAVENOUS; SUBCUTANEOUS EVERY 8 HOURS
Status: DISCONTINUED | OUTPATIENT
Start: 2022-03-28 | End: 2022-04-06 | Stop reason: HOSPADM

## 2022-03-28 RX ORDER — SODIUM CHLORIDE 0.9 % (FLUSH) 0.9 %
5-10 SYRINGE (ML) INJECTION EVERY 8 HOURS
Status: DISCONTINUED | OUTPATIENT
Start: 2022-03-28 | End: 2022-04-06 | Stop reason: HOSPADM

## 2022-03-28 RX ORDER — ATORVASTATIN CALCIUM 80 MG/1
80 TABLET, FILM COATED ORAL DAILY
Status: DISCONTINUED | OUTPATIENT
Start: 2022-03-29 | End: 2022-04-06 | Stop reason: HOSPADM

## 2022-03-28 RX ORDER — INSULIN LISPRO 100 [IU]/ML
INJECTION, SOLUTION INTRAVENOUS; SUBCUTANEOUS
Status: DISCONTINUED | OUTPATIENT
Start: 2022-03-28 | End: 2022-04-06 | Stop reason: HOSPADM

## 2022-03-28 RX ORDER — MONTELUKAST SODIUM 10 MG/1
10 TABLET ORAL DAILY
Status: DISCONTINUED | OUTPATIENT
Start: 2022-03-29 | End: 2022-04-06 | Stop reason: HOSPADM

## 2022-03-28 RX ORDER — SODIUM CHLORIDE 0.9 % (FLUSH) 0.9 %
5-10 SYRINGE (ML) INJECTION AS NEEDED
Status: DISCONTINUED | OUTPATIENT
Start: 2022-03-28 | End: 2022-04-06 | Stop reason: HOSPADM

## 2022-03-28 RX ORDER — PRENATAL VIT 91/IRON/FOLIC/DHA 28-975-200
COMBINATION PACKAGE (EA) ORAL 2 TIMES DAILY
Status: DISCONTINUED | OUTPATIENT
Start: 2022-03-28 | End: 2022-03-28

## 2022-03-28 RX ORDER — DEXTROSE MONOHYDRATE 100 MG/ML
125-250 INJECTION, SOLUTION INTRAVENOUS AS NEEDED
Status: DISCONTINUED | OUTPATIENT
Start: 2022-03-28 | End: 2022-04-06 | Stop reason: HOSPADM

## 2022-03-28 RX ORDER — INSULIN GLARGINE 100 [IU]/ML
0.2 INJECTION, SOLUTION SUBCUTANEOUS
Status: DISCONTINUED | OUTPATIENT
Start: 2022-03-28 | End: 2022-04-06 | Stop reason: HOSPADM

## 2022-03-28 RX ORDER — ACETAMINOPHEN 325 MG/1
650 TABLET ORAL
Status: DISCONTINUED | OUTPATIENT
Start: 2022-03-28 | End: 2022-04-06 | Stop reason: HOSPADM

## 2022-03-28 RX ADMIN — INSULIN LISPRO 10 UNITS: 100 INJECTION, SOLUTION INTRAVENOUS; SUBCUTANEOUS at 21:30

## 2022-03-28 RX ADMIN — HEPARIN SODIUM 5000 UNITS: 5000 INJECTION INTRAVENOUS; SUBCUTANEOUS at 21:31

## 2022-03-28 RX ADMIN — SODIUM CHLORIDE 3 G: 900 INJECTION INTRAVENOUS at 20:27

## 2022-03-28 RX ADMIN — INSULIN GLARGINE 15 UNITS: 100 INJECTION, SOLUTION SUBCUTANEOUS at 21:30

## 2022-03-28 RX ADMIN — SODIUM CHLORIDE, PRESERVATIVE FREE 10 ML: 5 INJECTION INTRAVENOUS at 21:32

## 2022-03-28 RX ADMIN — FUROSEMIDE 10 MG/HR: 10 INJECTION, SOLUTION INTRAMUSCULAR; INTRAVENOUS at 21:22

## 2022-03-28 NOTE — ROUTINE PROCESS
Bedside and verbal report given to [facility], [address] [facility].

## 2022-03-28 NOTE — ROUTINE PROCESS
TRANSFER - IN REPORT:    Verbal report received from [facility] on [address] being received from ER for routine progression of care. Report consisted of patients Situation, Background, Assessment and Recommendations(SBAR). Opportunity for questions and clarification was provided. Assessment completed upon patients arrival to unit and care assumed. Patient received to room 303. Patient connected to monitor and assessment completed. Plan of care reviewed. Patient oriented to room and call light. Patient aware to use call light to communicate any chest pain or needs. Admission skin assessment completed with second RN and reveals the following:   BLE venous injuries with delayed healing due to DM- +2 pitting edema, red, yesika, cellulitis, warm. Scattered scars including vertical chest and knee. Sacrum and coccyx are intact with no sign of breakdown. Heels are red and swollen as are the rest of his legs bilaterally.

## 2022-03-28 NOTE — H&P
[facility]P      Patient ID:  NAME:  Germaine Hameed   Age/Sex: 80 y.o. male  :   1934   MRN:   [id number]    Admission Date: 3/28/2022  Chief Complaint: BLE edema and RLE erythema  Reason for Admission: Acute on chronic right-sided congestive heart failure ([address]    Assessment/Plan (MDM):     Principal Problem:    Acute on chronic right-sided congestive heart failure ([address] (3/28/2022)  - BLE edema worse despite doubling Lasix to 80mg and adding Zaroxolyn in past week  - Start Lasix gtt  - Check ECHO  - Consult Cardiology    Active Problems:    Bilateral leg edema (3/28/2022)  - Likely due to #1  - Start Lasix gtt  - Check ECHO  - Check BLE duplex  - Elevate legs as possible      Cellulitis of right leg (3/28/2022)  - RLE more erythematous and swollen than LLE  - Appears consistent with cellulitis  - Start Unasyn  - Start antifungal topical on feet and toes  - Trace erythema      Hypertension (9/10/2011)  - Stable  - Continue Cozaar  - Continue Toprol      Chronic combined systolic and diastolic heart failure ([address] (2013)  - Last ECHO with EF 18% + diastolic dysfunction  - Continue Cozaar  - Continue Toprol  - Start Lasix gtt      Persistent atrial fibrillation ([address] (2019)  - Rate controlled  - Continue Toprol  - Watchman device in place      Type 2 diabetes with nephropathy ([address] (2019)  - A1C 9.3 in 2022  - Start Lantus 15U  - Start Humalog SSI  - Hold home meds      Cor pulmonale (chronic) ([address] (3/28/2022)  - ECHO with elevated RVSP and with LE edema  - Start Lasix gtt  - Check ECHO      Pulmonary HTN ([address] (3/28/2022)  - Likely due to CHF  - Check ECHO      Coronary artery disease (9/10/2011)  - No acute CP  - Continue ASA  - Continue Lipitor      Hypercholesteremia (9/10/2011)  - Continue Lipitor      Automatic implantable cardioverter-defibrillator in situ (3/30/2016)      Presence of Watchman left atrial appendage closure device (1/21/2020)    Disposition: Home in 2-3 days  Diet: Diabetic, Low sodium  VTE ppx: Heparin  CODE STATUS: FULL CODE    PCP: Anabel Alfred MD    Attending MD: Hebert Ny DO    Treatment Team: Attending Provider: Dylan Tripathi DO; Primary Nurse: Carolyn Jiménez Respiratory Therapist: Merrill Johnson, RT    HPI:     Jayashree Hwang is a 80year old CM with a PMH of chronic combined CHF, DM2, HTN, HLD, and pulmonary HTN with cor pulmonale with chronic BLE edema who presented to the ER with worsening BLE edema and RLE erythema despite doubling his Lasix dose for a week and adding Zaroxolyn. Per the patient, he takes Lasix 40mg daily but noticed his legs were getting more swollen so he saw his PCP who referred him to Cardiology. The cardiologist double his Lasix to 80mg and added Zaroxolyn 2.5mg. Despite this increase and frequency urination, his legs continued to get more swollen. Then 2 days prior to admission, his right leg became red and tender and it worsened so they finally decided to come to the ER. Denies CP/SOB/cough. Denies eating salt in diet. Denies sick contacts. Denies F/C. Denies N/V/D. Complete ROS done and is as stated in HPI or otherwise negative    Past Medical History:   Diagnosis Date    A-fib ([facility] 12/17/2019    Abnormal EKG     Acute diastolic heart failure (HCC)     Atrial fibrillation ([facility] 9/10/2011    Atrial flutter ([facility] 9/2013    Biotronik biventricular implantable cardioverter defibrillator    Automatic implantable cardioverter-defibrillator in situ 3/30/2016    Breast lump     right- pt states bx was neg-- m. d. \"released\" him    CAD (coronary artery disease)     mi w angioplasty--1995---- mi then cabg--2008    Cardiomyopathy, ischemic 3/30/2016    Chronic    Chest pain     Chronic systolic heart failure ([facility]     \"stable\" per cardiology office note (1/2015) and on lasix daily    CKD (chronic kidney disease) stage 3, GFR 30-59 ml/min: Metformin discontinued 2011    Coronary artery disease 9/10/2011    Diabetes ([facility] diag     type2, oral meds, range 96-98, does not know last hgba1c, hypo s/s <70    Heart attack ([facility]     History of kidney stones     none in years     Hypercholesteremia 9/10/2011    Hypercholesterolemia     no meds currently    Hypertension diag 1981    controlled with med    Kidney stone     LBBB (left bundle branch block) 2013    Orthostatic hypotension 3/30/2016    Osteoarthritis     hands    Pneumonia 9/10/2011    Preop cardiovascular exam     Renal insufficiency     S/P total knee arthroplasty 2015    TIA (transient ischemic attack) 2019    Unstable angina [facility])         Past Surgical History:   Procedure Laterality Date    HX ANKLE FRACTURE TX Right 2005    HX BREAST BIOPSY Right 2013    HX BUNIONECTOMY Left     HX CARPAL TUNNEL RELEASE Bilateral 1972    HX CATARACT REMOVAL      HX COLONOSCOPY      HX CORONARY ARTERY BYPASS GRAFT  2008    3 vessel    HX HIP REPLACEMENT Right 2005    HX IMPLANTABLE CARDIOVERTER DEFIBRILLATOR  2013    biotronik    HX KNEE ARTHROSCOPY Left 2009    HX KNEE ARTHROSCOPY Right 2014    HX KNEE REPLACEMENT Right 2015    HX LITHOTRIPSY      x 5    HX ORTHOPAEDIC      right hip    HX OTHER SURGICAL      hydrocele repair and circumcision    HX PACEMAKER      pacemaker- defib    MI TOTAL KNEE ARTHROPLASTY Left 2010        Prior to Admission Medications   Prescriptions Last Dose Informant Patient Reported? Taking? Bedside Commode XX   No No   Sig: Use as instructed   Wheel Chair elizabeth   No No   Si wheelchair for difficulty walking and at risk for falls. aspirin delayed-release 81 mg tablet   Yes No   Sig: Take  by mouth daily. atorvastatin (LIPITOR) 80 mg tablet   No No   Sig: Take 1 Tablet by mouth daily.    canagliflozin (Invokana) 100 mg tablet   No No   Sig: Take 1 Tablet by mouth Daily (before breakfast). diclofenac (VOLTAREN) 1 % gel   Yes No   Sig: Apply  to affected area four (4) times daily. furosemide (LASIX) 80 mg tablet   No No   Sig: Take 1 Tablet by mouth two (2) times a day. losartan (COZAAR) 100 mg tablet   Yes No   Sig: Take 100 mg by mouth daily. metOLazone (ZAROXOLYN) 2.5 mg tablet   No No   Sig: Take 1 Tablet by mouth daily. metoprolol succinate (TOPROL-XL) 50 mg XL tablet   Yes No   Sig: Take 50 mg by mouth daily. montelukast (Singulair) 10 mg tablet   No No   Sig: Take 1 Tablet by mouth daily. multivitamin (ONE A DAY) tablet   Yes No   Sig: Take 1 Tab by mouth daily. nateglinide (Starlix) 120 mg tablet   Yes No   Sig: Take 120 mg by mouth Before breakfast, lunch, and dinner. nitroglycerin (NITROSTAT) 0.4 mg SL tablet   No No   Sig: Place 1 sl under the tongue q 5 min prn cp, max 3 sl in a 15-min time period. Call 911 if no relief after the 3rd sl.   iziqiphtn-Vjbbamft-Nvjlo-W.Pet (PREPARATION H MAXIMUM STRENGTH) 0.25-1 % rectal cream   No No   Sig: Insert  into rectum as needed for Hemorrhoids. terbinafine HCL (LAMISIL) 1 % topical cream   No No   Sig: Apply  to affected area two (2) times a day.       Facility-Administered Medications: None       Allergies   Allergen Reactions    Iodinated Contrast Media Swelling        Social History     Tobacco Use    Smoking status: Never Smoker    Smokeless tobacco: Never Used   Substance Use Topics    Alcohol use: No        Family History   Problem Relation Age of Onset    Heart Disease Sister     Heart Disease Brother     Cancer Brother     Heart Disease Brother     Stroke Sister     Asthma Father     Heart Disease Other     Malignant Hyperthermia Neg Hx     Pseudocholinesterase Deficiency Neg Hx     Delayed Awakening Neg Hx     Post-op Nausea/Vomiting Neg Hx     Emergence Delirium Neg Hx     Post-op Cognitive Dysfunction Neg Hx     Other Neg Hx         Objective:       Visit Vitals  /66   Pulse 70   Temp 98.8 °F (37.1 °C)   Resp 17   Ht 6' (1.829 m)   Wt 81.6 kg (180 lb)   SpO2 99%   BMI 24.41 kg/m²        Temp (24hrs), Av.8 °F (37.1 °C), Min:98.8 °F (37.1 °C), Max:98.8 °F (37.1 °C)      Oxygen Therapy  O2 Sat (%): 99 % (22 1533)  Pulse via Oximetry: 72 beats per minute (22 1533)  O2 Device: None (Room air) (22 1529)      Physical Exam:    General:    Alert, cooperative, appears stated age. Eyes:   PERRLA EOMI Anicteric  Head:   Normocephalic, without obvious abnormality, atraumatic. ENT:  Nares normal. No drainage. Lungs:   Clear to auscultation bilaterally. No Wheezing or Rhonchi. No rales. Heart:   Regular rate and rhythm,  no murmur, rub or gallop. No JVD. Abdomen:   Soft, non-tender. Not distended. Bowel sounds normal.   MSK:  2+ BLE edema. No clubbing or cyanosis. No deformities/lesions. Skin:     BLE with chronic changes, RLE with bright erythema up to knee and on foot. No Jaundice. Neurologic: Alert and oriented x 3, no focal deficits   Psychiatry:      No depression/anxiety. Mood congruent for context. Heme/Lymph/Immune: No echymoses or overt signs of bleeding.              Lab/Data Review:  Recent Results (from the past 24 hour(s))   EKG, 12 LEAD, INITIAL    Collection Time: 22 12:16 PM   Result Value Ref Range    Ventricular Rate 74 BPM    Atrial Rate 60 BPM    QRS Duration 182 ms    Q-T Interval 472 ms    QTC Calculation (Bezet) 523 ms    Calculated R Axis -143 degrees    Calculated T Axis 24 degrees    Diagnosis       Ventricular-paced rhythm with occasional Premature ventricular complexes  Abnormal ECG  When compared with ECG of 24-MAR-2022 01:09,  No significant change was found  Confirmed by Nando Singleton ([id number]) on 3/28/2022 2:33:16 PM     CBC WITH AUTOMATED DIFF    Collection Time: 22 12:24 PM   Result Value Ref Range    WBC 8.9 4.3 - 11.1 K/uL    RBC 3.62 (L) 4.23 - 5.6 M/uL    HGB 12.2 (L) 13.6 - 17.2 g/dL    HCT 37.3 (L) 41.1 - 50.3 %    .0 (H) 79.6 - 97.8 FL    MCH 33.7 (H) 26.1 - 32.9 PG    MCHC 32.7 31.4 - 35.0 g/dL    RDW 15.9 (H) 11.9 - 14.6 %    PLATELET 429 582 - 104 K/uL    MPV 9.1 (L) 9.4 - 12.3 FL    ABSOLUTE NRBC 0.00 0.0 - 0.2 K/uL    DF AUTOMATED      NEUTROPHILS 70 43 - 78 %    LYMPHOCYTES 14 13 - 44 %    MONOCYTES 11 4.0 - 12.0 %    EOSINOPHILS 3 0.5 - 7.8 %    BASOPHILS 1 0.0 - 2.0 %    IMMATURE GRANULOCYTES 1 0.0 - 5.0 %    ABS. NEUTROPHILS 6.3 1.7 - 8.2 K/UL    ABS. LYMPHOCYTES 1.3 0.5 - 4.6 K/UL    ABS. MONOCYTES 1.0 0.1 - 1.3 K/UL    ABS. EOSINOPHILS 0.3 0.0 - 0.8 K/UL    ABS. BASOPHILS 0.1 0.0 - 0.2 K/UL    ABS. IMM. GRANS. 0.1 0.0 - 0.5 K/UL   METABOLIC PANEL, COMPREHENSIVE    Collection Time: 03/28/22 12:24 PM   Result Value Ref Range    Sodium 136 136 - 145 mmol/L    Potassium 3.6 3.5 - 5.1 mmol/L    Chloride 100 98 - 107 mmol/L    CO2 28 21 - 32 mmol/L    Anion gap 8 7 - 16 mmol/L    Glucose 328 (H) 65 - 100 mg/dL    BUN 68 (H) 8 - 23 MG/DL    Creatinine 2.40 (H) 0.8 - 1.5 MG/DL    GFR est AA 33 (L) >60 ml/min/1.73m2    GFR est non-AA 27 (L) >60 ml/min/1.73m2    Calcium 10.2 8.3 - 10.4 MG/DL    Bilirubin, total 0.8 0.2 - 1.1 MG/DL    ALT (SGPT) 32 12 - 65 U/L    AST (SGOT) 32 15 - 37 U/L    Alk. phosphatase 240 (H) 50 - 136 U/L    Protein, total 7.3 6.3 - 8.2 g/dL    Albumin 2.7 (L) 3.2 - 4.6 g/dL    Globulin 4.6 (H) 2.3 - 3.5 g/dL    A-G Ratio 0.6 (L) 1.2 - 3.5     MAGNESIUM    Collection Time: 03/28/22 12:24 PM   Result Value Ref Range    Magnesium 2.1 1.8 - 2.4 mg/dL   LACTIC ACID    Collection Time: 03/28/22 12:24 PM   Result Value Ref Range    Lactic acid 1.4 0.4 - 2.0 MMOL/L   NT-PRO BNP    Collection Time: 03/28/22 12:24 PM   Result Value Ref Range    NT pro-BNP 2,478 (H) <450 PG/ML       Imaging:  XR CHEST PA LAT    Result Date: 3/24/2022  Frontal and lateral views of the chest COMPARISON: September 2019 INDICATION: Chest pain. Lower leg swelling. FINDINGS: Heart is enlarged. Mediastinal contour is within normal limits. Midline sternotomy wires and left-sided cardiac pacer are stable. There is no focal pulmonary consolidation, pneumothorax or pulmonary edema. No pleural effusion. Surrounding bones are intact. 1. No evidence of pneumonia or pulmonary edema. XR CHEST PORT    Result Date: 3/28/2022  History: Bilateral leg swelling and bleeding. Redness and pain. Exam: portable chest Comparison: 3/24/2022 Findings: There is consistent elevation of the left hemidiaphragm. No change in the positioning of the support monitoring devices. No new alveolar infiltrate. No pneumothorax. IMPRESSIONs: Stable portable chest       Cardiac Studies:  EKG Results     Procedure 720 Value Units Date/Time    EKG [[id number]] Collected: 03/28/22 1216    Order Status: Completed Updated: 03/28/22 1433     Ventricular Rate 74 BPM      Atrial Rate 60 BPM      QRS Duration 182 ms      Q-T Interval 472 ms      QTC Calculation (Bezet) 523 ms      Calculated R Axis -143 degrees      Calculated T Axis 24 degrees      Diagnosis --     Ventricular-paced rhythm with occasional Premature ventricular complexes  Abnormal ECG  When compared with ECG of 24-MAR-2022 01:09,  No significant change was found  Confirmed by Leyla Natarajan ([id number]) on 3/28/2022 2:33:16 PM            No results found for this visit on 03/28/22. Cultures:   All Micro Results     None          Active Problems:     Principal Diagnosis Acute on chronic right-sided congestive heart failure [facility]    Hospital Problems as of 3/28/2022 Date Reviewed: 3/21/2022          Codes Class Noted - Resolved POA    * (Principal) Acute on chronic right-sided congestive heart failure ([facility] ICD-10-CM: I50.813  ICD-9-CM: 428.0  3/28/2022 - Present Yes        Bilateral leg edema ICD-10-CM: R60.0  ICD-9-CM: 782.3  3/28/2022 - Present Yes        Cellulitis of right leg ICD-10-CM: L03.115  ICD-9-CM: 682.6  3/28/2022 - Present Yes        Cor pulmonale (chronic) ([facility] ICD-10-CM: I27.81  ICD-9-CM: 416.9  3/28/2022 - Present Yes        Pulmonary HTN ([facility] ICD-10-CM: I27.20  ICD-9-CM: 416.8  3/28/2022 - Present Yes        Type 2 diabetes with nephropathy ([facility] ICD-10-CM: E11.21  ICD-9-CM: 250.40, 583.81  11/6/2019 - Present Yes        Persistent atrial fibrillation (HCC) ICD-10-CM: I48.19  ICD-9-CM: 427.31  9/18/2019 - Present Yes        Chronic combined systolic and diastolic heart failure (HCC) ICD-10-CM: I50.42  ICD-9-CM: 428.42  9/17/2013 - Present Yes        Hypertension (Chronic) ICD-10-CM: I10  ICD-9-CM: 401.9  9/10/2011 - Present Yes        Coronary artery disease (Chronic) ICD-10-CM: I25.10  ICD-9-CM: 414.00  9/10/2011 - Present Yes    Overview Signed 3/30/2016  9:03 AM by Nicole De La Garza through [facility]             Presence of Watchman left atrial appendage closure device ICD-10-CM: Z95.818  ICD-9-CM: V45.09  1/21/2020 - Present Yes    Overview Signed 1/21/2020  7:36 PM by Dipika Allen MD     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 (12/17/19)             Automatic implantable cardioverter-defibrillator in situ ICD-10-CM: Z95.810  ICD-9-CM: V45.02  3/30/2016 - Present Yes    Overview Signed 3/30/2016  9:01 AM by Helen Zhou     No shocks             Hypercholesteremia (Chronic) ICD-10-CM: E78.00  ICD-9-CM: 272.0  9/10/2011 - Present Yes              Anticipated discharge: 3 days    Gabbi Nia, DO  [facility]  3/28/2022 4:55 PM

## 2022-03-28 NOTE — ED PROVIDER NOTES
Patient is an 80-year-old male with a history of DM 2, diastolic heart failure, atrial fibrillation/flutter, CAD status post CABG 2008, CKD, hyperlipidemia and hypertension who presents with lower extremity swelling. He states this has been going on for the past 1 to 2 weeks. He was taking Lasix 40 mg twice a day, his cardiologist Dr. Lindsey Lopez increased his dose to 80 mg twice a day. This was approximately 1 week ago. He also had Zaroxolyn 2.5 mg daily added to his regimen. He has been wrapping his legs with Ace wrap, states that his swelling has not improved in fact has gotten worse. He states he has been getting some shortness of breath as well. He now has redness to his right foot and distal leg as well. Past Medical History:   Diagnosis Date    A-fib [facility]) 12/17/2019    Abnormal EKG     Acute diastolic heart failure (HCC)     Atrial fibrillation ([address] 9/10/2011    Atrial flutter ([address] 9/2013    Biotronik biventricular implantable cardioverter defibrillator    Automatic implantable cardioverter-defibrillator in situ 3/30/2016    Breast lump     right- pt states bx was neg-- m. d. \"released\" him    CAD (coronary artery disease)     mi w angioplasty--1995---- mi then cabg--2008    Cardiomyopathy, ischemic 3/30/2016    Chronic    Chest pain     Chronic systolic heart failure ([address]     \"stable\" per cardiology office note (1/2015) and on lasix daily    CKD (chronic kidney disease) stage 3, GFR 30-59 ml/min: Metformin discontinued 9/12/2011    Coronary artery disease 9/10/2011    Diabetes ([address] diag 2002    type2, oral meds, range 96-98, does not know last hgba1c, hypo s/s <70    Heart attack ([address] 1995/2008    History of kidney stones     none in years     Hypercholesteremia 9/10/2011    Hypercholesterolemia     no meds currently    Hypertension diag 1981    controlled with med    Kidney stone     LBBB (left bundle branch block) 9/17/2013    Orthostatic hypotension 3/30/2016    Osteoarthritis     hands    Pneumonia 9/10/2011    Preop cardiovascular exam     Renal insufficiency     S/P total knee arthroplasty 12/11/2015    TIA (transient ischemic attack) 9/18/2019    Unstable angina [facility])        Past Surgical History:   Procedure Laterality Date    HX ANKLE FRACTURE TX Right 5/11/2005    HX BREAST BIOPSY Right 7/2013    HX BUNIONECTOMY Left 2001    HX CARPAL TUNNEL RELEASE Bilateral 1972    HX CATARACT REMOVAL  2006/2009    HX COLONOSCOPY  2003/2010    HX CORONARY ARTERY BYPASS GRAFT  12/19/2008    3 vessel    HX HIP REPLACEMENT Right 12/28/2005    HX IMPLANTABLE CARDIOVERTER DEFIBRILLATOR  9/17/2013    biotronik    HX KNEE ARTHROSCOPY Left 9/17/2009    HX KNEE ARTHROSCOPY Right 4/30/2014    HX KNEE REPLACEMENT Right 12/2015    HX LITHOTRIPSY      x 5    HX ORTHOPAEDIC  2005    right hip    HX OTHER SURGICAL  2002    hydrocele repair and circumcision    HX PACEMAKER      pacemaker- defib    CO TOTAL KNEE ARTHROPLASTY Left 4/8/2010         Family History:   Problem Relation Age of Onset    Heart Disease Sister     Heart Disease Brother     Cancer Brother     Heart Disease Brother     Stroke Sister     Asthma Father     Heart Disease Other     Malignant Hyperthermia Neg Hx     Pseudocholinesterase Deficiency Neg Hx     Delayed Awakening Neg Hx     Post-op Nausea/Vomiting Neg Hx     Emergence Delirium Neg Hx     Post-op Cognitive Dysfunction Neg Hx     Other Neg Hx        Social History     Socioeconomic History    Marital status:      Spouse name: Not on file    Number of children: Not on file    Years of education: Not on file    Highest education level: Not on file   Occupational History    Not on file   Tobacco Use    Smoking status: Never Smoker    Smokeless tobacco: Never Used   Vaping Use    Vaping Use: Never used   Substance and Sexual Activity    Alcohol use: No    Drug use: No    Sexual activity: Not on file   Other Topics Concern    Not on file   Social History Narrative    , lives with wife. Was in [address] [place] [address] for 4 years. 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 Social Determinants of Health     Financial Resource Strain:     Difficulty of Paying Living Expenses: Not on file   Food Insecurity:     Worried About Running Out of Food in the Last Year: Not on file    Lilian of Food in the Last Year: Not on file   Transportation Needs:     Lack of Transportation (Medical): Not on file    Lack of Transportation (Non-Medical): Not on file   Physical Activity:     Days of Exercise per Week: Not on file    Minutes of Exercise per Session: Not on file   Stress:     Feeling of Stress : Not on file   Social Connections:     Frequency of Communication with Friends and Family: Not on file    Frequency of Social Gatherings with Friends and Family: Not on file    Attends Druze Services: Not on file    Active Member of [address] [place] [address] or Organizations: Not on file    Attends Club or Organization Meetings: Not on file    Marital Status: Not on file   Intimate Partner Violence:     Fear of Current or Ex-Partner: Not on file    Emotionally Abused: Not on file    Physically Abused: Not on file    Sexually Abused: Not on file   Housing Stability:     Unable to Pay for Housing in the Last Year: Not on file    Number of [place] in the Last Year: Not on file    Unstable Housing in the Last Year: Not on file         ALLERGIES: Iodinated contrast media    Review of Systems   Constitutional: Negative for chills and fever. Respiratory: Positive for shortness of breath. Cardiovascular: Positive for leg swelling. Negative for chest pain. Gastrointestinal: Negative for nausea and vomiting. All other systems reviewed and are negative.       Vitals:    03/28/22 1217 03/28/22 1529 03/28/22 1529 03/28/22 1533   BP: 109/66      Pulse: 70      Resp: 17      Temp: 98.8 °F (37.1 °C)      SpO2: 98%  98% 99%   Weight: 81.6 kg (180 lb) 81.6 kg (180 lb)     Height:  6' (1.829 m)              Physical Exam  Vitals and nursing note reviewed. Constitutional:       General: He is not in acute distress. Appearance: He is well-developed and normal weight. HENT:      Head: Normocephalic and atraumatic. Eyes:      Conjunctiva/sclera: Conjunctivae normal.      Pupils: Pupils are equal, round, and reactive to light. Cardiovascular:      Rate and Rhythm: Normal rate and regular rhythm. Pulmonary:      Effort: Pulmonary effort is normal. No respiratory distress. Abdominal:      General: There is no distension. Tenderness: There is no abdominal tenderness. Musculoskeletal:         General: Normal range of motion. Cervical back: Normal range of motion and neck supple. Right lower leg: Edema present. Left lower leg: Edema present. Comments: 2-3+ pitting edema bilateral lower extremities. Erythema to right dorsal foot and pretibial area as shown in the picture   Skin:     General: Skin is warm and dry. Neurological:      Mental Status: He is alert. Psychiatric:         Mood and Affect: Mood normal.         Behavior: Behavior normal.          MDM  Number of Diagnoses or Management Options  Acute on chronic congestive heart failure, unspecified heart failure type [facility]): established and worsening  LALY (acute kidney injury) ([facility]): new and requires workup  Cellulitis of right lower extremity: new and requires workup  Type 2 diabetes mellitus without complication, unspecified whether long term insulin use ([facility]  Diagnosis management comments: 4:16 PM discussed results with patient and son. His renal function is worsening per old labs. He has no improvement in his edema despite modifications to his medication regimen. Given his worsening renal function and his right leg cellulitis, I feel he warrants inpatient treatment for IV Lasix and antibiotics.   I have notified cardiology, hospitalist has been paged for admission.        Amount and/or Complexity of Data Reviewed  Clinical lab tests: ordered and reviewed  Tests in the radiology section of CPT®: ordered and reviewed  Tests in the medicine section of CPT®: ordered and reviewed  Decide to obtain previous medical records or to obtain history from someone other than the patient: yes  Obtain history from someone other than the patient: yes  Review and summarize past medical records: yes  Discuss the patient with other providers: yes    Risk of Complications, Morbidity, and/or Mortality  Presenting problems: moderate  Diagnostic procedures: moderate  Management options: moderate    Patient Progress  Patient progress: improved         EKG    Date/Time: 3/28/2022 4:17 PM  Performed by: Clint Siddiqi MD  Authorized by: Clint Siddiqi MD     ECG reviewed by ED Physician in the absence of a cardiologist: yes    Previous ECG:     Previous ECG:  Unavailable  Interpretation:     Interpretation: non-specific    Rate:     ECG rate:  74    ECG rate assessment: normal    Rhythm:     Rhythm: paced    Pacing:     Capture:  Complete  Ectopy:     Ectopy: PVCs      PVCs:  Frequent  ST segments:     ST segments:  Normal  T waves:     T waves: normal

## 2022-03-28 NOTE — ROUTINE PROCESS
Bedside and Verbal shift change report given to myself (oncoming nurse) by Edgar Britton RN (offgoing nurse). Report included the following information SBAR, Kardex, MAR and Recent Results.

## 2022-03-28 NOTE — ED TRIAGE NOTES
Pt to triage with mask in place. Pt reports he was seen here Wednesday for bilateral leg swelling and weeping. Pt reports increased redness and pain to right foot that has progressively gotten worse since this weekend.

## 2022-03-28 NOTE — ED NOTES
TRANSFER - OUT REPORT:    Verbal report given to Margarito [address] on Timi Trevino  being transferred to [facility] for routine progression of care       Report consisted of patients Situation, Background, Assessment and   Recommendations(SBAR). Information from the following report(s) SBAR, Kardex, ED Summary, Recent Results and Med Rec Status was reviewed with the receiving nurse. Lines:   Peripheral IV 03/28/22 Left Antecubital (Active)        Opportunity for questions and clarification was provided.       Patient transported with:   [facility] Here

## 2022-03-29 ENCOUNTER — APPOINTMENT (OUTPATIENT)
Dept: NON INVASIVE DIAGNOSTICS | Age: 87
DRG: 291 | End: 2022-03-29
Attending: INTERNAL MEDICINE
Payer: MEDICARE

## 2022-03-29 LAB
ALBUMIN SERPL-MCNC: 2.4 G/DL (ref 3.2–4.6)
ANION GAP SERPL CALC-SCNC: 10 MMOL/L (ref 7–16)
BASOPHILS # BLD: 0.1 K/UL (ref 0–0.2)
BASOPHILS NFR BLD: 1 % (ref 0–2)
BUN SERPL-MCNC: 65 MG/DL (ref 8–23)
CALCIUM SERPL-MCNC: 10.2 MG/DL (ref 8.3–10.4)
CHLORIDE SERPL-SCNC: 99 MMOL/L (ref 98–107)
CO2 SERPL-SCNC: 29 MMOL/L (ref 21–32)
CREAT SERPL-MCNC: 2.2 MG/DL (ref 0.8–1.5)
DIFFERENTIAL METHOD BLD: NORMAL
ECHO AO ASC DIAM: 3.4 CM
ECHO AO ASCENDING AORTA INDEX: 1.69 CM/M2
ECHO AO ROOT DIAM: 3 CM
ECHO AO ROOT INDEX: 1.49 CM/M2
ECHO AV AREA PEAK VELOCITY: 1.3 CM2
ECHO AV AREA VTI: 1.2 CM2
ECHO AV AREA/BSA PEAK VELOCITY: 0.6 CM2/M2
ECHO AV AREA/BSA VTI: 0.6 CM2/M2
ECHO AV MEAN GRADIENT: 6 MMHG
ECHO AV MEAN VELOCITY: 1.2 M/S
ECHO AV PEAK GRADIENT: 10 MMHG
ECHO AV PEAK VELOCITY: 1.6 M/S
ECHO AV VELOCITY RATIO: 0.38
ECHO AV VTI: 31.3 CM
ECHO EST RA PRESSURE: 8 MMHG
ECHO IVC PROX: 2.3 CM
ECHO LA AREA 2C: 17.1 CM2
ECHO LA AREA 4C: 22.8 CM2
ECHO LA DIAMETER INDEX: 1.79 CM/M2
ECHO LA DIAMETER: 3.6 CM
ECHO LA MAJOR AXIS: 5.8 CM
ECHO LA MINOR AXIS: 5.9 CM
ECHO LA TO AORTIC ROOT RATIO: 1.2
ECHO LA VOL BP: 54 ML (ref 18–58)
ECHO LA VOL/BSA BIPLANE: 27 ML/M2 (ref 16–34)
ECHO LV E' LATERAL VELOCITY: 7 CM/S
ECHO LV E' SEPTAL VELOCITY: 7 CM/S
ECHO LV EDV A2C: 119 ML
ECHO LV EDV A4C: 90 ML
ECHO LV EDV INDEX A4C: 45 ML/M2
ECHO LV EDV NDEX A2C: 59 ML/M2
ECHO LV EJECTION FRACTION A2C: 52 %
ECHO LV EJECTION FRACTION A4C: 47 %
ECHO LV EJECTION FRACTION BIPLANE: 48 % (ref 55–100)
ECHO LV ESV A2C: 58 ML
ECHO LV ESV A4C: 48 ML
ECHO LV ESV INDEX A2C: 29 ML/M2
ECHO LV ESV INDEX A4C: 24 ML/M2
ECHO LV FRACTIONAL SHORTENING: 29 % (ref 28–44)
ECHO LV INTERNAL DIMENSION DIASTOLE INDEX: 2.39 CM/M2
ECHO LV INTERNAL DIMENSION DIASTOLIC: 4.8 CM (ref 4.2–5.9)
ECHO LV INTERNAL DIMENSION SYSTOLIC INDEX: 1.69 CM/M2
ECHO LV INTERNAL DIMENSION SYSTOLIC: 3.4 CM
ECHO LV IVSD: 1.3 CM (ref 0.6–1)
ECHO LV MASS 2D: 219.1 G (ref 88–224)
ECHO LV MASS INDEX 2D: 109 G/M2 (ref 49–115)
ECHO LV POSTERIOR WALL DIASTOLIC: 1.1 CM (ref 0.6–1)
ECHO LV RELATIVE WALL THICKNESS RATIO: 0.46
ECHO LVOT AREA: 3.1 CM2
ECHO LVOT AV VTI INDEX: 0.39
ECHO LVOT DIAM: 2 CM
ECHO LVOT MEAN GRADIENT: 1 MMHG
ECHO LVOT PEAK GRADIENT: 2 MMHG
ECHO LVOT PEAK VELOCITY: 0.6 M/S
ECHO LVOT STROKE VOLUME INDEX: 19.2 ML/M2
ECHO LVOT SV: 38.6 ML
ECHO LVOT VTI: 12.3 CM
ECHO MV A VELOCITY: 0.38 M/S
ECHO MV E DECELERATION TIME (DT): 272 MS
ECHO MV E VELOCITY: 0.66 M/S
ECHO MV E/A RATIO: 1.74
ECHO MV E/E' LATERAL: 9.43
ECHO MV E/E' RATIO (AVERAGED): 9.43
ECHO MV E/E' SEPTAL: 9.43
ECHO PV ACCELERATION TIME (AT): 132 MS
ECHO PV MAX VELOCITY: 0.9 M/S
ECHO PV PEAK GRADIENT: 3 MMHG
ECHO RIGHT VENTRICULAR SYSTOLIC PRESSURE (RVSP): 33 MMHG
ECHO RV BASAL DIMENSION: 3.7 CM
ECHO TV REGURGITANT MAX VELOCITY: 2.52 M/S
ECHO TV REGURGITANT PEAK GRADIENT: 25 MMHG
EOSINOPHIL # BLD: 0.3 K/UL (ref 0–0.8)
EOSINOPHIL NFR BLD: 4 % (ref 0.5–7.8)
ERYTHROCYTE [DISTWIDTH] IN BLOOD BY AUTOMATED COUNT: 15.9 % (ref 11.9–14.6)
GLUCOSE BLD STRIP.AUTO-MCNC: 167 MG/DL (ref 65–100)
GLUCOSE BLD STRIP.AUTO-MCNC: 172 MG/DL (ref 65–100)
GLUCOSE BLD STRIP.AUTO-MCNC: 198 MG/DL (ref 65–100)
GLUCOSE BLD STRIP.AUTO-MCNC: 401 MG/DL (ref 65–100)
GLUCOSE SERPL-MCNC: 170 MG/DL (ref 65–100)
HCT VFR BLD AUTO: 36.3 % (ref 41.1–50.3)
HGB BLD-MCNC: 11.8 G/DL (ref 13.6–17.2)
IMM GRANULOCYTES # BLD AUTO: 0.1 K/UL (ref 0–0.5)
IMM GRANULOCYTES NFR BLD AUTO: 1 % (ref 0–5)
LYMPHOCYTES # BLD: 1.4 K/UL (ref 0.5–4.6)
LYMPHOCYTES NFR BLD: 16 % (ref 13–44)
MAGNESIUM SERPL-MCNC: 2.2 MG/DL (ref 1.8–2.4)
MCH RBC QN AUTO: 32.9 PG (ref 26.1–32.9)
MCHC RBC AUTO-ENTMCNC: 32.5 G/DL (ref 31.4–35)
MCV RBC AUTO: 101.1 FL (ref 79.6–97.8)
MONOCYTES # BLD: 0.9 K/UL (ref 0.1–1.3)
MONOCYTES NFR BLD: 10 % (ref 4–12)
NEUTS SEG # BLD: 5.6 K/UL (ref 1.7–8.2)
NEUTS SEG NFR BLD: 68 % (ref 43–78)
NRBC # BLD: 0 K/UL (ref 0–0.2)
PHOSPHATE SERPL-MCNC: 4.6 MG/DL (ref 2.3–3.7)
PLATELET # BLD AUTO: 256 K/UL (ref 150–450)
PMV BLD AUTO: 9.5 FL (ref 9.4–12.3)
POTASSIUM SERPL-SCNC: 3 MMOL/L (ref 3.5–5.1)
RBC # BLD AUTO: 3.59 M/UL (ref 4.23–5.6)
SERVICE CMNT-IMP: ABNORMAL
SODIUM SERPL-SCNC: 138 MMOL/L (ref 138–145)
WBC # BLD AUTO: 8.3 K/UL (ref 4.3–11.1)

## 2022-03-29 PROCEDURE — 85027 COMPLETE CBC AUTOMATED: CPT

## 2022-03-29 PROCEDURE — 74011636637 HC RX REV CODE- 636/637: Performed by: INTERNAL MEDICINE

## 2022-03-29 PROCEDURE — 99223 1ST HOSP IP/OBS HIGH 75: CPT | Performed by: INTERNAL MEDICINE

## 2022-03-29 PROCEDURE — 65660000000 HC RM CCU STEPDOWN

## 2022-03-29 PROCEDURE — 74011000250 HC RX REV CODE- 250: Performed by: EMERGENCY MEDICINE

## 2022-03-29 PROCEDURE — 74011250636 HC RX REV CODE- 250/636: Performed by: INTERNAL MEDICINE

## 2022-03-29 PROCEDURE — 97165 OT EVAL LOW COMPLEX 30 MIN: CPT

## 2022-03-29 PROCEDURE — 97530 THERAPEUTIC ACTIVITIES: CPT

## 2022-03-29 PROCEDURE — C8929 TTE W OR WO FOL WCON,DOPPLER: HCPCS

## 2022-03-29 PROCEDURE — 97161 PT EVAL LOW COMPLEX 20 MIN: CPT

## 2022-03-29 PROCEDURE — 97112 NEUROMUSCULAR REEDUCATION: CPT

## 2022-03-29 PROCEDURE — 74011250637 HC RX REV CODE- 250/637: Performed by: INTERNAL MEDICINE

## 2022-03-29 PROCEDURE — 86580 TB INTRADERMAL TEST: CPT | Performed by: FAMILY MEDICINE

## 2022-03-29 PROCEDURE — 74011250636 HC RX REV CODE- 250/636: Performed by: FAMILY MEDICINE

## 2022-03-29 PROCEDURE — 74011250637 HC RX REV CODE- 250/637: Performed by: FAMILY MEDICINE

## 2022-03-29 PROCEDURE — 74011000250 HC RX REV CODE- 250: Performed by: FAMILY MEDICINE

## 2022-03-29 PROCEDURE — 80069 RENAL FUNCTION PANEL: CPT

## 2022-03-29 PROCEDURE — 74011000258 HC RX REV CODE- 258: Performed by: INTERNAL MEDICINE

## 2022-03-29 PROCEDURE — 83735 ASSAY OF MAGNESIUM: CPT

## 2022-03-29 PROCEDURE — 36415 COLL VENOUS BLD VENIPUNCTURE: CPT

## 2022-03-29 PROCEDURE — 82962 GLUCOSE BLOOD TEST: CPT

## 2022-03-29 PROCEDURE — 97535 SELF CARE MNGMENT TRAINING: CPT

## 2022-03-29 PROCEDURE — 2709999900 HC NON-CHARGEABLE SUPPLY

## 2022-03-29 RX ORDER — POTASSIUM CHLORIDE 20 MEQ/1
40 TABLET, EXTENDED RELEASE ORAL 2 TIMES DAILY
Status: DISCONTINUED | OUTPATIENT
Start: 2022-03-29 | End: 2022-03-31

## 2022-03-29 RX ADMIN — INSULIN LISPRO 2 UNITS: 100 INJECTION, SOLUTION INTRAVENOUS; SUBCUTANEOUS at 12:54

## 2022-03-29 RX ADMIN — ASPIRIN 81 MG: 81 TABLET ORAL at 08:11

## 2022-03-29 RX ADMIN — FUROSEMIDE 10 MG/HR: 10 INJECTION, SOLUTION INTRAMUSCULAR; INTRAVENOUS at 15:32

## 2022-03-29 RX ADMIN — SODIUM CHLORIDE, PRESERVATIVE FREE 5 ML: 5 INJECTION INTRAVENOUS at 13:42

## 2022-03-29 RX ADMIN — INSULIN LISPRO 15 UNITS: 100 INJECTION, SOLUTION INTRAVENOUS; SUBCUTANEOUS at 17:20

## 2022-03-29 RX ADMIN — HEPARIN SODIUM 5000 UNITS: 5000 INJECTION INTRAVENOUS; SUBCUTANEOUS at 13:32

## 2022-03-29 RX ADMIN — LOSARTAN POTASSIUM 100 MG: 50 TABLET, FILM COATED ORAL at 08:11

## 2022-03-29 RX ADMIN — Medication 5 UNITS: at 13:32

## 2022-03-29 RX ADMIN — SODIUM CHLORIDE 3 G: 900 INJECTION INTRAVENOUS at 08:07

## 2022-03-29 RX ADMIN — INSULIN LISPRO 2 UNITS: 100 INJECTION, SOLUTION INTRAVENOUS; SUBCUTANEOUS at 21:13

## 2022-03-29 RX ADMIN — INSULIN GLARGINE 15 UNITS: 100 INJECTION, SOLUTION SUBCUTANEOUS at 21:13

## 2022-03-29 RX ADMIN — MONTELUKAST 10 MG: 10 TABLET, FILM COATED ORAL at 08:11

## 2022-03-29 RX ADMIN — INSULIN LISPRO 2 UNITS: 100 INJECTION, SOLUTION INTRAVENOUS; SUBCUTANEOUS at 07:45

## 2022-03-29 RX ADMIN — SODIUM CHLORIDE, PRESERVATIVE FREE 10 ML: 5 INJECTION INTRAVENOUS at 05:53

## 2022-03-29 RX ADMIN — SODIUM CHLORIDE 3 G: 900 INJECTION INTRAVENOUS at 18:57

## 2022-03-29 RX ADMIN — POTASSIUM CHLORIDE 40 MEQ: 20 TABLET, EXTENDED RELEASE ORAL at 17:26

## 2022-03-29 RX ADMIN — FUROSEMIDE 10 MG/HR: 10 INJECTION, SOLUTION INTRAMUSCULAR; INTRAVENOUS at 05:50

## 2022-03-29 RX ADMIN — OXYCODONE 5 MG: 5 TABLET ORAL at 15:36

## 2022-03-29 RX ADMIN — SODIUM CHLORIDE, PRESERVATIVE FREE 10 ML: 5 INJECTION INTRAVENOUS at 21:13

## 2022-03-29 RX ADMIN — ATORVASTATIN CALCIUM 80 MG: 80 TABLET, FILM COATED ORAL at 08:11

## 2022-03-29 RX ADMIN — OXYCODONE 5 MG: 5 TABLET ORAL at 21:13

## 2022-03-29 RX ADMIN — PERFLUTREN 1 ML: 6.52 INJECTION, SUSPENSION INTRAVENOUS at 09:02

## 2022-03-29 RX ADMIN — HEPARIN SODIUM 5000 UNITS: 5000 INJECTION INTRAVENOUS; SUBCUTANEOUS at 05:50

## 2022-03-29 RX ADMIN — HEPARIN SODIUM 5000 UNITS: 5000 INJECTION INTRAVENOUS; SUBCUTANEOUS at 21:13

## 2022-03-29 RX ADMIN — METOPROLOL SUCCINATE 50 MG: 25 TABLET, EXTENDED RELEASE ORAL at 08:11

## 2022-03-29 NOTE — PROGRESS NOTES
ACUTE OT GOALS:  (Developed with and agreed upon by patient and/or caregiver.)  1. Patient will complete lower body bathing and dressing with min A and adaptive equipment as needed. 2. Patient will complete toileting with min A.   3. Patient will tolerate 30 minutes of OT treatment with 1-2 rest breaks to increase activity tolerance for ADLs. 4. Patient will complete functional transfers with min A and adaptive equipment as needed. 5. Patient will complete functional activity while seated edge of bed with SBA and adaptive equipment as needed. 6. Patient will tolerate 15 minutes unsupported sitting balance with SBA in preparation for ADL performance. 7. Patient will tolerate 15 minutes BUE therapeutic activities to increase use of BUE during ADL performance.      Timeframe: 7 visits         OCCUPATIONAL THERAPY ASSESSMENT: Initial Assessment and Daily Note OT Treatment Day # 1    Mary Elizabeth is a 80 y.o. male   PRIMARY DIAGNOSIS: Acute on chronic right-sided congestive heart failure (HCC)  Bilateral leg edema [R60.0]       Reason for Referral:   ICD-10: Treatment Diagnosis: Generalized Muscle Weakness (M62.81)  [place]PAT[place] / [place] B / Product Type: Medicare /   ASSESSMENT:     REHAB RECOMMENDATIONS:   Recommendation to date pending progress:  Setting:   Short-term Rehab  Equipment:    To Be Determined     PRIOR LEVEL OF FUNCTION:  (Prior to Hospitalization)  INITIAL/CURRENT LEVEL OF FUNCTION:  (Based on today's evaluation)   Bathing:   Moderate Assistance  Dressing:   Moderate Assistance  Feeding/Grooming:   Independent  Toileting:   Maximal Assistance  Functional Mobility:   Moderate Assistance for fx transfers Bathing:   Maximal Assistance  Dressing:   Maximal Assistance  Feeding/Grooming:   Minimal Assistance  Toileting:   Total Assistance  Functional Mobility:   Maximal Assistance x 2 - bed mobility and STS     ASSESSMENT:  Mr. Claudette Amanda presents with deficits in overall strength, activity tolerance, ADL performance and functional mobility. Admitted for CHF and BLE cellulitis. Ongoing weakness for the last 6 weeks. BUE AROM and strength (3+/5) are generally decreased but WFL. Max A x 2 for functional bed mobility/transfers; fair EOB sitting balance. Complete several sit to stands with max A x 2; poor standing balance with forward hunched posture; required max manual cues for glute activation to achieve  for upright posture. At end of session, pt requesting to use BSC. Max A x 2 for SPT to BSC. Patient requiring total A for bowel hygiene. At this time, Yogesh Terrazas is functioning below baseline for ADLs and functional mobility. Pt would benefit from skilled OT services to address OT goals and and plan of care. .     SUBJECTIVE:   Mr. Samir Sainz states, \"I have really bad arthritis in my joints. \"    SOCIAL HISTORY/LIVING ENVIRONMENT: Lives alone but son lives nearby for support. Over the last month, the son has been having to physically assist with transfers and ADLs.   Home Environment: Private residence  # Steps to Enter: 2  One/Two Story Residence: One story  Living Alone: Yes  Support Systems: Child(aleksey)    OBJECTIVE:     PAIN: VITAL SIGNS: LINES/DRAINS:   Pre Treatment: Pain Screen  Pain Scale 1: Numeric (0 - 10)  Pain Intensity 1: 0  Post Treatment: 0   Chavarria Catheter  O2 Device: None (Room air)     GROSS EVALUATION:  BUEs Within Functional Limits Abnormal/ Functional Abnormal/ Non-Functional (see comments) Not Tested Comments:   AROM [] [x] [] []    PROM [] [] [] []    Strength [] [x] [] []    Balance [] [x] [] [] Fair (+) sitting; poor standing    Posture [] [x] [] [] Forward hunched   Sensation [x] [] [] []    Coordination [x] [] [] []    Tone [x] [] [] []    Edema [] [x] [] [] BLEs   Activity Tolerance [] [x] [] [] Generally decreased on RA    [] [] [] []      COGNITION/  PERCEPTION: Intact Impaired   (see comments) Comments:   Orientation [x] [] Vision [x] []    Hearing [x] []    Judgment/ Insight [x] []    Attention [x] []    Memory [x] []    Command Following [x] []    Emotional Regulation [x] []     [] []      ACTIVITIES OF DAILY LIVING: I Mod I S SBA CGA Min Mod Max Total NT Comments   BASIC ADLs:              Bathing/ Showering [] [] [] [] [] [] [] [] [] [x]    Toileting [] [] [] [] [] [] [] [] [x] []    Dressing [] [] [] [] [] [] [] [] [] [x]    Feeding [] [] [] [] [] [] [] [] [] [x]    Grooming [] [] [] [x] [] [] [] [] [] [] Washing face   Personal Device Care [] [] [] [] [] [] [] [] [] [x]    Functional Mobility [] [] [] [] [] [] [] [x] [] [] X 2   I=Independent, Mod I=Modified Independent, S=Supervision, SBA=Standby Assistance, CGA=Contact Guard Assistance,   Min=Minimal Assistance, Mod=Moderate Assistance, Max=Maximal Assistance, Total=Total Assistance, NT=Not Tested    MOBILITY: I Mod I S SBA CGA Min Mod Max Total  NT x2 Comments:   Supine to sit [] [] [] [] [] [] [] [x] [] [] [x]    Sit to supine [] [] [] [] [] [] [] [x] [] [] [x]    Sit to stand [] [] [] [] [] [] [] [x] [] [] [x]    Bed to chair [] [] [] [] [] [] [] [] [] [x] []    I=Independent, Mod I=Modified Independent, S=Supervision, SBA=Standby Assistance, CGA=Contact Guard Assistance,   Min=Minimal Assistance, Mod=Moderate Assistance, Max=Maximal Assistance, Total=Total Assistance, NT=Not [facility] 6 Clicks   Daily Activity Inpatient Short Form        How much help from another person does the patient currently need. .. Total A Lot A Little None   1. Putting on and taking off regular lower body clothing? [] 1   [x] 2   [] 3   [] 4   2. Bathing (including washing, rinsing, drying)? [] 1   [x] 2   [] 3   [] 4   3. Toileting, which includes using toilet, bedpan or urinal?   [x] 1   [] 2   [] 3   [] 4   4. Putting on and taking off regular upper body clothing? [] 1   [] 2   [x] 3   [] 4   5. Taking care of personal grooming such as brushing teeth?    [] 1   [] 2   [x] 3   [] 4   6. Eating meals? [] 1   [] 2   [x] 3   [] 4   © 2007, Trustees of [address] [place] [address] [address] [address], under license to [employer]. All rights reserved     Score:  Initial: 14 Most Recent: X (Date: -- )   Interpretation of Tool:  Represents activities that are increasingly more difficult (i.e. Bed mobility, Transfers, Gait). PLAN:   FREQUENCY/DURATION: OT Plan of Care: 3 times/week for duration of hospital stay or until stated goals are met, whichever comes first.    PROBLEM LIST:   (Skilled intervention is medically necessary to address:)  1. Decreased ADL/Functional Activities  2. Decreased Activity Tolerance  3. Decreased AROM/PROM  4. Decreased Balance  5. Decreased Coordination  6. Decreased Gait Ability  7. Decreased Strength  8. Decreased Transfer Abilities   INTERVENTIONS PLANNED:   (Benefits and precautions of occupational therapy have been discussed with the patient.)  1. Self Care Training  2. Therapeutic Activity  3. Therapeutic Exercise/HEP  4. Neuromuscular Re-education  5. Education     TREATMENT:     EVALUATION: Low Complexity : (Untimed Charge)    TREATMENT:   ($$ Self Care/Home Management: 8-22 mins$$ Neuromuscular Re-Education: 23-37 mins   )  Self Care (15 Minutes): Self care including Toileting, Grooming and Energy Conservation Training to increase independence and decrease level of assistance required. Neuromuscular Re-education (25 Minutes): Neuromuscular Re-education included Balance Training, Coordination training, Postural training, Sitting balance training and Standing balance training to improve Balance, Coordination and Postural Control.     TREATMENT GRID:  N/A    AFTER TREATMENT POSITION/PRECAUTIONS:  Bed, Needs within reach and RN notified    INTERDISCIPLINARY COLLABORATION:  RN/PCT, PT/PTA and OT/CALI    TOTAL TREATMENT DURATION:  OT Patient Time In/Time Out  Time In: 3758  Time Out: Parag 598, OT

## 2022-03-29 NOTE — CONSULTS
Comprehensive Nutrition Assessment    Type and Reason for Visit: Initial,Consult,Positive nutrition screen  Best Practice Alert for Malnutrition Screening Tool: Recently Lost Weight Without Trying: Yes, If Yes, How Much Weight Loss: 2-13 lbs, Eating Poorly Due to Decreased Appetite: Yes  Diet education (\"Diabetic low sodium\" Hospitalist)    Nutrition Recommendations/Plan:   Meals and Snacks:  Continue current diet. Nutrition Supplement Therapy:   Medical food supplement therapy:  Initiate Glucerna Shake once per day (this provides 220 kcal and 10 grams protein per bottle)    Per pt report, meals are primarily provided by neighbors and family members due to pt reporting increased shortness of breath. Holding diet education due to this reason and positive malnutrition screen. Malnutrition Assessment:  Malnutrition Status: Moderate malnutrition  Context: Chronic illness  Findings of clinical characteristics of malnutrition:   Energy Intake:  Unable to assess (pt reports son, daughter in law, and neighbors provide meals)  Weight Loss:  Unable to assess (fluid fluctuations noted per chart review)     Body Fat Loss:  1 - Mild body fat loss, Fat overlying ribs,Triceps   Muscle Mass Loss:  1 - Mild muscle mass loss, Clavicles (pectoralis &deltoids),Hand (interosseous),Scapula (trapezius)  Fluid Accumulation:  Unable to assess,     Strength:  Not performed     Nutrition Assessment:   Nutrition History: Pt states having increased work of breathing \"on and off\" over the past year that has also impacted his ability to provide meals for himself. He states his daughter-in-law, son, and neighbors provide meals. Son arrived during initial assessment and states pt will eat meals provided, but not in one sitting. Pt typically will eat the meal for 1-2 days. Pt also reports drinking 1 8oz glass of 2% milk each day. Pt reports UBW of 225-230lbs ~1 year ago. Wt fluctuates between 180-203lbs per EMR.  Pt also recently had lasix increased from 40mg daily to 80mg daily with addition of Zaroxolyn. Nutrition Background: Pt with PMH notable for CHF, DM II, HTN, HLD, CAD, CKD III, TIA, and OA. Presents with worsening BLE edema. Pt admitted for acute on chronic CHF, and cellulitis of right leg. Nutrition Interval:  Pt seen sitting in bed at time of visit. His son later joined and was able to provide additional information regarding po intake PTA. Per flowsheets, pt has been eating % of meals. Discussed addition of Glucerna while appetite improves. Discussed pt with ZENAIDA Vargas, who states pt is eating well today. Nutrition Related Findings:   NFPE findings as stated above      Current Nutrition Therapies:  ADULT DIET Regular; 4 carb choices (60 gm/meal);  Low Sodium (2 gm)    Current Intake:   Average Meal Intake: % Average Supplement Intake: None ordered      Anthropometric Measures:  Height: 6' 2\" (188 cm)  Current Body Wt: 75.4 kg (166 lb 3.6 oz) (3/29), Weight source: Bed scale  BMI: 21.3, Underweight (BMI less than 22) age over 72  Admission Body Weight: 166 lb 0.1 oz (3/28; bed scale)  Ideal Body Weight (lbs) (Calculated): 190 lbs (86 kg), 87.5 %  Usual Body Wt:  (180-203lbs over the past year per EMR), Percent weight change:          Edema: LLE: 2+; Pitting (3/29/2022  7:54 AM)  RLE: 2+; Pitting (3/29/2022  7:54 AM)    Estimated Daily Nutrient Needs:  Energy (kcal/day): 3862-44018 (Kcal/kg (25-30), Weight Used: Current (75.4kg))  Protein (g/day): 75-90 Weight Used: (Current (1-1.2gm/kg))  Fluid (ml/day):   (1 ml/kcal)    Nutrition Diagnosis:   · Inadequate oral intake related to impaired respiratory function as evidenced by  (pt reports barrier to intake)    · Moderate malnutrition,In context of chronic illness related to inadequate protein-energy intake as evidenced by  (criteria provided in malnutrition screen above)    Nutrition Interventions:   Food and/or Nutrient Delivery: Continue current diet,Start oral nutrition supplement     Coordination of Nutrition Care: Continue to monitor while inpatient  Plan of Care discussed with Alicia PCT    Goals: Active Goal: Meet >75% estimated nutrition needs by RD follow up. Nutrition Monitoring and Evaluation:      Food/Nutrient Intake Outcomes: Food and nutrient intake,Supplement intake  Physical Signs/Symptoms Outcomes: Biochemical data,GI status,Meal time behavior,Weight    Discharge Planning:     Too soon to determine    Electronically signed by Bob Shetty MS, RDN, LD 3/29/2022 at 3:12 PM  Contact: [phone]

## 2022-03-29 NOTE — PROGRESS NOTES
ACUTE PHYSICAL THERAPY GOALS:  (Developed with and agreed upon by patient and/or caregiver. )  LTG:  (1.)Mr. Gregg will move from supine to sit and sit to supine , scoot up and down and roll side to side in bed with STAND BY ASSIST within 7 treatment day(s). (2.)Mr. Gregg will transfer from bed to chair and chair to bed with MINIMAL ASSIST using the least restrictive device within 7 treatment day(s). (3.)Mr. Gregg will ambulate with MODERATE ASSIST for 50 feet with the least restrictive device within 7 treatment day(s). (4.)Mr. Gregg will participate in therapeutic activity/exercises x 25 minutes for increased strength within 7 treatment days. ________________________________________________________________________________________________            PHYSICAL THERAPY ASSESSMENT: Initial Assessment and AM PT Treatment Day # 1      Howard Ruiz is a 80 y.o. male   PRIMARY DIAGNOSIS: Acute on chronic right-sided congestive heart failure (HCC)  Bilateral leg edema [R60.0]       Reason for Referral:    ICD-10: Treatment Diagnosis: Generalized Muscle Weakness (M62.81)  Difficulty in walking, Not elsewhere classified (R26.2)  [place] / [place] / Product Type: Medicare /     ASSESSMENT:     REHAB RECOMMENDATIONS:   Recommendation to date pending progress:  Setting:   Short-term Rehab  Equipment:    To Be Determined     PRIOR LEVEL OF FUNCTION:  (Prior to Hospitalization) INITIAL/CURRENT LEVEL OF FUNCTION:  (Most Recently Demonstrated)   Bed Mobility:   Unknown  Sit to Stand:   Minimal Assistance  Transfers:   Minimal Assistance  Gait/Mobility:   Unknown Bed Mobility:   Moderate Assistance x 2  Sit to Stand:   Moderate Assistance x 2  Transfers:   Maximal Assistance x 2  Gait/Mobility:   Maximal Assistance x 2     ASSESSMENT:  Mr. Barney Laws presents to PT with decreased AROM and strength in B LEs.   Pt's son reported he has been declining for past month due to joint pain and was requiring assistance from son for transfers. Pt required mod-maxA x 2 for bed mobility with fair sitting balance. Pt stood with maxA x 2/RW and poor standing balance. Pt was transferred to [facility] and back with maxA x 2. He has decreased activity tolerance as well. STR would be most beneficial at this point after discharge from acute care. Mr. Karson Barnett could benefit from skilled PT as he is currently functioning below his baseline.         SUBJECTIVE:   Mr. Karson Barnett states, \"I have to go\"    SOCIAL HISTORY/LIVING ENVIRONMENT: Lives alone and declining for past months; son has been helping with transfers using rollator or w/c  Home Environment: Private residence  # Steps to Enter: 2  One/Two Story Residence: One story  Living Alone: Yes  Support Systems: Child(aleksey),Other Family Member(s),[employer],Friend/Neighbor  OBJECTIVE:     PAIN: VITAL SIGNS: LINES/DRAINS:   Pre Treatment: Pain Screen  Pain Scale 1: Numeric (0 - 10)  Pain Intensity 1: 0  Post Treatment: 0   Chavarria Catheter and IV  O2 Device: None (Room air)     GROSS EVALUATION:  B LE Within Functional Limits Abnormal/ Functional Abnormal/ Non-Functional (see comments) Not Tested Comments:   AROM [] [x] [] []    PROM [] [] [] []    Strength [] [] [x] []    Balance [] [] [x] [] Poor standing   Posture [] [] [] []    Sensation [] [] [] []    Coordination [] [x] [] []    Tone [] [] [] []    Edema [] [] [] []    Activity Tolerance [] [x] [] []     [] [] [] []      COGNITION/  PERCEPTION: Intact Impaired   (see comments) Comments:   Orientation [x] []    Vision [] []    Hearing [] []    Command Following [x] []    Safety Awareness [x] []     [] []      MOBILITY: I Mod I S SBA CGA Min Mod Max Total  NT x2 Comments:   Bed Mobility    Rolling [] [] [] [] [] [] [x] [] [] [] [x]    Supine to Sit [] [] [] [] [] [] [x] [x] [] [] [x]    Scooting [] [] [] [] [] [] [] [] [x] [] [x]    Sit to Supine [] [] [] [] [] [] [x] [x] [] [] [x]    Transfers Sit to Stand [] [] [] [] [] [] [] [x] [] [] [x]    Bed to Chair [] [] [] [] [] [] [] [x] [] [] [x]    Stand to Sit [] [] [] [] [] [] [] [x] [] [] [x]    I=Independent, Mod I=Modified Independent, S=Supervision, SBA=Standby Assistance, CGA=Contact Guard Assistance,   Min=Minimal Assistance, Mod=Moderate Assistance, Max=Maximal Assistance, Total=Total Assistance, NT=Not Tested  GAIT: I Mod I S SBA CGA Min Mod Max Total  NT x2 Comments:   Level of Assistance [] [] [] [] [] [] [] [] [] [x] []    Distance NT    DME N/A    Gait Quality NT    Weightbearing Status N/A     I=Independent, Mod I=Modified Independent, S=Supervision, SBA=Standby Assistance, CGA=Contact Guard Assistance,   Min=Minimal Assistance, Mod=Moderate Assistance, Max=Maximal Assistance, Total=Total Assistance, NT=Not Tested    [facility] -[facility] Form       How much difficulty does the patient currently have. .. Unable A Lot A Little None   1. Turning over in bed (including adjusting bedclothes, sheets and blankets)? [] 1   [x] 2   [] 3   [] 4   2. Sitting down on and standing up from a chair with arms ( e.g., wheelchair, bedside commode, etc.)   [] 1   [x] 2   [] 3   [] 4   3. Moving from lying on back to sitting on the side of the bed? [] 1   [x] 2   [] 3   [] 4   How much help from another person does the patient currently need. .. Total A Lot A Little None   4. Moving to and from a bed to a chair (including a wheelchair)? [x] 1   [] 2   [] 3   [] 4   5. Need to walk in hospital room? [x] 1   [] 2   [] 3   [] 4   6. Climbing 3-5 steps with a railing? [x] 1   [] 2   [] 3   [] 4   © 2007, Trustees of [facility], under license to [employer]. All rights reserved     Score:  Initial: 9 Most Recent: X (Date: -- )    Interpretation of Tool:  Represents activities that are increasingly more difficult (i.e. Bed mobility, Transfers, Gait).     PLAN:   FREQUENCY/DURATION: PT Plan of Care: 3 times/week for duration of hospital stay or until stated goals are met, whichever comes first.    PROBLEM LIST:   (Skilled intervention is medically necessary to address:)  1. Decreased ADL/Functional Activities  2. Decreased Activity Tolerance  3. Decreased AROM/PROM  4. Decreased Balance  5. Decreased Coordination  6. Decreased Gait Ability  7. Decreased Strength  8. Decreased Transfer Abilities  9. Increased Pain   INTERVENTIONS PLANNED:   (Benefits and precautions of physical therapy have been discussed with the patient.)  1. Therapeutic Activity  2. Therapeutic Exercise/HEP  3. Neuromuscular Re-education  4. Gait Training  5. Manual Therapy  6. Education     TREATMENT:     EVALUATION: Low Complexity : (Untimed Charge)    TREATMENT:   ($$ Therapeutic Activity: 38-52 mins    )  Co-Treatment PT/OT necessary due to patient's decreased overall endurance/tolerance levels, as well as need for high level skilled assistance to complete functional transfers/mobility and functional tasks  Therapeutic Activity (38 Minutes): Therapeutic activity included Rolling, Supine to Sit, Sit to Supine, Transfer Training, Ambulation on level ground, Sitting balance  and Standing balance to improve functional Mobility, Strength and Activity tolerance.     TREATMENT GRID:  N/A    AFTER TREATMENT POSITION/PRECAUTIONS:  Bed, Needs within reach and RN notified    INTERDISCIPLINARY COLLABORATION:  MD/PA/NP, RN/PCT, PT/PTA and OT/CALI    TOTAL TREATMENT DURATION:  PT Patient Time In/Time Out  Time In: 1495  Time Out: [address]ess[address] Diaz DPT

## 2022-03-29 NOTE — PROGRESS NOTES
03/29/22 1901   NIH Stroke Scale   Interval Handoff/Transfer  (with Alesia Butler RN)   Level of Conciousness (1a) 0   LOC Questions (1b) 0   LOC Commands (1c) 0   Best Gaze (2) 0   Visual (3) 0   Facial Palsy (4) 0   Motor Arm, Left (5a) 0   Motor Arm, Right (5b) 0   Motor Leg, Left (6a) 0   Motor Leg, Right (6b) 0   Limb Ataxia (7) 0   Sensory (8) (!) 1   Best Language (9) 0   Dysarthria (10) 0   Extinction and Inattention (11) 0   Total 1

## 2022-03-29 NOTE — ROUTINE PROCESS
Bedside and Verbal shift change report given to myself (oncoming nurse) by Natalie Angel RN (offgoing nurse). Report included the following information SBAR, Kardex, MAR and Recent Results.

## 2022-03-29 NOTE — PROGRESS NOTES
Pt reported that he was seen in the ED on Wednesday for bilateral leg swelling and weeping; increased redness and pain to R foot that has progressively gotten worse since this past weekend. PMHx of chronic CHF, DM2, HTN, HLD, pulmonary HTN with cor pulmonale with chronic BLE edema. Pt reported that he lives alone in a single story home. His son, he reported lives \"four minutes away. \"  PTA, he was independent with his ADLs. Able to drive himself. Stated he was able to cook for himself but his son and neighbors also provided him meals. He uses a rollator and a WC at home for ambulation. Denies h/o HH and STR. PT/OT were consulted and recommended STR. Pt reported that he wanted to talk to his son first before he made a decision. Pt reported that he expected him to visit later today. Will revisit. PCP confirmed. Insurance verified and able to afford his medications. Will continue to follow. PPD placed. Care Management Interventions  PCP Verified by CM:  Yes Claudio Jose)  Mode of Transport at Discharge: BLS  Transition of Care Consult (CM Consult): Discharge Planning,SNF  Discharge Durable Medical Equipment: No  Physical Therapy Consult: Yes  Occupational Therapy Consult: Yes  Speech Therapy Consult: No  Support Systems: Child(aleksey),Other Family Member(s),[employer],Friend/Neighbor  Confirm Follow Up Transport: Family  The Plan for Transition of Care is Related to the Following Treatment Goals : Return home and back to his baseline  Discharge Location  Patient Expects to be Discharged to[de-identified] Unable to determine at this time

## 2022-03-29 NOTE — PROGRESS NOTES
Hospitalist Progress Note   Admit Date:  3/28/2022  3:15 PM   Name:  Yogesh Terrazas   Age:  80 y.o. Sex:  male  :  1934   MRN:  [id number]   Room:  303/01    Presenting Complaint: Leg Pain    Reason(s) for Admission: Bilateral leg edema [R60.0]     Hospital Course & Interval History:     Yogesh Terrazas is a 80year old CM with a PMH of chronic combined CHF, DM2, HTN, HLD, and pulmonary HTN with cor pulmonale with chronic BLE edema admitted with worsening bilateral lower extremity edema despite doubling Lasix dose for a week and adding Zaroxolyn. Started on Lasix gtt. Cardiology consulted. Also noted to have right lower extremity cellulitis and started on Unasyn. Subjective/24hr Events (22): Patient is seen at the bedside. Reports feeling little better today. Reports right lower extremity erythema and swelling improving. On Lasix GTT. Endorses some shortness of breath but denies chest pain, palpitation, nausea, vomiting. Negative fluid balance . Son at the bedside and updated on patient's current condition. All questions were answered. ROS:  10 systems reviewed and negative except as noted above.      Assessment & Plan:     Acute on chronic congestive heart failure:  Bilateral lower extremity edema:  Last echocardiogram with ejection fraction 50% with diastolic dysfunction  Continue Lasix gtt. negative fluid balance   Daily BMP  Echocardiogram   Lower extremity Doppler negative for DVT  Cardiology consulted, appreciate recommendation    Hypertension:  Persistent A. Fib:  CAD:  Continue Cozaar and Toprol  Continue aspirin and statin  Watchman device in place    Hypokalemia:  Repleted  Continue to monitor    Right leg cellulitis:  Improving  Continue Unasyn    LALY on CKD stage III:  Baseline creatinine 1.5, Creatinine 2.40 on admission  Close monitoring as patient is on Lasix gtt    Type 2 diabetes mellitus:  A1c 9.3 in 2022  Continue Lantus 15 units and sliding scale  We will continue to adjust accordingly  Hold home meds      Discharge Planning: To be determined, PT/OT    Diet:  ADULT DIET Regular; 4 carb choices (60 gm/meal);  Low Sodium (2 gm)  DVT PPx: Heparin  Code status: Full Code    Hospital Problems as of 3/29/2022 Date Reviewed: 3/21/2022          Codes Class Noted - Resolved POA    Bilateral leg edema ICD-10-CM: R60.0  ICD-9-CM: 782.3  3/28/2022 - Present Yes        Cor pulmonale (chronic) ([facility] ICD-10-CM: I27.81  ICD-9-CM: 416.9  3/28/2022 - Present Yes        Pulmonary HTN ([facility] ICD-10-CM: I27.20  ICD-9-CM: 416.8  3/28/2022 - Present Yes        Cellulitis of right leg ICD-10-CM: L03.115  ICD-9-CM: 682.6  3/28/2022 - Present Yes        * (Principal) Acute on chronic right-sided congestive heart failure ([facility] ICD-10-CM: I50.813  ICD-9-CM: 428.0  3/28/2022 - Present Yes        Presence of Watchman left atrial appendage closure device ICD-10-CM: Z95.818  ICD-9-CM: V45.09  1/21/2020 - Present Yes    Overview Signed 1/21/2020  7:36 PM by Dee Zapata MD     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42  ICD-9-CM: 428.42  9/17/2013 - Present Yes        Coronary artery disease (Chronic) ICD-10-CM: I25.10  ICD-9-CM: 414.00  9/10/2011 - Present Yes    Overview Signed 3/30/2016  9:03 AM by Refugio Donnelly through [facility]             Hypercholesteremia (Chronic) ICD-10-CM: E78.00  ICD-9-CM: 272.0  9/10/2011 - Present Yes Hypertension (Chronic) ICD-10-CM: I10  ICD-9-CM: 401.9  9/10/2011 - Present Yes              Objective:     Patient Vitals for the past 24 hrs:   Temp Pulse Resp BP SpO2   03/29/22 0811 -- -- -- 125/60 --   03/29/22 0756 98.1 °F (36.7 °C) 76 18 (!) 102/51 100 %   03/29/22 0440 97.6 °F (36.4 °C) 80 20 127/64 95 %   03/29/22 0101 97.4 °F (36.3 °C) 72 20 134/61 97 %   03/28/22 2030 97.2 °F (36.2 °C) 73 20 (!) 151/75 98 %   03/28/22 1840 97.5 °F (36.4 °C) 68 20 136/68 98 %   03/28/22 1713 -- -- -- -- 99 %   03/28/22 1643 -- -- -- (!) 140/81 96 %   03/28/22 1558 -- -- -- 139/64 99 %   03/28/22 1533 -- -- -- -- 99 %   03/28/22 1529 -- -- -- -- 98 %     Oxygen Therapy  O2 Sat (%): 100 % (03/29/22 0756)  Pulse via Oximetry: 70 beats per minute (03/28/22 1713)  O2 Device: None (Room air) (03/29/22 0756)    Estimated body mass index is 21.34 kg/m² as calculated from the following:    Height as of this encounter: 6' 2\" (1.88 m). Weight as of this encounter: 75.4 kg (166 lb 3.2 oz). Intake/Output Summary (Last 24 hours) at 3/29/2022 1221  Last data filed at 3/29/2022 0918  Gross per 24 hour   Intake 480 ml   Output 2180 ml   Net -1700 ml         Physical Exam:     Blood pressure 125/60, pulse 76, temperature 98.1 °F (36.7 °C), resp. rate 18, height 6' 2\" (1.88 m), weight 75.4 kg (166 lb 3.2 oz), SpO2 100 %. General:    Well nourished. No overt distress  Head:  Normocephalic, atraumatic  Eyes:  Sclerae appear normal.  Pupils equally round. ENT:  Nares appear normal, no drainage. Moist oral mucosa  Neck:  No restricted ROM. Trachea midline   CV:   RRR. No m/r/g. No jugular venous distension. Lungs:   Crackles on left lower lung  Abdomen: Bowel sounds present. Soft, nontender, nondistended. Extremities: 1+ bilateral lower extremity edema and chronic erythema,  Right LE cellulitis, improving   Skin:     No rashes and normal coloration. Warm and dry. Neuro:  CN II-XII grossly intact. Sensation intact. A&Ox3  Psych:  Normal mood and affect. I have reviewed ordered lab tests and independently visualized imaging below:    Recent Labs:  Recent Results (from the past 48 hour(s))   EKG, 12 LEAD, INITIAL    Collection Time: 03/28/22 12:16 PM   Result Value Ref Range    Ventricular Rate 74 BPM    Atrial Rate 60 BPM    QRS Duration 182 ms    Q-T Interval 472 ms    QTC Calculation (Bezet) 523 ms    Calculated R Axis -143 degrees    Calculated T Axis 24 degrees    Diagnosis       Ventricular-paced rhythm with occasional Premature ventricular complexes  Abnormal ECG  When compared with ECG of 24-MAR-2022 01:09,  No significant change was found  Confirmed by Dustin Lucio ([id number]) on 3/28/2022 2:33:16 PM     CBC WITH AUTOMATED DIFF    Collection Time: 03/28/22 12:24 PM   Result Value Ref Range    WBC 8.9 4.3 - 11.1 K/uL    RBC 3.62 (L) 4.23 - 5.6 M/uL    HGB 12.2 (L) 13.6 - 17.2 g/dL    HCT 37.3 (L) 41.1 - 50.3 %    .0 (H) 79.6 - 97.8 FL    MCH 33.7 (H) 26.1 - 32.9 PG    MCHC 32.7 31.4 - 35.0 g/dL    RDW 15.9 (H) 11.9 - 14.6 %    PLATELET 974 089 - 672 K/uL    MPV 9.1 (L) 9.4 - 12.3 FL    ABSOLUTE NRBC 0.00 0.0 - 0.2 K/uL    DF AUTOMATED      NEUTROPHILS 70 43 - 78 %    LYMPHOCYTES 14 13 - 44 %    MONOCYTES 11 4.0 - 12.0 %    EOSINOPHILS 3 0.5 - 7.8 %    BASOPHILS 1 0.0 - 2.0 %    IMMATURE GRANULOCYTES 1 0.0 - 5.0 %    ABS. NEUTROPHILS 6.3 1.7 - 8.2 K/UL    ABS. LYMPHOCYTES 1.3 0.5 - 4.6 K/UL    ABS. MONOCYTES 1.0 0.1 - 1.3 K/UL    ABS. EOSINOPHILS 0.3 0.0 - 0.8 K/UL    ABS. BASOPHILS 0.1 0.0 - 0.2 K/UL    ABS. IMM.  GRANS. 0.1 0.0 - 0.5 K/UL   METABOLIC PANEL, COMPREHENSIVE    Collection Time: 03/28/22 12:24 PM   Result Value Ref Range    Sodium 136 136 - 145 mmol/L    Potassium 3.6 3.5 - 5.1 mmol/L    Chloride 100 98 - 107 mmol/L    CO2 28 21 - 32 mmol/L    Anion gap 8 7 - 16 mmol/L    Glucose 328 (H) 65 - 100 mg/dL    BUN 68 (H) 8 - 23 MG/DL    Creatinine 2.40 (H) 0.8 - 1.5 MG/DL    GFR est AA 33 (L) >60 ml/min/1.73m2    GFR est non-AA 27 (L) >60 ml/min/1.73m2    Calcium 10.2 8.3 - 10.4 MG/DL    Bilirubin, total 0.8 0.2 - 1.1 MG/DL    ALT (SGPT) 32 12 - 65 U/L    AST (SGOT) 32 15 - 37 U/L    Alk.  phosphatase 240 (H) 50 - 136 U/L    Protein, total 7.3 6.3 - 8.2 g/dL    Albumin 2.7 (L) 3.2 - 4.6 g/dL    Globulin 4.6 (H) 2.3 - 3.5 g/dL    A-G Ratio 0.6 (L) 1.2 - 3.5     MAGNESIUM    Collection Time: 03/28/22 12:24 PM   Result Value Ref Range    Magnesium 2.1 1.8 - 2.4 mg/dL   LACTIC ACID    Collection Time: 03/28/22 12:24 PM   Result Value Ref Range    Lactic acid 1.4 0.4 - 2.0 MMOL/L   NT-PRO BNP    Collection Time: 03/28/22 12:24 PM   Result Value Ref Range    NT pro-BNP 2,478 (H) <450 PG/ML   PHOSPHORUS    Collection Time: 03/28/22 12:24 PM   Result Value Ref Range    Phosphorus 5.0 (H) 2.3 - 3.7 MG/DL   GLUCOSE, POC    Collection Time: 03/28/22  9:03 PM   Result Value Ref Range    Glucose (POC) 379 (H) 65 - 100 mg/dL    Performed by Ro    CBC W/O DIFF    Collection Time: 03/29/22  6:45 AM   Result Value Ref Range    WBC 8.3 4.3 - 11.1 K/uL    RBC 3.59 (L) 4.23 - 5.6 M/uL    HGB 11.8 (L) 13.6 - 17.2 g/dL    HCT 36.3 (L) 41.1 - 50.3 %    .1 (H) 79.6 - 97.8 FL    MCH 32.9 26.1 - 32.9 PG    MCHC 32.5 31.4 - 35.0 g/dL    RDW 15.9 (H) 11.9 - 14.6 %    PLATELET 633 847 - 046 K/uL    MPV 9.5 9.4 - 12.3 FL    ABSOLUTE NRBC 0.00 0.0 - 0.2 K/uL   MAGNESIUM    Collection Time: 03/29/22  6:45 AM   Result Value Ref Range    Magnesium 2.2 1.8 - 2.4 mg/dL   RENAL FUNCTION PANEL    Collection Time: 03/29/22  6:45 AM   Result Value Ref Range    Sodium 138 138 - 145 mmol/L    Potassium 3.0 (L) 3.5 - 5.1 mmol/L    Chloride 99 98 - 107 mmol/L    CO2 29 21 - 32 mmol/L    Anion gap 10 7 - 16 mmol/L    Glucose 170 (H) 65 - 100 mg/dL    BUN 65 (H) 8 - 23 MG/DL    Creatinine 2.20 (H) 0.8 - 1.5 MG/DL    GFR est AA 37 (L) >60 ml/min/1.73m2    GFR est non-AA 30 (L) >60 ml/min/1.73m2    Calcium 10.2 8.3 - 10.4 MG/DL Phosphorus 4.6 (H) 2.3 - 3.7 MG/DL    Albumin 2.4 (L) 3.2 - 4.6 g/dL   DIFFERENTIAL, AUTO    Collection Time: 03/29/22  6:45 AM   Result Value Ref Range    NEUTROPHILS 68 43 - 78 %    LYMPHOCYTES 16 13 - 44 %    MONOCYTES 10 4.0 - 12.0 %    EOSINOPHILS 4 0.5 - 7.8 %    BASOPHILS 1 0.0 - 2.0 %    ABS. NEUTROPHILS 5.6 1.7 - 8.2 K/UL    ABS. LYMPHOCYTES 1.4 0.5 - 4.6 K/UL    ABS. MONOCYTES 0.9 0.1 - 1.3 K/UL    ABS. EOSINOPHILS 0.3 0.0 - 0.8 K/UL    ABS. BASOPHILS 0.1 0.0 - 0.2 K/UL    DF AUTOMATED      IMMATURE GRANULOCYTES 1 0.0 - 5.0 %    ABS. IMM.  GRANS. 0.1 0.0 - 0.5 K/UL   GLUCOSE, POC    Collection Time: 03/29/22  7:23 AM   Result Value Ref Range    Glucose (POC) 167 (H) 65 - 100 mg/dL    Performed by [facility]    ECHO ADULT COMPLETE    Collection Time: 03/29/22  9:03 AM   Result Value Ref Range    LV EDV A2C 119 mL    LV EDV A4C 90 mL    LV ESV A2C 58 mL    LV ESV A4C 48 mL    IVSd 1.3 (A) 0.6 - 1.0 cm    LVIDd 4.8 4.2 - 5.9 cm    LVIDs 3.4 cm    LVOT Diameter 2.0 cm    LVOT Mean Gradient 1 mmHg    LVOT VTI 12.3 cm    LVOT Peak Velocity 0.6 m/s    LVOT Peak Gradient 2 mmHg    LVPWd 1.1 (A) 0.6 - 1.0 cm    LV E' Lateral Velocity 7 cm/s    LV E' Septal Velocity 7 cm/s    LV Ejection Fraction A2C 52 %    LV Ejection Fraction A4C 47 %    EF BP 48 (A) 55 - 100 %    LVOT Area 3.1 cm2    LVOT SV 38.6 ml    LA Minor Axis 5.9 cm    LA Major Axis 5.8 cm    LA Area 2C 17.1 cm2    LA Area 4C 22.8 cm2    LA Volume BP 54 18 - 58 mL    LA Diameter 3.6 cm    AV Mean Velocity 1.2 m/s    AV Mean Gradient 6 mmHg    AV VTI 31.3 cm    AV Peak Velocity 1.6 m/s    AV Peak Gradient 10 mmHg    AV Area by VTI 1.2 cm2    AV Area by Peak Velocity 1.3 cm2    Aortic Root 3.0 cm    Ascending Aorta 3.4 cm    IVC Proxmal 2.3 cm    MV E Wave Deceleration Time 272.0 ms    MV A Velocity 0.38 m/s    MV E Velocity 0.66 m/s    PV .0 ms    PV Max Velocity 0.9 m/s    PV Peak Gradient 3 mmHg    RV Basal Dimension 3.7 cm    TR Max Velocity 2.52 m/s    TR Peak Gradient 25 mmHg    Fractional Shortening 2D 29 28 - 44 %    LV ESV Index A4C 24 mL/m2    LV EDV Index A4C 45 mL/m2    LV ESV Index A2C 29 mL/m2    LV EDV Index A2C 59 mL/m2    LVIDd Index 2.39 cm/m2    LVIDs Index 1.69 cm/m2    LV RWT Ratio 0.46     LV Mass 2D 219.1 88 - 224 g    LV Mass 2D Index 109.0 49 - 115 g/m2    MV E/A 1.74     E/E' Ratio (Averaged) 9.43     E/E' Lateral 9.43     E/E' Septal 9.43     LA Volume Index BP 27 16 - 34 ml/m2    LVOT Stroke Volume Index 19.2 mL/m2    LA Size Index 1.79 cm/m2    LA/AO Root Ratio 1.20     Ao Root Index 1.49 cm/m2    Ascending Aorta Index 1.69 cm/m2    AV Velocity Ratio 0.38     LVOT:AV VTI Index 0.39     JUANITA/BSA VTI 0.6 cm2/m2    JUANITA/BSA Peak Velocity 0.6 cm2/m2    Est. RA Pressure 8 mmHg    RVSP 33 mmHg   GLUCOSE, POC    Collection Time: 03/29/22 11:36 AM   Result Value Ref Range    Glucose (POC) 198 (H) 65 - 100 mg/dL    Performed by [place]        All Micro Results     None          Other Studies:  XR CHEST PORT    Result Date: 3/28/2022  History: Bilateral leg swelling and bleeding. Redness and pain. Exam: portable chest Comparison: 3/24/2022 Findings: There is consistent elevation of the left hemidiaphragm. No change in the positioning of the support monitoring devices. No new alveolar infiltrate. No pneumothorax. IMPRESSIONs: Stable portable chest     DUPLEX LOWER EXT VENOUS BILAT    Result Date: 3/28/2022  Bilateral lower extremity venous duplex exam. CLINICAL INDICATION: Bilateral leg swelling PROCEDURE: Realtime grayscale, color, and spectral Doppler analysis of the bilateral lower extremity deep venous system from the common femoral vein through the posterior tibial and peroneal veins. COMPARISON: No prior similar studies available for direct comparison. FINDINGS: There is normal compressibility appreciated throughout the imaged venous structures. Normal flow dynamics are noted.  No echogenic intraluminal filling defect noted to indicate deep venous thrombosis. No sonographic evidence for DVT in either lower extremity. Current Meds:  Current Facility-Administered Medications   Medication Dose Route Frequency    sodium chloride (NS) flush 5-10 mL  5-10 mL IntraVENous Q8H    sodium chloride (NS) flush 5-10 mL  5-10 mL IntraVENous PRN    aspirin delayed-release tablet 81 mg  81 mg Oral DAILY    atorvastatin (LIPITOR) tablet 80 mg  80 mg Oral DAILY    losartan (COZAAR) tablet 100 mg  100 mg Oral DAILY    metoprolol succinate (TOPROL-XL) XL tablet 50 mg  50 mg Oral DAILY    montelukast (SINGULAIR) tablet 10 mg  10 mg Oral DAILY    ondansetron (ZOFRAN) injection 4 mg  4 mg IntraVENous Q6H PRN    heparin (porcine) injection 5,000 Units  5,000 Units SubCUTAneous Q8H    ampicillin-sulbactam (UNASYN) 3 g in 0.9% sodium chloride (MBP/ADV) 100 mL MBP  3 g IntraVENous Q12H    furosemide (LASIX) 100 mg in 0.9% sodium chloride (MBP/ADV) 110 mL infusion  10 mg/hr IntraVENous CONTINUOUS    morphine injection 2 mg  2 mg IntraVENous Q4H PRN    oxyCODONE IR (ROXICODONE) tablet 5 mg  5 mg Oral Q4H PRN    acetaminophen (TYLENOL) tablet 650 mg  650 mg Oral Q6H PRN    insulin glargine (LANTUS) injection 15 Units  0.2 Units/kg SubCUTAneous QHS    insulin lispro (HUMALOG) injection   SubCUTAneous AC&HS    glucose chewable tablet 16 g  16 g Oral PRN    glucagon (GLUCAGEN) injection 1 mg  1 mg IntraMUSCular PRN    dextrose 10% infusion 125-250 mL  125-250 mL IntraVENous PRN       Signed:  Terrance Seymour MD    Part of this note may have been written by using a voice dictation software. The note has been proof read but may still contain some grammatical/other typographical errors.

## 2022-03-29 NOTE — ROUTINE PROCESS
Bedside and Verbal shift change report given to Tia Navarrete RN (oncoming nurse) by myself (offgoing nurse). Report included the following information SBAR, Kardex, MAR and Recent Results.

## 2022-03-29 NOTE — H&P
[facility] Cardiology Initial Cardiac Evaluation      Date of  Admission: 3/28/2022  3:15 PM     Primary Care Physician: Jeannette Singh MD  Primary Cardiologist: Dr Catalan Files  Referring Physician: Dr Juan Zavala Physician: Dr Kanwal Olguin    CC/Reason for evaluation: LE edema     HPI:  Angela Koenig is a 80 y.o. male with PMH of CAD s/p CABG x 3 (12/2008), aflutter s/p flutter ablation 4/10/4088, chronic systolic heart failure, ischemic cardiomyopathy s/p Biotronik BiV ICD 9/17/2013, longstanding persistent atrial fibrillation s/p Watchman 12/17/2019, orthostatic hypotension, HTN, hypercholesterolemia, pulmonary hypertension, DM, TIA and CKD who presented to [facility] ED on 3/28 with complaint of worsening bilateral lower extremity swelling and redness. Patient is poor historian. Reports worsening swelling in lower extremities for 4-5 weeks. Was seen by primary Cardiologist on 3/21 with increased lasix 80 mg daily and zaroxolyn was added. Patient reports SOB with exertion over last 5-7 days. Reports sleeping in recliner for 6+ weeks secondary to shoulder and hip pain. Patient reports no improvement with changes in medications. Upon evaluation in ED, labs showed WBC 8.9, H/H 12.2/37.3, Ptl 251, Na 136, K 3.6, BUN/Cr 68/2.40, glucose 328, albumin 2.7, lactic acid 1.4, Mag 2.1, and pBNP 2478. CXR showed stable chest. Bilateral LE duplex showed no evidence of DVT. Patient was admitted to medicine team for acute on chronic right sided CHF and cellulitis of right leg. Cardiology consulted for LE edema. Past Medical History:   Diagnosis Date    A-fib [facility]) 12/17/2019    Abnormal EKG     Acute diastolic heart failure (HCC)     Atrial fibrillation ([address] 9/10/2011    Atrial flutter ([facility] 9/2013    Biotronik biventricular implantable cardioverter defibrillator    Automatic implantable cardioverter-defibrillator in situ 3/30/2016    Breast lump     right- pt states bx was neg-- m. d. \"released\" him    CAD (coronary artery disease)     mi w angioplasty--1995---- mi then cabg--2008    Cardiomyopathy, ischemic 3/30/2016    Chronic    Chest pain     Chronic systolic heart failure ([address]     \"stable\" per cardiology office note (1/2015) and on lasix daily    CKD (chronic kidney disease) stage 3, GFR 30-59 ml/min: Metformin discontinued 9/12/2011    Coronary artery disease 9/10/2011    Diabetes ([address] diag 2002    type2, oral meds, range 96-98, does not know last hgba1c, hypo s/s <70    Heart attack ([address] 1995/2008    History of kidney stones     none in years     Hypercholesteremia 9/10/2011    Hypercholesterolemia     no meds currently    Hypertension diag 1981    controlled with med    Kidney stone     LBBB (left bundle branch block) 9/17/2013    Orthostatic hypotension 3/30/2016    Osteoarthritis     hands    Pneumonia 9/10/2011    Preop cardiovascular exam     Renal insufficiency     S/P total knee arthroplasty 12/11/2015    TIA (transient ischemic attack) 9/18/2019    Unstable angina ([address]       Past Surgical History:   Procedure Laterality Date    HX ANKLE FRACTURE TX Right 5/11/2005    HX BREAST BIOPSY Right 7/2013    HX BUNIONECTOMY Left 2001    HX CARPAL TUNNEL RELEASE Bilateral 1972    HX CATARACT REMOVAL  2006/2009    HX COLONOSCOPY  2003/2010    HX CORONARY ARTERY BYPASS GRAFT  12/19/2008    3 vessel    HX HIP REPLACEMENT Right 12/28/2005    HX IMPLANTABLE CARDIOVERTER DEFIBRILLATOR  9/17/2013    biotronik    HX KNEE ARTHROSCOPY Left 9/17/2009    HX KNEE ARTHROSCOPY Right 4/30/2014    HX KNEE REPLACEMENT Right 12/2015    HX LITHOTRIPSY      x 5    HX ORTHOPAEDIC  2005    right hip    HX OTHER SURGICAL  2002    hydrocele repair and circumcision    HX PACEMAKER      pacemaker- defib    CT TOTAL KNEE ARTHROPLASTY Left 4/8/2010       Allergies   Allergen Reactions    Iodinated Contrast Media Swelling      Social History     Socioeconomic History    Marital status:  Spouse name: Not on file    Number of children: Not on file    Years of education: Not on file    Highest education level: Not on file   Occupational History    Not on file   Tobacco Use    Smoking status: Never Smoker    Smokeless tobacco: Never Used   Vaping Use    Vaping Use: Never used   Substance and Sexual Activity    Alcohol use: No    Drug use: No    Sexual activity: Not on file   Other Topics Concern    Not on file   Social History Narrative    , lives with wife. Was in [address] [place] [address] for 4 years. 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 Social Determinants of Health     Financial Resource Strain:     Difficulty of Paying Living Expenses: Not on file   Food Insecurity:     Worried About Running Out of Food in the Last Year: Not on file    Lilian of Food in the Last Year: Not on file   Transportation Needs:     Lack of Transportation (Medical): Not on file    Lack of Transportation (Non-Medical):  Not on file   Physical Activity:     Days of Exercise per Week: Not on file    Minutes of Exercise per Session: Not on file   Stress:     Feeling of Stress : Not on file   Social Connections:     Frequency of Communication with Friends and Family: Not on file    Frequency of Social Gatherings with Friends and Family: Not on file    Attends Bahai Services: Not on file    Active Member of [address] [place] [address] or Organizations: Not on file    Attends Club or Organization Meetings: Not on file    Marital Status: Not on file   Intimate Partner Violence:     Fear of Current or Ex-Partner: Not on file    Emotionally Abused: Not on file    Physically Abused: Not on file    Sexually Abused: Not on file   Housing Stability:     Unable to Pay for Housing in the Last Year: Not on file    Number of [place] in the Last Year: Not on file    Unstable Housing in the Last Year: Not on file     Family History   Problem Relation Age of Onset    Heart Disease Sister     Heart Disease Brother  Cancer Brother     Heart Disease Brother     Stroke Sister     Asthma Father     Heart Disease Other     Malignant Hyperthermia Neg Hx     Pseudocholinesterase Deficiency Neg Hx     Delayed Awakening Neg Hx     Post-op Nausea/Vomiting Neg Hx     Emergence Delirium Neg Hx     Post-op Cognitive Dysfunction Neg Hx     Other Neg Hx         Current Facility-Administered Medications   Medication Dose Route Frequency    potassium chloride (K-DUR, KLOR-CON M20) SR tablet 40 mEq  40 mEq Oral BID    tuberculin injection 5 Units  5 Units IntraDERMal ONCE    sodium chloride (NS) flush 5-10 mL  5-10 mL IntraVENous Q8H    sodium chloride (NS) flush 5-10 mL  5-10 mL IntraVENous PRN    aspirin delayed-release tablet 81 mg  81 mg Oral DAILY    atorvastatin (LIPITOR) tablet 80 mg  80 mg Oral DAILY    losartan (COZAAR) tablet 100 mg  100 mg Oral DAILY    metoprolol succinate (TOPROL-XL) XL tablet 50 mg  50 mg Oral DAILY    montelukast (SINGULAIR) tablet 10 mg  10 mg Oral DAILY    ondansetron (ZOFRAN) injection 4 mg  4 mg IntraVENous Q6H PRN    heparin (porcine) injection 5,000 Units  5,000 Units SubCUTAneous Q8H    ampicillin-sulbactam (UNASYN) 3 g in 0.9% sodium chloride (MBP/ADV) 100 mL MBP  3 g IntraVENous Q12H    furosemide (LASIX) 100 mg in 0.9% sodium chloride (MBP/ADV) 110 mL infusion  10 mg/hr IntraVENous CONTINUOUS    morphine injection 2 mg  2 mg IntraVENous Q4H PRN    oxyCODONE IR (ROXICODONE) tablet 5 mg  5 mg Oral Q4H PRN    acetaminophen (TYLENOL) tablet 650 mg  650 mg Oral Q6H PRN    insulin glargine (LANTUS) injection 15 Units  0.2 Units/kg SubCUTAneous QHS    insulin lispro (HUMALOG) injection   SubCUTAneous AC&HS    glucose chewable tablet 16 g  16 g Oral PRN    glucagon (GLUCAGEN) injection 1 mg  1 mg IntraMUSCular PRN    dextrose 10% infusion 125-250 mL  125-250 mL IntraVENous PRN     Review of Symptoms:  General: Positive for weight loss,  generalized weakness.  No fever or chills  Skin: no rashes, lumps, or other skin changes  HEENT: no headache, dizziness, lightheadedness, vision changes, hearing changes, tinnitus, vertigo, sinus pressure/pain, bleeding gums, sore throat, or hoarseness  Neck: no swollen glands, goiter, pain or stiffness  Respiratory: Positive for dyspnea. No cough, sputum, hemoptysis, wheezing  Cardiovascular: + as per HPI  Gastrointestinal: no GERD, constipation, diarrhea, liver problems, or h/o GI bleed  Urinary: no frequency, urgency , hematuria, burning/pain with urination, recent flank pain, polyuria, nocturia, or difficulty urinating  Peripheral Vascular: Positive for bilateral lower extremity edema. No claudication, leg cramps, prior DVTs  Musculoskeletal: Positive for shoulder and hip pain. Psychiatric: no depression or excessive stress  Neurological: no sensory or motor loss, seizures, syncope, tremors, numbness, no dementia  Hematologic: no anemia, easy bruising or bleeding  Endocrine: Positive for diabetes.  No thyroid problems     Subjective:     Physical Exam:    Vitals:    03/29/22 0101 03/29/22 0440 03/29/22 0756 03/29/22 0811   BP: 134/61 127/64 (!) 102/51 125/60   Pulse: 72 80 76    Resp: 20 20 18    Temp: 97.4 °F (36.3 °C) 97.6 °F (36.4 °C) 98.1 °F (36.7 °C)    SpO2: 97% 95% 100%    Weight:  75.4 kg (166 lb 3.2 oz)     Height:         General: Elderly, No Acute Distress  HEENT: pupils equal and round, no abnormalities noted  Neck: supple, no JVD, no carotid bruits  Heart: S1S2 with RRR without murmurs or gallops  Lungs: Clear throughout auscultation bilaterally without adventitious sounds  Abd: soft, nontender, nondistended, with good bowel sounds  Ext: warm, 2+ edema, calves supple/nontender, bilateral erythema to LE  Skin: warm and dry  Psychiatric: Normal mood and affect  Neurologic: Alert and oriented X 3    Cardiographics    Telemetry: normal sinus rhythm, paced   ECG: paced   Echocardiogram: ordered    Labs:   Recent Results (from the past 24 hour(s))   GLUCOSE, POC    Collection Time: 03/28/22  9:03 PM   Result Value Ref Range    Glucose (POC) 379 (H) 65 - 100 mg/dL    Performed by Ro    CBC W/O DIFF    Collection Time: 03/29/22  6:45 AM   Result Value Ref Range    WBC 8.3 4.3 - 11.1 K/uL    RBC 3.59 (L) 4.23 - 5.6 M/uL    HGB 11.8 (L) 13.6 - 17.2 g/dL    HCT 36.3 (L) 41.1 - 50.3 %    .1 (H) 79.6 - 97.8 FL    MCH 32.9 26.1 - 32.9 PG    MCHC 32.5 31.4 - 35.0 g/dL    RDW 15.9 (H) 11.9 - 14.6 %    PLATELET 164 318 - 884 K/uL    MPV 9.5 9.4 - 12.3 FL    ABSOLUTE NRBC 0.00 0.0 - 0.2 K/uL   MAGNESIUM    Collection Time: 03/29/22  6:45 AM   Result Value Ref Range    Magnesium 2.2 1.8 - 2.4 mg/dL   RENAL FUNCTION PANEL    Collection Time: 03/29/22  6:45 AM   Result Value Ref Range    Sodium 138 138 - 145 mmol/L    Potassium 3.0 (L) 3.5 - 5.1 mmol/L    Chloride 99 98 - 107 mmol/L    CO2 29 21 - 32 mmol/L    Anion gap 10 7 - 16 mmol/L    Glucose 170 (H) 65 - 100 mg/dL    BUN 65 (H) 8 - 23 MG/DL    Creatinine 2.20 (H) 0.8 - 1.5 MG/DL    GFR est AA 37 (L) >60 ml/min/1.73m2    GFR est non-AA 30 (L) >60 ml/min/1.73m2    Calcium 10.2 8.3 - 10.4 MG/DL    Phosphorus 4.6 (H) 2.3 - 3.7 MG/DL    Albumin 2.4 (L) 3.2 - 4.6 g/dL   DIFFERENTIAL, AUTO    Collection Time: 03/29/22  6:45 AM   Result Value Ref Range    NEUTROPHILS 68 43 - 78 %    LYMPHOCYTES 16 13 - 44 %    MONOCYTES 10 4.0 - 12.0 %    EOSINOPHILS 4 0.5 - 7.8 %    BASOPHILS 1 0.0 - 2.0 %    ABS. NEUTROPHILS 5.6 1.7 - 8.2 K/UL    ABS. LYMPHOCYTES 1.4 0.5 - 4.6 K/UL    ABS. MONOCYTES 0.9 0.1 - 1.3 K/UL    ABS. EOSINOPHILS 0.3 0.0 - 0.8 K/UL    ABS. BASOPHILS 0.1 0.0 - 0.2 K/UL    DF AUTOMATED      IMMATURE GRANULOCYTES 1 0.0 - 5.0 %    ABS. IMM.  GRANS. 0.1 0.0 - 0.5 K/UL   GLUCOSE, POC    Collection Time: 03/29/22  7:23 AM   Result Value Ref Range    Glucose (POC) 167 (H) 65 - 100 mg/dL    Performed by Cheyenne BRAVO ADULT COMPLETE    Collection Time: 03/29/22  9:03 AM   Result Value Ref Range    LV EDV A2C 119 mL    LV EDV A4C 90 mL    LV ESV A2C 58 mL    LV ESV A4C 48 mL    IVSd 1.3 (A) 0.6 - 1.0 cm    LVIDd 4.8 4.2 - 5.9 cm    LVIDs 3.4 cm    LVOT Diameter 2.0 cm    LVOT Mean Gradient 1 mmHg    LVOT VTI 12.3 cm    LVOT Peak Velocity 0.6 m/s    LVOT Peak Gradient 2 mmHg    LVPWd 1.1 (A) 0.6 - 1.0 cm    LV E' Lateral Velocity 7 cm/s    LV E' Septal Velocity 7 cm/s    LV Ejection Fraction A2C 52 %    LV Ejection Fraction A4C 47 %    EF BP 48 (A) 55 - 100 %    LVOT Area 3.1 cm2    LVOT SV 38.6 ml    LA Minor Axis 5.9 cm    LA Major Axis 5.8 cm    LA Area 2C 17.1 cm2    LA Area 4C 22.8 cm2    LA Volume BP 54 18 - 58 mL    LA Diameter 3.6 cm    AV Mean Velocity 1.2 m/s    AV Mean Gradient 6 mmHg    AV VTI 31.3 cm    AV Peak Velocity 1.6 m/s    AV Peak Gradient 10 mmHg    AV Area by VTI 1.2 cm2    AV Area by Peak Velocity 1.3 cm2    Aortic Root 3.0 cm    Ascending Aorta 3.4 cm    IVC Proxmal 2.3 cm    MV E Wave Deceleration Time 272.0 ms    MV A Velocity 0.38 m/s    MV E Velocity 0.66 m/s    PV .0 ms    PV Max Velocity 0.9 m/s    PV Peak Gradient 3 mmHg    RV Basal Dimension 3.7 cm    TR Max Velocity 2.52 m/s    TR Peak Gradient 25 mmHg    Fractional Shortening 2D 29 28 - 44 %    LV ESV Index A4C 24 mL/m2    LV EDV Index A4C 45 mL/m2    LV ESV Index A2C 29 mL/m2    LV EDV Index A2C 59 mL/m2    LVIDd Index 2.39 cm/m2    LVIDs Index 1.69 cm/m2    LV RWT Ratio 0.46     LV Mass 2D 219.1 88 - 224 g    LV Mass 2D Index 109.0 49 - 115 g/m2    MV E/A 1.74     E/E' Ratio (Averaged) 9.43     E/E' Lateral 9.43     E/E' Septal 9.43     LA Volume Index BP 27 16 - 34 ml/m2    LVOT Stroke Volume Index 19.2 mL/m2    LA Size Index 1.79 cm/m2    LA/AO Root Ratio 1.20     Ao Root Index 1.49 cm/m2    Ascending Aorta Index 1.69 cm/m2    AV Velocity Ratio 0.38     LVOT:AV VTI Index 0.39     JUANITA/BSA VTI 0.6 cm2/m2    JUANITA/BSA Peak Velocity 0.6 cm2/m2    Est. RA Pressure 8 mmHg    RVSP 33 mmHg   GLUCOSE, POC    Collection Time: 03/29/22 11:36 AM   Result Value Ref Range    Glucose (POC) 198 (H) 65 - 100 mg/dL    Performed by Cheyenne      Pt has been seen and examined by Dr. Chidi Tillman. He agrees with the following assessment and plan. Assessment/Plan:      Principal Problem:    Volume overload    Bilateral lower extremity edema    - ECHO ordered  - continue lasix gtt   - strict I&O's  - daily weights   - monitor daily labs while on diuresis     Active Problems:    Coronary artery disease (9/10/2011)  - hx CABG   - no angina   - continue ASA, atorvastatin, metoprolol succinate       Hypercholesteremia (9/10/2011)  - on atorvastatin       Hypertension (9/10/2011)  - stable   - on losartan, metoprolol succinate       Chronic combined systolic and diastolic heart failure ([facility] (9/17/2013)  - ECHO ordered   - on losartan, metoprolol succinate   - as above       Automatic implantable cardioverter-defibrillator in situ (3/30/2016)  - Biotronik BiV ICD placed 9/17/2013      Persistent atrial fibrillation ([facility] [address] (9/18/2019)  - s/p Watchman       Type 2 diabetes with nephropathy ([address] (11/6/2019)  - uncontrolled   - SSI  - per primary       Pulmonary HTN ([address] (3/28/2022)  - ECHO       Cellulitis of right leg (3/28/2022)  - antibiotics per primary       LALY on CKD   - baseline Cr ~1.5  - monitor daily labs while on diuresis   - per primary      Hypokalemia  - replaced   - monitor       Thank you for requesting cardiac evaluation and allowing us to participate in the care of this patient. We will continue to follow along with you.       Seema Hurt PA-C  Supervising Physician: Dr Chidi Tillman

## 2022-03-30 PROBLEM — E44.0 MODERATE PROTEIN-CALORIE MALNUTRITION (HCC): Status: ACTIVE | Noted: 2022-01-01

## 2022-03-30 PROBLEM — E44.0 MODERATE PROTEIN-CALORIE MALNUTRITION (HCC): Status: ACTIVE | Noted: 2022-03-30

## 2022-03-30 LAB
ALBUMIN SERPL-MCNC: 2.3 G/DL (ref 3.2–4.6)
ANION GAP SERPL CALC-SCNC: 9 MMOL/L (ref 7–16)
BASOPHILS # BLD: 0.1 K/UL (ref 0–0.2)
BASOPHILS NFR BLD: 1 % (ref 0–2)
BUN SERPL-MCNC: 81 MG/DL (ref 8–23)
CALCIUM SERPL-MCNC: 9.7 MG/DL (ref 8.3–10.4)
CHLORIDE SERPL-SCNC: 96 MMOL/L (ref 98–107)
CO2 SERPL-SCNC: 28 MMOL/L (ref 21–32)
CREAT SERPL-MCNC: 2.7 MG/DL (ref 0.8–1.5)
DIFFERENTIAL METHOD BLD: ABNORMAL
EOSINOPHIL # BLD: 0.3 K/UL (ref 0–0.8)
EOSINOPHIL NFR BLD: 3 % (ref 0.5–7.8)
ERYTHROCYTE [DISTWIDTH] IN BLOOD BY AUTOMATED COUNT: 15.9 % (ref 11.9–14.6)
GLUCOSE BLD STRIP.AUTO-MCNC: 104 MG/DL (ref 65–100)
GLUCOSE BLD STRIP.AUTO-MCNC: 220 MG/DL (ref 65–100)
GLUCOSE BLD STRIP.AUTO-MCNC: 243 MG/DL (ref 65–100)
GLUCOSE BLD STRIP.AUTO-MCNC: 278 MG/DL (ref 65–100)
GLUCOSE SERPL-MCNC: 111 MG/DL (ref 65–100)
HCT VFR BLD AUTO: 36.9 % (ref 41.1–50.3)
HGB BLD-MCNC: 11.9 G/DL (ref 13.6–17.2)
IMM GRANULOCYTES # BLD AUTO: 0.1 K/UL (ref 0–0.5)
IMM GRANULOCYTES NFR BLD AUTO: 1 % (ref 0–5)
LYMPHOCYTES # BLD: 1.7 K/UL (ref 0.5–4.6)
LYMPHOCYTES NFR BLD: 19 % (ref 13–44)
MAGNESIUM SERPL-MCNC: 2 MG/DL (ref 1.8–2.4)
MCH RBC QN AUTO: 32.7 PG (ref 26.1–32.9)
MCHC RBC AUTO-ENTMCNC: 32.2 G/DL (ref 31.4–35)
MCV RBC AUTO: 101.4 FL (ref 79.6–97.8)
MONOCYTES # BLD: 1 K/UL (ref 0.1–1.3)
MONOCYTES NFR BLD: 11 % (ref 4–12)
NEUTS SEG # BLD: 5.9 K/UL (ref 1.7–8.2)
NEUTS SEG NFR BLD: 66 % (ref 43–78)
NRBC # BLD: 0 K/UL (ref 0–0.2)
PHOSPHATE SERPL-MCNC: 5 MG/DL (ref 2.3–3.7)
PLATELET # BLD AUTO: 240 K/UL (ref 150–450)
PMV BLD AUTO: 9.3 FL (ref 9.4–12.3)
POTASSIUM SERPL-SCNC: 4 MMOL/L (ref 3.5–5.1)
RBC # BLD AUTO: 3.64 M/UL (ref 4.23–5.6)
SERVICE CMNT-IMP: ABNORMAL
SODIUM SERPL-SCNC: 133 MMOL/L (ref 138–145)
WBC # BLD AUTO: 9.1 K/UL (ref 4.3–11.1)

## 2022-03-30 PROCEDURE — 99232 SBSQ HOSP IP/OBS MODERATE 35: CPT | Performed by: INTERNAL MEDICINE

## 2022-03-30 PROCEDURE — 74011250636 HC RX REV CODE- 250/636: Performed by: INTERNAL MEDICINE

## 2022-03-30 PROCEDURE — 74011250637 HC RX REV CODE- 250/637: Performed by: INTERNAL MEDICINE

## 2022-03-30 PROCEDURE — 83735 ASSAY OF MAGNESIUM: CPT

## 2022-03-30 PROCEDURE — 74011250637 HC RX REV CODE- 250/637: Performed by: FAMILY MEDICINE

## 2022-03-30 PROCEDURE — 74011000258 HC RX REV CODE- 258: Performed by: INTERNAL MEDICINE

## 2022-03-30 PROCEDURE — 82040 ASSAY OF SERUM ALBUMIN: CPT

## 2022-03-30 PROCEDURE — 76450000000

## 2022-03-30 PROCEDURE — 36415 COLL VENOUS BLD VENIPUNCTURE: CPT

## 2022-03-30 PROCEDURE — 82962 GLUCOSE BLOOD TEST: CPT

## 2022-03-30 PROCEDURE — 97535 SELF CARE MNGMENT TRAINING: CPT

## 2022-03-30 PROCEDURE — 74011000250 HC RX REV CODE- 250: Performed by: EMERGENCY MEDICINE

## 2022-03-30 PROCEDURE — 74011636637 HC RX REV CODE- 636/637: Performed by: INTERNAL MEDICINE

## 2022-03-30 PROCEDURE — 99497 ADVNCD CARE PLAN 30 MIN: CPT | Performed by: NURSE PRACTITIONER

## 2022-03-30 PROCEDURE — 80048 BASIC METABOLIC PNL TOTAL CA: CPT

## 2022-03-30 PROCEDURE — 99223 1ST HOSP IP/OBS HIGH 75: CPT | Performed by: NURSE PRACTITIONER

## 2022-03-30 PROCEDURE — 97530 THERAPEUTIC ACTIVITIES: CPT | Performed by: PHYSICAL THERAPIST

## 2022-03-30 PROCEDURE — 65660000000 HC RM CCU STEPDOWN

## 2022-03-30 PROCEDURE — 84100 ASSAY OF PHOSPHORUS: CPT

## 2022-03-30 PROCEDURE — 85025 COMPLETE CBC W/AUTO DIFF WBC: CPT

## 2022-03-30 RX ADMIN — ASPIRIN 81 MG: 81 TABLET ORAL at 08:57

## 2022-03-30 RX ADMIN — POTASSIUM CHLORIDE 40 MEQ: 20 TABLET, EXTENDED RELEASE ORAL at 08:56

## 2022-03-30 RX ADMIN — METOPROLOL SUCCINATE 50 MG: 25 TABLET, EXTENDED RELEASE ORAL at 08:57

## 2022-03-30 RX ADMIN — POTASSIUM CHLORIDE 40 MEQ: 20 TABLET, EXTENDED RELEASE ORAL at 17:55

## 2022-03-30 RX ADMIN — FUROSEMIDE 10 MG/HR: 10 INJECTION, SOLUTION INTRAMUSCULAR; INTRAVENOUS at 01:48

## 2022-03-30 RX ADMIN — HEPARIN SODIUM 5000 UNITS: 5000 INJECTION INTRAVENOUS; SUBCUTANEOUS at 21:00

## 2022-03-30 RX ADMIN — OXYCODONE 5 MG: 5 TABLET ORAL at 01:48

## 2022-03-30 RX ADMIN — INSULIN LISPRO 6 UNITS: 100 INJECTION, SOLUTION INTRAVENOUS; SUBCUTANEOUS at 22:18

## 2022-03-30 RX ADMIN — SODIUM CHLORIDE 3 G: 900 INJECTION INTRAVENOUS at 06:07

## 2022-03-30 RX ADMIN — INSULIN GLARGINE 15 UNITS: 100 INJECTION, SOLUTION SUBCUTANEOUS at 22:18

## 2022-03-30 RX ADMIN — SODIUM CHLORIDE, PRESERVATIVE FREE 10 ML: 5 INJECTION INTRAVENOUS at 21:00

## 2022-03-30 RX ADMIN — SODIUM CHLORIDE, PRESERVATIVE FREE 10 ML: 5 INJECTION INTRAVENOUS at 14:01

## 2022-03-30 RX ADMIN — HEPARIN SODIUM 5000 UNITS: 5000 INJECTION INTRAVENOUS; SUBCUTANEOUS at 06:06

## 2022-03-30 RX ADMIN — HEPARIN SODIUM 5000 UNITS: 5000 INJECTION INTRAVENOUS; SUBCUTANEOUS at 14:00

## 2022-03-30 RX ADMIN — SODIUM CHLORIDE 3 G: 900 INJECTION INTRAVENOUS at 21:00

## 2022-03-30 RX ADMIN — INSULIN LISPRO 4 UNITS: 100 INJECTION, SOLUTION INTRAVENOUS; SUBCUTANEOUS at 12:49

## 2022-03-30 RX ADMIN — OXYCODONE 5 MG: 5 TABLET ORAL at 21:02

## 2022-03-30 RX ADMIN — MONTELUKAST 10 MG: 10 TABLET, FILM COATED ORAL at 08:57

## 2022-03-30 RX ADMIN — INSULIN LISPRO 4 UNITS: 100 INJECTION, SOLUTION INTRAVENOUS; SUBCUTANEOUS at 16:45

## 2022-03-30 RX ADMIN — ATORVASTATIN CALCIUM 80 MG: 80 TABLET, FILM COATED ORAL at 08:56

## 2022-03-30 RX ADMIN — OXYCODONE 5 MG: 5 TABLET ORAL at 14:00

## 2022-03-30 RX ADMIN — SODIUM CHLORIDE, PRESERVATIVE FREE 10 ML: 5 INJECTION INTRAVENOUS at 06:07

## 2022-03-30 NOTE — PROGRESS NOTES
ACUTE OT GOALS:  (Developed with and agreed upon by patient and/or caregiver.)  1. Patient will complete lower body bathing and dressing with min A and adaptive equipment as needed. 2. Patient will complete toileting with min A.   3. Patient will tolerate 30 minutes of OT treatment with 1-2 rest breaks to increase activity tolerance for ADLs. 4. Patient will complete functional transfers with min A and adaptive equipment as needed. 5. Patient will complete functional activity while seated edge of bed with SBA and adaptive equipment as needed. 6. Patient will tolerate 15 minutes unsupported sitting balance with SBA in preparation for ADL performance. 7. Patient will tolerate 15 minutes BUE therapeutic activities to increase use of BUE during ADL performance. OCCUPATIONAL THERAPY: Daily Note OT Treatment Day # 2    Digna Lee is a 80 y.o. male   PRIMARY DIAGNOSIS: Acute on chronic right-sided congestive heart failure (HCC)  Bilateral leg edema [R60.0]       [place] / [place] / Product Type: Medicare /   ASSESSMENT:     REHAB RECOMMENDATIONS: CURRENT LEVEL OF FUNCTION:  (Most Recently Demonstrated)   Recommendation to date pending progress:  Setting:   Short-term Rehab  Equipment:    To Be Determined Bathing:   Not tested  Dressing:   Not tested  Feeding/Grooming:   Set Up  Toileting:   Not tested  Functional Mobility:   Moderate Assistance x 2     ASSESSMENT:  Mr. Perfecto Severe was supine in bed upon arrival. Pt completed bed mobility with mod A X 2. Pt sat EOB and completed grooming with set up. Pt took some side steps. Continue POC.      SUBJECTIVE:   Mr. Perfecto Severe states, \"Hey\"    SOCIAL HISTORY/LIVING ENVIRONMENT:   Home Environment: Private residence  # Steps to Enter: 2  One/Two Story Residence: One story  Living Alone: Yes  Support Systems: Child(aleksey),Other Family Member(s),Friend/Neighbor,[employer]    OBJECTIVE:     PAIN: VITAL SIGNS: LINES/DRAINS: Pre Treatment: Pain Screen  Pain Scale 1: Numeric (0 - 10)  Pain Intensity 1: 0  Post Treatment: 0   IV  O2 Device: None (Room air)     ACTIVITIES OF DAILY LIVING: I Mod I S SBA CGA Min Mod Max Total NT Comments   BASIC ADLs:              Bathing/ Showering [] [] [] [] [] [] [] [] [] []    Toileting [] [] [] [] [] [] [] [] [] []    Dressing [] [] [] [] [] [] [] [] [] []    Feeding [] [] [] [] [] [] [] [] [] []    Grooming [] [] [x] [] [] [] [] [] [] [] Washing face, combing hair    Personal Device Care [] [] [] [] [] [] [] [] [] []    Functional Mobility [] [] [] [] [] [] [] [] [] []    I=Independent, Mod I=Modified Independent, S=Supervision, SBA=Standby Assistance, CGA=Contact Guard Assistance,   Min=Minimal Assistance, Mod=Moderate Assistance, Max=Maximal Assistance, Total=Total Assistance, NT=Not Tested    MOBILITY: I Mod I S SBA CGA Min Mod Max Total  NT x2 Comments:   Supine to sit [] [] [] [] [] [] [x] [] [] [] [x]    Sit to supine [] [] [] [] [] [] [x] [] [] [] [x]    Sit to stand [] [] [] [] [] [] [x] [] [] [] [x]    Bed to chair [] [] [] [] [] [] [] [] [] [] []    I=Independent, Mod I=Modified Independent, S=Supervision, SBA=Standby Assistance, CGA=Contact Guard Assistance,   Min=Minimal Assistance, Mod=Moderate Assistance, Max=Maximal Assistance, Total=Total Assistance, NT=Not Tested    BALANCE: Good Fair+ Fair Fair- Poor NT Comments   Sitting Static [] [] [] [] [] []    Sitting Dynamic [] [] [] [] [] []              Standing Static [] [] [] [] [] []    Standing Dynamic [] [] [] [] [] []      PLAN:   FREQUENCY/DURATION: OT Plan of Care: 3 times/week for duration of hospital stay or until stated goals are met, whichever comes first.    TREATMENT:   TREATMENT:   ($$ Self Care/Home Management: 23-37 mins    )  Self Care (25 Minutes): Self care including Grooming to increase independence and decrease level of assistance required.     TREATMENT GRID:  N/A    AFTER TREATMENT POSITION/PRECAUTIONS:  Alarm Activated, Bed, Needs within reach and RN notified    INTERDISCIPLINARY COLLABORATION:  RN/PCT, PT/PTA and OT/CALI    TOTAL TREATMENT DURATION:  OT Patient Time In/Time Out  Time In: 1000  Time Out: [id number]

## 2022-03-30 NOTE — PROGRESS NOTES
Hospitalist Progress Note   Admit Date:  3/28/2022  3:15 PM   Name:  Germaine Hameed   Age:  80 y.o. Sex:  male  :  1934   MRN:  [id number]   Room:  303/01    Presenting Complaint: Leg Pain    Reason(s) for Admission: Bilateral leg edema [R60.0]     Hospital Course & Interval History:     Germaine Hameed is a 80year old CM with a PMH of chronic combined CHF, DM2, HTN, HLD, and pulmonary HTN with cor pulmonale with chronic BLE edema admitted with worsening bilateral lower extremity edema despite doubling Lasix dose for a week and adding Zaroxolyn. Started on Lasix gtt. Cardiology consulted. Also noted to have right lower extremity cellulitis and started on Unasyn. Subjective/24hr Events (22): Patient is seen at the bedside. Reports feeling better. Lower extremity edema improving. Patient reports  short of breath with exertion. Denies nausea, vomiting or chest pain. Negative fluid balance 700. Creatinine trending up. Patient with intermittent hypotension, holding losartan. Son at the bedside and updated on patient's current condition. All questions were answered. ROS:  10 systems reviewed and negative except as noted above. Assessment & Plan:     Acute on chronic congestive heart failure:  Bilateral lower extremity edema:  Last echocardiogram with ejection fraction 56% with diastolic dysfunction  Continue Lasix gtt. negative fluid balance 2000  Daily BMP  Echocardiogram   Lower extremity Doppler negative for DVT  Cardiology consulted, appreciate recommendation  3/30: Echocardiogram with ejection fraction 05%, and diastolic dysfunction. Lower extremity edema significantly improved. Negative fluid balance 700. Creatinine trending up. Discontinue Lasix gtt, will resume Lasix IV or p.o. depend upon kidney functions tomorrow. Currently fluid status stable. Appreciate cardiology recommendations.   Overall poor prognosis, cardiology recommend palliative care consultation to discuss goal of care. Hypertension:  Persistent A. Fib:  CAD:  Continue Cozaar and Toprol  Continue aspirin and statin  Watchman device in place  3/30: Patient with intermittent hypotension. Hold losartan d/t LALY and hypotension    LALY on CKD stage III:  Baseline creatinine 1.5, Creatinine 2.40 on admission  Close monitoring as patient is on Lasix gtt  3/30: Creatinine trending up, 12.7 today. Hold lasix gtt and monitor. Hypokalemia:  Repleted  Continue to monitor    Right leg cellulitis:  Improving  Continue Unasyn    Type 2 diabetes mellitus:  A1c 9.3 in 1/2022  Continue Lantus 15 units and sliding scale  We will continue to adjust accordingly  Hold home meds      Discharge Planning: To be determined, PT/OT    Diet:  ADULT ORAL NUTRITION SUPPLEMENT Dinner; Diabetic Supplement  ADULT DIET Regular; 4 carb choices (60 gm/meal);  Low Sodium (2 gm); 2000 ml  DVT PPx: Heparin  Code status: Full Code    Hospital Problems as of 3/30/2022 Date Reviewed: 3/21/2022          Codes Class Noted - Resolved POA    Moderate protein-calorie malnutrition ([facility] ICD-10-CM: E44.0  ICD-9-CM: 263.0  3/30/2022 - Present Yes        Bilateral leg edema ICD-10-CM: R60.0  ICD-9-CM: 782.3  3/28/2022 - Present Yes        Cor pulmonale (chronic) ([facility] ICD-10-CM: I27.81  ICD-9-CM: 416.9  3/28/2022 - Present Yes        Pulmonary HTN ([facility] ICD-10-CM: I27.20  ICD-9-CM: 416.8  3/28/2022 - Present Yes        Cellulitis of right leg ICD-10-CM: L03.115  ICD-9-CM: 682.6  3/28/2022 - Present Yes        * (Principal) Acute on chronic right-sided congestive heart failure ([facility] ICD-10-CM: I50.813  ICD-9-CM: 428.0  3/28/2022 - Present Yes        Presence of Watchman left atrial appendage closure device ICD-10-CM: Z95.818  ICD-9-CM: V45.09  1/21/2020 - Present Yes    Overview Signed 1/21/2020  7:36 PM by Clarence Mckeon MD     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 (12/17/19)             Type 2 diabetes with nephropathy ([facility] ICD-10-CM: E11.21  ICD-9-CM: 250.40, 583.81  11/6/2019 - Present Yes        Persistent atrial fibrillation ([facility] ICD-10-CM: I48.19  ICD-9-CM: 427.31  9/18/2019 - Present Yes        Automatic implantable cardioverter-defibrillator in situ ICD-10-CM: Z95.810  ICD-9-CM: V45.02  3/30/2016 - Present Yes    Overview Signed 3/30/2016  9:01 AM by Helen Zhou     No shocks             Chronic combined systolic and diastolic heart failure ([facility] ICD-10-CM: I50.42  ICD-9-CM: 428.42  9/17/2013 - Present Yes        Coronary artery disease (Chronic) ICD-10-CM: I25.10  ICD-9-CM: 414.00  9/10/2011 - Present Yes    Overview Signed 3/30/2016  9:03 AM by Nicole De La Garza through [facility]             Hypercholesteremia (Chronic) ICD-10-CM: E78.00  ICD-9-CM: 272.0  9/10/2011 - Present Yes        Hypertension (Chronic) ICD-10-CM: I10  ICD-9-CM: 401.9  9/10/2011 - Present Yes              Objective:     Patient Vitals for the past 24 hrs:   Temp Pulse Resp BP SpO2   03/30/22 0857 -- 74 -- -- --   03/30/22 0832 97.7 °F (36.5 °C) 74 18 128/61 98 %   03/30/22 0457 97.6 °F (36.4 °C) 74 18 111/74 98 %   03/30/22 0013 98.1 °F (36.7 °C) 73 18 (!) 93/55 97 %   03/29/22 2026 97.8 °F (36.6 °C) 69 18 (!) 88/49 96 %   03/29/22 1858 97.9 °F (36.6 °C) 69 18 (!) 92/52 91 %   03/29/22 1428 98.4 °F (36.9 °C) 69 20 (!) 91/53 93 %     Oxygen Therapy  O2 Sat (%): 98 % (03/30/22 0832)  Pulse via Oximetry: 70 beats per minute (03/28/22 1713)  O2 Device: None (Room air) (03/30/22 0845)    Estimated body mass index is 22.26 kg/m² as calculated from the following:    Height as of this encounter: 6' 2\" (1.88 m). Weight as of this encounter: 78.7 kg (173 lb 6.4 oz). Intake/Output Summary (Last 24 hours) at 3/30/2022 1101  Last data filed at 3/30/2022 0952  Gross per 24 hour   Intake 780 ml   Output 1450 ml   Net -670 ml         Physical Exam:     Blood pressure 128/61, pulse 74, temperature 97.7 °F (36.5 °C), resp.  rate 18, height 6' 2\" (1.88 m), weight 78.7 kg (173 lb 6.4 oz), SpO2 98 %. General:    Well nourished. No overt distress  Head:  Normocephalic, atraumatic  Eyes:  Sclerae appear normal.  Pupils equally round. ENT:  Nares appear normal, no drainage. Moist oral mucosa  Neck:  No restricted ROM. Trachea midline   CV:   RRR. No m/r/g. No jugular venous distension. Lungs:   Crackles on left lower lung  Abdomen: Bowel sounds present. Soft, nontender, nondistended. Extremities: 1+ bilateral lower extremity edema and chronic erythema,  Right LE cellulitis, improving   Skin:     No rashes and normal coloration. Warm and dry. Neuro:  CN II-XII grossly intact. Sensation intact. A&Ox3  Psych:  Normal mood and affect.       I have reviewed ordered lab tests and independently visualized imaging below:    Recent Labs:  Recent Results (from the past 48 hour(s))   EKG, 12 LEAD, INITIAL    Collection Time: 03/28/22 12:16 PM   Result Value Ref Range    Ventricular Rate 74 BPM    Atrial Rate 60 BPM    QRS Duration 182 ms    Q-T Interval 472 ms    QTC Calculation (Bezet) 523 ms    Calculated R Axis -143 degrees    Calculated T Axis 24 degrees    Diagnosis       Ventricular-paced rhythm with occasional Premature ventricular complexes  Abnormal ECG  When compared with ECG of 24-MAR-2022 01:09,  No significant change was found  Confirmed by Halima Mendosa ([id number]) on 3/28/2022 2:33:16 PM     CBC WITH AUTOMATED DIFF    Collection Time: 03/28/22 12:24 PM   Result Value Ref Range    WBC 8.9 4.3 - 11.1 K/uL    RBC 3.62 (L) 4.23 - 5.6 M/uL    HGB 12.2 (L) 13.6 - 17.2 g/dL    HCT 37.3 (L) 41.1 - 50.3 %    .0 (H) 79.6 - 97.8 FL    MCH 33.7 (H) 26.1 - 32.9 PG    MCHC 32.7 31.4 - 35.0 g/dL    RDW 15.9 (H) 11.9 - 14.6 %    PLATELET 005 091 - 801 K/uL    MPV 9.1 (L) 9.4 - 12.3 FL    ABSOLUTE NRBC 0.00 0.0 - 0.2 K/uL    DF AUTOMATED      NEUTROPHILS 70 43 - 78 %    LYMPHOCYTES 14 13 - 44 %    MONOCYTES 11 4.0 - 12.0 %    EOSINOPHILS 3 0.5 - 7.8 % BASOPHILS 1 0.0 - 2.0 %    IMMATURE GRANULOCYTES 1 0.0 - 5.0 %    ABS. NEUTROPHILS 6.3 1.7 - 8.2 K/UL    ABS. LYMPHOCYTES 1.3 0.5 - 4.6 K/UL    ABS. MONOCYTES 1.0 0.1 - 1.3 K/UL    ABS. EOSINOPHILS 0.3 0.0 - 0.8 K/UL    ABS. BASOPHILS 0.1 0.0 - 0.2 K/UL    ABS. IMM. GRANS. 0.1 0.0 - 0.5 K/UL   METABOLIC PANEL, COMPREHENSIVE    Collection Time: 03/28/22 12:24 PM   Result Value Ref Range    Sodium 136 136 - 145 mmol/L    Potassium 3.6 3.5 - 5.1 mmol/L    Chloride 100 98 - 107 mmol/L    CO2 28 21 - 32 mmol/L    Anion gap 8 7 - 16 mmol/L    Glucose 328 (H) 65 - 100 mg/dL    BUN 68 (H) 8 - 23 MG/DL    Creatinine 2.40 (H) 0.8 - 1.5 MG/DL    GFR est AA 33 (L) >60 ml/min/1.73m2    GFR est non-AA 27 (L) >60 ml/min/1.73m2    Calcium 10.2 8.3 - 10.4 MG/DL    Bilirubin, total 0.8 0.2 - 1.1 MG/DL    ALT (SGPT) 32 12 - 65 U/L    AST (SGOT) 32 15 - 37 U/L    Alk.  phosphatase 240 (H) 50 - 136 U/L    Protein, total 7.3 6.3 - 8.2 g/dL    Albumin 2.7 (L) 3.2 - 4.6 g/dL    Globulin 4.6 (H) 2.3 - 3.5 g/dL    A-G Ratio 0.6 (L) 1.2 - 3.5     MAGNESIUM    Collection Time: 03/28/22 12:24 PM   Result Value Ref Range    Magnesium 2.1 1.8 - 2.4 mg/dL   LACTIC ACID    Collection Time: 03/28/22 12:24 PM   Result Value Ref Range    Lactic acid 1.4 0.4 - 2.0 MMOL/L   NT-PRO BNP    Collection Time: 03/28/22 12:24 PM   Result Value Ref Range    NT pro-BNP 2,478 (H) <450 PG/ML   PHOSPHORUS    Collection Time: 03/28/22 12:24 PM   Result Value Ref Range    Phosphorus 5.0 (H) 2.3 - 3.7 MG/DL   GLUCOSE, POC    Collection Time: 03/28/22  9:03 PM   Result Value Ref Range    Glucose (POC) 379 (H) 65 - 100 mg/dL    Performed by Ro    CBC W/O DIFF    Collection Time: 03/29/22  6:45 AM   Result Value Ref Range    WBC 8.3 4.3 - 11.1 K/uL    RBC 3.59 (L) 4.23 - 5.6 M/uL    HGB 11.8 (L) 13.6 - 17.2 g/dL    HCT 36.3 (L) 41.1 - 50.3 %    .1 (H) 79.6 - 97.8 FL    MCH 32.9 26.1 - 32.9 PG    MCHC 32.5 31.4 - 35.0 g/dL    RDW 15.9 (H) 11.9 - 14.6 %    PLATELET 328 374 - 481 K/uL    MPV 9.5 9.4 - 12.3 FL    ABSOLUTE NRBC 0.00 0.0 - 0.2 K/uL   MAGNESIUM    Collection Time: 03/29/22  6:45 AM   Result Value Ref Range    Magnesium 2.2 1.8 - 2.4 mg/dL   RENAL FUNCTION PANEL    Collection Time: 03/29/22  6:45 AM   Result Value Ref Range    Sodium 138 138 - 145 mmol/L    Potassium 3.0 (L) 3.5 - 5.1 mmol/L    Chloride 99 98 - 107 mmol/L    CO2 29 21 - 32 mmol/L    Anion gap 10 7 - 16 mmol/L    Glucose 170 (H) 65 - 100 mg/dL    BUN 65 (H) 8 - 23 MG/DL    Creatinine 2.20 (H) 0.8 - 1.5 MG/DL    GFR est AA 37 (L) >60 ml/min/1.73m2    GFR est non-AA 30 (L) >60 ml/min/1.73m2    Calcium 10.2 8.3 - 10.4 MG/DL    Phosphorus 4.6 (H) 2.3 - 3.7 MG/DL    Albumin 2.4 (L) 3.2 - 4.6 g/dL   DIFFERENTIAL, AUTO    Collection Time: 03/29/22  6:45 AM   Result Value Ref Range    NEUTROPHILS 68 43 - 78 %    LYMPHOCYTES 16 13 - 44 %    MONOCYTES 10 4.0 - 12.0 %    EOSINOPHILS 4 0.5 - 7.8 %    BASOPHILS 1 0.0 - 2.0 %    ABS. NEUTROPHILS 5.6 1.7 - 8.2 K/UL    ABS. LYMPHOCYTES 1.4 0.5 - 4.6 K/UL    ABS. MONOCYTES 0.9 0.1 - 1.3 K/UL    ABS. EOSINOPHILS 0.3 0.0 - 0.8 K/UL    ABS. BASOPHILS 0.1 0.0 - 0.2 K/UL    DF AUTOMATED      IMMATURE GRANULOCYTES 1 0.0 - 5.0 %    ABS. IMM.  GRANS. 0.1 0.0 - 0.5 K/UL   GLUCOSE, POC    Collection Time: 03/29/22  7:23 AM   Result Value Ref Range    Glucose (POC) 167 (H) 65 - 100 mg/dL    Performed by [facility]    ECHO ADULT COMPLETE    Collection Time: 03/29/22  9:03 AM   Result Value Ref Range    LV EDV A2C 119 mL    LV EDV A4C 90 mL    LV ESV A2C 58 mL    LV ESV A4C 48 mL    IVSd 1.3 (A) 0.6 - 1.0 cm    LVIDd 4.8 4.2 - 5.9 cm    LVIDs 3.4 cm    LVOT Diameter 2.0 cm    LVOT Mean Gradient 1 mmHg    LVOT VTI 12.3 cm    LVOT Peak Velocity 0.6 m/s    LVOT Peak Gradient 2 mmHg    LVPWd 1.1 (A) 0.6 - 1.0 cm    LV E' Lateral Velocity 7 cm/s    LV E' Septal Velocity 7 cm/s    LV Ejection Fraction A2C 52 %    LV Ejection Fraction A4C 47 %    EF BP 48 (A) 55 - 100 % LVOT Area 3.1 cm2    LVOT SV 38.6 ml    LA Minor Axis 5.9 cm    LA Major Axis 5.8 cm    LA Area 2C 17.1 cm2    LA Area 4C 22.8 cm2    LA Volume BP 54 18 - 58 mL    LA Diameter 3.6 cm    AV Mean Velocity 1.2 m/s    AV Mean Gradient 6 mmHg    AV VTI 31.3 cm    AV Peak Velocity 1.6 m/s    AV Peak Gradient 10 mmHg    AV Area by VTI 1.2 cm2    AV Area by Peak Velocity 1.3 cm2    Aortic Root 3.0 cm    Ascending Aorta 3.4 cm    IVC Proxmal 2.3 cm    MV E Wave Deceleration Time 272.0 ms    MV A Velocity 0.38 m/s    MV E Velocity 0.66 m/s    PV .0 ms    PV Max Velocity 0.9 m/s    PV Peak Gradient 3 mmHg    RV Basal Dimension 3.7 cm    TR Max Velocity 2.52 m/s    TR Peak Gradient 25 mmHg    Fractional Shortening 2D 29 28 - 44 %    LV ESV Index A4C 24 mL/m2    LV EDV Index A4C 45 mL/m2    LV ESV Index A2C 29 mL/m2    LV EDV Index A2C 59 mL/m2    LVIDd Index 2.39 cm/m2    LVIDs Index 1.69 cm/m2    LV RWT Ratio 0.46     LV Mass 2D 219.1 88 - 224 g    LV Mass 2D Index 109.0 49 - 115 g/m2    MV E/A 1.74     E/E' Ratio (Averaged) 9.43     E/E' Lateral 9.43     E/E' Septal 9.43     LA Volume Index BP 27 16 - 34 ml/m2    LVOT Stroke Volume Index 19.2 mL/m2    LA Size Index 1.79 cm/m2    LA/AO Root Ratio 1.20     Ao Root Index 1.49 cm/m2    Ascending Aorta Index 1.69 cm/m2    AV Velocity Ratio 0.38     LVOT:AV VTI Index 0.39     JUANITA/BSA VTI 0.6 cm2/m2    JUANITA/BSA Peak Velocity 0.6 cm2/m2    Est. RA Pressure 8 mmHg    RVSP 33 mmHg   GLUCOSE, POC    Collection Time: 03/29/22 11:36 AM   Result Value Ref Range    Glucose (POC) 198 (H) 65 - 100 mg/dL    Performed by [id number] [facility] [place], POC    Collection Time: 03/29/22  5:01 PM   Result Value Ref Range    Glucose (POC) 401 (H) 65 - 100 mg/dL    Performed by [facility]    GLUCOSE, POC    Collection Time: 03/29/22  8:47 PM   Result Value Ref Range    Glucose (POC) 172 (H) 65 - 100 mg/dL    Performed by Ro    GLUCOSE, POC    Collection Time: 03/30/22  7:30 AM Result Value Ref Range    Glucose (POC) 104 (H) 65 - 100 mg/dL    Performed by [facility]    METABOLIC PANEL, BASIC    Collection Time: 03/30/22  8:10 AM   Result Value Ref Range    Sodium 133 (L) 138 - 145 mmol/L    Potassium 4.0 3.5 - 5.1 mmol/L    Chloride 96 (L) 98 - 107 mmol/L    CO2 28 21 - 32 mmol/L    Anion gap 9 7 - 16 mmol/L    Glucose 111 (H) 65 - 100 mg/dL    BUN 81 (H) 8 - 23 MG/DL    Creatinine 2.70 (H) 0.8 - 1.5 MG/DL    GFR est AA 29 (L) >60 ml/min/1.73m2    GFR est non-AA 24 (L) >60 ml/min/1.73m2    Calcium 9.7 8.3 - 10.4 MG/DL   CBC WITH AUTOMATED DIFF    Collection Time: 03/30/22  8:10 AM   Result Value Ref Range    WBC 9.1 4.3 - 11.1 K/uL    RBC 3.64 (L) 4.23 - 5.6 M/uL    HGB 11.9 (L) 13.6 - 17.2 g/dL    HCT 36.9 (L) 41.1 - 50.3 %    .4 (H) 79.6 - 97.8 FL    MCH 32.7 26.1 - 32.9 PG    MCHC 32.2 31.4 - 35.0 g/dL    RDW 15.9 (H) 11.9 - 14.6 %    PLATELET 886 192 - 582 K/uL    MPV 9.3 (L) 9.4 - 12.3 FL    ABSOLUTE NRBC 0.00 0.0 - 0.2 K/uL    DF AUTOMATED      NEUTROPHILS 66 43 - 78 %    LYMPHOCYTES 19 13 - 44 %    MONOCYTES 11 4.0 - 12.0 %    EOSINOPHILS 3 0.5 - 7.8 %    BASOPHILS 1 0.0 - 2.0 %    IMMATURE GRANULOCYTES 1 0.0 - 5.0 %    ABS. NEUTROPHILS 5.9 1.7 - 8.2 K/UL    ABS. LYMPHOCYTES 1.7 0.5 - 4.6 K/UL    ABS. MONOCYTES 1.0 0.1 - 1.3 K/UL    ABS. EOSINOPHILS 0.3 0.0 - 0.8 K/UL    ABS. BASOPHILS 0.1 0.0 - 0.2 K/UL    ABS. IMM. GRANS. 0.1 0.0 - 0.5 K/UL   ALBUMIN    Collection Time: 03/30/22  8:10 AM   Result Value Ref Range    Albumin 2.3 (L) 3.2 - 4.6 g/dL   MAGNESIUM    Collection Time: 03/30/22  8:10 AM   Result Value Ref Range    Magnesium 2.0 1.8 - 2.4 mg/dL   PHOSPHORUS    Collection Time: 03/30/22  8:10 AM   Result Value Ref Range    Phosphorus 5.0 (H) 2.3 - 3.7 MG/DL       All Micro Results     None          Other Studies:  No results found.     Current Meds:  Current Facility-Administered Medications   Medication Dose Route Frequency    potassium chloride (K-DUR, KLOR-CON M20) SR tablet 40 mEq  40 mEq Oral BID    tuberculin injection 5 Units  5 Units IntraDERMal ONCE    sodium chloride (NS) flush 5-10 mL  5-10 mL IntraVENous Q8H    sodium chloride (NS) flush 5-10 mL  5-10 mL IntraVENous PRN    aspirin delayed-release tablet 81 mg  81 mg Oral DAILY    atorvastatin (LIPITOR) tablet 80 mg  80 mg Oral DAILY    [Held by provider] losartan (COZAAR) tablet 100 mg  100 mg Oral DAILY    metoprolol succinate (TOPROL-XL) XL tablet 50 mg  50 mg Oral DAILY    montelukast (SINGULAIR) tablet 10 mg  10 mg Oral DAILY    ondansetron (ZOFRAN) injection 4 mg  4 mg IntraVENous Q6H PRN    heparin (porcine) injection 5,000 Units  5,000 Units SubCUTAneous Q8H    ampicillin-sulbactam (UNASYN) 3 g in 0.9% sodium chloride (MBP/ADV) 100 mL MBP  3 g IntraVENous Q12H    morphine injection 2 mg  2 mg IntraVENous Q4H PRN    oxyCODONE IR (ROXICODONE) tablet 5 mg  5 mg Oral Q4H PRN    acetaminophen (TYLENOL) tablet 650 mg  650 mg Oral Q6H PRN    insulin glargine (LANTUS) injection 15 Units  0.2 Units/kg SubCUTAneous QHS    insulin lispro (HUMALOG) injection   SubCUTAneous AC&HS    glucose chewable tablet 16 g  16 g Oral PRN    glucagon (GLUCAGEN) injection 1 mg  1 mg IntraMUSCular PRN    dextrose 10% infusion 125-250 mL  125-250 mL IntraVENous PRN       Signed:  Kirstin Sagastume MD    Part of this note may have been written by using a voice dictation software. The note has been proof read but may still contain some grammatical/other typographical errors.

## 2022-03-30 NOTE — ROUTINE PROCESS
IP consult to Cardiac Rehab. Pt EF=48% on Echo completed 3/29/22. EF must be 35% or less to qualify for Cardiac Rehab with diagnosis of chronic heart failure. We will contact the patient if a qualifying referral is received.      Thank you,  BOBBY LeyvaN, RN  Cardiac Rehab Nurse Liaison

## 2022-03-30 NOTE — ROUTINE PROCESS
Bedside and Verbal shift change report given to myself (oncoming nurse) by Yogi Barros RN (offgoing nurse). Report included the following information SBAR, Kardex, MAR and Recent Results.

## 2022-03-30 NOTE — CONSULTS
Palliative Care    Patient: Yogesh Terrazas MRN: [id number]  SSN: [id number]    YOB: 1934  Age: 80 y.o. Sex: male       Date of Request: 3/30/2022  Date of Consult:  3/30/2022  Reason for Consult:  advanced care plan planning/end of life  Requesting Physician: Dr Anna Abdi      Assessment/Plan:     Principal Diagnosis:    Edema  R60.9    Additional Diagnoses:   · Advance Care Planning Counseling Z71.89  · Debility, Unspecified  R53.81  · Fatigue, Lethargy  R53.83  · Frailty  R54  · Pain, limb  M79.609  · Counseling, Encounter for Medical Advice  Z71.9  · Encounter for Palliative Care  Z51.5    Palliative Performance Scale (PPS):       Medical Decision Making:   Reviewed and summarized notes from admission to present   Discussed case with appropriate providers  Reviewed laboratory and x-ray data from admission to present      Pt resting in bed, appears comfortable. No visitors at bedside. Introduced role of PC and reviewed events of hospitalization. Pt has good understanding of his condition, and difficulty diuresing due to hypotension and renal function. Pt reports left hip and knee pain, which is chronic for him. He takes Tylenol PRN, and has taken some this morning. Heron Keita He reports he has severe arthritis in his joints, and has had multiple joint replacements. His mobility is limited due to the pain, and he states he essentially cannot use his left leg. He has worked with PT/OT, and is receptive to going to [facility] after discharge. He expressed worry that he will not be able to live on his own anymore. He is , and has 2 children. He is very worried about having his estate in order before he dies, and also about issues with his house. Encouraged him to discuss with his son, Aurelia Castro. Pt asked that I call peri, and update him on pt's condition. I spoke with Aurelia Castro via phone. Introduced role of PC and reviewed above, as well as the ACP conversation I had with pt. Aurelia Castro voiced understanding. He voiced hope that pt will be able to return home after STR. Encouraged him to discuss the issues that are worrying pt. Antonio Decker voiced appreciation for phone call. Will continue to follow. Will discuss findings with members of the interdisciplinary team.      Thank you for this referral.          .    Subjective:     History obtained from:  Patient, Family and Chart    Chief Complaint: BLE edema   History of Present Illness:  Mr Anthony Morales is an 79 yo male with PMH of atrial fibrillation, biventricular heart failure, CAD, DM, HTN, HLD, CKD, pulmonary HTN, and other conditions listed below, who presented to the ER from home on 3/28/2022 with c/o worsening BLE edema and RLE erythema for several days. He had been seen by his PCP and Cardiology the week prior, and was instructed to increase his Lasix and add Zaroxolyn. His symptoms continued to worsened, prompting him to return to the ER. Pt denied chest pain, n/v/d, fevers. Work up revealed acute on chronic right sided heart failure and cellulitis of the RLE, and pt was admitted for further management. He was evaluated by cardiology, and placed on a Lasix drip. His edema has improved, however, his renal function has declined. He has been evaluated by PT/OT, and STR his recommended at discharge. Advance Directive: Yes       Code Status:  Full Code            Health Care Power of : Yes - Copy of [address] on file. Past Medical History:   Diagnosis Date    A-fib [facility]) 12/17/2019    Abnormal EKG     Acute diastolic heart failure (HCC)     Atrial fibrillation ([facility] 9/10/2011    Atrial flutter ([facility] 9/2013    Biotronik biventricular implantable cardioverter defibrillator    Automatic implantable cardioverter-defibrillator in situ 3/30/2016    Breast lump     right- pt states bx was neg-- m. d. \"released\" him    CAD (coronary artery disease)     mi w angioplasty--1995---- mi then cabg--2008    Cardiomyopathy, ischemic 3/30/2016    Chronic    Chest pain     Chronic systolic heart failure ([address]     \"stable\" per cardiology office note (1/2015) and on lasix daily    CKD (chronic kidney disease) stage 3, GFR 30-59 ml/min: Metformin discontinued 9/12/2011    Coronary artery disease 9/10/2011    Diabetes ([address] diag 2002    type2, oral meds, range 96-98, does not know last hgba1c, hypo s/s <70    Heart attack ([address] 1995/2008    History of kidney stones     none in years     Hypercholesteremia 9/10/2011    Hypercholesterolemia     no meds currently    Hypertension diag 1981    controlled with med    Kidney stone     LBBB (left bundle branch block) 9/17/2013    Moderate protein-calorie malnutrition ([address] 3/30/2022    Orthostatic hypotension 3/30/2016    Osteoarthritis     hands    Pneumonia 9/10/2011    Preop cardiovascular exam     Renal insufficiency     S/P total knee arthroplasty 12/11/2015    TIA (transient ischemic attack) 9/18/2019    Unstable angina ([address]       Past Surgical History:   Procedure Laterality Date    HX ANKLE FRACTURE TX Right 5/11/2005    HX BREAST BIOPSY Right 7/2013    HX BUNIONECTOMY Left 2001    HX CARPAL TUNNEL RELEASE Bilateral 1972    HX CATARACT REMOVAL  2006/2009    HX COLONOSCOPY  2003/2010    HX CORONARY ARTERY BYPASS GRAFT  12/19/2008    3 vessel    HX HIP REPLACEMENT Right 12/28/2005    HX IMPLANTABLE CARDIOVERTER DEFIBRILLATOR  9/17/2013    biotronik    HX KNEE ARTHROSCOPY Left 9/17/2009    HX KNEE ARTHROSCOPY Right 4/30/2014    HX KNEE REPLACEMENT Right 12/2015    HX LITHOTRIPSY      x 5    HX ORTHOPAEDIC  2005    right hip    HX OTHER SURGICAL  2002    hydrocele repair and circumcision    HX PACEMAKER      pacemaker- defib    WA TOTAL KNEE ARTHROPLASTY Left 4/8/2010     Family History   Problem Relation Age of Onset    Heart Disease Sister     Heart Disease Brother     Cancer Brother     Heart Disease Brother     Stroke Sister     Asthma Father     Heart Disease Other     Malignant Hyperthermia Neg Hx     Pseudocholinesterase Deficiency Neg Hx     Delayed Awakening Neg Hx     Post-op Nausea/Vomiting Neg Hx     Emergence Delirium Neg Hx     Post-op Cognitive Dysfunction Neg Hx     Other Neg Hx       Social History     Tobacco Use    Smoking status: Never Smoker    Smokeless tobacco: Never Used   Substance Use Topics    Alcohol use: No     Prior to Admission medications    Medication Sig Start Date End Date Taking? Authorizing Provider   furosemide (LASIX) 80 mg tablet Take 1 Tablet by mouth two (2) times a day. 3/21/22  Yes Christopher Santana MD   metOLazone (ZAROXOLYN) 2.5 mg tablet Take 1 Tablet by mouth daily. 3/21/22  Yes Christopher Santana MD   montelukast (Singulair) 10 mg tablet Take 1 Tablet by mouth daily. 3/10/22  Yes Estrellita Mendez MD   canagliflozin (Invokana) 100 mg tablet Take 1 Tablet by mouth Daily (before breakfast). 1/26/22  Yes Estrellita Mendez MD   jexvlnutg-Donufhfv-Ympsk-W.Pet (PREPARATION H MAXIMUM STRENGTH) 0.25-1 % rectal cream Insert  into rectum as needed for Hemorrhoids. 1/24/22  Yes Estrellita Mendez MD   atorvastatin (LIPITOR) 80 mg tablet Take 1 Tablet by mouth daily. 10/28/21  Yes Estrellita Mendez MD   losartan (COZAAR) 100 mg tablet Take 100 mg by mouth daily. Yes Provider, Historical   nateglinide (Starlix) 120 mg tablet Take 120 mg by mouth Before breakfast, lunch, and dinner. Yes Provider, Historical   diclofenac (VOLTAREN) 1 % gel Apply  to affected area four (4) times daily. 7/22/20  Yes Provider, Historical   metoprolol succinate (TOPROL-XL) 50 mg XL tablet Take 50 mg by mouth daily. 2/23/21  Yes Provider, Historical   nitroglycerin (NITROSTAT) 0.4 mg SL tablet Place 1 sl under the tongue q 5 min prn cp, max 3 sl in a 15-min time period. Call 911 if no relief after the 3rd sl. 12/17/20  Yes Christopher Santana MD   multivitamin (ONE A DAY) tablet Take 1 Tab by mouth daily.    Yes Provider, Historical   aspirin delayed-release 81 mg tablet Take  by mouth daily. Yes Provider, Historical   Bedside Commode XX Use as instructed  Patient not taking: Reported on 3/28/2022 3/23/22   Maria Luisa Russell MD   Wheel Chair elizabeth 1 wheelchair for difficulty walking and at risk for falls. 3/4/22   Maria Luisa Russell MD   terbinafine HCL (LAMISIL) 1 % topical cream Apply  to affected area two (2) times a day. 1/24/22   Maria Luisa Russell MD       Allergies   Allergen Reactions    Iodinated Contrast Media Swelling        Review of Systems:  A comprehensive review of systems was negative except for: Constitutional: Positive for fatigue. Musculoskeletal: Positive for left hip and knee pain; BLE edema      Objective:     Visit Vitals  /61   Pulse 74   Temp 97.7 °F (36.5 °C)   Resp 18   Ht 6' 2\" (1.88 m)   Wt 173 lb 6.4 oz (78.7 kg)   SpO2 98%   BMI 22.26 kg/m²        Physical Exam:    General:  Cooperative. Debilitated. No acute distress. Eyes:  Conjunctivae/corneas clear    Nose: Nares normal. Septum midline. Neck: Supple, symmetrical, trachea midline   Lungs:   Clear to auscultation bilaterally, unlabored   Heart:  Regular rate and rhythm   Abdomen:   Soft, non-tender, non-distended   Extremities: Normal, atraumatic, no cyanosis.  Trace BLE edema   Skin: Skin color, texture, turgor normal.    Neurologic: Nonfocal   Psych: Alert and oriented      Assessment:     Hospital Problems  Date Reviewed: 3/21/2022          Codes Class Noted POA    Moderate protein-calorie malnutrition ([facility] ICD-10-CM: E44.0  ICD-9-CM: 263.0  3/30/2022 Yes        Bilateral leg edema ICD-10-CM: R60.0  ICD-9-CM: 782.3  3/28/2022 Yes        Cor pulmonale (chronic) (HCC) ICD-10-CM: I27.81  ICD-9-CM: 416.9  3/28/2022 Yes        Pulmonary HTN (HCC) ICD-10-CM: I27.20  ICD-9-CM: 416.8  3/28/2022 Yes        Cellulitis of right leg ICD-10-CM: R98.038  ICD-9-CM: 682.6  3/28/2022 Yes        * (Principal) Acute on chronic right-sided congestive heart failure ([facility] ICD-10-CM: I50.813  ICD-9-CM: 428.0  3/28/2022 Yes        Presence of Watchman left atrial appendage closure device ICD-10-CM: Z95.818  ICD-9-CM: V45.09  1/21/2020 Yes    Overview Signed 1/21/2020  7:36 PM by Dipika Allen MD     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 (12/17/19)             Type 2 diabetes with nephropathy ([facility] ICD-10-CM: E11.21  ICD-9-CM: 250.40, 583.81  11/6/2019 Yes        Persistent atrial fibrillation ([facility] ICD-10-CM: I48.19  ICD-9-CM: 427.31  9/18/2019 Yes        Automatic implantable cardioverter-defibrillator in situ ICD-10-CM: Z95.810  ICD-9-CM: V45.02  3/30/2016 Yes    Overview Signed 3/30/2016  9:01 AM by Helen Zhou     No shocks             Chronic combined systolic and diastolic heart failure ([facility] ICD-10-CM: I50.42  ICD-9-CM: 428.42  9/17/2013 Yes        Coronary artery disease (Chronic) ICD-10-CM: I25.10  ICD-9-CM: 414.00  9/10/2011 Yes    Overview Signed 3/30/2016  9:03 AM by Nicole schaefer TKA surg             Hypercholesteremia (Chronic) ICD-10-CM: E78.00  ICD-9-CM: 272.0  9/10/2011 Yes        Hypertension (Chronic) ICD-10-CM: I10  ICD-9-CM: 401.9  9/10/2011 Yes              Signed By: Sergey Singh NP     March 30, 2022

## 2022-03-30 NOTE — ROUTINE PROCESS
Bedside and Verbal shift change report given to Nevin Ny RN (oncoming nurse) by myself (offgoing nurse). Report included the following information SBAR, Kardex, MAR and Recent Results.

## 2022-03-30 NOTE — PROGRESS NOTES
Pt more receptive to STR. CM was unable to meet with pt and son yesterday, will reattempt later today. CM left STR list in pts room to discuss with son. Cards report that prognosis is poor due to pts chronic CHF and consulted Palliative. Care Management Interventions  PCP Verified by CM:  Yes  Mode of Transport at Discharge: BLS  Transition of Care Consult (CM Consult): SNF  Discharge Durable Medical Equipment: No  Physical Therapy Consult: Yes  Occupational Therapy Consult: Yes  Speech Therapy Consult: No  Support Systems: Child(aleksey),Other Family Member(s),Friend/Neighbor,Buddhism/[place] [employer]  Confirm Follow Up Transport: Family  The Plan for Transition of Care is Related to the Following Treatment Goals : Return home and back to his baseline  Discharge Location  Patient Expects to be Discharged to[de-identified] Rehab hospital/unit acute

## 2022-03-30 NOTE — PROGRESS NOTES
ACUTE PHYSICAL THERAPY GOALS:  (Developed with and agreed upon by patient and/or caregiver. )  LTG:  (1.)Mr. Gregg will move from supine to sit and sit to supine , scoot up and down and roll side to side in bed with STAND BY ASSIST within 7 treatment day(s). (2.)Mr. Gregg will transfer from bed to chair and chair to bed with MINIMAL ASSIST using the least restrictive device within 7 treatment day(s). (3.)Mr. Gregg will ambulate with MODERATE ASSIST for 50 feet with the least restrictive device within 7 treatment day(s). (4.)Mr. Gregg will participate in therapeutic activity/exercises x 25 minutes for increased strength within 7 treatment days. PHYSICAL THERAPY: Daily Note and AM Treatment Day # 2    Tevin Sams is a 80 y.o. male   PRIMARY DIAGNOSIS: Acute on chronic right-sided congestive heart failure (HCC)  Bilateral leg edema [R60.0]         ASSESSMENT:     REHAB RECOMMENDATIONS: CURRENT LEVEL OF FUNCTION:  (Most Recently Demonstrated)   Recommendation to date pending progress:  Setting:   Short-term Rehab  Equipment:    To Be Determined Bed Mobility:   Moderate Assistance x 2  Sit to Stand:   Moderate Assistance x 2  Transfers:   Moderate Assistance x 2  Gait/Mobility:   Moderate Assistance x 2     ASSESSMENT:  Mr. Anthony Morales presents in supine, agreeable to therapy. Upon entering, Pnt is agreeable to PT treatment. he reports no pain at rest. Pnt performed supine > sit with mod Ax2, sitting EOB with good sitting balance control. Sit > stand is performed 3-4 times with mod Ax2 using RW. Gait is performed twice alongside the bed. Gait is noted to be very slow and shuffled. Stand > sit with mod Ax2, followed by positioning for comfort. At end of session pt supine in bed with all needs within reach, alarm activated for safety, RN notified. Overall, good progress today as pnt improved in level of assistance required for transfers.  Pnt continues to present as functioning below his baseline, with deficits in mobility including transfers, gait, balance, and activity tolerance. Pt will continue to benefit from skilled therapy services to address stated deficits to promote return to highest level of function, independence, and safety. Will continue to follow.          SUBJECTIVE:   Mr. Tressa Bailey states, \"I'm the last left of my 13 siblings\"    SOCIAL HISTORY/ LIVING ENVIRONMENT: see eval  Home Environment: Private residence  # Steps to Enter: 2  One/Two Story Residence: One story  Living Alone: Yes  Support Systems: Child(aleksey),Other Family Member(s),Friend/Neighbor,[employer]  OBJECTIVE:     PAIN: VITAL SIGNS: LINES/DRAINS:   Pre Treatment: Pain Screen  Pain Scale 1: Numeric (0 - 10)  Pain Intensity 1: 0  Post Treatment: 0   IV  O2 Device: None (Room air)     MOBILITY: I Mod I S SBA CGA Min Mod Max Total  NT x2 Comments:   Bed Mobility    Rolling [] [] [] [] [] [] [x] [] [] [] [x]    Supine to Sit [] [] [] [] [] [] [x] [] [] [] [x]    Scooting [] [] [] [] [] [] [x] [] [] [] [x]    Sit to Supine [] [] [] [] [] [] [x] [] [] [] [x]    Transfers    Sit to Stand [] [] [] [] [] [] [x] [] [] [] [x]    Bed to Chair [] [] [] [] [] [] [] [] [] [x] []    Stand to Sit [] [] [] [] [] [] [x] [] [] [] [x]    I=Independent, Mod I=Modified Independent, S=Supervision, SBA=Standby Assistance, CGA=Contact Guard Assistance,   Min=Minimal Assistance, Mod=Moderate Assistance, Max=Maximal Assistance, Total=Total Assistance, NT=Not Tested    BALANCE: Good Fair+ Fair Fair- Poor NT Comments   Sitting Static [] [x] [] [] [] []    Sitting Dynamic [] [x] [] [] [] []              Standing Static [] [] [x] [] [] []    Standing Dynamic [] [] [] [x] [] []      GAIT: I Mod I S SBA CGA Min Mod Max Total  NT x2 Comments:   Level of Assistance [] [] [] [] [] [] [x] [] [] [] [x]    Distance 2 sets of steps alongside bed    DME Rolling Walker and Gait Belt    Gait Quality Shuffled, slow    Weightbearing  Status N/A     I=Independent, Mod I=Modified Independent, S=Supervision, SBA=Standby Assistance, CGA=Contact Guard Assistance,   Min=Minimal Assistance, Mod=Moderate Assistance, Max=Maximal Assistance, Total=Total Assistance, NT=Not Tested    PLAN:   FREQUENCY/DURATION: PT Plan of Care: 3 times/week for duration of hospital stay or until stated goals are met, whichever comes first.  TREATMENT:     TREATMENT:   ($$ Therapeutic Activity: 23-37 mins    )  Co-Treatment PT/OT necessary due to patient's decreased overall endurance/tolerance levels, as well as need for high level skilled assistance to complete functional transfers/mobility and functional tasks  Therapeutic Activity (28 Minutes): Therapeutic activity included Rolling, Supine to Sit, Sit to Supine, Transfer Training, Ambulation on level ground, Sitting balance  and Standing balance to improve functional Mobility, Strength, ROM and Activity tolerance.     TREATMENT GRID:  N/A    AFTER TREATMENT POSITION/PRECAUTIONS:  Alarm Activated, Bed, Needs within reach and RN notified    INTERDISCIPLINARY COLLABORATION:  RN/PCT, PT/PTA and OT/CALI    TOTAL TREATMENT DURATION:  PT Patient Time In/Time Out  Time In: 0771  Time Out: [address]iano

## 2022-03-30 NOTE — ROUTINE PROCESS
CHF teaching started post introduction to pt/family; aware of diagnosis. Planner/scale(home) @ BS and will follow. Smoking/ ETOH/Illicit drug use cessation and maintain a healthy weight covered. Pt/family aware that I can not prescribe nor adjust  medications: 15mins  Palliative Care score:  ACP on file  Refused ACP on admission  Start 2L/D Fluid restriction/ cardiac diet  CHF teaching continues to pt/family. Emphasis on taking prescription meds as ordered, to keep F/U appts and to call MD STAT if any of the following occur:    short of breath.  If you can not lay flat without developing short of breath or rapid breathing at night; or if it wakes you up. Develop a cough or wheezing.     Pt/family verbalizes understanding, will follow to reinforce teaching skills: 20 mins

## 2022-03-30 NOTE — PROGRESS NOTES
Physician Progress Note      Lia Laughlin  CSN #:                  [id number]  :                       1934  ADMIT DATE:       3/28/2022 3:15 PM  [address] [place] DATE:  RESPONDING  PROVIDER #:        Eugenie Ulloa MD          QUERY TEXT:    Pt admitted with worsening BLE edema. and has Acute on chronic congestive heart failure documented. If possible, please document in progress notes and discharge summary further specificity regarding the type and acuity of CHF:      The medical record reflects the following:  Risk Factors: 88 yr, Hx systolic diastolic heart failure, AFib, DM, pulm Htn, HTN  Clinical Indicators:  EF 48%, ProBNP 2,478,  2+ edema BLE,  Negative fluid balance   Treatment:  Lasix gtt, cardiology consult, strict I&O, daily weight, 2000ml fluid restriction  Options provided:  -- Acute on Chronic Systolic CHF/HFrEF  -- Acute on Chronic Diastolic CHF/HFpEF  -- Acute on Chronic Systolic and Diastolic CHF  -- Other - I will add my own diagnosis  -- Disagree - Not applicable / Not valid  -- Disagree - Clinically unable to determine / Unknown  -- Refer to Clinical Documentation Reviewer    PROVIDER RESPONSE TEXT:    This patient is in acute on chronic systolic and diastolic CHF.     Query created by: Mishel Kellogg on 3/30/2022 12:08 PM      Electronically signed by:  Eugenie Ulloa MD 3/30/2022 1:37 PM

## 2022-03-30 NOTE — ROUTINE PROCESS
Bedside and Verbal shift change report given to myself (oncoming nurse) by Rosales Hemphill (offgoing nurse). Report included the following information SBAR, Kardex, Intake/Output, MAR, Recent Results and Med Rec Status. Lasix gtt infusing at bedside.

## 2022-03-30 NOTE — ACP (ADVANCE CARE PLANNING)
In addition to E&M time spent, an additional 20 minutes was spent on ACP. Reviewed pt's Advance Directive on file. It names his son, Héctor Cline, as primary agent. We reviewed pt's wishes regarding life support and artificial nutrition. Pt states he does not want resuscitative efforts, life support, or artificial nutrition, which is reflected in the Living Will portion of his AD. DNR order placed per his request.  Counseled on the chronic and progressive nature of CHF. Counseled that as his condition worsens, hospice may be recommended to assist with symptom management at end of life. Pt voiced understanding. He states he is not ready for hospice at this time, but is receptive to their services in the future. This conversation was also relayed to his son, Héctor Cline, who is in agreement.     ALESHA Chen-C  Palliative Care

## 2022-03-30 NOTE — PROGRESS NOTES
[facility] CARDIOLOGY PROGRESS NOTE           3/30/2022 8:27 AM    Admit Date: 3/28/2022      Subjective:   Patient resting comfortably this morning. Patient has significant improvement in lower extremity edema. He is currently struggling with intermittent hypotension on Lasix drip. Losartan has been held due to acute kidney injury and hypotension. ROS:  Cardiovascular:  As noted above    Objective:      Vitals:    03/29/22 1858 03/29/22 2026 03/30/22 0013 03/30/22 0457   BP: (!) 92/52 (!) 88/49 (!) 93/55 111/74   Pulse: 69 69 73 74   Resp: 18 18 18 18   Temp: 97.9 °F (36.6 °C) 97.8 °F (36.6 °C) 98.1 °F (36.7 °C) 97.6 °F (36.4 °C)   SpO2: 91% 96% 97% 98%   Weight:    173 lb 6.4 oz (78.7 kg)   Height:           Physical Exam:  General-No Acute Distress  Neck- supple, no JVD  CV- regular rate and rhythm no MRG  Lung- clear bilaterally  Abd- soft, nontender, nondistended  Ext- no edema bilaterally. Skin- warm and dry    Data Review:   Recent Labs     03/29/22  0645 03/28/22  1224    136   K 3.0* 3.6   MG 2.2 2.1   BUN 65* 68*   CREA 2.20* 2.40*   * 328*   WBC 8.3 8.9   HGB 11.8* 12.2*   HCT 36.3* 37.3*    251     Interpretation Summary    Result status: Final result       Left Ventricle: Left ventricle size is normal. Mildly increased wall thickness. Mild global hypokinesis present. Septal motion consistent with pacemaker activation. Mildly reduced left ventricular systolic function. EF by 2D Simpsons Biplane is 48%. Abnormal diastolic function.   Right Ventricle: Right ventricle size is normal. Low normal systolic function. Lead present in the right ventricle.   Aortic Valve: Moderately thickened cusps. Trace transvalvular regurgitation. No significant stenosis. AV mean gradient is 6 mmHg. AV peak gradient is 10 mmHg.   Mitral Valve: Mildly thickened leaflet. Mild transvalvular regurgitation.   Tricuspid Valve: Moderate transvalvular regurgitation.  RVSP is 33 mmHg.    IVC diameter is greater than 21 mm and decreases greater than 50% during inspiration; therefore the estimated right atrial pressure is intermediate (~8 mmHg).   Contrast used: Definity.       Assessment/Plan:     Principal Problem:    Acute on chronic right-sided congestive heart failure ([address] (3/28/2022)  Patient with mild biventricular heart failure. He is admitted primarily with lower extremity edema. Had no demonstrate hypoxia or pulmonary edema. He has been treated with Lasix in the face of acute kidney injury and lower extremity edema. Lower extremity edema essentially resolved. Patient is struggled with worsening hypotension. Laboratory information is not available at this time. We will hold Lasix drip and assess patient's renal function when labs are available. Suspect patient can transition back to oral or IV bolus therapies. Overall prognosis is poor. Consider engaging palliative care. Active Problems:    Coronary artery disease (9/10/2011)  No angina. Hypercholesteremia (9/10/2011)  Continue atorvastatin 80 mg daily      Hypertension (9/10/2011)  Holding losartan in the face of hypotension and acute kidney injury. Continue metoprolol 50 mg daily. Chronic combined systolic and diastolic heart failure ([address] (9/17/2013)  See above.       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

## 2022-03-30 NOTE — DISCHARGE INSTRUCTIONS
Patient Education        Heart Failure: Care Instructions  Your Care Instructions     Heart failure occurs when your heart does not pump as much blood as the body needs. Failure does not mean that the heart has stopped pumping but rather that it is not pumping as well as it should. Over time, this causes fluid buildup in your lungs and other parts of your body. Fluid buildup can cause shortness of breath, fatigue, swollen ankles, and other problems. By taking medicines regularly, reducing sodium (salt) in your diet, checking your weight every day, and making lifestyle changes, you can feel better and live longer. Follow-up care is a key part of your treatment and safety. Be sure to make and go to all appointments, and call your doctor if you are having problems. It's also a good idea to know your test results and keep a list of the medicines you take. How can you care for yourself at home? Medicines    · Be safe with medicines. Take your medicines exactly as prescribed. Call your doctor if you think you are having a problem with your medicine.     · Do not take any vitamins, over-the-counter medicine, or herbal products without talking to your doctor first. Josh Reyna not take ibuprofen (Advil or Motrin) and naproxen (Aleve) without talking to your doctor first. They could make your heart failure worse.     · You may take some of the following medicine. ? Angiotensin-converting enzyme inhibitors (ACEIs) or angiotensin II receptor blockers (ARBs) reduce the heart's workload, lower blood pressure, and reduce swelling. Taking an ACEI or ARB may lower your chance of needing to be hospitalized. ? Beta-blockers can slow heart rate, decrease blood pressure, and improve your condition. Taking a beta-blocker may lower your chance of needing to be hospitalized. ? Diuretics, also called water pills, reduce swelling. You will get more details on the specific medicines your doctor prescribes.   Diet    · Your doctor may suggest that you limit sodium. Your doctor can tell you how much sodium is right for you. An example is less than 3,000 mg a day. This includes all the salt you eat in cooking or in packaged foods. People get most of their sodium from processed foods. Fast food and restaurant meals also tend to be very high in sodium.     · Ask your doctor how much liquid you can drink each day. You may have to limit liquids. Weight    · Weigh yourself without clothing at the same time each day. Record your weight. Call your doctor if you have a sudden weight gain, such as more than 2 to 3 pounds in a day or 5 pounds in a week. (Your doctor may suggest a different range of weight gain.) A sudden weight gain may mean that your heart failure is getting worse. Activity level    · Start light exercise (if your doctor says it is okay). Even if you can only do a small amount, exercise will help you get stronger, have more energy, and manage your weight and your stress. Walking is an easy way to get exercise. Start out by walking a little more than you did before. Bit by bit, increase the amount you walk.     · When you exercise, watch for signs that your heart is working too hard. You are pushing yourself too hard if you cannot talk while you are exercising. If you become short of breath or dizzy or have chest pain, stop, sit down, and rest.     · If you feel \"wiped out\" the day after you exercise, walk slower or for a shorter distance until you can work up to a better pace.     · Get enough rest at night. Sleeping with 1 or 2 pillows under your upper body and head may help you breathe easier. Lifestyle changes    · Do not smoke. Smoking can make a heart condition worse. If you need help quitting, talk to your doctor about stop-smoking programs and medicines. These can increase your chances of quitting for good.  Quitting smoking may be the most important step you can take to protect your heart.     · Limit alcohol to 2 drinks a day for men and 1 drink a day for women. Too much alcohol can cause health problems.     · Avoid getting sick from colds and the flu. Get a pneumococcal vaccine shot. If you have had one before, ask your doctor whether you need another dose. Get a flu shot each year. If you must be around people with colds or the flu, wash your hands often. When should you call for help? Call 911  if you have symptoms of sudden heart failure such as:    · You have severe trouble breathing.     · You cough up pink, foamy mucus.     · You have a new irregular or rapid heartbeat. Call your doctor now or seek immediate medical care if:    · You have new or increased shortness of breath.     · You are dizzy or lightheaded, or you feel like you may faint.     · You have sudden weight gain, such as more than 2 to 3 pounds in a day or 5 pounds in a week. (Your doctor may suggest a different range of weight gain.)     · You have increased swelling in your legs, ankles, or feet.     · You are suddenly so tired or weak that you cannot do your usual activities. Watch closely for changes in your health, and be sure to contact your doctor if you develop new symptoms. Where can you learn more? Go to http://www.gray.com/  Enter E748 in the search box to learn more about \"Heart Failure: Care Instructions. \"  Current as of: January 10, 2022               Content Version: 13.2  © 3616-5301 [employer]. Care instructions adapted under license by [employer] (which disclaims liability or warranty for this information). If you have questions about a medical condition or this instruction, always ask your healthcare professional. [place] [address] any warranty or liability for your use of this information.

## 2022-03-31 LAB
ANION GAP SERPL CALC-SCNC: 9 MMOL/L (ref 7–16)
BASOPHILS # BLD: 0.1 K/UL (ref 0–0.2)
BASOPHILS NFR BLD: 1 % (ref 0–2)
BUN SERPL-MCNC: 99 MG/DL (ref 8–23)
CALCIUM SERPL-MCNC: 9.4 MG/DL (ref 8.3–10.4)
CHLORIDE SERPL-SCNC: 97 MMOL/L (ref 98–107)
CO2 SERPL-SCNC: 25 MMOL/L (ref 21–32)
CREAT SERPL-MCNC: 3.4 MG/DL (ref 0.8–1.5)
DIFFERENTIAL METHOD BLD: ABNORMAL
EOSINOPHIL # BLD: 0.2 K/UL (ref 0–0.8)
EOSINOPHIL NFR BLD: 3 % (ref 0.5–7.8)
ERYTHROCYTE [DISTWIDTH] IN BLOOD BY AUTOMATED COUNT: 15.7 % (ref 11.9–14.6)
GLUCOSE BLD STRIP.AUTO-MCNC: 124 MG/DL (ref 65–100)
GLUCOSE BLD STRIP.AUTO-MCNC: 187 MG/DL (ref 65–100)
GLUCOSE BLD STRIP.AUTO-MCNC: 192 MG/DL (ref 65–100)
GLUCOSE BLD STRIP.AUTO-MCNC: 242 MG/DL (ref 65–100)
GLUCOSE SERPL-MCNC: 117 MG/DL (ref 65–100)
HCT VFR BLD AUTO: 33.4 % (ref 41.1–50.3)
HGB BLD-MCNC: 11.1 G/DL (ref 13.6–17.2)
IMM GRANULOCYTES # BLD AUTO: 0.1 K/UL (ref 0–0.5)
IMM GRANULOCYTES NFR BLD AUTO: 1 % (ref 0–5)
LYMPHOCYTES # BLD: 1.5 K/UL (ref 0.5–4.6)
LYMPHOCYTES NFR BLD: 16 % (ref 13–44)
MAGNESIUM SERPL-MCNC: 2.2 MG/DL (ref 1.8–2.4)
MCH RBC QN AUTO: 33.2 PG (ref 26.1–32.9)
MCHC RBC AUTO-ENTMCNC: 33.2 G/DL (ref 31.4–35)
MCV RBC AUTO: 100 FL (ref 79.6–97.8)
MONOCYTES # BLD: 1.1 K/UL (ref 0.1–1.3)
MONOCYTES NFR BLD: 12 % (ref 4–12)
NEUTS SEG # BLD: 6.6 K/UL (ref 1.7–8.2)
NEUTS SEG NFR BLD: 68 % (ref 43–78)
NRBC # BLD: 0 K/UL (ref 0–0.2)
PLATELET # BLD AUTO: 227 K/UL (ref 150–450)
PMV BLD AUTO: 9.3 FL (ref 9.4–12.3)
POTASSIUM SERPL-SCNC: 5.3 MMOL/L (ref 3.5–5.1)
RBC # BLD AUTO: 3.34 M/UL (ref 4.23–5.6)
SERVICE CMNT-IMP: ABNORMAL
SODIUM SERPL-SCNC: 131 MMOL/L (ref 138–145)
WBC # BLD AUTO: 9.6 K/UL (ref 4.3–11.1)

## 2022-03-31 PROCEDURE — 97535 SELF CARE MNGMENT TRAINING: CPT

## 2022-03-31 PROCEDURE — 74011000250 HC RX REV CODE- 250: Performed by: FAMILY MEDICINE

## 2022-03-31 PROCEDURE — 74011250637 HC RX REV CODE- 250/637: Performed by: FAMILY MEDICINE

## 2022-03-31 PROCEDURE — 97530 THERAPEUTIC ACTIVITIES: CPT

## 2022-03-31 PROCEDURE — 2709999900 HC NON-CHARGEABLE SUPPLY

## 2022-03-31 PROCEDURE — 65660000000 HC RM CCU STEPDOWN

## 2022-03-31 PROCEDURE — 80048 BASIC METABOLIC PNL TOTAL CA: CPT

## 2022-03-31 PROCEDURE — 99232 SBSQ HOSP IP/OBS MODERATE 35: CPT | Performed by: INTERNAL MEDICINE

## 2022-03-31 PROCEDURE — 97112 NEUROMUSCULAR REEDUCATION: CPT

## 2022-03-31 PROCEDURE — 74011636637 HC RX REV CODE- 636/637: Performed by: FAMILY MEDICINE

## 2022-03-31 PROCEDURE — 74011636637 HC RX REV CODE- 636/637: Performed by: INTERNAL MEDICINE

## 2022-03-31 PROCEDURE — 74011000250 HC RX REV CODE- 250: Performed by: EMERGENCY MEDICINE

## 2022-03-31 PROCEDURE — 85025 COMPLETE CBC W/AUTO DIFF WBC: CPT

## 2022-03-31 PROCEDURE — 83735 ASSAY OF MAGNESIUM: CPT

## 2022-03-31 PROCEDURE — 74011250636 HC RX REV CODE- 250/636: Performed by: INTERNAL MEDICINE

## 2022-03-31 PROCEDURE — 74011250637 HC RX REV CODE- 250/637: Performed by: INTERNAL MEDICINE

## 2022-03-31 PROCEDURE — 74011000258 HC RX REV CODE- 258: Performed by: INTERNAL MEDICINE

## 2022-03-31 PROCEDURE — 36415 COLL VENOUS BLD VENIPUNCTURE: CPT

## 2022-03-31 PROCEDURE — 82962 GLUCOSE BLOOD TEST: CPT

## 2022-03-31 RX ORDER — SODIUM BICARBONATE 84 MG/ML
50 INJECTION, SOLUTION INTRAVENOUS ONCE
Status: COMPLETED | OUTPATIENT
Start: 2022-03-31 | End: 2022-03-31

## 2022-03-31 RX ORDER — AMOXICILLIN AND CLAVULANATE POTASSIUM 875; 125 MG/1; MG/1
1 TABLET, FILM COATED ORAL 2 TIMES DAILY WITH MEALS
Status: DISCONTINUED | OUTPATIENT
Start: 2022-03-31 | End: 2022-03-31

## 2022-03-31 RX ORDER — DEXTROSE 50 % IN WATER (D50W) INTRAVENOUS SYRINGE
25 ONCE
Status: DISCONTINUED | OUTPATIENT
Start: 2022-03-31 | End: 2022-03-31

## 2022-03-31 RX ORDER — AMOXICILLIN AND CLAVULANATE POTASSIUM 500; 125 MG/1; MG/1
1 TABLET, FILM COATED ORAL 2 TIMES DAILY WITH MEALS
Status: COMPLETED | OUTPATIENT
Start: 2022-03-31 | End: 2022-04-04

## 2022-03-31 RX ORDER — DEXTROSE MONOHYDRATE 100 MG/ML
250 INJECTION, SOLUTION INTRAVENOUS ONCE
Status: COMPLETED | OUTPATIENT
Start: 2022-03-31 | End: 2022-03-31

## 2022-03-31 RX ADMIN — DEXTROSE MONOHYDRATE 250 ML: 100 INJECTION, SOLUTION INTRAVENOUS at 08:37

## 2022-03-31 RX ADMIN — HEPARIN SODIUM 5000 UNITS: 5000 INJECTION INTRAVENOUS; SUBCUTANEOUS at 21:40

## 2022-03-31 RX ADMIN — OXYCODONE 5 MG: 5 TABLET ORAL at 13:23

## 2022-03-31 RX ADMIN — SODIUM CHLORIDE, PRESERVATIVE FREE 10 ML: 5 INJECTION INTRAVENOUS at 05:37

## 2022-03-31 RX ADMIN — ASPIRIN 81 MG: 81 TABLET ORAL at 08:36

## 2022-03-31 RX ADMIN — HEPARIN SODIUM 5000 UNITS: 5000 INJECTION INTRAVENOUS; SUBCUTANEOUS at 13:24

## 2022-03-31 RX ADMIN — SODIUM ZIRCONIUM CYCLOSILICATE 15 G: 10 POWDER, FOR SUSPENSION ORAL at 08:53

## 2022-03-31 RX ADMIN — INSULIN GLARGINE 15 UNITS: 100 INJECTION, SOLUTION SUBCUTANEOUS at 21:41

## 2022-03-31 RX ADMIN — SODIUM CHLORIDE, PRESERVATIVE FREE 10 ML: 5 INJECTION INTRAVENOUS at 21:51

## 2022-03-31 RX ADMIN — ATORVASTATIN CALCIUM 80 MG: 80 TABLET, FILM COATED ORAL at 08:36

## 2022-03-31 RX ADMIN — SODIUM CHLORIDE, PRESERVATIVE FREE 10 ML: 5 INJECTION INTRAVENOUS at 13:26

## 2022-03-31 RX ADMIN — SODIUM BICARBONATE 50 MEQ: 84 INJECTION, SOLUTION INTRAVENOUS at 08:37

## 2022-03-31 RX ADMIN — INSULIN LISPRO 2 UNITS: 100 INJECTION, SOLUTION INTRAVENOUS; SUBCUTANEOUS at 16:42

## 2022-03-31 RX ADMIN — HEPARIN SODIUM 5000 UNITS: 5000 INJECTION INTRAVENOUS; SUBCUTANEOUS at 05:37

## 2022-03-31 RX ADMIN — AMOXICILLIN AND CLAVULANATE POTASSIUM 1 TABLET: 500; 125 TABLET, FILM COATED ORAL at 16:42

## 2022-03-31 RX ADMIN — OXYCODONE 5 MG: 5 TABLET ORAL at 05:37

## 2022-03-31 RX ADMIN — INSULIN LISPRO 4 UNITS: 100 INJECTION, SOLUTION INTRAVENOUS; SUBCUTANEOUS at 11:41

## 2022-03-31 RX ADMIN — INSULIN LISPRO 2 UNITS: 100 INJECTION, SOLUTION INTRAVENOUS; SUBCUTANEOUS at 21:40

## 2022-03-31 RX ADMIN — INSULIN HUMAN 10 UNITS: 100 INJECTION, SOLUTION PARENTERAL at 08:38

## 2022-03-31 RX ADMIN — METOPROLOL SUCCINATE 50 MG: 25 TABLET, EXTENDED RELEASE ORAL at 08:37

## 2022-03-31 RX ADMIN — ACETAMINOPHEN 650 MG: 325 TABLET ORAL at 21:40

## 2022-03-31 RX ADMIN — SODIUM CHLORIDE 3 G: 900 INJECTION INTRAVENOUS at 08:35

## 2022-03-31 NOTE — PROGRESS NOTES
[facility] CARDIOLOGY PROGRESS NOTE    3/31/2022 7:22 AM    Admit Date: 3/28/2022        Subjective:   Stable overnight without angina, CHF, or palpitations. Breathing improved but not quite back to baseline according to patient. Vitals stable and controlled. No other complaints overnight. He had intermittent hypotension on Lasix drip, now held. Losartan has been held due to acute kidney injury and hypotension as well. Tolerating meds well otherwise. Net 3 L out. Renal function worse today, K+ climbing. Objective:      Vitals:    03/30/22 1801 03/30/22 2000 03/31/22 0026 03/31/22 0507   BP: (!) 106/51 (!) 140/75 (!) 116/51 (!) 121/54   Pulse: 69 70 70 70   Resp: 20 20 20 20   Temp: 97.5 °F (36.4 °C) 98 °F (36.7 °C) 97.8 °F (36.6 °C) 98 °F (36.7 °C)   SpO2: 100% 99% 98% 94%   Weight:    176 lb 9.6 oz (80.1 kg)   Height:           Physical Exam:  Neck- supple, 8 to 10 cm JVD at 45 degrees  CV- regular rate and rhythm no MRG  Lung- clear bilaterally, left basilar faint crackles, otherwise clear  Abd- soft, nontender, nondistended  Ext-trace lower extremity edema  Skin- warm and dry    Data Review:   Recent Labs     03/31/22  0517 03/30/22  0810   * 133*   K 5.3* 4.0   MG 2.2 2.0   BUN 99* 81*   CREA 3.40* 2.70*   * 111*   WBC 9.6 9.1   HGB 11.1* 11.9*   HCT 33.4* 36.9*    240     Echo3/22:    Left Ventricle: Left ventricle size is normal. Mildly increased wall thickness. Mild global hypokinesis present. Septal motion consistent with pacemaker activation. Mildly reduced left ventricular systolic function. EF by 2D Simpsons Biplane is 48%. Abnormal diastolic function.   Right Ventricle: Right ventricle size is normal. Low normal systolic function. Lead present in the right ventricle.   Aortic Valve: Moderately thickened cusps. Trace transvalvular regurgitation. No significant stenosis. AV mean gradient is 6 mmHg. AV peak gradient is 10 mmHg.   Mitral Valve: Mildly thickened leaflet. Mild transvalvular regurgitation.   Tricuspid Valve: Moderate transvalvular regurgitation. RVSP is 33 mmHg.   IVC diameter is greater than 21 mm and decreases greater than 50% during inspiration; therefore the estimated right atrial pressure is intermediate (~8 mmHg).   Contrast used: Definity. Assessment and Plan:       Principal Problem:    Acute on chronic right-sided congestive heart failure ([address] (3/28/2022)  Patient with mild biventricular heart failure clinically and by echo. He is admitted primarily with lower extremity edema. Had no hypoxia or pulmonary edema. He has been treated with Lasix in the face of acute kidney injury and lower extremity edema. Lower extremity edema essentially resolved. Patient is struggled with worsening hypotension but stable overnight with higher BP now. Try low dose lasix orally once renal function improved, looks azotemic by labs today, hold ARB/ACE/diuretics today again and recheck BMP in AM.  Overall prognosis is poor. Consider engaging palliative care if continues to decline.     Active Problems:    Coronary artery disease (9/10/2011)  No angina. Follow clinically       Hypercholesteremia (9/10/2011)  Continue atorvastatin 80 mg daily with outpatient surveillance.        Hypertension (9/10/2011)  Holding losartan in the face of hypotension and acute kidney injury.   Continue metoprolol 50 mg daily.       Chronic combined systolic and diastolic heart failure ([address] (9/17/2013)  See above.       Automatic implantable cardioverter-defibrillator in situ (3/30/2016)  Normal function       Persistent atrial fibrillation ([address] (9/18/2019)  Status post watchman- outpatient surveillance.        Type 2 diabetes with nephropathy ([address] (11/6/2019)  Chronic managed per primary team- recheck BMP in AM       Presence of Watchman left atrial appendage closure device (1/21/2020)  Chronic stable       Bilateral leg edema (3/28/2022)  Nearly resolved- start low dose lasix today and follow clinically, recheck BMP Nagi       Cor pulmonale (chronic) [address] (3/28/2022)  See above       Pulmonary HTN ([facility] (3/28/2022)  See above- continue therapy, start low dose lasix today.         Cellulitis of right leg (3/28/2022)  See above- continue ABX per primary MD's       Moderate protein-calorie malnutrition ([facility] (3/30/2022)  Chronic and managed per primary team      Acute kidney injury- worse- hold nephrotoxins and diuretics, recheck renal function in AM.  PO fluids as tolerated today. IVF's if worsening hypotension. Stop KCL supplementation now.      Maggie Vallejo MD  [facility] Cardiology  Pager [phone]

## 2022-03-31 NOTE — PROGRESS NOTES
Pt is now a DNR. CM spoke with pts son, Ken Ibrahmi, who was at bedside. Pt was sleeping. Requested [facility]; referral sent. Pt is vax'd 2x and boosted. Nurys Mccray a text for f/u. Pts d/c ETA is Monday. Care Management Interventions  PCP Verified by CM:  Yes  Mode of Transport at Discharge: BLS  Transition of Care Consult (CM Consult): SNF,Discharge Planning  Discharge Durable Medical Equipment: No  Physical Therapy Consult: Yes  Occupational Therapy Consult: Yes  Speech Therapy Consult: No  Support Systems: Child(aleksey),Other Family Member(s),[employer] [employer],Friend/Neighbor  Confirm Follow Up Transport: Family  The Plan for Transition of Care is Related to the Following Treatment Goals : Return home and back to his baseline  Discharge Location  Patient Expects to be Discharged to[de-identified] Rehab hospital/unit acute

## 2022-03-31 NOTE — PROGRESS NOTES
ACUTE PHYSICAL THERAPY GOALS:  (Developed with and agreed upon by patient and/or caregiver. )  LTG:  (1.)Mr. Gregg will move from supine to sit and sit to supine , scoot up and down and roll side to side in bed with STAND BY ASSIST within 7 treatment day(s). (2.)Mr. Gregg will transfer from bed to chair and chair to bed with MINIMAL ASSIST using the least restrictive device within 7 treatment day(s). (3.)Mr. Gregg will ambulate with MODERATE ASSIST for 50 feet with the least restrictive device within 7 treatment day(s). (4.)Mr. Gregg will participate in therapeutic activity/exercises x 25 minutes for increased strength within 7 treatment days. PHYSICAL THERAPY: Daily Note and AM Treatment Day # 3    Mary Elizabeth is a 80 y.o. male   PRIMARY DIAGNOSIS: Acute on chronic right-sided congestive heart failure (HCC)  Bilateral leg edema [R60.0]         ASSESSMENT:     REHAB RECOMMENDATIONS: CURRENT LEVEL OF FUNCTION:  (Most Recently Demonstrated)   Recommendation to date pending progress:  Setting:   Short-term Rehab  Equipment:    To Be Determined Bed Mobility:   Minimal Assistance  Sit to Stand:   Moderate Assistance x 2  Transfers:   Moderate Assistance x 2  Gait/Mobility:   Moderate Assistance x 2     ASSESSMENT:  Mr. Claudette Amanda has improved with bed mobility. Transfers and gait continue to require mod assist of 2. He did get to the chair today which was great. Before doing that he performed dynamic sitting activity while doing ADLs with OT. It was a very good session. PM- Mr. Claudette Amanda did well up in the chair.   I was pleased that he required the same amount of assist to get back to bee       SUBJECTIVE:   Mr. Claudette Amanda states, \"OK    SOCIAL HISTORY/ LIVING ENVIRONMENT: see eval  Home Environment: Private residence  # Steps to Enter: 2  One/Two Story Residence: One story  Living Alone: Yes  Support Systems: Child(aleksey),Other Family Member(s),[place] [employer],Friend/Neighbor  OBJECTIVE:     PAIN: VITAL SIGNS: LINES/DRAINS:   Pre Treatment: Pain Screen  Pain Scale 1: Numeric (0 - 10)  Pain Onset 1: ongoing  Pain Location 1: Hip;Knee  Pain Orientation 1: Left  Post Treatment: 0   IV  O2 Device: None (Room air)     MOBILITY: I Mod I S SBA CGA Min Mod Max Total  NT x2 Comments:   Bed Mobility    Rolling [] [] [] [] [] [] [] [] [] [x] []    Supine to Sit [] [] [] [] [] [x] [] [] [] [] []    Scooting [] [] [] [] [] [x] [x] [] [] [] []    Sit to Supine [] [] [] [] [] [] [] [] [] [x] []    Transfers    Sit to Stand [] [] [] [] [] [] [x] [] [] [] [x]    Bed to Chair [] [] [] [] [] [] [x] [] [] [] [x]    Stand to Sit [] [] [] [] [] [] [x] [] [] [] [x]    I=Independent, Mod I=Modified Independent, S=Supervision, SBA=Standby Assistance, CGA=Contact Guard Assistance,   Min=Minimal Assistance, Mod=Moderate Assistance, Max=Maximal Assistance, Total=Total Assistance, NT=Not Tested    BALANCE: Good Fair+ Fair Fair- Poor NT Comments   Sitting Static [] [x] [] [] [] []    Sitting Dynamic [] [x] [] [] [] []              Standing Static [] [] [] [x] [] []    Standing Dynamic [] [] [] [x] [] []      GAIT: I Mod I S SBA CGA Min Mod Max Total  NT x2 Comments:   Level of Assistance [] [] [] [] [] [] [x] [] [] [] [x]    Distance 3 ft to get from chair to bed    DME Rolling Walker and Gait Belt    Gait Quality Shuffled, slow    Weightbearing  Status N/A     I=Independent, Mod I=Modified Independent, S=Supervision, SBA=Standby Assistance, CGA=Contact Guard Assistance,   Min=Minimal Assistance, Mod=Moderate Assistance, Max=Maximal Assistance, Total=Total Assistance, NT=Not Tested    PLAN:   FREQUENCY/DURATION: PT Plan of Care: 3 times/week for duration of hospital stay or until stated goals are met, whichever comes first.  TREATMENT:     TREATMENT:   ($$ Therapeutic Activity: 8-22 mins    )  Therapeutic Activity (10 Minutes):  Therapeutic activity included Sit to Supine, Transfer Training, Ambulation on level ground, Sitting balance  and Standing balance to improve functional Mobility, Strength, ROM and Activity tolerance.     TREATMENT GRID:  N/A    AFTER TREATMENT POSITION/PRECAUTIONS:  Alarm Activated, Bed, Needs within reach, RN notified and Visitors at bedside    INTERDISCIPLINARY COLLABORATION:  RN/PCT and PT/PTA    TOTAL TREATMENT DURATION:  PT Patient Time In/Time Out  Time In: 1220  Time Out: [address] [facility],

## 2022-03-31 NOTE — PROGRESS NOTES
ACUTE PHYSICAL THERAPY GOALS:  (Developed with and agreed upon by patient and/or caregiver. )  LTG:  (1.)Mr. Gregg will move from supine to sit and sit to supine , scoot up and down and roll side to side in bed with STAND BY ASSIST within 7 treatment day(s). (2.)Mr. Gregg will transfer from bed to chair and chair to bed with MINIMAL ASSIST using the least restrictive device within 7 treatment day(s). (3.)Mr. Gregg will ambulate with MODERATE ASSIST for 50 feet with the least restrictive device within 7 treatment day(s). (4.)Mr. Gregg will participate in therapeutic activity/exercises x 25 minutes for increased strength within 7 treatment days. PHYSICAL THERAPY: Daily Note and AM Treatment Day # 3    Shirlene Au is a 80 y.o. male   PRIMARY DIAGNOSIS: Acute on chronic right-sided congestive heart failure (HCC)  Bilateral leg edema [R60.0]         ASSESSMENT:     REHAB RECOMMENDATIONS: CURRENT LEVEL OF FUNCTION:  (Most Recently Demonstrated)   Recommendation to date pending progress:  Setting:   Short-term Rehab  Equipment:    To Be Determined Bed Mobility:   Minimal Assistance  Sit to Stand:   Moderate Assistance x 2  Transfers:   Moderate Assistance x 2  Gait/Mobility:   Moderate Assistance x 2     ASSESSMENT:  Mr. Karson Barnett has improved with bed mobility. Transfers and gait continue to require mod assist of 2. He did get to the chair today which was great. Before doing that he performed dynamic sitting activity while doing ADLs with OT. It was a very good session.            SUBJECTIVE:   Mr. Karson Barnett states, \"OK    SOCIAL HISTORY/ LIVING ENVIRONMENT: see eval  Home Environment: Private residence  # Steps to Enter: 2  One/Two Story Residence: One story  Living Alone: Yes  Support Systems: Child(aleksey),Other Family Member(s),[employer],Friend/Neighbor  OBJECTIVE:     PAIN: VITAL SIGNS: LINES/DRAINS:   Pre Treatment: Pain Screen  Pain Scale 1: Numeric (0 - 10)  Pain Onset 1: ongoing  Pain Location 1: Hip;Knee  Pain Orientation 1: Left  Post Treatment: 0   IV  O2 Device: None (Room air)     MOBILITY: I Mod I S SBA CGA Min Mod Max Total  NT x2 Comments:   Bed Mobility    Rolling [] [] [] [] [] [] [] [] [] [x] []    Supine to Sit [] [] [] [] [] [x] [] [] [] [] []    Scooting [] [] [] [] [] [x] [x] [] [] [] []    Sit to Supine [] [] [] [] [] [] [] [] [] [x] []    Transfers    Sit to Stand [] [] [] [] [] [] [x] [] [] [] [x]    Bed to Chair [] [] [] [] [] [] [x] [] [] [] [x]    Stand to Sit [] [] [] [] [] [] [x] [] [] [] [x]    I=Independent, Mod I=Modified Independent, S=Supervision, SBA=Standby Assistance, CGA=Contact Guard Assistance,   Min=Minimal Assistance, Mod=Moderate Assistance, Max=Maximal Assistance, Total=Total Assistance, NT=Not Tested    BALANCE: Good Fair+ Fair Fair- Poor NT Comments   Sitting Static [] [x] [] [] [] []    Sitting Dynamic [] [x] [] [] [] []              Standing Static [] [] [] [x] [] []    Standing Dynamic [] [] [] [x] [] []      GAIT: I Mod I S SBA CGA Min Mod Max Total  NT x2 Comments:   Level of Assistance [] [] [] [] [] [] [x] [] [] [] [x]    Distance 3 ft to get to the chair    DME Rolling Walker and Gait Belt    Gait Quality Shuffled, slow    Weightbearing  Status N/A     I=Independent, Mod I=Modified Independent, S=Supervision, SBA=Standby Assistance, CGA=Contact Guard Assistance,   Min=Minimal Assistance, Mod=Moderate Assistance, Max=Maximal Assistance, Total=Total Assistance, NT=Not Tested    PLAN:   FREQUENCY/DURATION: PT Plan of Care: 3 times/week for duration of hospital stay or until stated goals are met, whichever comes first.  TREATMENT:     TREATMENT:   ($$ Therapeutic Activity: 23-37 mins    )  Co-Treatment PT/OT necessary due to patient's decreased overall endurance/tolerance levels, as well as need for high level skilled assistance to complete functional transfers/mobility and functional tasks  Therapeutic Activity (30 Minutes):  Therapeutic activity included Rolling, Supine to Sit, Sit to Supine, Transfer Training, Ambulation on level ground, Sitting balance  and Standing balance to improve functional Mobility, Strength, ROM and Activity tolerance.     TREATMENT GRID:  N/A    AFTER TREATMENT POSITION/PRECAUTIONS:  Alarm Activated, Bed, Needs within reach and RN notified    INTERDISCIPLINARY COLLABORATION:  RN/PCT, PT/PTA and OT/CALI    TOTAL TREATMENT DURATION:  PT Patient Time In/Time Out  Time In: 1020  Time Out: 224 Antoine Patel, PT

## 2022-03-31 NOTE — PROGRESS NOTES
Hospitalist Progress Note   Admit Date:  3/28/2022  3:15 PM   Name:  Damien Gracia   Age:  80 y.o. Sex:  male  :  1934   MRN:  [id number]   Room:  303/01    Presenting Complaint: Leg Pain    Reason(s) for Admission: Bilateral leg edema [R60.0]     Hospital Course & Interval History:     Damien Gracia is a 80year old CM with a PMH of chronic combined CHF, DM2, HTN, HLD, and pulmonary HTN with cor pulmonale with chronic BLE edema admitted with worsening bilateral lower extremity edema despite doubling Lasix dose for a week and adding Zaroxolyn. Started on Lasix gtt. Cardiology consulted. Also noted to have right lower extremity cellulitis and started on Unasyn. Subjective/24hr Events (22): Patient is seen at the bedside. Reports feeling better. Lower extremity edema resolved. Denies chest pain, palpitation, nausea, vomiting or abdominal pain. Labs notable for creatinine getting worse and hyperkalemia. Lasix GTT discontinued yesterday. We will continue close monitoring. Patient is now DNR after discussion with palliative care. Son at the bedside and daughter on phone, updated on patient's current condition. All questions were answered. ROS:  10 systems reviewed and negative except as noted above. Assessment & Plan:     Acute on chronic congestive heart failure:  Bilateral lower extremity edema:  Last echocardiogram with ejection fraction 12% with diastolic dysfunction  Off lasix gtt on 3/30  Daily BMP  Lower extremity Doppler negative for DVT  Cardiology consulted, appreciate recommendation  Echocardiogram with ejection fraction 41%, and diastolic dysfunction. 3/31:Lower extremity edema significantly resolved. Lasix yesterday discontinued yesterday due to worsening kidney function. Currently fluid status stable. Appreciate cardiology recommendations.       Hypertension:  Persistent A. Fib:  CAD:  Continue Cozaar and Toprol  Continue aspirin and statin  Watchman device in place  3/31: Patient with intermittent hypotension. Held losartan d/t LALY and hypotension    LALY on CKD stage III:  Baseline creatinine 1.5, Creatinine 2.40 on admission  Close monitoring as patient is on Lasix gtt  3/31: Creatinine trending up, 3.4 today, likely aggressive diuresis. Lasix drip  Discontinued yesterday. Continue close monitoring    Hyperkalemia in the setting of LALY:  3/31: We will treat with sodium bicarbonate, insulin, dextrose and Lokelma. Repeat BMP in the evening    Right leg cellulitis:  Improving  3/31: switch Unasyn to Augmentin to complete 7-day course, EOT 4/4    Type 2 diabetes mellitus:  A1c 9.3 in 1/2022  Continue Lantus 15 units and sliding scale  We will continue to adjust accordingly  Hold home meds      Discharge Planning: To be determined, PT/OT    Diet:  ADULT ORAL NUTRITION SUPPLEMENT Dinner; Diabetic Supplement  ADULT DIET Regular; 4 carb choices (60 gm/meal);  Low Sodium (2 gm); 2000 ml  DVT PPx: Heparin  Code status: DNR    Hospital Problems as of 3/31/2022 Date Reviewed: 3/21/2022          Codes Class Noted - Resolved POA    Moderate protein-calorie malnutrition ([facility] ICD-10-CM: E44.0  ICD-9-CM: 263.0  3/30/2022 - Present Yes        Bilateral leg edema ICD-10-CM: R60.0  ICD-9-CM: 782.3  3/28/2022 - Present Yes        Cor pulmonale (chronic) ([facility] ICD-10-CM: I27.81  ICD-9-CM: 416.9  3/28/2022 - Present Yes        Pulmonary HTN ([facility] ICD-10-CM: I27.20  ICD-9-CM: 416.8  3/28/2022 - Present Yes        Cellulitis of right leg ICD-10-CM: L03.115  ICD-9-CM: 682.6  3/28/2022 - Present Yes        * (Principal) Acute on chronic right-sided congestive heart failure ([facility] ICD-10-CM: I50.813  ICD-9-CM: 428.0  3/28/2022 - Present Yes        Presence of Watchman left atrial appendage closure device ICD-10-CM: Z95.818  ICD-9-CM: V45.09  1/21/2020 - Present Yes    Overview Signed 1/21/2020  7:36 PM by Mary Watson MD     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 (12/17/19)             Type 2 diabetes with nephropathy ([facility] ICD-10-CM: E11.21  ICD-9-CM: 250.40, 583.81  11/6/2019 - Present Yes        Persistent atrial fibrillation (HCC) ICD-10-CM: I48.19  ICD-9-CM: 427.31  9/18/2019 - Present Yes        Automatic implantable cardioverter-defibrillator in situ ICD-10-CM: Z95.810  ICD-9-CM: V45.02  3/30/2016 - Present Yes    Overview Signed 3/30/2016  9:01 AM by Dorothy Sanchez     No shocks             Chronic combined systolic and diastolic heart failure ([facility] ICD-10-CM: I50.42  ICD-9-CM: 428.42  9/17/2013 - Present Yes        Coronary artery disease (Chronic) ICD-10-CM: I25.10  ICD-9-CM: 414.00  9/10/2011 - Present Yes    Overview Signed 3/30/2016  9:03 AM by Marcus Walsh through [facility]             Hypercholesteremia (Chronic) ICD-10-CM: E78.00  ICD-9-CM: 272.0  9/10/2011 - Present Yes        Hypertension (Chronic) ICD-10-CM: I10  ICD-9-CM: 401.9  9/10/2011 - Present Yes              Objective:     Patient Vitals for the past 24 hrs:   Temp Pulse Resp BP SpO2   03/31/22 0755 98.3 °F (36.8 °C) 71 20 (!) 140/60 93 %   03/31/22 0507 98 °F (36.7 °C) 70 20 (!) 121/54 94 %   03/31/22 0026 97.8 °F (36.6 °C) 70 20 (!) 116/51 98 %   03/30/22 2000 98 °F (36.7 °C) 70 20 (!) 140/75 99 %   03/30/22 1801 97.5 °F (36.4 °C) 69 20 (!) 106/51 100 %   03/30/22 1429 97.7 °F (36.5 °C) 69 20 134/60 97 %     Oxygen Therapy  O2 Sat (%): 93 % (03/31/22 0755)  Pulse via Oximetry: 70 beats per minute (03/28/22 1713)  O2 Device: None (Room air) (03/31/22 1220)    Estimated body mass index is 22.67 kg/m² as calculated from the following:    Height as of this encounter: 6' 2\" (1.88 m). Weight as of this encounter: 80.1 kg (176 lb 9.6 oz).     Intake/Output Summary (Last 24 hours) at 3/31/2022 1302  Last data filed at 3/30/2022 1807  Gross per 24 hour   Intake 540 ml   Output 550 ml   Net -10 ml         Physical Exam:     Blood pressure (!) 140/60, pulse 71, temperature 98.3 °F (36.8 °C), resp. rate 20, height 6' 2\" (1.88 m), weight 80.1 kg (176 lb 9.6 oz), SpO2 93 %. General:    Well nourished. No overt distress  Head:  Normocephalic, atraumatic  Eyes:  Sclerae appear normal.  Pupils equally round. ENT:  Nares appear normal, no drainage. Moist oral mucosa  Neck:  No restricted ROM. Trachea midline   CV:   RRR. No m/r/g. No jugular venous distension. Lungs:   Crackles on left lower lung  Abdomen: Bowel sounds present. Soft, nontender, nondistended. Extremities: bilateral lower extremity edema resolved, chronic erythema,  Right LE cellulitis, improved  Skin:     No rashes and normal coloration. Warm and dry. Neuro:  CN II-XII grossly intact. Sensation intact. A&Ox3  Psych:  Normal mood and affect.       I have reviewed ordered lab tests and independently visualized imaging below:    Recent Labs:  Recent Results (from the past 48 hour(s))   GLUCOSE, POC    Collection Time: 03/29/22  5:01 PM   Result Value Ref Range    Glucose (POC) 401 (H) 65 - 100 mg/dL    Performed by [employer]    GLUCOSE, POC    Collection Time: 03/29/22  8:47 PM   Result Value Ref Range    Glucose (POC) 172 (H) 65 - 100 mg/dL    Performed by [facility]    GLUCOSE, POC    Collection Time: 03/30/22  7:30 AM   Result Value Ref Range    Glucose (POC) 104 (H) 65 - 100 mg/dL    Performed by [employer]    METABOLIC PANEL, BASIC    Collection Time: 03/30/22  8:10 AM   Result Value Ref Range    Sodium 133 (L) 138 - 145 mmol/L    Potassium 4.0 3.5 - 5.1 mmol/L    Chloride 96 (L) 98 - 107 mmol/L    CO2 28 21 - 32 mmol/L    Anion gap 9 7 - 16 mmol/L    Glucose 111 (H) 65 - 100 mg/dL    BUN 81 (H) 8 - 23 MG/DL    Creatinine 2.70 (H) 0.8 - 1.5 MG/DL    GFR est AA 29 (L) >60 ml/min/1.73m2    GFR est non-AA 24 (L) >60 ml/min/1.73m2    Calcium 9.7 8.3 - 10.4 MG/DL   CBC WITH AUTOMATED DIFF    Collection Time: 03/30/22  8:10 AM   Result Value Ref Range    WBC 9.1 4.3 - 11.1 K/uL    RBC 3.64 (L) 4.23 - 5.6 M/uL    HGB 11.9 (L) 13.6 - 17.2 g/dL    HCT 36.9 (L) 41.1 - 50.3 %    .4 (H) 79.6 - 97.8 FL    MCH 32.7 26.1 - 32.9 PG    MCHC 32.2 31.4 - 35.0 g/dL    RDW 15.9 (H) 11.9 - 14.6 %    PLATELET 314 269 - 852 K/uL    MPV 9.3 (L) 9.4 - 12.3 FL    ABSOLUTE NRBC 0.00 0.0 - 0.2 K/uL    DF AUTOMATED      NEUTROPHILS 66 43 - 78 %    LYMPHOCYTES 19 13 - 44 %    MONOCYTES 11 4.0 - 12.0 %    EOSINOPHILS 3 0.5 - 7.8 %    BASOPHILS 1 0.0 - 2.0 %    IMMATURE GRANULOCYTES 1 0.0 - 5.0 %    ABS. NEUTROPHILS 5.9 1.7 - 8.2 K/UL    ABS. LYMPHOCYTES 1.7 0.5 - 4.6 K/UL    ABS. MONOCYTES 1.0 0.1 - 1.3 K/UL    ABS. EOSINOPHILS 0.3 0.0 - 0.8 K/UL    ABS. BASOPHILS 0.1 0.0 - 0.2 K/UL    ABS. IMM.  GRANS. 0.1 0.0 - 0.5 K/UL   ALBUMIN    Collection Time: 03/30/22  8:10 AM   Result Value Ref Range    Albumin 2.3 (L) 3.2 - 4.6 g/dL   MAGNESIUM    Collection Time: 03/30/22  8:10 AM   Result Value Ref Range    Magnesium 2.0 1.8 - 2.4 mg/dL   PHOSPHORUS    Collection Time: 03/30/22  8:10 AM   Result Value Ref Range    Phosphorus 5.0 (H) 2.3 - 3.7 MG/DL   GLUCOSE, POC    Collection Time: 03/30/22 11:56 AM   Result Value Ref Range    Glucose (POC) 243 (H) 65 - 100 mg/dL    Performed by [employer]    GLUCOSE, POC    Collection Time: 03/30/22  4:26 PM   Result Value Ref Range    Glucose (POC) 220 (H) 65 - 100 mg/dL    Performed by [employer]    GLUCOSE, POC    Collection Time: 03/30/22  9:24 PM   Result Value Ref Range    Glucose (POC) 278 (H) 65 - 100 mg/dL    Performed by [facility]    METABOLIC PANEL, BASIC    Collection Time: 03/31/22  5:17 AM   Result Value Ref Range    Sodium 131 (L) 138 - 145 mmol/L    Potassium 5.3 (H) 3.5 - 5.1 mmol/L    Chloride 97 (L) 98 - 107 mmol/L    CO2 25 21 - 32 mmol/L    Anion gap 9 7 - 16 mmol/L    Glucose 117 (H) 65 - 100 mg/dL    BUN 99 (H) 8 - 23 MG/DL    Creatinine 3.40 (H) 0.8 - 1.5 MG/DL    GFR est AA 22 (L) >60 ml/min/1.73m2    GFR est non-AA 18 (L) >60 ml/min/1.73m2    Calcium 9.4 8.3 - 10.4 MG/DL   CBC WITH AUTOMATED DIFF    Collection Time: 03/31/22  5:17 AM   Result Value Ref Range    WBC 9.6 4.3 - 11.1 K/uL    RBC 3.34 (L) 4.23 - 5.6 M/uL    HGB 11.1 (L) 13.6 - 17.2 g/dL    HCT 33.4 (L) 41.1 - 50.3 %    .0 (H) 79.6 - 97.8 FL    MCH 33.2 (H) 26.1 - 32.9 PG    MCHC 33.2 31.4 - 35.0 g/dL    RDW 15.7 (H) 11.9 - 14.6 %    PLATELET 493 731 - 865 K/uL    MPV 9.3 (L) 9.4 - 12.3 FL    ABSOLUTE NRBC 0.00 0.0 - 0.2 K/uL    DF AUTOMATED      NEUTROPHILS 68 43 - 78 %    LYMPHOCYTES 16 13 - 44 %    MONOCYTES 12 4.0 - 12.0 %    EOSINOPHILS 3 0.5 - 7.8 %    BASOPHILS 1 0.0 - 2.0 %    IMMATURE GRANULOCYTES 1 0.0 - 5.0 %    ABS. NEUTROPHILS 6.6 1.7 - 8.2 K/UL    ABS. LYMPHOCYTES 1.5 0.5 - 4.6 K/UL    ABS. MONOCYTES 1.1 0.1 - 1.3 K/UL    ABS. EOSINOPHILS 0.2 0.0 - 0.8 K/UL    ABS. BASOPHILS 0.1 0.0 - 0.2 K/UL    ABS. IMM. GRANS. 0.1 0.0 - 0.5 K/UL   MAGNESIUM    Collection Time: 03/31/22  5:17 AM   Result Value Ref Range    Magnesium 2.2 1.8 - 2.4 mg/dL   GLUCOSE, POC    Collection Time: 03/31/22  7:50 AM   Result Value Ref Range    Glucose (POC) 124 (H) 65 - 100 mg/dL    Performed by [facility]    GLUCOSE, POC    Collection Time: 03/31/22 11:22 AM   Result Value Ref Range    Glucose (POC) 242 (H) 65 - 100 mg/dL    Performed by Sonali        All Micro Results     None          Other Studies:  No results found.     Current Meds:  Current Facility-Administered Medications   Medication Dose Route Frequency    sodium chloride (NS) flush 5-10 mL  5-10 mL IntraVENous Q8H    sodium chloride (NS) flush 5-10 mL  5-10 mL IntraVENous PRN    aspirin delayed-release tablet 81 mg  81 mg Oral DAILY    atorvastatin (LIPITOR) tablet 80 mg  80 mg Oral DAILY    [Held by provider] losartan (COZAAR) tablet 100 mg  100 mg Oral DAILY    metoprolol succinate (TOPROL-XL) XL tablet 50 mg  50 mg Oral DAILY    montelukast (SINGULAIR) tablet 10 mg  10 mg Oral DAILY    ondansetron (ZOFRAN) injection 4 mg  4 mg IntraVENous Q6H PRN    heparin (porcine) injection 5,000 Units  5,000 Units SubCUTAneous Q8H    ampicillin-sulbactam (UNASYN) 3 g in 0.9% sodium chloride (MBP/ADV) 100 mL MBP  3 g IntraVENous Q12H    morphine injection 2 mg  2 mg IntraVENous Q4H PRN    oxyCODONE IR (ROXICODONE) tablet 5 mg  5 mg Oral Q4H PRN    acetaminophen (TYLENOL) tablet 650 mg  650 mg Oral Q6H PRN    insulin glargine (LANTUS) injection 15 Units  0.2 Units/kg SubCUTAneous QHS    insulin lispro (HUMALOG) injection   SubCUTAneous AC&HS    glucose chewable tablet 16 g  16 g Oral PRN    glucagon (GLUCAGEN) injection 1 mg  1 mg IntraMUSCular PRN    dextrose 10% infusion 125-250 mL  125-250 mL IntraVENous PRN       Signed:  Logan Winter MD    Part of this note may have been written by using a voice dictation software. The note has been proof read but may still contain some grammatical/other typographical errors.

## 2022-03-31 NOTE — ROUTINE PROCESS
Bedside and Verbal shift change report given to Tia Cooper RN (oncoming nurse) by myself (offgoing nurse). Report included the following information SBAR, Kardex, MAR and Recent Results.

## 2022-03-31 NOTE — PROGRESS NOTES
ACUTE OT GOALS:  (Developed with and agreed upon by patient and/or caregiver.)  1. Patient will complete lower body bathing and dressing with min A and adaptive equipment as needed. 2. Patient will complete toileting with min A.   3. Patient will tolerate 30 minutes of OT treatment with 1-2 rest breaks to increase activity tolerance for ADLs. 4. Patient will complete functional transfers with min A and adaptive equipment as needed. 5. Patient will complete functional activity while seated edge of bed with SBA and adaptive equipment as needed. 6. Patient will tolerate 15 minutes unsupported sitting balance with SBA in preparation for ADL performance. 7. Patient will tolerate 15 minutes BUE therapeutic activities to increase use of BUE during ADL performance. OCCUPATIONAL THERAPY: Daily Note OT Treatment Day # 3    Rosalba Nichols is a 80 y.o. male   PRIMARY DIAGNOSIS: Acute on chronic right-sided congestive heart failure (HCC)  Bilateral leg edema [R60.0]       [place] / [place] / Product Type: Medicare /   ASSESSMENT:     REHAB RECOMMENDATIONS: CURRENT LEVEL OF FUNCTION:  (Most Recently Demonstrated)   Recommendation to date pending progress:  Setting:   Short-term Rehab  Equipment:    To Be Determined Bathing:   Moderate Assistance  Dressing:   Moderate Assistance UB; total assist LB  Feeding/Grooming:   Set Up  Toileting:   Not tested  Functional Mobility:   Moderate Assistance x 2     ASSESSMENT:  Mr. Sendy Mills presents with generalized weakness, decreased independence with transfers and functional mobility, and decreased independence with ADL's. Per chart review pt was more mobile today. Pt has limited range in his L shoulder and hip. Good effort today. Continue POC. SUBJECTIVE:   Mr. Sendy Mills states, \"I can't move this L shoulder much. \"    SOCIAL HISTORY/LIVING ENVIRONMENT:   Home Environment: Private residence  # Steps to Enter: 2  One/Two Story Residence: One story  Living Alone: Yes  Support Systems: Child(aleksey),Other Family Member(s),[employer],Friend/Neighbor    OBJECTIVE:     PAIN: VITAL SIGNS: LINES/DRAINS:   Pre Treatment: Pain Screen  Pain Scale 1: Numeric (0 - 10)  Pain Intensity 1: 3  Pain Location 1: Hip;Knee  Pain Orientation 1: Left  Pain Description 1: Aching  Pain Intervention(s) 1: Repositioned  Post Treatment: 0   IV  O2 Device: None (Room air)     ACTIVITIES OF DAILY LIVING: I Mod I S SBA CGA Min Mod Max Total NT Comments   BASIC ADLs:              Bathing/ Showering [] [] [] [] [] [] [x] [] [] []    Toileting [] [] [] [] [] [] [] [] [] [x]    Dressing [] [] [] [] [] [] [x] [] [x] []    Feeding [] [] [] [] [] [] [] [] [] [x]    Grooming [] [] [] [x] [] [] [] [] [] [] Washing face, combing hair, brushing teeth   Personal Device Care [] [] [] [] [] [] [] [] [] [x]    Functional Mobility [] [] [] [] [] [] [x] [] [] [] Assist x 2   I=Independent, Mod I=Modified Independent, S=Supervision, SBA=Standby Assistance, CGA=Contact Guard Assistance,   Min=Minimal Assistance, Mod=Moderate Assistance, Max=Maximal Assistance, Total=Total Assistance, NT=Not Tested    MOBILITY: I Mod I S SBA CGA Min Mod Max Total  NT x2 Comments:   Supine to sit [] [] [] [] [] [x] [] [] [] [] [x]    Sit to supine [] [] [] [] [] [] [] [] [] [x] []    Sit to stand [] [] [] [] [] [] [x] [] [] [] [x]    Bed to chair [] [] [] [] [] [] [x] [] [] [] [x]    I=Independent, Mod I=Modified Independent, S=Supervision, SBA=Standby Assistance, CGA=Contact Guard Assistance,   Min=Minimal Assistance, Mod=Moderate Assistance, Max=Maximal Assistance, Total=Total Assistance, NT=Not Tested    BALANCE: Good Fair+ Fair Fair- Poor NT Comments   Sitting Static [x] [] [] [] [] []    Sitting Dynamic [x] [] [] [] [] []              Standing Static [] [] [] [x] [] []    Standing Dynamic [] [] [] [x] [] []      PLAN:   FREQUENCY/DURATION: OT Plan of Care: 3 times/week for duration of hospital stay or until stated goals are met, whichever comes first.    TREATMENT:   TREATMENT:   ($$ Self Care/Home Management: 8-22 mins$$ Neuromuscular Re-Education: 8-22 mins   )  Co-Treatment PT/OT necessary due to patient's decreased overall endurance/tolerance levels, as well as need for high level skilled assistance to complete functional transfers/mobility and functional tasks  Self Care (16 Minutes): Self care including Upper Body Bathing, Lower Body Bathing, Upper Body Dressing, Lower Body Dressing, Grooming and Energy Conservation Training to increase independence and decrease level of assistance required. Neuromuscular Re-education (15 Minutes): Neuromuscular Re-education included Balance Training, Postural training, Sitting balance training, Standing balance training and transfers and functional mobility to improve Balance, Functional Mobility and Postural Control.     TREATMENT GRID:  N/A    AFTER TREATMENT POSITION/PRECAUTIONS:  Alarm Activated, Chair, Needs within reach, RN notified and family in room    INTERDISCIPLINARY COLLABORATION:  RN/PCT, PT/PTA and OT/CALI    TOTAL TREATMENT DURATION:  OT Patient Time In/Time Out  Time In: 1020  Time Out: [address] [facility] Prave[facility] Damon

## 2022-04-01 ENCOUNTER — APPOINTMENT (OUTPATIENT)
Dept: ULTRASOUND IMAGING | Age: 87
DRG: 291 | End: 2022-04-01
Attending: FAMILY MEDICINE
Payer: MEDICARE

## 2022-04-01 LAB
ALBUMIN SERPL-MCNC: 2.1 G/DL (ref 3.2–4.6)
ANION GAP SERPL CALC-SCNC: 8 MMOL/L (ref 7–16)
APPEARANCE UR: CLEAR
BACTERIA URNS QL MICRO: 0 /HPF
BASOPHILS # BLD: 0 K/UL (ref 0–0.2)
BASOPHILS NFR BLD: 1 % (ref 0–2)
BILIRUB UR QL: NEGATIVE
BUN SERPL-MCNC: 105 MG/DL (ref 8–23)
CALCIUM SERPL-MCNC: 9.1 MG/DL (ref 8.3–10.4)
CASTS URNS QL MICRO: ABNORMAL /LPF
CHLORIDE SERPL-SCNC: 94 MMOL/L (ref 98–107)
CO2 SERPL-SCNC: 26 MMOL/L (ref 21–32)
COLOR UR: YELLOW
CREAT SERPL-MCNC: 3.8 MG/DL (ref 0.8–1.5)
CREAT UR-MCNC: 56 MG/DL
DIFFERENTIAL METHOD BLD: ABNORMAL
EOSINOPHIL # BLD: 0.2 K/UL (ref 0–0.8)
EOSINOPHIL NFR BLD: 2 % (ref 0.5–7.8)
EPI CELLS #/AREA URNS HPF: 0 /HPF
ERYTHROCYTE [DISTWIDTH] IN BLOOD BY AUTOMATED COUNT: 15.7 % (ref 11.9–14.6)
GLUCOSE BLD STRIP.AUTO-MCNC: 130 MG/DL (ref 65–100)
GLUCOSE BLD STRIP.AUTO-MCNC: 170 MG/DL (ref 65–100)
GLUCOSE BLD STRIP.AUTO-MCNC: 176 MG/DL (ref 65–100)
GLUCOSE BLD STRIP.AUTO-MCNC: 244 MG/DL (ref 65–100)
GLUCOSE SERPL-MCNC: 130 MG/DL (ref 65–100)
GLUCOSE UR STRIP.AUTO-MCNC: NEGATIVE MG/DL
HCT VFR BLD AUTO: 31.2 % (ref 41.1–50.3)
HGB BLD-MCNC: 10.5 G/DL (ref 13.6–17.2)
HGB UR QL STRIP: ABNORMAL
IMM GRANULOCYTES # BLD AUTO: 0.1 K/UL (ref 0–0.5)
IMM GRANULOCYTES NFR BLD AUTO: 1 % (ref 0–5)
KETONES UR QL STRIP.AUTO: NEGATIVE MG/DL
LEUKOCYTE ESTERASE UR QL STRIP.AUTO: NEGATIVE
LYMPHOCYTES # BLD: 1.2 K/UL (ref 0.5–4.6)
LYMPHOCYTES NFR BLD: 14 % (ref 13–44)
MAGNESIUM SERPL-MCNC: 2.2 MG/DL (ref 1.8–2.4)
MCH RBC QN AUTO: 32.9 PG (ref 26.1–32.9)
MCHC RBC AUTO-ENTMCNC: 33.7 G/DL (ref 31.4–35)
MCV RBC AUTO: 97.8 FL (ref 79.6–97.8)
MM INDURATION POC: 0 MM (ref 0–5)
MONOCYTES # BLD: 1 K/UL (ref 0.1–1.3)
MONOCYTES NFR BLD: 12 % (ref 4–12)
NEUTS SEG # BLD: 5.9 K/UL (ref 1.7–8.2)
NEUTS SEG NFR BLD: 70 % (ref 43–78)
NITRITE UR QL STRIP.AUTO: NEGATIVE
NRBC # BLD: 0 K/UL (ref 0–0.2)
PH UR STRIP: 6 [PH] (ref 5–9)
PHOSPHATE SERPL-MCNC: 6 MG/DL (ref 2.3–3.7)
PLATELET # BLD AUTO: 200 K/UL (ref 150–450)
PMV BLD AUTO: 9.2 FL (ref 9.4–12.3)
POTASSIUM SERPL-SCNC: 4.8 MMOL/L (ref 3.5–5.1)
PPD POC: NEGATIVE NEGATIVE
PROT UR STRIP-MCNC: NEGATIVE MG/DL
RBC # BLD AUTO: 3.19 M/UL (ref 4.23–5.6)
RBC #/AREA URNS HPF: ABNORMAL /HPF
SERVICE CMNT-IMP: ABNORMAL
SODIUM SERPL-SCNC: 128 MMOL/L (ref 138–145)
SODIUM UR-SCNC: 18 MMOL/L
SP GR UR REFRACTOMETRY: 1.01 (ref 1–1.02)
UROBILINOGEN UR QL STRIP.AUTO: 0.2 EU/DL (ref 0.2–1)
WBC # BLD AUTO: 8.4 K/UL (ref 4.3–11.1)
WBC URNS QL MICRO: 0 /HPF

## 2022-04-01 PROCEDURE — 83735 ASSAY OF MAGNESIUM: CPT

## 2022-04-01 PROCEDURE — 2709999900 HC NON-CHARGEABLE SUPPLY

## 2022-04-01 PROCEDURE — 97530 THERAPEUTIC ACTIVITIES: CPT | Performed by: PHYSICAL THERAPIST

## 2022-04-01 PROCEDURE — 74011250637 HC RX REV CODE- 250/637: Performed by: INTERNAL MEDICINE

## 2022-04-01 PROCEDURE — 97112 NEUROMUSCULAR REEDUCATION: CPT

## 2022-04-01 PROCEDURE — 82962 GLUCOSE BLOOD TEST: CPT

## 2022-04-01 PROCEDURE — 74011250636 HC RX REV CODE- 250/636: Performed by: INTERNAL MEDICINE

## 2022-04-01 PROCEDURE — 99232 SBSQ HOSP IP/OBS MODERATE 35: CPT | Performed by: INTERNAL MEDICINE

## 2022-04-01 PROCEDURE — 80048 BASIC METABOLIC PNL TOTAL CA: CPT

## 2022-04-01 PROCEDURE — 81001 URINALYSIS AUTO W/SCOPE: CPT

## 2022-04-01 PROCEDURE — 85025 COMPLETE CBC W/AUTO DIFF WBC: CPT

## 2022-04-01 PROCEDURE — 84300 ASSAY OF URINE SODIUM: CPT

## 2022-04-01 PROCEDURE — 82570 ASSAY OF URINE CREATININE: CPT

## 2022-04-01 PROCEDURE — 76775 US EXAM ABDO BACK WALL LIM: CPT

## 2022-04-01 PROCEDURE — 97535 SELF CARE MNGMENT TRAINING: CPT

## 2022-04-01 PROCEDURE — 74011250637 HC RX REV CODE- 250/637: Performed by: FAMILY MEDICINE

## 2022-04-01 PROCEDURE — 65660000000 HC RM CCU STEPDOWN

## 2022-04-01 PROCEDURE — 82040 ASSAY OF SERUM ALBUMIN: CPT

## 2022-04-01 PROCEDURE — 36415 COLL VENOUS BLD VENIPUNCTURE: CPT

## 2022-04-01 PROCEDURE — 74011636637 HC RX REV CODE- 636/637: Performed by: INTERNAL MEDICINE

## 2022-04-01 PROCEDURE — 84100 ASSAY OF PHOSPHORUS: CPT

## 2022-04-01 PROCEDURE — 74011000250 HC RX REV CODE- 250: Performed by: EMERGENCY MEDICINE

## 2022-04-01 RX ORDER — SODIUM CHLORIDE 9 MG/ML
50 INJECTION, SOLUTION INTRAVENOUS CONTINUOUS
Status: DISPENSED | OUTPATIENT
Start: 2022-04-01 | End: 2022-04-02

## 2022-04-01 RX ORDER — SODIUM CHLORIDE 9 MG/ML
50 INJECTION, SOLUTION INTRAVENOUS ONCE
Status: DISPENSED | OUTPATIENT
Start: 2022-04-01 | End: 2022-04-01

## 2022-04-01 RX ADMIN — AMOXICILLIN AND CLAVULANATE POTASSIUM 1 TABLET: 500; 125 TABLET, FILM COATED ORAL at 16:31

## 2022-04-01 RX ADMIN — INSULIN LISPRO 2 UNITS: 100 INJECTION, SOLUTION INTRAVENOUS; SUBCUTANEOUS at 13:42

## 2022-04-01 RX ADMIN — ACETAMINOPHEN 650 MG: 325 TABLET ORAL at 03:12

## 2022-04-01 RX ADMIN — ASPIRIN 81 MG: 81 TABLET ORAL at 08:38

## 2022-04-01 RX ADMIN — HEPARIN SODIUM 5000 UNITS: 5000 INJECTION INTRAVENOUS; SUBCUTANEOUS at 05:32

## 2022-04-01 RX ADMIN — SODIUM CHLORIDE, PRESERVATIVE FREE 5 ML: 5 INJECTION INTRAVENOUS at 13:44

## 2022-04-01 RX ADMIN — INSULIN GLARGINE 15 UNITS: 100 INJECTION, SOLUTION SUBCUTANEOUS at 21:29

## 2022-04-01 RX ADMIN — HEPARIN SODIUM 5000 UNITS: 5000 INJECTION INTRAVENOUS; SUBCUTANEOUS at 21:30

## 2022-04-01 RX ADMIN — HEPARIN SODIUM 5000 UNITS: 5000 INJECTION INTRAVENOUS; SUBCUTANEOUS at 13:43

## 2022-04-01 RX ADMIN — ACETAMINOPHEN 650 MG: 325 TABLET ORAL at 20:41

## 2022-04-01 RX ADMIN — AMOXICILLIN AND CLAVULANATE POTASSIUM 1 TABLET: 500; 125 TABLET, FILM COATED ORAL at 08:36

## 2022-04-01 RX ADMIN — SODIUM CHLORIDE 50 ML/HR: 900 INJECTION, SOLUTION INTRAVENOUS at 08:44

## 2022-04-01 RX ADMIN — SODIUM CHLORIDE, PRESERVATIVE FREE 10 ML: 5 INJECTION INTRAVENOUS at 05:32

## 2022-04-01 RX ADMIN — MONTELUKAST 10 MG: 10 TABLET, FILM COATED ORAL at 08:37

## 2022-04-01 RX ADMIN — INSULIN LISPRO 2 UNITS: 100 INJECTION, SOLUTION INTRAVENOUS; SUBCUTANEOUS at 17:48

## 2022-04-01 RX ADMIN — INSULIN LISPRO 4 UNITS: 100 INJECTION, SOLUTION INTRAVENOUS; SUBCUTANEOUS at 21:29

## 2022-04-01 RX ADMIN — METOPROLOL SUCCINATE 50 MG: 25 TABLET, EXTENDED RELEASE ORAL at 08:36

## 2022-04-01 RX ADMIN — ATORVASTATIN CALCIUM 80 MG: 80 TABLET, FILM COATED ORAL at 08:37

## 2022-04-01 NOTE — PROGRESS NOTES
Hospitalist Progress Note   Admit Date:  3/28/2022  3:15 PM   Name:  Nathalia Oliveira   Age:  80 y.o. Sex:  male  :  1934   MRN:  [id number]   Room:      Presenting Complaint: Leg Pain    Reason(s) for Admission: Bilateral leg edema [R60.0]     Hospital Course & Interval History:     Nathalia Oliveira is a 80year old CM with a PMH of chronic combined CHF, DM2, HTN, HLD, and pulmonary HTN with cor pulmonale with chronic BLE edema admitted with worsening bilateral lower extremity edema despite doubling Lasix dose for a week and adding Zaroxolyn. Cardiology consulted. Started on Lasix gtt. later discontinued on 3/30 due to worsening kidney functions. Lower extremity edema resolved. Also noted to have right lower extremity cellulitis and started on Unasyn and switched to p.o. Augmentin on 3/31. Subjective/24hr Events (22): Patient is seen at the bedside. Reports overall feeling okay. Denies chest pain, palpitation, shortness of breath, nausea, vomiting or abdominal pain. Lower extremity edema resolved. Patient with worsening kidney function despite holding Lasix gtt. for 48 hours. Urine studies ordered. Renal ultrasound ordered. Will give gentle hydration today and reassess tomorrow morning. Also nephrology consulted. Son at the bedside and daughter on phone, updated on patient's current condition. All questions were answered. ROS:  10 systems reviewed and negative except as noted above. Assessment & Plan:     LALY on CKD stage III:  Baseline creatinine 1.5, Creatinine 2.40 on admission  : Cr 3.8. Patient with worsening kidney function despite holding Lasix gtt. for 48 hours. Urine analysis and urine studies ordered. Renal ultrasound ordered. Will give gentle hydration today and reassess tomorrow morning. Careful for fluid overload state. Also nephrology consulted, appreciate recommendation.     Acute on chronic congestive heart failure:  Bilateral lower extremity edema:  Last echocardiogram with ejection fraction 12% with diastolic dysfunction  Off lasix gtt on 3/30  Daily BMP  Lower extremity Doppler negative for DVT  Cardiology consulted, appreciate recommendation  Echocardiogram with ejection fraction 93%, and diastolic dysfunction. 4/1:Lower extremity edema resolved. Diuresis on hold due to worsening kidney function. Currently fluid status stable. Appreciate cardiology recommendations. Hypertension:  Persistent A. Fib:  CAD:  Continue Cozaar and Toprol  Continue aspirin and statin  Watchman device in place  4/1: losartan on hold d/t LALY, continue rest of the management    Hyperkalemia in the setting of LALY:  Treated with sodium bicarbonate, insulin, dextrose and Lokelma. 4/1: Resolved    Right leg cellulitis:  Switched Unasyn to Augmentin on 3/31 to complete 7-day course, EOT 4/4 4/1: Continue Augmentin    Type 2 diabetes mellitus:  A1c 9.3 in 1/2022  Continue Lantus 15 units and sliding scale  We will continue to adjust accordingly  Hold home meds      Discharge Planning: To be determined, PT/OT recommend STR    Diet:  ADULT ORAL NUTRITION SUPPLEMENT Dinner; Diabetic Supplement  ADULT DIET Regular; 4 carb choices (60 gm/meal);  Low Sodium (2 gm); 2000 ml  DVT PPx: Heparin  Code status: DNR    Hospital Problems as of 4/1/2022 Date Reviewed: 3/21/2022          Codes Class Noted - Resolved POA    Moderate protein-calorie malnutrition ([facility] ICD-10-CM: E44.0  ICD-9-CM: 263.0  3/30/2022 - Present Yes        Bilateral leg edema ICD-10-CM: R60.0  ICD-9-CM: 782.3  3/28/2022 - Present Yes        Cor pulmonale (chronic) (HCC) ICD-10-CM: I27.81  ICD-9-CM: 416.9  3/28/2022 - Present Yes        Pulmonary HTN ([facility] ICD-10-CM: I27.20  ICD-9-CM: 416.8  3/28/2022 - Present Yes        Cellulitis of right leg ICD-10-CM: L03.115  ICD-9-CM: 682.6  3/28/2022 - Present Yes        * (Principal) Acute on chronic right-sided congestive heart failure ([facility] ICD-10-CM: I50.813  ICD-9-CM: 428.0  3/28/2022 - Present Yes        Presence of Watchman left atrial appendage closure device ICD-10-CM: Z95.818  ICD-9-CM: V45.09  1/21/2020 - Present Yes    Overview Signed 1/21/2020  7:36 PM by Emely Pabon MD     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 (12/17/19)             Type 2 diabetes with nephropathy ([facility] ICD-10-CM: E11.21  ICD-9-CM: 250.40, 583.81  11/6/2019 - Present Yes        Persistent atrial fibrillation (HCC) ICD-10-CM: I48.19  ICD-9-CM: 427.31  9/18/2019 - Present Yes        Automatic implantable cardioverter-defibrillator in situ ICD-10-CM: Z95.810  ICD-9-CM: V45.02  3/30/2016 - Present Yes    Overview Signed 3/30/2016  9:01 AM by Marifer Swanson     No shocks             Chronic combined systolic and diastolic heart failure ([facility] ICD-10-CM: I50.42  ICD-9-CM: 428.42  9/17/2013 - Present Yes        Coronary artery disease (Chronic) ICD-10-CM: I25.10  ICD-9-CM: 414.00  9/10/2011 - Present Yes    Overview Signed 3/30/2016  9:03 AM by Jeff granados             Hypercholesteremia (Chronic) ICD-10-CM: E78.00  ICD-9-CM: 272.0  9/10/2011 - Present Yes        Hypertension (Chronic) ICD-10-CM: I10  ICD-9-CM: 401.9  9/10/2011 - Present Yes              Objective:     Patient Vitals for the past 24 hrs:   Temp Pulse Resp BP SpO2   04/01/22 0733 97.6 °F (36.4 °C) 72 20 113/65 96 %   04/01/22 0412 98.4 °F (36.9 °C) 70 20 (!) 111/56 93 %   03/31/22 2356 98.3 °F (36.8 °C) 70 20 126/70 98 %   03/31/22 1940 98.2 °F (36.8 °C) 70 20 117/63 96 %   03/31/22 1519 97.9 °F (36.6 °C) 69 20 (!) 104/55 94 %   03/31/22 1218 97.9 °F (36.6 °C) 70 18 (!) 108/57 97 %     Oxygen Therapy  O2 Sat (%): 96 % (04/01/22 4298)  Pulse via Oximetry: 70 beats per minute (03/28/22 0083)  O2 Device: None (Room air) (04/01/22 3386)    Estimated body mass index is 23.32 kg/m² as calculated from the following:    Height as of this encounter: 6' 2\" (1.88 m).     Weight as of this encounter: 82.4 kg (181 lb 9.6 oz). Intake/Output Summary (Last 24 hours) at 4/1/2022 1053  Last data filed at 3/31/2022 1519  Gross per 24 hour   Intake 240 ml   Output 150 ml   Net 90 ml         Physical Exam:     Blood pressure 113/65, pulse 72, temperature 97.6 °F (36.4 °C), resp. rate 20, height 6' 2\" (1.88 m), weight 82.4 kg (181 lb 9.6 oz), SpO2 96 %. General:    Well nourished. No overt distress  Head:  Normocephalic, atraumatic  Eyes:  Sclerae appear normal.  Pupils equally round. ENT:  Nares appear normal, no drainage. Moist oral mucosa  Neck:  No restricted ROM. Trachea midline   CV:   RRR. No m/r/g. No jugular venous distension. Lungs:   Crackles on left lower lung  Abdomen: Bowel sounds present. Soft, nontender, nondistended. Extremities: bilateral lower extremity edema resolved, chronic erythema,  Right LE cellulitis, improved  Skin:     No rashes and normal coloration. Warm and dry. Neuro:  CN II-XII grossly intact. Sensation intact. A&Ox3  Psych:  Normal mood and affect.       I have reviewed ordered lab tests and independently visualized imaging below:    Recent Labs:  Recent Results (from the past 48 hour(s))   GLUCOSE, POC    Collection Time: 03/30/22 11:56 AM   Result Value Ref Range    Glucose (POC) 243 (H) 65 - 100 mg/dL    Performed by [employer]    GLUCOSE, POC    Collection Time: 03/30/22  4:26 PM   Result Value Ref Range    Glucose (POC) 220 (H) 65 - 100 mg/dL    Performed by [employer]    GLUCOSE, POC    Collection Time: 03/30/22  9:24 PM   Result Value Ref Range    Glucose (POC) 278 (H) 65 - 100 mg/dL    Performed by [employer]    METABOLIC PANEL, BASIC    Collection Time: 03/31/22  5:17 AM   Result Value Ref Range    Sodium 131 (L) 138 - 145 mmol/L    Potassium 5.3 (H) 3.5 - 5.1 mmol/L    Chloride 97 (L) 98 - 107 mmol/L    CO2 25 21 - 32 mmol/L    Anion gap 9 7 - 16 mmol/L    Glucose 117 (H) 65 - 100 mg/dL    BUN 99 (H) 8 - 23 MG/DL    Creatinine 3.40 (H) 0.8 - 1.5 MG/DL    GFR est AA 22 (L) >60 ml/min/1.73m2    GFR est non-AA 18 (L) >60 ml/min/1.73m2    Calcium 9.4 8.3 - 10.4 MG/DL   CBC WITH AUTOMATED DIFF    Collection Time: 03/31/22  5:17 AM   Result Value Ref Range    WBC 9.6 4.3 - 11.1 K/uL    RBC 3.34 (L) 4.23 - 5.6 M/uL    HGB 11.1 (L) 13.6 - 17.2 g/dL    HCT 33.4 (L) 41.1 - 50.3 %    .0 (H) 79.6 - 97.8 FL    MCH 33.2 (H) 26.1 - 32.9 PG    MCHC 33.2 31.4 - 35.0 g/dL    RDW 15.7 (H) 11.9 - 14.6 %    PLATELET 834 232 - 223 K/uL    MPV 9.3 (L) 9.4 - 12.3 FL    ABSOLUTE NRBC 0.00 0.0 - 0.2 K/uL    DF AUTOMATED      NEUTROPHILS 68 43 - 78 %    LYMPHOCYTES 16 13 - 44 %    MONOCYTES 12 4.0 - 12.0 %    EOSINOPHILS 3 0.5 - 7.8 %    BASOPHILS 1 0.0 - 2.0 %    IMMATURE GRANULOCYTES 1 0.0 - 5.0 %    ABS. NEUTROPHILS 6.6 1.7 - 8.2 K/UL    ABS. LYMPHOCYTES 1.5 0.5 - 4.6 K/UL    ABS. MONOCYTES 1.1 0.1 - 1.3 K/UL    ABS. EOSINOPHILS 0.2 0.0 - 0.8 K/UL    ABS. BASOPHILS 0.1 0.0 - 0.2 K/UL    ABS. IMM.  GRANS. 0.1 0.0 - 0.5 K/UL   MAGNESIUM    Collection Time: 03/31/22  5:17 AM   Result Value Ref Range    Magnesium 2.2 1.8 - 2.4 mg/dL   GLUCOSE, POC    Collection Time: 03/31/22  7:50 AM   Result Value Ref Range    Glucose (POC) 124 (H) 65 - 100 mg/dL    Performed by Elizabeth    GLUCOSE, POC    Collection Time: 03/31/22 11:22 AM   Result Value Ref Range    Glucose (POC) 242 (H) 65 - 100 mg/dL    Performed by Sonali    GLUCOSE, POC    Collection Time: 03/31/22  4:07 PM   Result Value Ref Range    Glucose (POC) 187 (H) 65 - 100 mg/dL    Performed by Molly    GLUCOSE, POC    Collection Time: 03/31/22  8:49 PM   Result Value Ref Range    Glucose (POC) 192 (H) 65 - 100 mg/dL    Performed by Ramon    CBC WITH AUTOMATED DIFF    Collection Time: 04/01/22  4:40 AM   Result Value Ref Range    WBC 8.4 4.3 - 11.1 K/uL    RBC 3.19 (L) 4.23 - 5.6 M/uL    HGB 10.5 (L) 13.6 - 17.2 g/dL    HCT 31.2 (L) 41.1 - 50.3 %    MCV 97.8 79.6 - 97.8 FL MCH 32.9 26.1 - 32.9 PG    MCHC 33.7 31.4 - 35.0 g/dL    RDW 15.7 (H) 11.9 - 14.6 %    PLATELET 029 576 - 052 K/uL    MPV 9.2 (L) 9.4 - 12.3 FL    ABSOLUTE NRBC 0.00 0.0 - 0.2 K/uL    DF AUTOMATED      NEUTROPHILS 70 43 - 78 %    LYMPHOCYTES 14 13 - 44 %    MONOCYTES 12 4.0 - 12.0 %    EOSINOPHILS 2 0.5 - 7.8 %    BASOPHILS 1 0.0 - 2.0 %    IMMATURE GRANULOCYTES 1 0.0 - 5.0 %    ABS. NEUTROPHILS 5.9 1.7 - 8.2 K/UL    ABS. LYMPHOCYTES 1.2 0.5 - 4.6 K/UL    ABS. MONOCYTES 1.0 0.1 - 1.3 K/UL    ABS. EOSINOPHILS 0.2 0.0 - 0.8 K/UL    ABS. BASOPHILS 0.0 0.0 - 0.2 K/UL    ABS. IMM.  GRANS. 0.1 0.0 - 0.5 K/UL   METABOLIC PANEL, BASIC    Collection Time: 04/01/22  4:40 AM   Result Value Ref Range    Sodium 128 (L) 138 - 145 mmol/L    Potassium 4.8 3.5 - 5.1 mmol/L    Chloride 94 (L) 98 - 107 mmol/L    CO2 26 21 - 32 mmol/L    Anion gap 8 7 - 16 mmol/L    Glucose 130 (H) 65 - 100 mg/dL     (H) 8 - 23 MG/DL    Creatinine 3.80 (H) 0.8 - 1.5 MG/DL    GFR est AA 19 (L) >60 ml/min/1.73m2    GFR est non-AA 16 (L) >60 ml/min/1.73m2    Calcium 9.1 8.3 - 10.4 MG/DL   ALBUMIN    Collection Time: 04/01/22  4:40 AM   Result Value Ref Range    Albumin 2.1 (L) 3.2 - 4.6 g/dL   MAGNESIUM    Collection Time: 04/01/22  4:40 AM   Result Value Ref Range    Magnesium 2.2 1.8 - 2.4 mg/dL   PHOSPHORUS    Collection Time: 04/01/22  4:40 AM   Result Value Ref Range    Phosphorus 6.0 (H) 2.3 - 3.7 MG/DL   GLUCOSE, POC    Collection Time: 04/01/22  7:35 AM   Result Value Ref Range    Glucose (POC) 130 (H) 65 - 100 mg/dL    Performed by [employer]    URINALYSIS W/ RFLX MICROSCOPIC    Collection Time: 04/01/22  8:20 AM   Result Value Ref Range    Color YELLOW      Appearance CLEAR      Specific gravity 1.010 1.001 - 1.023      pH (UA) 6.0 5.0 - 9.0      Protein Negative NEG mg/dL    Glucose Negative mg/dL    Ketone Negative NEG mg/dL    Bilirubin Negative NEG      Blood TRACE (A) NEG      Urobilinogen 0.2 0.2 - 1.0 EU/dL    Nitrites Negative NEG      Leukocyte Esterase Negative NEG      WBC 0 0 /hpf    RBC 0-3 0 /hpf    Epithelial cells 0 0 /hpf    Bacteria 0 0 /hpf    Casts 0-3 0 /lpf       All Micro Results     None          Other Studies:  No results found. Current Meds:  Current Facility-Administered Medications   Medication Dose Route Frequency    0.9% sodium chloride infusion  50 mL/hr IntraVENous CONTINUOUS    0.9% sodium chloride infusion  50 mL/hr IntraVENous ONCE    amoxicillin-clavulanate (AUGMENTIN) 500-125 mg per tablet 1 Tablet  1 Tablet Oral BID WITH MEALS    sodium chloride (NS) flush 5-10 mL  5-10 mL IntraVENous Q8H    sodium chloride (NS) flush 5-10 mL  5-10 mL IntraVENous PRN    aspirin delayed-release tablet 81 mg  81 mg Oral DAILY    atorvastatin (LIPITOR) tablet 80 mg  80 mg Oral DAILY    [Held by provider] losartan (COZAAR) tablet 100 mg  100 mg Oral DAILY    metoprolol succinate (TOPROL-XL) XL tablet 50 mg  50 mg Oral DAILY    montelukast (SINGULAIR) tablet 10 mg  10 mg Oral DAILY    ondansetron (ZOFRAN) injection 4 mg  4 mg IntraVENous Q6H PRN    heparin (porcine) injection 5,000 Units  5,000 Units SubCUTAneous Q8H    morphine injection 2 mg  2 mg IntraVENous Q4H PRN    oxyCODONE IR (ROXICODONE) tablet 5 mg  5 mg Oral Q4H PRN    acetaminophen (TYLENOL) tablet 650 mg  650 mg Oral Q6H PRN    insulin glargine (LANTUS) injection 15 Units  0.2 Units/kg SubCUTAneous QHS    insulin lispro (HUMALOG) injection   SubCUTAneous AC&HS    glucose chewable tablet 16 g  16 g Oral PRN    glucagon (GLUCAGEN) injection 1 mg  1 mg IntraMUSCular PRN    dextrose 10% infusion 125-250 mL  125-250 mL IntraVENous PRN       Signed:  Genesis Mcintyre MD    Part of this note may have been written by using a voice dictation software. The note has been proof read but may still contain some grammatical/other typographical errors.

## 2022-04-01 NOTE — ROUTINE PROCESS
Bedside and Verbal shift change report given to Rosanna Horvath and China Heredia RN (oncoming nurse) by self Edita Lovell nurse). Report included the following information SBAR, Kardex, Intake/Output, MAR, Recent Results and Cardiac Rhythm V Paced.

## 2022-04-01 NOTE — PROGRESS NOTES
[facility] CARDIOLOGY PROGRESS NOTE    4/1/2022 7:28 AM    Admit Date: 3/28/2022        Subjective:   Stable overnight without angina, CHF, or palpitations but weak and fatigued, renal function continues to worsen with worsening azotemia despite cessation of diuretics. Vitals otherwise stable and controlled. No other complaints overnight. Tolerating meds well. Objective:      Vitals:    03/31/22 1519 03/31/22 1940 03/31/22 2356 04/01/22 0412   BP: (!) 104/55 117/63 126/70 (!) 111/56   Pulse: 69 70 70 70   Resp: 20 20 20 20   Temp: 97.9 °F (36.6 °C) 98.2 °F (36.8 °C) 98.3 °F (36.8 °C) 98.4 °F (36.9 °C)   SpO2: 94% 96% 98% 93%   Weight:    181 lb 9.6 oz (82.4 kg)   Height:           Physical Exam:  Neck- supple, 8 cm JVD at 60 degrees  CV- regular rate and rhythm no MRG  Lung- clear bilaterally, mildly decreased bilateral bases but no crackles or wheezes  Abd- soft, nontender, nondistended  Ext-trace distal edema at most  Skin- warm and dry    Data Review:   Recent Labs     04/01/22  0440 03/31/22  0517   * 131*   K 4.8 5.3*   MG 2.2 2.2   * 99*   CREA 3.80* 3.40*   * 117*   WBC 8.4 9.6   HGB 10.5* 11.1*   HCT 31.2* 33.4*    227     Echo3/22:    Left Ventricle: Left ventricle size is normal. Mildly increased wall thickness. Mild global hypokinesis present. Septal motion consistent with pacemaker activation. Mildly reduced left ventricular systolic function. EF by 2D Simpsons Biplane is 48%. Abnormal diastolic function.   Right Ventricle: Right ventricle size is normal. Low normal systolic function. Lead present in the right ventricle.   Aortic Valve: Moderately thickened cusps. Trace transvalvular regurgitation. No significant stenosis. AV mean gradient is 6 mmHg. AV peak gradient is 10 mmHg.   Mitral Valve: Mildly thickened leaflet. Mild transvalvular regurgitation.   Tricuspid Valve: Moderate transvalvular regurgitation. RVSP is 33 mmHg.     IVC diameter is greater than 21 mm and decreases greater than 50% during inspiration; therefore the estimated right atrial pressure is intermediate (~8 mmHg).   Contrast used: Definity. Assessment and Plan:      Principal Problem:    Acute on chronic right-sided congestive heart failure ([address] (3/28/2022)  Patient with mild biventricular heart failure clinically and by echo. Aliya Tubbs was admitted primarily with lower extremity edema.  Had no hypoxia or pulmonary edema.  He has been treated with Lasix in the face of acute kidney injury and lower extremity edema.  Lower extremity edema essentially resolved though renal function continues to worsen with BUN over 100.  Patient intermittently with worsening hypotension after Lasix drip but stable overnight with higher BP now after cessation of diuretics.   Renal function continues to worsen. Hold  ARB/ACE/diuretics today again and recheck BMP in AM.   Gentle fluids today for 24 hours and reassess renal function in the morning. Overall prognosis is poor.  Consider engaging palliative care if continues to decline.     Active Problems:    Coronary artery disease (9/10/2011)  No angina.  Follow clinically       Hypercholesteremia (9/10/2011)  Continue atorvastatin 80 mg daily with outpatient surveillance.        Hypertension (9/10/2011)  Holding losartan in the face of hypotension and acute kidney injury.  Continue metoprolol 50 mg daily.       Chronic combined systolic and diastolic heart failure (HCC) (9/17/2013)  See above.       Automatic implantable cardioverter-defibrillator in situ (3/30/2016)  Normal function       Persistent atrial fibrillation ([address] (9/18/2019)  Status post watchman device implantation- outpatient surveillance.        Type 2 diabetes with nephropathy ([address] (11/6/2019)  Chronic managed per primary team- recheck BMP in AM       Presence of Watchman left atrial appendage closure device (1/21/2020)  Chronic stable       Bilateral leg edema (3/28/2022)  Nearly resolved-worsening azotemia/renal failure. Avoid diuretics, ACE, ARB. Gentle fluids at 50 cc an hour for 24 hours, encourage p.o. intake and reassess in the morning.       Cor pulmonale (chronic) ([facility] (3/28/2022)  See above       Pulmonary HTN ([facility] (3/28/2022)  See above- continue therapy, holding diuretics for now. Difficult situation.       Cellulitis of right leg (3/28/2022)  See above- continue ABX per primary MD's       Moderate protein-calorie malnutrition ([facility] (3/30/2022)  Chronic and managed per primary team      Acute kidney injury- worse- hold nephrotoxins and diuretics, recheck renal function in AM.  PO fluids as tolerated today. IVF's today as tolerated. Potassium better after stopping KCl supplementation. Recheck in the morning.       Raudel Ferguson MD  [facility] Cardiology  Pager [phone]

## 2022-04-01 NOTE — PROGRESS NOTES
No new d/c needs identified. [facility] offered bed, son accepted. Facility will not have availability until Wednesday however. Pt and family updated. Will remain available. Care Management Interventions  PCP Verified by CM:  Yes  Mode of Transport at Discharge: BLS  Transition of Care Consult (CM Consult): Discharge Planning,SNF [facility])  Discharge Durable Medical Equipment: No  Physical Therapy Consult: Yes  Occupational Therapy Consult: Yes  Speech Therapy Consult: No  Support Systems: Child(aleksey),Other Family Member(s),[employer],Friend/Neighbor  Confirm Follow Up Transport: Family  The Plan for Transition of Care is Related to the Following Treatment Goals : Return home and back to his baseline  Discharge Location  Patient Expects to be Discharged to[de-identified] Rehab hospital/unit acute

## 2022-04-01 NOTE — CONSULTS
Nephrology consult    Admission Date:  3/28/2022    Admission Diagnosis  Bilateral leg edema [R60.0]    History of Present Illness:  Jayashree Hwang is a 80 y.o. male with an extensive cardiac history of CAD s/p CABG x 3, HFrEF 2/2 ischemic cardiomyoapthy , longstanding persistent atrial fibrillation s/p Watchman as well as HTN, DM and CKD III with a baseline Cr in the mid 1's who presented to [facility] ED on 3/28 with complaint of worsening bilateral lower extremity swelling and redness as well as SOB. He was admitted and was placed on a Lasix gtt. Noted to have a Cr of 2.4 on admission which has slowly trended up to 3.8 today. He is feeling better. Edema has improved and he is breathing easier. Lasix gtt now d/c and started on saline this morning. Nephrology consulted for evaluation of LALY. Past Medical History:   Diagnosis Date    A-fib [facility]) 12/17/2019    Abnormal EKG     Acute diastolic heart failure (HCC)     Atrial fibrillation ([address] 9/10/2011    Atrial flutter ([address] 9/2013    Biotronik biventricular implantable cardioverter defibrillator    Automatic implantable cardioverter-defibrillator in situ 3/30/2016    Breast lump     right- pt states bx was neg-- m. d. \"released\" him    CAD (coronary artery disease)     mi w angioplasty--1995---- mi then cabg--2008    Cardiomyopathy, ischemic 3/30/2016    Chronic    Chest pain     Chronic systolic heart failure ([address]     \"stable\" per cardiology office note (1/2015) and on lasix daily    CKD (chronic kidney disease) stage 3, GFR 30-59 ml/min: Metformin discontinued 9/12/2011    Coronary artery disease 9/10/2011    Diabetes ([address] diag 2002    type2, oral meds, range 96-98, does not know last hgba1c, hypo s/s <70    Heart attack ([address] 1995/2008    History of kidney stones     none in years     Hypercholesteremia 9/10/2011    Hypercholesterolemia     no meds currently    Hypertension diag 1981    controlled with med    Kidney stone     LBBB (left bundle branch block) 9/17/2013    Moderate protein-calorie malnutrition ([address] 3/30/2022    Orthostatic hypotension 3/30/2016    Osteoarthritis     hands    Pneumonia 9/10/2011    Preop cardiovascular exam     Renal insufficiency     S/P total knee arthroplasty 12/11/2015    TIA (transient ischemic attack) 9/18/2019    Unstable angina [facility])       Past Surgical History:   Procedure Laterality Date    HX ANKLE FRACTURE TX Right 5/11/2005    HX BREAST BIOPSY Right 7/2013    HX BUNIONECTOMY Left 2001    HX CARPAL TUNNEL RELEASE Bilateral 1972    HX CATARACT REMOVAL  2006/2009    HX COLONOSCOPY  2003/2010    HX CORONARY ARTERY BYPASS GRAFT  12/19/2008    3 vessel    HX HIP REPLACEMENT Right 12/28/2005    HX IMPLANTABLE CARDIOVERTER DEFIBRILLATOR  9/17/2013    biotronik    HX KNEE ARTHROSCOPY Left 9/17/2009    HX KNEE ARTHROSCOPY Right 4/30/2014    HX KNEE REPLACEMENT Right 12/2015    HX LITHOTRIPSY      x 5    HX ORTHOPAEDIC  2005    right hip    HX OTHER SURGICAL  2002    hydrocele repair and circumcision    HX PACEMAKER      pacemaker- defib    MA TOTAL KNEE ARTHROPLASTY Left 4/8/2010      Current Facility-Administered Medications   Medication Dose Route Frequency    0.9% sodium chloride infusion  50 mL/hr IntraVENous CONTINUOUS    0.9% sodium chloride infusion  50 mL/hr IntraVENous ONCE    amoxicillin-clavulanate (AUGMENTIN) 500-125 mg per tablet 1 Tablet  1 Tablet Oral BID WITH MEALS    sodium chloride (NS) flush 5-10 mL  5-10 mL IntraVENous Q8H    sodium chloride (NS) flush 5-10 mL  5-10 mL IntraVENous PRN    aspirin delayed-release tablet 81 mg  81 mg Oral DAILY    atorvastatin (LIPITOR) tablet 80 mg  80 mg Oral DAILY    [Held by provider] losartan (COZAAR) tablet 100 mg  100 mg Oral DAILY    metoprolol succinate (TOPROL-XL) XL tablet 50 mg  50 mg Oral DAILY    montelukast (SINGULAIR) tablet 10 mg  10 mg Oral DAILY    ondansetron (ZOFRAN) injection 4 mg  4 mg IntraVENous Q6H PRN  heparin (porcine) injection 5,000 Units  5,000 Units SubCUTAneous Q8H    morphine injection 2 mg  2 mg IntraVENous Q4H PRN    oxyCODONE IR (ROXICODONE) tablet 5 mg  5 mg Oral Q4H PRN    acetaminophen (TYLENOL) tablet 650 mg  650 mg Oral Q6H PRN    insulin glargine (LANTUS) injection 15 Units  0.2 Units/kg SubCUTAneous QHS    insulin lispro (HUMALOG) injection   SubCUTAneous AC&HS    glucose chewable tablet 16 g  16 g Oral PRN    glucagon (GLUCAGEN) injection 1 mg  1 mg IntraMUSCular PRN    dextrose 10% infusion 125-250 mL  125-250 mL IntraVENous PRN     Allergies   Allergen Reactions    Iodinated Contrast Media Swelling      Social History     Tobacco Use    Smoking status: Never Smoker    Smokeless tobacco: Never Used   Substance Use Topics    Alcohol use: No      Family History   Problem Relation Age of Onset    Heart Disease Sister     Heart Disease Brother     Cancer Brother     Heart Disease Brother     Stroke Sister     Asthma Father     Heart Disease Other     Malignant Hyperthermia Neg Hx     Pseudocholinesterase Deficiency Neg Hx     Delayed Awakening Neg Hx     Post-op Nausea/Vomiting Neg Hx     Emergence Delirium Neg Hx     Post-op Cognitive Dysfunction Neg Hx     Other Neg Hx         Review of Systems  Per HPI    Objective:     Vitals:    03/31/22 2356 04/01/22 0412 04/01/22 0733 04/01/22 1059   BP: 126/70 (!) 111/56 113/65 (!) 109/58   Pulse: 70 70 72 74   Resp: 20 20 20 20   Temp: 98.3 °F (36.8 °C) 98.4 °F (36.9 °C) 97.6 °F (36.4 °C) 98 °F (36.7 °C)   SpO2: 98% 93% 96% 97%   Weight:  82.4 kg (181 lb 9.6 oz)     Height:           Intake/Output Summary (Last 24 hours) at 4/1/2022 1311  Last data filed at 4/1/2022 0733  Gross per 24 hour   Intake --   Output 900 ml   Net -900 ml       Physical Exam  GEN :in no distress, alert and oriented  HEENT: anicteric sclerae, eomi. Oropharynx without lesions. Mucous membranes are moist.  Neck - supple without JVD, no thyromegaly.   No lymphadenopathy. CV - regular rate and rhythm, no murmur, no rub  Lung - clear bilaterally, lungs expand symmetrically  Chest wall - normal appearance  Abd - soft, nontender, bowel sounds present, no hepatosplenomegaly  Ext - venous stasis changes bilaterally. trace edema  Neurologic - nonfocal  Skin - no rashes, no purpura, no ecchymoses  Psychiatric: Normal mood and affect. Data Review:   Recent Labs     04/01/22  0440 03/31/22  0517 03/30/22  0810   WBC 8.4 9.6 9.1   HGB 10.5* 11.1* 11.9*   HCT 31.2* 33.4* 36.9*    227 240     Recent Labs     04/01/22  0440 03/31/22  0517 03/30/22  0810   * 131* 133*   K 4.8 5.3* 4.0   CL 94* 97* 96*   CO2 26 25 28   * 99* 81*   CREA 3.80* 3.40* 2.70*   * 117* 111*   CA 9.1 9.4 9.7   MG 2.2 2.2 2.0   PHOS 6.0*  --  5.0*     No results for input(s): PH, PCO2, PO2, PCO2 in the last 72 hours.     Problem List:     Patient Active Problem List    Diagnosis Date Noted    Moderate protein-calorie malnutrition ([address] 03/30/2022    Bilateral leg edema 03/28/2022    Cor pulmonale (chronic) ([address] 03/28/2022    Pulmonary HTN ([address] 03/28/2022    Cellulitis of right leg 03/28/2022    Acute on chronic right-sided congestive heart failure ([address] 03/28/2022    Localized edema 03/21/2022    Dilated cardiomyopathy ([address] 06/22/2021    Presence of Watchman left atrial appendage closure device 01/21/2020    Type 2 diabetes with nephropathy ([address] 11/06/2019    TIA (transient ischemic attack) 09/18/2019    Persistent atrial fibrillation (HCC) 09/18/2019    Numbness and tingling in right hand 09/18/2019    Dyspnea on exertion 04/08/2019    Hx of long term use of blood thinners 10/02/2018    History of BPH 10/02/2018    Microhematuria 10/02/2018    Anticoagulant long-term use 06/28/2017    Breast lump     History of kidney stones     Preop cardiovascular exam     Acute diastolic heart failure (HCC)     Abnormal EKG     Renal insufficiency     Chest pain     Automatic implantable cardioverter-defibrillator in situ 03/30/2016    Cardiomyopathy, ischemic 03/30/2016    Orthostatic hypotension 03/30/2016    S/P total knee arthroplasty 12/11/2015    Osteoarthritis 12/03/2015    Atrial flutter ([facility] 09/17/2013    Chronic combined systolic and diastolic heart failure (HCC) 09/17/2013    LBBB (left bundle branch block) 09/17/2013    CKD (chronic kidney disease) stage 3, GFR 30-59 ml/min: Metformin discontinued 09/12/2011    Longstanding persistent atrial fibrillation ([facility] 09/10/2011    Diabetes ([facility] 09/10/2011    Coronary artery disease 09/10/2011    Hypercholesteremia 09/10/2011    Hypertension 09/10/2011    Pneumonia 09/10/2011       Impression:    Plan:     1) LALY on CKD III-  Likely 2/2 aggressive diuresis. UA unremarkable. Renal US is pending. Agree with giving back some IVF over the next 24hrs. Discussed possibility of dialysis with patient at the bedside if renal function were to decline further. He is not interested in it, citing his advance age. We will continue to follow. 2) HFrEF- symptomatically improved with diuresis. Now being held and started on fluids. 3) Hyponatremia-   Hopefully will see some improvement with IVF. 4) Hyperphosphatemia-  2/2 LALY. Should improve as LALY resolves.

## 2022-04-01 NOTE — ROUTINE PROCESS
CHF teaching continues to pt/family. Emphasis on taking prescription meds as ordered, to keep F/U appts and to call MD STAT if any of the following occur:   If you gain 2 lbs in one day or 5 lbs in a week, and short of breath.  If you can not lay flat without developing short of breath or rapid breathing at night; or if it wakes you up. Develop a cough or wheezing.     Pt/family verbalizes understanding, will follow to reinforce teaching skills: 20 mins

## 2022-04-01 NOTE — PROGRESS NOTES
ACUTE OT GOALS:  (Developed with and agreed upon by patient and/or caregiver.)  1. Patient will complete lower body bathing and dressing with min A and adaptive equipment as needed. 2. Patient will complete toileting with min A.   3. Patient will tolerate 30 minutes of OT treatment with 1-2 rest breaks to increase activity tolerance for ADLs. 4. Patient will complete functional transfers with min A and adaptive equipment as needed. 5. Patient will complete functional activity while seated edge of bed with SBA and adaptive equipment as needed. 6. Patient will tolerate 15 minutes unsupported sitting balance with SBA in preparation for ADL performance. 7. Patient will tolerate 15 minutes BUE therapeutic activities to increase use of BUE during ADL performance. OCCUPATIONAL THERAPY: Daily Note OT Treatment Day # 4    Nate Nichole is a 80 y.o. male   PRIMARY DIAGNOSIS: Acute on chronic right-sided congestive heart failure (HCC)  Bilateral leg edema [R60.0]       [place] / [place] / Product Type: Medicare /   ASSESSMENT:     REHAB RECOMMENDATIONS: CURRENT LEVEL OF FUNCTION:  (Most Recently Demonstrated)   Recommendation to date pending progress:  Setting:   Short-term Rehab  Equipment:    To Be Determined Bathing:   Moderate Assistance  Dressing:   Moderate Assistance UB; total assist LB  Feeding/Grooming:   Set Up  Toileting:   Contact Guard Assistance  Functional Mobility:   Moderate Assistance x 2     ASSESSMENT:  Mr. Hyun Paige presents with generalized weakness, decreased independence with transfers and functional mobility, and decreased independence with ADL's. Pt soiled upon arrival and participated in toilet hygiene, brief change, and gown change. Pt completed STS reps with mod a x 2 and a rolling walker and completed side steps along bedside. Pt returned to supine post treatment. Continue POC. SUBJECTIVE:   Mr. Hyun Paige states, \"I think I'm wet. \"    SOCIAL HISTORY/LIVING ENVIRONMENT:   Home Environment: Private residence  # Steps to Enter: 2  One/Two Story Residence: One story  Living Alone: Yes  Support Systems: Child(aleksey),Other Family Member(s),[employer],Friend/Neighbor    OBJECTIVE:     PAIN: VITAL SIGNS: LINES/DRAINS:   Pre Treatment: Pain Screen  Pain Scale 1: Visual  Pain Location 1: Generalized  Pain Intervention(s) 1: Repositioned  Post Treatment: 0   IV  O2 Device: None (Room air)     ACTIVITIES OF DAILY LIVING: I Mod I S SBA CGA Min Mod Max Total NT Comments   BASIC ADLs:              Bathing/ Showering [] [] [] [] [] [] [x] [] [] []    Toileting [] [] [] [] [x] [] [] [] [] [x]    Dressing [] [] [] [] [] [] [x] [] [] []    Feeding [] [] [] [] [] [] [] [] [] [x]    Grooming [] [] [] [] [] [] [] [] [] [x]    Personal Device Care [] [] [] [] [] [] [] [] [] [x]    Functional Mobility [] [] [] [] [] [] [x] [] [] [] Assist x 2   I=Independent, Mod I=Modified Independent, S=Supervision, SBA=Standby Assistance, CGA=Contact Guard Assistance,   Min=Minimal Assistance, Mod=Moderate Assistance, Max=Maximal Assistance, Total=Total Assistance, NT=Not Tested    MOBILITY: I Mod I S SBA CGA Min Mod Max Total  NT x2 Comments:   Supine to sit [] [] [] [] [] [x] [x] [] [] [] [x]    Sit to supine [] [] [] [] [] [] [x] [] [] [] [x]    Sit to stand [] [] [] [] [] [] [x] [] [] [] [x]    Bed to chair [] [] [] [] [] [] [x] [] [] [] [x]    I=Independent, Mod I=Modified Independent, S=Supervision, SBA=Standby Assistance, CGA=Contact Guard Assistance,   Min=Minimal Assistance, Mod=Moderate Assistance, Max=Maximal Assistance, Total=Total Assistance, NT=Not Tested    BALANCE: Good Fair+ Fair Fair- Poor NT Comments   Sitting Static [x] [] [] [] [] []    Sitting Dynamic [x] [] [] [] [] []              Standing Static [] [] [] [x] [] []    Standing Dynamic [] [] [] [x] [] []      PLAN:   FREQUENCY/DURATION: OT Plan of Care: 3 times/week for duration of hospital stay or until stated goals are met, whichever comes first.    TREATMENT:   TREATMENT:   ($$ Self Care/Home Management: 8-22 mins$$ Neuromuscular Re-Education: 8-22 mins   )  Co-Treatment PT/OT necessary due to patient's decreased overall endurance/tolerance levels, as well as need for high level skilled assistance to complete functional transfers/mobility and functional tasks  Self Care (13 Minutes): Self care including Toileting, Upper Body Dressing, Grooming and Energy Conservation Training to increase independence and decrease level of assistance required. Neuromuscular Re-education (13 Minutes): Neuromuscular Re-education included Balance Training, Postural training, Sitting balance training, Standing balance training and transfers and functional mobility to improve Balance, Functional Mobility and Postural Control.     TREATMENT GRID:  N/A    AFTER TREATMENT POSITION/PRECAUTIONS:  Bed, Needs within reach, RN notified and family in room    INTERDISCIPLINARY COLLABORATION:  RN/PCT, PT/PTA and OT/CALI    TOTAL TREATMENT DURATION:  OT Patient Time In/Time Out  Time In: 1417  Time Out: Pravin Keller [address] Alvarez

## 2022-04-01 NOTE — PROGRESS NOTES
ACUTE PHYSICAL THERAPY GOALS:  (Developed with and agreed upon by patient and/or caregiver. )  LTG:  (1.)Mr. Gregg will move from supine to sit and sit to supine , scoot up and down and roll side to side in bed with STAND BY ASSIST within 7 treatment day(s). (2.)Mr. Gregg will transfer from bed to chair and chair to bed with MINIMAL ASSIST using the least restrictive device within 7 treatment day(s). (3.)Mr. Gregg will ambulate with MODERATE ASSIST for 50 feet with the least restrictive device within 7 treatment day(s). (4.)Mr. Gregg will participate in therapeutic activity/exercises x 25 minutes for increased strength within 7 treatment days. PHYSICAL THERAPY: Daily Note and AM Treatment Day # 4    Shirlene Au is a 80 y.o. male   PRIMARY DIAGNOSIS: Acute on chronic right-sided congestive heart failure (HCC)  Bilateral leg edema [R60.0]         ASSESSMENT:     REHAB RECOMMENDATIONS: CURRENT LEVEL OF FUNCTION:  (Most Recently Demonstrated)   Recommendation to date pending progress:  Setting:   Short-term Rehab  Equipment:    To Be Determined Bed Mobility:   Minimal Assistance x 2  Sit to Stand:   Moderate Assistance x 2  Transfers:   Moderate Assistance x 2  Gait/Mobility:   Moderate Assistance x 2     ASSESSMENT:  Mr. Karson Barnett presents in supine, soiled. During standing bouts today, he requires balance cues from PT while CALI cues self-care. Upon entering, Pnt is agreeable to PT treatment. he reports no pain  at rest. Pnt performed supine > sit with min Ax2 and extra time, sitting EOB with good sitting balance control. Sit > stand with mod Ax2 using RW is pefored 4 times. Gait is performed 3 times and is steps alongside the bed. Stand > sit with mod Ax2, followed by positioning for comfort. At end of session pt supine in bed with all needs within reach, alarm activated for safety, RN notified. Overall, good progress today as pnt improved in standing balance.  Pnt continues to present as functioning below his baseline, with deficits in mobility including transfers, gait, balance, and activity tolerance. Pt will continue to benefit from skilled therapy services to address stated deficits to promote return to highest level of function, independence, and safety. Will continue to follow.            SUBJECTIVE:   Mr. Maddi Cifuentes states, \"OK    SOCIAL HISTORY/ LIVING ENVIRONMENT: see eval  Home Environment: Private residence  # Steps to Enter: 2  One/Two Story Residence: One story  Living Alone: Yes  Support Systems: Child(aleksey),Other Family Member(s),[employer],Friend/Neighbor  OBJECTIVE:     PAIN: VITAL SIGNS: LINES/DRAINS:   Pre Treatment: Pain Screen  Pain Scale 1: Numeric (0 - 10)  Pain Intensity 1: 0  Post Treatment: 0   IV  O2 Device: None (Room air)     MOBILITY: I Mod I S SBA CGA Min Mod Max Total  NT x2 Comments:   Bed Mobility    Rolling [] [] [] [] [] [] [] [] [] [x] []    Supine to Sit [] [] [] [] [] [x] [x] [] [] [] []    Scooting [] [] [] [] [] [x] [x] [] [] [] []    Sit to Supine [] [] [] [] [] [] [] [] [] [x] []    Transfers    Sit to Stand [] [] [] [] [] [] [x] [] [] [] [x]    Bed to Chair [] [] [] [] [] [] [x] [] [] [] [x]    Stand to Sit [] [] [] [] [] [] [x] [] [] [] [x]    I=Independent, Mod I=Modified Independent, S=Supervision, SBA=Standby Assistance, CGA=Contact Guard Assistance,   Min=Minimal Assistance, Mod=Moderate Assistance, Max=Maximal Assistance, Total=Total Assistance, NT=Not Tested    BALANCE: Good Fair+ Fair Fair- Poor NT Comments   Sitting Static [] [x] [] [] [] []    Sitting Dynamic [] [x] [] [] [] []              Standing Static [] [] [] [x] [] []    Standing Dynamic [] [] [] [x] [] []      GAIT: I Mod I S SBA CGA Min Mod Max Total  NT x2 Comments:   Level of Assistance [] [] [] [] [] [] [x] [] [] [] [x]    Distance 3 x steps alongside the bed    DME Rolling Walker and Gait Belt    Gait Quality Shuffled, slow    Weightbearing  Status N/A     I=Independent, Mod I=Modified Independent, S=Supervision, SBA=Standby Assistance, CGA=Contact Guard Assistance,   Min=Minimal Assistance, Mod=Moderate Assistance, Max=Maximal Assistance, Total=Total Assistance, NT=Not Tested    PLAN:   FREQUENCY/DURATION: PT Plan of Care: 3 times/week for duration of hospital stay or until stated goals are met, whichever comes first.  TREATMENT:     TREATMENT:   ($$ Therapeutic Activity: 23-37 mins    )  Co-Treatment PT/OT necessary due to patient's decreased overall endurance/tolerance levels, as well as need for high level skilled assistance to complete functional transfers/mobility and functional tasks  Therapeutic Activity (29 Minutes): Therapeutic activity included Rolling, Supine to Sit, Sit to Supine, Scooting, Transfer Training, Ambulation on level ground, Sitting balance  and Standing balance to improve functional Mobility, Strength, ROM and Activity tolerance.     TREATMENT GRID:  N/A    AFTER TREATMENT POSITION/PRECAUTIONS:  Alarm Activated, Bed, Needs within reach, RN notified and Visitors at bedside    INTERDISCIPLINARY COLLABORATION:  RN/PCT, PT/PTA and OT/CALI    TOTAL TREATMENT DURATION:  PT Patient Time In/Time Out  Time In: Dean 24  Time Out: R Regato 53

## 2022-04-02 LAB
ALBUMIN SERPL-MCNC: 2 G/DL (ref 3.2–4.6)
ANION GAP SERPL CALC-SCNC: 10 MMOL/L (ref 7–16)
BASOPHILS # BLD: 0 K/UL (ref 0–0.2)
BASOPHILS NFR BLD: 1 % (ref 0–2)
BUN SERPL-MCNC: 107 MG/DL (ref 8–23)
CALCIUM SERPL-MCNC: 9.3 MG/DL (ref 8.3–10.4)
CHLORIDE SERPL-SCNC: 96 MMOL/L (ref 98–107)
CO2 SERPL-SCNC: 24 MMOL/L (ref 21–32)
CREAT SERPL-MCNC: 3.1 MG/DL (ref 0.8–1.5)
DIFFERENTIAL METHOD BLD: ABNORMAL
EOSINOPHIL # BLD: 0.3 K/UL (ref 0–0.8)
EOSINOPHIL NFR BLD: 4 % (ref 0.5–7.8)
ERYTHROCYTE [DISTWIDTH] IN BLOOD BY AUTOMATED COUNT: 15.8 % (ref 11.9–14.6)
GLUCOSE BLD STRIP.AUTO-MCNC: 117 MG/DL (ref 65–100)
GLUCOSE BLD STRIP.AUTO-MCNC: 188 MG/DL (ref 65–100)
GLUCOSE BLD STRIP.AUTO-MCNC: 189 MG/DL (ref 65–100)
GLUCOSE BLD STRIP.AUTO-MCNC: 207 MG/DL (ref 65–100)
GLUCOSE SERPL-MCNC: 113 MG/DL (ref 65–100)
HCT VFR BLD AUTO: 31.4 % (ref 41.1–50.3)
HGB BLD-MCNC: 10.4 G/DL (ref 13.6–17.2)
IMM GRANULOCYTES # BLD AUTO: 0.1 K/UL (ref 0–0.5)
IMM GRANULOCYTES NFR BLD AUTO: 1 % (ref 0–5)
LYMPHOCYTES # BLD: 1.2 K/UL (ref 0.5–4.6)
LYMPHOCYTES NFR BLD: 14 % (ref 13–44)
MAGNESIUM SERPL-MCNC: 2.3 MG/DL (ref 1.8–2.4)
MCH RBC QN AUTO: 33.1 PG (ref 26.1–32.9)
MCHC RBC AUTO-ENTMCNC: 33.1 G/DL (ref 31.4–35)
MCV RBC AUTO: 100 FL (ref 79.6–97.8)
MM INDURATION POC: 0 MM (ref 0–5)
MONOCYTES # BLD: 1.1 K/UL (ref 0.1–1.3)
MONOCYTES NFR BLD: 13 % (ref 4–12)
NEUTS SEG # BLD: 5.5 K/UL (ref 1.7–8.2)
NEUTS SEG NFR BLD: 68 % (ref 43–78)
NRBC # BLD: 0 K/UL (ref 0–0.2)
PHOSPHATE SERPL-MCNC: 5.6 MG/DL (ref 2.3–3.7)
PLATELET # BLD AUTO: 208 K/UL (ref 150–450)
PMV BLD AUTO: 9.7 FL (ref 9.4–12.3)
POTASSIUM SERPL-SCNC: 4.4 MMOL/L (ref 3.5–5.1)
PPD POC: NEGATIVE NEGATIVE
RBC # BLD AUTO: 3.14 M/UL (ref 4.23–5.6)
SERVICE CMNT-IMP: ABNORMAL
SODIUM SERPL-SCNC: 130 MMOL/L (ref 138–145)
WBC # BLD AUTO: 8.1 K/UL (ref 4.3–11.1)

## 2022-04-02 PROCEDURE — 74011000250 HC RX REV CODE- 250: Performed by: EMERGENCY MEDICINE

## 2022-04-02 PROCEDURE — 83735 ASSAY OF MAGNESIUM: CPT

## 2022-04-02 PROCEDURE — 74011636637 HC RX REV CODE- 636/637: Performed by: INTERNAL MEDICINE

## 2022-04-02 PROCEDURE — 2709999900 HC NON-CHARGEABLE SUPPLY

## 2022-04-02 PROCEDURE — 74011250637 HC RX REV CODE- 250/637: Performed by: INTERNAL MEDICINE

## 2022-04-02 PROCEDURE — 82962 GLUCOSE BLOOD TEST: CPT

## 2022-04-02 PROCEDURE — 74011250637 HC RX REV CODE- 250/637: Performed by: FAMILY MEDICINE

## 2022-04-02 PROCEDURE — 65660000000 HC RM CCU STEPDOWN

## 2022-04-02 PROCEDURE — 99232 SBSQ HOSP IP/OBS MODERATE 35: CPT | Performed by: INTERNAL MEDICINE

## 2022-04-02 PROCEDURE — 74011250636 HC RX REV CODE- 250/636: Performed by: INTERNAL MEDICINE

## 2022-04-02 PROCEDURE — 36415 COLL VENOUS BLD VENIPUNCTURE: CPT

## 2022-04-02 PROCEDURE — 85025 COMPLETE CBC W/AUTO DIFF WBC: CPT

## 2022-04-02 PROCEDURE — 80069 RENAL FUNCTION PANEL: CPT

## 2022-04-02 RX ORDER — SODIUM CHLORIDE 9 MG/ML
50 INJECTION, SOLUTION INTRAVENOUS CONTINUOUS
Status: DISCONTINUED | OUTPATIENT
Start: 2022-04-02 | End: 2022-04-03

## 2022-04-02 RX ADMIN — ASPIRIN 81 MG: 81 TABLET ORAL at 08:45

## 2022-04-02 RX ADMIN — INSULIN GLARGINE 15 UNITS: 100 INJECTION, SOLUTION SUBCUTANEOUS at 22:14

## 2022-04-02 RX ADMIN — HEPARIN SODIUM 5000 UNITS: 5000 INJECTION INTRAVENOUS; SUBCUTANEOUS at 05:51

## 2022-04-02 RX ADMIN — HEPARIN SODIUM 5000 UNITS: 5000 INJECTION INTRAVENOUS; SUBCUTANEOUS at 13:00

## 2022-04-02 RX ADMIN — AMOXICILLIN AND CLAVULANATE POTASSIUM 1 TABLET: 500; 125 TABLET, FILM COATED ORAL at 17:10

## 2022-04-02 RX ADMIN — SODIUM CHLORIDE 50 ML/HR: 900 INJECTION, SOLUTION INTRAVENOUS at 22:15

## 2022-04-02 RX ADMIN — SODIUM CHLORIDE, PRESERVATIVE FREE 10 ML: 5 INJECTION INTRAVENOUS at 22:15

## 2022-04-02 RX ADMIN — METOPROLOL SUCCINATE 50 MG: 25 TABLET, EXTENDED RELEASE ORAL at 08:45

## 2022-04-02 RX ADMIN — SODIUM CHLORIDE, PRESERVATIVE FREE 10 ML: 5 INJECTION INTRAVENOUS at 13:00

## 2022-04-02 RX ADMIN — SODIUM CHLORIDE, PRESERVATIVE FREE 10 ML: 5 INJECTION INTRAVENOUS at 05:52

## 2022-04-02 RX ADMIN — SODIUM CHLORIDE 50 ML/HR: 900 INJECTION, SOLUTION INTRAVENOUS at 02:31

## 2022-04-02 RX ADMIN — MONTELUKAST 10 MG: 10 TABLET, FILM COATED ORAL at 08:45

## 2022-04-02 RX ADMIN — OXYCODONE 5 MG: 5 TABLET ORAL at 04:08

## 2022-04-02 RX ADMIN — INSULIN LISPRO 2 UNITS: 100 INJECTION, SOLUTION INTRAVENOUS; SUBCUTANEOUS at 17:11

## 2022-04-02 RX ADMIN — SODIUM CHLORIDE 50 ML/HR: 900 INJECTION, SOLUTION INTRAVENOUS at 08:47

## 2022-04-02 RX ADMIN — INSULIN LISPRO 2 UNITS: 100 INJECTION, SOLUTION INTRAVENOUS; SUBCUTANEOUS at 11:43

## 2022-04-02 RX ADMIN — HEPARIN SODIUM 5000 UNITS: 5000 INJECTION INTRAVENOUS; SUBCUTANEOUS at 22:15

## 2022-04-02 RX ADMIN — AMOXICILLIN AND CLAVULANATE POTASSIUM 1 TABLET: 500; 125 TABLET, FILM COATED ORAL at 08:45

## 2022-04-02 RX ADMIN — ATORVASTATIN CALCIUM 80 MG: 80 TABLET, FILM COATED ORAL at 08:45

## 2022-04-02 RX ADMIN — ACETAMINOPHEN 650 MG: 325 TABLET ORAL at 12:53

## 2022-04-02 RX ADMIN — INSULIN LISPRO 4 UNITS: 100 INJECTION, SOLUTION INTRAVENOUS; SUBCUTANEOUS at 22:14

## 2022-04-02 NOTE — ROUTINE PROCESS
Bedside and verbal shift report received from [place], [facility].

## 2022-04-02 NOTE — PROGRESS NOTES
[facility] PROGRESS NOTE    Follow up for:    Subjective:   Patient seen and examined. Breathing comfortably. Cr is  Better.     ROS:  Gen - no fever, no chills, appetite okay  CV - no chest pain, no orthopnea  Lung - no shortness of breath, no cough  Ext - no edema    Objective:   Exam:  Vitals:    04/01/22 2032 04/02/22 0023 04/02/22 0504 04/02/22 0717   BP: 130/60 (!) 124/55 115/80 121/71   Pulse: 70 72 74 69   Resp:  18 18 20   Temp: 98.3 °F (36.8 °C) 98.5 °F (36.9 °C) 98 °F (36.7 °C) 97.3 °F (36.3 °C)   SpO2: 96% 95% 95% 97%   Weight:   82.3 kg (181 lb 6.4 oz)    Height:             Intake/Output Summary (Last 24 hours) at 4/2/2022 0827  Last data filed at 4/2/2022 0557  Gross per 24 hour   Intake --   Output 2025 ml   Net -2025 ml       Current Facility-Administered Medications   Medication Dose Route Frequency    amoxicillin-clavulanate (AUGMENTIN) 500-125 mg per tablet 1 Tablet  1 Tablet Oral BID WITH MEALS    sodium chloride (NS) flush 5-10 mL  5-10 mL IntraVENous Q8H    sodium chloride (NS) flush 5-10 mL  5-10 mL IntraVENous PRN    aspirin delayed-release tablet 81 mg  81 mg Oral DAILY    atorvastatin (LIPITOR) tablet 80 mg  80 mg Oral DAILY    [Held by provider] losartan (COZAAR) tablet 100 mg  100 mg Oral DAILY    metoprolol succinate (TOPROL-XL) XL tablet 50 mg  50 mg Oral DAILY    montelukast (SINGULAIR) tablet 10 mg  10 mg Oral DAILY    ondansetron (ZOFRAN) injection 4 mg  4 mg IntraVENous Q6H PRN    heparin (porcine) injection 5,000 Units  5,000 Units SubCUTAneous Q8H    morphine injection 2 mg  2 mg IntraVENous Q4H PRN    oxyCODONE IR (ROXICODONE) tablet 5 mg  5 mg Oral Q4H PRN    acetaminophen (TYLENOL) tablet 650 mg  650 mg Oral Q6H PRN    insulin glargine (LANTUS) injection 15 Units  0.2 Units/kg SubCUTAneous QHS    insulin lispro (HUMALOG) injection   SubCUTAneous AC&HS    glucose chewable tablet 16 g  16 g Oral PRN    glucagon (GLUCAGEN) injection 1 mg  1 mg IntraMUSCular PRN    dextrose 10% infusion 125-250 mL  125-250 mL IntraVENous PRN       EXAM  GEN - Alert, oriented, in no distress  CV - S1, S2, RRR, no rub, murmur, or gallop  Lung - clear to auscultation bilaterally  Abd - soft, nontender, BS present  Ext - venous stasis changes. trace edema    Recent Labs     04/02/22  0602 04/01/22  0440 03/31/22  0517   WBC 8.1 8.4 9.6   HGB 10.4* 10.5* 11.1*   HCT 31.4* 31.2* 33.4*    200 227        Recent Labs     04/02/22  0602 04/01/22  0440 03/31/22  0517   * 128* 131*   K 4.4 4.8 5.3*   CL 96* 94* 97*   CO2 24 26 25   * 105* 99*   CREA 3.10* 3.80* 3.40*   CA 9.3 9.1 9.4   * 130* 117*   MG 2.3 2.2 2.2   PHOS 5.6* 6.0*  --        Assessment and Plan:      1) LALY on CKD III-  Likely 2/2 aggressive diuresis. UA unremarkable. Renal US showed cysts and nonobstructing stone. Urine Na was 18. Cr is better today. Will continue IVF another 24hrs with close monitoring of his pulmonary status. 2) HFrEF- breathing comfortably. Diuretics on hold. Will run fluids for 1 more day. 3) Hyponatremia-   slight improvement. Continue fluids.    4) Hyperphosphatemia-  2/2 LALY. down some from yesterday.     Simba Franks MD

## 2022-04-02 NOTE — PROGRESS NOTES
Problem: Pressure Injury - Risk of  Goal: *Prevention of pressure injury  Description: Document Salas Scale and appropriate interventions in the flowsheet. Outcome: Progressing Towards Goal  Note: Pressure Injury Interventions:  Sensory Interventions: Minimize linen layers,Assess changes in LOC    Moisture Interventions: Absorbent underpads,Internal/External urinary devices,Minimize layers    Activity Interventions: Increase time out of bed,Pressure redistribution bed/mattress(bed type)    Mobility Interventions: HOB 30 degrees or less,Pressure redistribution bed/mattress (bed type)    Nutrition Interventions: Document food/fluid/supplement intake,Offer support with meals,snacks and hydration    Friction and Shear Interventions: HOB 30 degrees or less                Problem: Falls - Risk of  Goal: *Absence of Falls  Description: Document Anabell Fall Risk and appropriate interventions in the flowsheet.   Outcome: Progressing Towards Goal  Note: Fall Risk Interventions:  Mobility Interventions: Bed/chair exit alarm,Communicate number of staff needed for ambulation/transfer,Patient to call before getting OOB         Medication Interventions: Evaluate medications/consider consulting pharmacy,Bed/chair exit alarm,Patient to call before getting OOB,Teach patient to arise slowly    Elimination Interventions: Bed/chair exit alarm,Call light in reach,Elevated toilet seat,Toileting schedule/hourly rounds    History of Falls Interventions: Bed/chair exit alarm,Investigate reason for fall,Room close to nurse's station         Problem: Patient Education: Go to Patient Education Activity  Goal: Patient/Family Education  Outcome: Progressing Towards Goal

## 2022-04-02 NOTE — PROGRESS NOTES
[facility] CARDIOLOGY PROGRESS NOTE    4/2/2022 7:28 AM    Admit Date: 3/28/2022        Subjective:   Stable overnight without angina, CHF, or palpitations but weak and fatigued, renal function continues to worsen with worsening azotemia despite cessation of diuretics. Vitals otherwise stable and controlled. No other complaints overnight. Tolerating meds well. LALY on CKD debility      Objective:      Vitals:    04/01/22 2032 04/02/22 0023 04/02/22 0504 04/02/22 0717   BP: 130/60 (!) 124/55 115/80 121/71   Pulse: 70 72 74 69   Resp:  18 18 20   Temp: 98.3 °F (36.8 °C) 98.5 °F (36.9 °C) 98 °F (36.7 °C) 97.3 °F (36.3 °C)   SpO2: 96% 95% 95% 97%   Weight:   181 lb 6.4 oz (82.3 kg)    Height:           Physical Exam:  Neck- supple, 8 cm JVD at 60 degrees  CV- regular rate and rhythm no MRG  Lung- clear bilaterally, mildly decreased bilateral bases but no crackles or wheezes  Abd- soft, nontender, nondistended  Ext-trace distal edema at most  Skin- warm and dry    Data Review:   Recent Labs     04/02/22  0602 04/01/22  0440 03/31/22  0517 03/31/22  0517   NA  --  128*  --  131*   K  --  4.8  --  5.3*   MG  --  2.2  --  2.2   BUN  --  105*  --  99*   CREA  --  3.80*  --  3.40*   GLU  --  130*  --  117*   WBC 8.1 8.4   < > 9.6   HGB 10.4* 10.5*   < > 11.1*   HCT 31.4* 31.2*   < > 33.4*    200   < > 227    < > = values in this interval not displayed. Echo3/22:    Left Ventricle: Left ventricle size is normal. Mildly increased wall thickness. Mild global hypokinesis present. Septal motion consistent with pacemaker activation. Mildly reduced left ventricular systolic function. EF by 2D Simpsons Biplane is 48%. Abnormal diastolic function.   Right Ventricle: Right ventricle size is normal. Low normal systolic function. Lead present in the right ventricle.   Aortic Valve: Moderately thickened cusps. Trace transvalvular regurgitation. No significant stenosis. AV mean gradient is 6 mmHg.  AV peak gradient is 10 mmHg.    Mitral Valve: Mildly thickened leaflet. Mild transvalvular regurgitation.   Tricuspid Valve: Moderate transvalvular regurgitation. RVSP is 33 mmHg.   IVC diameter is greater than 21 mm and decreases greater than 50% during inspiration; therefore the estimated right atrial pressure is intermediate (~8 mmHg).   Contrast used: Definity. Assessment and Plan:      Principal Problem:    Acute on chronic right-sided congestive heart failure ([address] (3/28/2022)  Patient with mild biventricular heart failure clinically and by echo. [facility] was admitted primarily with lower extremity edema.  Had no hypoxia or pulmonary edema.  He has been treated with Lasix in the face of acute kidney injury and lower extremity edema.  Lower extremity edema essentially resolved though renal function continues to worsen with BUN over 100.  Patient intermittently with worsening hypotension after Lasix drip but stable overnight with higher BP now after cessation of diuretics.   Renal function continues to worsen. Hold  ARB/ACE/diuretics today again and recheck BMP in AM.   Gentle fluids today for 24 hours and reassess renal function in the morning. Overall prognosis is poor.  Consider engaging palliative care if continues to decline.     Active Problems:    Coronary artery disease (9/10/2011)  No angina.  Follow clinically       Hypercholesteremia (9/10/2011)  Continue atorvastatin 80 mg daily with outpatient surveillance.        Hypertension (9/10/2011)  Holding losartan in the face of hypotension and acute kidney injury.  Continue metoprolol 50 mg daily.       Chronic combined systolic and diastolic heart failure (HCC) (9/17/2013)  See above.       Automatic implantable cardioverter-defibrillator in situ (3/30/2016)  Normal function       Persistent atrial fibrillation ([address] (9/18/2019)  Status post watchman device implantation- outpatient surveillance.        Type 2 diabetes with nephropathy ([address] (11/6/2019)  Chronic managed per primary team- recheck BMP in AM       Presence of Watchman left atrial appendage closure device (1/21/2020)  Chronic stable       Bilateral leg edema (3/28/2022)  Nearly resolved-worsening azotemia/renal failure. Avoid diuretics, ACE, ARB. Gentle fluids at 50 cc an hour for 24 hours, encourage p.o. intake and reassess in the morning.       Cor pulmonale (chronic) ([facility] (3/28/2022)  See above       Pulmonary HTN ([facility] (3/28/2022)  See above- continue therapy, holding diuretics for now. Difficult situation.       Cellulitis of right leg (3/28/2022)  See above- continue ABX per primary MD's       Moderate protein-calorie malnutrition ([facility] (3/30/2022)  Chronic and managed per primary team      Acute kidney injury- worse- hold nephrotoxins and diuretics, recheck renal function in AM.  PO fluids as tolerated today. IVF's today as tolerated. Potassium better after stopping KCl supplementation. Recheck in the morning.

## 2022-04-02 NOTE — PROGRESS NOTES
Hospitalist Progress Note   Admit Date:  3/28/2022  3:15 PM   Name:  Jeffrey Mares   Age:  80 y.o. Sex:  male  :  1934   MRN:  [id number]   Room:  303/01    Presenting Complaint: Leg Pain    Reason(s) for Admission: Bilateral leg edema [R60.0]     Hospital Course & Interval History:     Jeffrey Mares is a 80year old CM with a PMH of chronic combined CHF, DM2, HTN, HLD, and pulmonary HTN with cor pulmonale with chronic BLE edema admitted with worsening bilateral lower extremity edema despite doubling Lasix dose for a week and adding Zaroxolyn. Cardiology consulted. Started on Lasix gtt. later discontinued on 3/30 due to worsening kidney functions. Lower extremity edema resolved. Also noted to have right lower extremity cellulitis and started on Unasyn and switched to p.o. Augmentin on 3/31. Subjective/24hr Events (22): Patient is seen at the bedside. No new complaint. Denies chest pain, palpitation, nausea, vomiting. OR Shortness of breath. Lower extremity edema resolved. ROS:  10 systems reviewed and negative except as noted above. Assessment & Plan:     LALY on CKD stage III:  Baseline creatinine 1.5, Creatinine 2.40 on admission  Kidney function worsened due to aggressive diuresis, diuretics on hold since 3/31  4/2: Renal US with bilateral renal cyst.  Creatinine started improving on gentle hydration, 3.1 today. Plan to continue gentle hydration for next 24 hrs with careful monitoring for fluid status. Appreciate nephro recs    Acute on chronic congestive heart failure:  Bilateral lower extremity edema:  Last echocardiogram with ejection fraction 28% with diastolic dysfunction  Off lasix gtt on 3/30  Daily BMP  Lower extremity Doppler negative for DVT  Cardiology consulted, appreciate recommendation  Echocardiogram with ejection fraction 30%, and diastolic dysfunction. 2:Lower extremity edema resolved. Diuresis on hold due to worsening kidney function. Currently fluid status stable. Appreciate cardiology recommendations. Hypertension:  Persistent A. Fib:  CAD:  Continue Cozaar and Toprol  Continue aspirin and statin  Watchman device in place  losartan on hold d/t LALY, continue rest of the management    Hyperkalemia in the setting of LALY:  Treated with sodium bicarbonate, insulin, dextrose and Lokelma. Resolved    Right leg cellulitis:  Switched Unasyn to Augmentin on 3/31 to complete 7-day course, EOT 4/4 4/2: Continue Augmentin    Type 2 diabetes mellitus:  A1c 9.3 in 1/2022  Continue Lantus 15 units and sliding scale  We will continue to adjust accordingly  Hold home meds      Discharge Planning: PT/OT recommend STR    Diet:  ADULT ORAL NUTRITION SUPPLEMENT Dinner; Diabetic Supplement  ADULT DIET Regular; 4 carb choices (60 gm/meal);  Low Sodium (2 gm); 2000 ml  DVT PPx: Heparin  Code status: DNR    Hospital Problems as of 4/2/2022 Date Reviewed: 3/21/2022          Codes Class Noted - Resolved POA    Moderate protein-calorie malnutrition ([facility] ICD-10-CM: E44.0  ICD-9-CM: 263.0  3/30/2022 - Present Yes        Bilateral leg edema ICD-10-CM: R60.0  ICD-9-CM: 782.3  3/28/2022 - Present Yes        Cor pulmonale (chronic) ([facility] ICD-10-CM: I27.81  ICD-9-CM: 416.9  3/28/2022 - Present Yes        Pulmonary HTN ([facility] ICD-10-CM: I27.20  ICD-9-CM: 416.8  3/28/2022 - Present Yes        Cellulitis of right leg ICD-10-CM: L03.115  ICD-9-CM: 682.6  3/28/2022 - Present Yes        * (Principal) Acute on chronic right-sided congestive heart failure ([facility] ICD-10-CM: I50.813  ICD-9-CM: 428.0  3/28/2022 - Present Yes        Presence of Watchman left atrial appendage closure device ICD-10-CM: Z95.818  ICD-9-CM: V45.09  1/21/2020 - Present Yes    Overview Signed 1/21/2020  7:36 PM by Kimberly Moran MD     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 (12/17/19)             Type 2 diabetes with nephropathy ([facility] ICD-10-CM: E11.21  ICD-9-CM: 250.40, 583.81 11/6/2019 - Present Yes        Persistent atrial fibrillation (HCC) ICD-10-CM: I48.19  ICD-9-CM: 427.31  9/18/2019 - Present Yes        Automatic implantable cardioverter-defibrillator in situ ICD-10-CM: Z95.810  ICD-9-CM: V45.02  3/30/2016 - Present Yes    Overview Signed 3/30/2016  9:01 AM by Belen Kirkpatrick     No shocks             Chronic combined systolic and diastolic heart failure ([facility] ICD-10-CM: I50.42  ICD-9-CM: 428.42  9/17/2013 - Present Yes        Coronary artery disease (Chronic) ICD-10-CM: I25.10  ICD-9-CM: 414.00  9/10/2011 - Present Yes    Overview Signed 3/30/2016  9:03 AM by Refugio Donnelly through [facility]             Hypercholesteremia (Chronic) ICD-10-CM: E78.00  ICD-9-CM: 272.0  9/10/2011 - Present Yes        Hypertension (Chronic) ICD-10-CM: I10  ICD-9-CM: 401.9  9/10/2011 - Present Yes              Objective:     Patient Vitals for the past 24 hrs:   Temp Pulse Resp BP SpO2   04/02/22 1101 98 °F (36.7 °C) 75 -- (!) 102/51 95 %   04/02/22 0717 97.3 °F (36.3 °C) 69 20 121/71 97 %   04/02/22 0504 98 °F (36.7 °C) 74 18 115/80 95 %   04/02/22 0023 98.5 °F (36.9 °C) 72 18 (!) 124/55 95 %   04/01/22 2032 98.3 °F (36.8 °C) 70 -- 130/60 96 %   04/01/22 1554 98 °F (36.7 °C) 69 -- (!) 120/56 98 %     Oxygen Therapy  O2 Sat (%): 95 % (04/02/22 1101)  Pulse via Oximetry: 70 beats per minute (03/28/22 1713)  O2 Device: None (Room air) (04/02/22 0748)    Estimated body mass index is 23.29 kg/m² as calculated from the following:    Height as of this encounter: 6' 2\" (1.88 m). Weight as of this encounter: 82.3 kg (181 lb 6.4 oz). Intake/Output Summary (Last 24 hours) at 4/2/2022 1401  Last data filed at 4/2/2022 1340  Gross per 24 hour   Intake --   Output 2525 ml   Net -2525 ml         Physical Exam:     Blood pressure (!) 102/51, pulse 75, temperature 98 °F (36.7 °C), resp. rate 20, height 6' 2\" (1.88 m), weight 82.3 kg (181 lb 6.4 oz), SpO2 95 %. General:    Well nourished.   No overt distress  Head:  Normocephalic, atraumatic  Eyes:  Sclerae appear normal.  Pupils equally round. ENT:  Nares appear normal, no drainage. Moist oral mucosa  Neck:  No restricted ROM. Trachea midline   CV:   RRR. No m/r/g. No jugular venous distension. Lungs:   Crackles on left lower lung  Abdomen: Bowel sounds present. Soft, nontender, nondistended. Extremities: bilateral lower extremity edema resolved, chronic erythema,  Right LE cellulitis, improved  Skin:     No rashes and normal coloration. Warm and dry. Neuro:  CN II-XII grossly intact. Sensation intact. A&Ox3  Psych:  Normal mood and affect. I have reviewed ordered lab tests and independently visualized imaging below:    Recent Labs:  Recent Results (from the past 48 hour(s))   GLUCOSE, POC    Collection Time: 03/31/22  4:07 PM   Result Value Ref Range    Glucose (POC) 187 (H) 65 - 100 mg/dL    Performed by Molly    GLUCOSE, POC    Collection Time: 03/31/22  8:49 PM   Result Value Ref Range    Glucose (POC) 192 (H) 65 - 100 mg/dL    Performed by Ramon    CBC WITH AUTOMATED DIFF    Collection Time: 04/01/22  4:40 AM   Result Value Ref Range    WBC 8.4 4.3 - 11.1 K/uL    RBC 3.19 (L) 4.23 - 5.6 M/uL    HGB 10.5 (L) 13.6 - 17.2 g/dL    HCT 31.2 (L) 41.1 - 50.3 %    MCV 97.8 79.6 - 97.8 FL    MCH 32.9 26.1 - 32.9 PG    MCHC 33.7 31.4 - 35.0 g/dL    RDW 15.7 (H) 11.9 - 14.6 %    PLATELET 769 457 - 870 K/uL    MPV 9.2 (L) 9.4 - 12.3 FL    ABSOLUTE NRBC 0.00 0.0 - 0.2 K/uL    DF AUTOMATED      NEUTROPHILS 70 43 - 78 %    LYMPHOCYTES 14 13 - 44 %    MONOCYTES 12 4.0 - 12.0 %    EOSINOPHILS 2 0.5 - 7.8 %    BASOPHILS 1 0.0 - 2.0 %    IMMATURE GRANULOCYTES 1 0.0 - 5.0 %    ABS. NEUTROPHILS 5.9 1.7 - 8.2 K/UL    ABS. LYMPHOCYTES 1.2 0.5 - 4.6 K/UL    ABS. MONOCYTES 1.0 0.1 - 1.3 K/UL    ABS. EOSINOPHILS 0.2 0.0 - 0.8 K/UL    ABS. BASOPHILS 0.0 0.0 - 0.2 K/UL    ABS. IMM.  GRANS. 0.1 0.0 - 0.5 K/UL   METABOLIC PANEL, BASIC Collection Time: 04/01/22  4:40 AM   Result Value Ref Range    Sodium 128 (L) 138 - 145 mmol/L    Potassium 4.8 3.5 - 5.1 mmol/L    Chloride 94 (L) 98 - 107 mmol/L    CO2 26 21 - 32 mmol/L    Anion gap 8 7 - 16 mmol/L    Glucose 130 (H) 65 - 100 mg/dL     (H) 8 - 23 MG/DL    Creatinine 3.80 (H) 0.8 - 1.5 MG/DL    GFR est AA 19 (L) >60 ml/min/1.73m2    GFR est non-AA 16 (L) >60 ml/min/1.73m2    Calcium 9.1 8.3 - 10.4 MG/DL   ALBUMIN    Collection Time: 04/01/22  4:40 AM   Result Value Ref Range    Albumin 2.1 (L) 3.2 - 4.6 g/dL   MAGNESIUM    Collection Time: 04/01/22  4:40 AM   Result Value Ref Range    Magnesium 2.2 1.8 - 2.4 mg/dL   PHOSPHORUS    Collection Time: 04/01/22  4:40 AM   Result Value Ref Range    Phosphorus 6.0 (H) 2.3 - 3.7 MG/DL   GLUCOSE, POC    Collection Time: 04/01/22  7:35 AM   Result Value Ref Range    Glucose (POC) 130 (H) 65 - 100 mg/dL    Performed by [facility]    URINALYSIS W/ RFLX MICROSCOPIC    Collection Time: 04/01/22  8:20 AM   Result Value Ref Range    Color YELLOW      Appearance CLEAR      Specific gravity 1.010 1.001 - 1.023      pH (UA) 6.0 5.0 - 9.0      Protein Negative NEG mg/dL    Glucose Negative mg/dL    Ketone Negative NEG mg/dL    Bilirubin Negative NEG      Blood TRACE (A) NEG      Urobilinogen 0.2 0.2 - 1.0 EU/dL    Nitrites Negative NEG      Leukocyte Esterase Negative NEG      WBC 0 0 /hpf    RBC 0-3 0 /hpf    Epithelial cells 0 0 /hpf    Bacteria 0 0 /hpf    Casts 0-3 0 /lpf   SODIUM, UR, RANDOM    Collection Time: 04/01/22  8:20 AM   Result Value Ref Range    Sodium,urine random 18 MMOL/L   CREATININE, UR, RANDOM    Collection Time: 04/01/22  8:20 AM   Result Value Ref Range    Creatinine, urine 56.00 mg/dL   GLUCOSE, POC    Collection Time: 04/01/22 11:02 AM   Result Value Ref Range    Glucose (POC) 170 (H) 65 - 100 mg/dL    Performed by Alexis    PLEASE READ & DOCUMENT PPD TEST IN 72 HRS    Collection Time: 04/01/22  1:30 PM   Result Value Ref Range    PPD Negative Negative    mm Induration 0 0 - 5 mm   GLUCOSE, POC    Collection Time: 04/01/22  3:56 PM   Result Value Ref Range    Glucose (POC) 176 (H) 65 - 100 mg/dL    Performed by Chris    GLUCOSE, POC    Collection Time: 04/01/22  8:52 PM   Result Value Ref Range    Glucose (POC) 244 (H) 65 - 100 mg/dL    Performed by [facility]    MAGNESIUM    Collection Time: 04/02/22  6:02 AM   Result Value Ref Range    Magnesium 2.3 1.8 - 2.4 mg/dL   RENAL FUNCTION PANEL    Collection Time: 04/02/22  6:02 AM   Result Value Ref Range    Sodium 130 (L) 138 - 145 mmol/L    Potassium 4.4 3.5 - 5.1 mmol/L    Chloride 96 (L) 98 - 107 mmol/L    CO2 24 21 - 32 mmol/L    Anion gap 10 7 - 16 mmol/L    Glucose 113 (H) 65 - 100 mg/dL     (H) 8 - 23 MG/DL    Creatinine 3.10 (H) 0.8 - 1.5 MG/DL    GFR est AA 25 (L) >60 ml/min/1.73m2    GFR est non-AA 20 (L) >60 ml/min/1.73m2    Calcium 9.3 8.3 - 10.4 MG/DL    Phosphorus 5.6 (H) 2.3 - 3.7 MG/DL    Albumin 2.0 (L) 3.2 - 4.6 g/dL   CBC WITH AUTOMATED DIFF    Collection Time: 04/02/22  6:02 AM   Result Value Ref Range    WBC 8.1 4.3 - 11.1 K/uL    RBC 3.14 (L) 4.23 - 5.6 M/uL    HGB 10.4 (L) 13.6 - 17.2 g/dL    HCT 31.4 (L) 41.1 - 50.3 %    .0 (H) 79.6 - 97.8 FL    MCH 33.1 (H) 26.1 - 32.9 PG    MCHC 33.1 31.4 - 35.0 g/dL    RDW 15.8 (H) 11.9 - 14.6 %    PLATELET 412 046 - 134 K/uL    MPV 9.7 9.4 - 12.3 FL    ABSOLUTE NRBC 0.00 0.0 - 0.2 K/uL    DF AUTOMATED      NEUTROPHILS 68 43 - 78 %    LYMPHOCYTES 14 13 - 44 %    MONOCYTES 13 (H) 4.0 - 12.0 %    EOSINOPHILS 4 0.5 - 7.8 %    BASOPHILS 1 0.0 - 2.0 %    IMMATURE GRANULOCYTES 1 0.0 - 5.0 %    ABS. NEUTROPHILS 5.5 1.7 - 8.2 K/UL    ABS. LYMPHOCYTES 1.2 0.5 - 4.6 K/UL    ABS. MONOCYTES 1.1 0.1 - 1.3 K/UL    ABS. EOSINOPHILS 0.3 0.0 - 0.8 K/UL    ABS. BASOPHILS 0.0 0.0 - 0.2 K/UL    ABS. IMM.  GRANS. 0.1 0.0 - 0.5 K/UL   GLUCOSE, POC    Collection Time: 04/02/22  7:21 AM   Result Value Ref Range Glucose (POC) 117 (H) 65 - 100 mg/dL    Performed by [facility]    GLUCOSE, POC    Collection Time: 04/02/22 11:00 AM   Result Value Ref Range    Glucose (POC) 188 (H) 65 - 100 mg/dL    Performed by [facility]        All Micro Results     None          Other Studies:  No results found. Current Meds:  Current Facility-Administered Medications   Medication Dose Route Frequency    0.9% sodium chloride infusion  50 mL/hr IntraVENous CONTINUOUS    amoxicillin-clavulanate (AUGMENTIN) 500-125 mg per tablet 1 Tablet  1 Tablet Oral BID WITH MEALS    sodium chloride (NS) flush 5-10 mL  5-10 mL IntraVENous Q8H    sodium chloride (NS) flush 5-10 mL  5-10 mL IntraVENous PRN    aspirin delayed-release tablet 81 mg  81 mg Oral DAILY    atorvastatin (LIPITOR) tablet 80 mg  80 mg Oral DAILY    [Held by provider] losartan (COZAAR) tablet 100 mg  100 mg Oral DAILY    metoprolol succinate (TOPROL-XL) XL tablet 50 mg  50 mg Oral DAILY    montelukast (SINGULAIR) tablet 10 mg  10 mg Oral DAILY    ondansetron (ZOFRAN) injection 4 mg  4 mg IntraVENous Q6H PRN    heparin (porcine) injection 5,000 Units  5,000 Units SubCUTAneous Q8H    morphine injection 2 mg  2 mg IntraVENous Q4H PRN    oxyCODONE IR (ROXICODONE) tablet 5 mg  5 mg Oral Q4H PRN    acetaminophen (TYLENOL) tablet 650 mg  650 mg Oral Q6H PRN    insulin glargine (LANTUS) injection 15 Units  0.2 Units/kg SubCUTAneous QHS    insulin lispro (HUMALOG) injection   SubCUTAneous AC&HS    glucose chewable tablet 16 g  16 g Oral PRN    glucagon (GLUCAGEN) injection 1 mg  1 mg IntraMUSCular PRN    dextrose 10% infusion 125-250 mL  125-250 mL IntraVENous PRN       Signed:  Fabricio Krause MD    Part of this note may have been written by using a voice dictation software. The note has been proof read but may still contain some grammatical/other typographical errors.

## 2022-04-02 NOTE — ROUTINE PROCESS
Bedside and Verbal shift change report given to self (oncoming nurse) by Brittney Mckeon RN (offgoing nurse). Report included the following information SBAR, Kardex, Intake/Output, MAR and Recent Results.

## 2022-04-03 LAB
ALBUMIN SERPL-MCNC: 2 G/DL (ref 3.2–4.6)
ANION GAP SERPL CALC-SCNC: 8 MMOL/L (ref 7–16)
BASOPHILS # BLD: 0.1 K/UL (ref 0–0.2)
BASOPHILS NFR BLD: 1 % (ref 0–2)
BUN SERPL-MCNC: 92 MG/DL (ref 8–23)
CALCIUM SERPL-MCNC: 9.4 MG/DL (ref 8.3–10.4)
CHLORIDE SERPL-SCNC: 100 MMOL/L (ref 98–107)
CO2 SERPL-SCNC: 25 MMOL/L (ref 21–32)
CREAT SERPL-MCNC: 2.5 MG/DL (ref 0.8–1.5)
DIFFERENTIAL METHOD BLD: ABNORMAL
EOSINOPHIL # BLD: 0.4 K/UL (ref 0–0.8)
EOSINOPHIL NFR BLD: 5 % (ref 0.5–7.8)
ERYTHROCYTE [DISTWIDTH] IN BLOOD BY AUTOMATED COUNT: 15.7 % (ref 11.9–14.6)
GLUCOSE BLD STRIP.AUTO-MCNC: 174 MG/DL (ref 65–100)
GLUCOSE BLD STRIP.AUTO-MCNC: 200 MG/DL (ref 65–100)
GLUCOSE BLD STRIP.AUTO-MCNC: 200 MG/DL (ref 65–100)
GLUCOSE BLD STRIP.AUTO-MCNC: 214 MG/DL (ref 65–100)
GLUCOSE SERPL-MCNC: 188 MG/DL (ref 65–100)
HCT VFR BLD AUTO: 30.6 % (ref 41.1–50.3)
HGB BLD-MCNC: 10.1 G/DL (ref 13.6–17.2)
IMM GRANULOCYTES # BLD AUTO: 0.1 K/UL (ref 0–0.5)
IMM GRANULOCYTES NFR BLD AUTO: 1 % (ref 0–5)
LYMPHOCYTES # BLD: 1.1 K/UL (ref 0.5–4.6)
LYMPHOCYTES NFR BLD: 13 % (ref 13–44)
MAGNESIUM SERPL-MCNC: 2.3 MG/DL (ref 1.8–2.4)
MCH RBC QN AUTO: 33 PG (ref 26.1–32.9)
MCHC RBC AUTO-ENTMCNC: 33 G/DL (ref 31.4–35)
MCV RBC AUTO: 100 FL (ref 79.6–97.8)
MONOCYTES # BLD: 1 K/UL (ref 0.1–1.3)
MONOCYTES NFR BLD: 12 % (ref 4–12)
NEUTS SEG # BLD: 5.8 K/UL (ref 1.7–8.2)
NEUTS SEG NFR BLD: 68 % (ref 43–78)
NRBC # BLD: 0 K/UL (ref 0–0.2)
PHOSPHATE SERPL-MCNC: 5.1 MG/DL (ref 2.3–3.7)
PLATELET # BLD AUTO: 204 K/UL (ref 150–450)
PMV BLD AUTO: 9.2 FL (ref 9.4–12.3)
POTASSIUM SERPL-SCNC: 4.2 MMOL/L (ref 3.5–5.1)
RBC # BLD AUTO: 3.06 M/UL (ref 4.23–5.6)
SERVICE CMNT-IMP: ABNORMAL
SODIUM SERPL-SCNC: 133 MMOL/L (ref 138–145)
WBC # BLD AUTO: 8.5 K/UL (ref 4.3–11.1)

## 2022-04-03 PROCEDURE — 74011000250 HC RX REV CODE- 250: Performed by: EMERGENCY MEDICINE

## 2022-04-03 PROCEDURE — 74011250637 HC RX REV CODE- 250/637: Performed by: INTERNAL MEDICINE

## 2022-04-03 PROCEDURE — 2709999900 HC NON-CHARGEABLE SUPPLY

## 2022-04-03 PROCEDURE — 74011250637 HC RX REV CODE- 250/637: Performed by: FAMILY MEDICINE

## 2022-04-03 PROCEDURE — 85025 COMPLETE CBC W/AUTO DIFF WBC: CPT

## 2022-04-03 PROCEDURE — 82962 GLUCOSE BLOOD TEST: CPT

## 2022-04-03 PROCEDURE — 74011250636 HC RX REV CODE- 250/636: Performed by: INTERNAL MEDICINE

## 2022-04-03 PROCEDURE — 83735 ASSAY OF MAGNESIUM: CPT

## 2022-04-03 PROCEDURE — 80048 BASIC METABOLIC PNL TOTAL CA: CPT

## 2022-04-03 PROCEDURE — 84100 ASSAY OF PHOSPHORUS: CPT

## 2022-04-03 PROCEDURE — 36415 COLL VENOUS BLD VENIPUNCTURE: CPT

## 2022-04-03 PROCEDURE — 82040 ASSAY OF SERUM ALBUMIN: CPT

## 2022-04-03 PROCEDURE — 65660000000 HC RM CCU STEPDOWN

## 2022-04-03 PROCEDURE — 99232 SBSQ HOSP IP/OBS MODERATE 35: CPT | Performed by: INTERNAL MEDICINE

## 2022-04-03 PROCEDURE — 74011636637 HC RX REV CODE- 636/637: Performed by: INTERNAL MEDICINE

## 2022-04-03 RX ADMIN — HEPARIN SODIUM 5000 UNITS: 5000 INJECTION INTRAVENOUS; SUBCUTANEOUS at 05:13

## 2022-04-03 RX ADMIN — SODIUM CHLORIDE, PRESERVATIVE FREE 10 ML: 5 INJECTION INTRAVENOUS at 22:30

## 2022-04-03 RX ADMIN — HEPARIN SODIUM 5000 UNITS: 5000 INJECTION INTRAVENOUS; SUBCUTANEOUS at 22:28

## 2022-04-03 RX ADMIN — OXYCODONE 5 MG: 5 TABLET ORAL at 16:43

## 2022-04-03 RX ADMIN — ATORVASTATIN CALCIUM 80 MG: 80 TABLET, FILM COATED ORAL at 08:51

## 2022-04-03 RX ADMIN — HEPARIN SODIUM 5000 UNITS: 5000 INJECTION INTRAVENOUS; SUBCUTANEOUS at 13:57

## 2022-04-03 RX ADMIN — METOPROLOL SUCCINATE 50 MG: 25 TABLET, EXTENDED RELEASE ORAL at 08:51

## 2022-04-03 RX ADMIN — INSULIN LISPRO 2 UNITS: 100 INJECTION, SOLUTION INTRAVENOUS; SUBCUTANEOUS at 07:40

## 2022-04-03 RX ADMIN — MONTELUKAST 10 MG: 10 TABLET, FILM COATED ORAL at 08:51

## 2022-04-03 RX ADMIN — ACETAMINOPHEN 650 MG: 325 TABLET ORAL at 00:37

## 2022-04-03 RX ADMIN — SODIUM CHLORIDE, PRESERVATIVE FREE 10 ML: 5 INJECTION INTRAVENOUS at 13:57

## 2022-04-03 RX ADMIN — SODIUM CHLORIDE, PRESERVATIVE FREE 10 ML: 5 INJECTION INTRAVENOUS at 05:13

## 2022-04-03 RX ADMIN — INSULIN GLARGINE 15 UNITS: 100 INJECTION, SOLUTION SUBCUTANEOUS at 22:29

## 2022-04-03 RX ADMIN — AMOXICILLIN AND CLAVULANATE POTASSIUM 1 TABLET: 500; 125 TABLET, FILM COATED ORAL at 16:37

## 2022-04-03 RX ADMIN — AMOXICILLIN AND CLAVULANATE POTASSIUM 1 TABLET: 500; 125 TABLET, FILM COATED ORAL at 08:53

## 2022-04-03 RX ADMIN — ASPIRIN 81 MG: 81 TABLET ORAL at 08:51

## 2022-04-03 RX ADMIN — OXYCODONE 5 MG: 5 TABLET ORAL at 22:29

## 2022-04-03 RX ADMIN — INSULIN LISPRO 4 UNITS: 100 INJECTION, SOLUTION INTRAVENOUS; SUBCUTANEOUS at 12:19

## 2022-04-03 RX ADMIN — INSULIN LISPRO 4 UNITS: 100 INJECTION, SOLUTION INTRAVENOUS; SUBCUTANEOUS at 16:37

## 2022-04-03 RX ADMIN — INSULIN LISPRO 4 UNITS: 100 INJECTION, SOLUTION INTRAVENOUS; SUBCUTANEOUS at 22:29

## 2022-04-03 NOTE — PROGRESS NOTES
am  4/3/2022 10:00 AM    Admit Date: 3/28/2022    Admit Diagnosis: Bilateral leg edema [R60.0]      Subjective:    Patient : Stable overnight without angina, CHF, or palpitations but weak and fatigued, renal function continues to worsen with worsening azotemia despite cessation of diuretics. Vitals otherwise stable and controlled. No other complaints overnight. Tolerating meds well.      LALY on CKD debility    4/3: Seen today and denies overnight events or concerns. Has not had edema, chest pain or palpitations.  Awake pleasant and cooperative during exam.    Objective:      Visit Vitals  BP (!) 145/66   Pulse 72   Temp 98.2 °F (36.8 °C)   Resp 22   Ht 6' 2\" (1.88 m)   Wt 183 lb 4.8 oz (83.1 kg)   SpO2 100%   BMI 23.53 kg/m²       ROS:  General ROS: negative for - chills  Hematological and Lymphatic ROS: negative for - blood clots or jaundice  Respiratory ROS: no cough, shortness of breath, or wheezing  Cardiovascular ROS: negative for - chest pain or palpitations  Gastrointestinal ROS: no abdominal pain, change in bowel habits, or black or bloody stools  Neurological ROS: no TIA or stroke symptoms    Physical Exam:  Neck- supple, 8 cm JVD at 60 degrees  CV- regular rate and rhythm no MRG  Lung- clear bilaterally, mildly decreased bilateral bases but no crackles or wheezes  Abd- soft, nontender, nondistended  Ext-trace distal edema at most  Skin- warm and dry      Current Facility-Administered Medications   Medication Dose Route Frequency    amoxicillin-clavulanate (AUGMENTIN) 500-125 mg per tablet 1 Tablet  1 Tablet Oral BID WITH MEALS    sodium chloride (NS) flush 5-10 mL  5-10 mL IntraVENous Q8H    sodium chloride (NS) flush 5-10 mL  5-10 mL IntraVENous PRN    aspirin delayed-release tablet 81 mg  81 mg Oral DAILY    atorvastatin (LIPITOR) tablet 80 mg  80 mg Oral DAILY    [Held by provider] losartan (COZAAR) tablet 100 mg  100 mg Oral DAILY    metoprolol succinate (TOPROL-XL) XL tablet 50 mg  50 mg Oral DAILY    montelukast (SINGULAIR) tablet 10 mg  10 mg Oral DAILY    ondansetron (ZOFRAN) injection 4 mg  4 mg IntraVENous Q6H PRN    heparin (porcine) injection 5,000 Units  5,000 Units SubCUTAneous Q8H    morphine injection 2 mg  2 mg IntraVENous Q4H PRN    oxyCODONE IR (ROXICODONE) tablet 5 mg  5 mg Oral Q4H PRN    acetaminophen (TYLENOL) tablet 650 mg  650 mg Oral Q6H PRN    insulin glargine (LANTUS) injection 15 Units  0.2 Units/kg SubCUTAneous QHS    insulin lispro (HUMALOG) injection   SubCUTAneous AC&HS    glucose chewable tablet 16 g  16 g Oral PRN    glucagon (GLUCAGEN) injection 1 mg  1 mg IntraMUSCular PRN    dextrose 10% infusion 125-250 mL  125-250 mL IntraVENous PRN       Data Review: data included in this note has been independently reviewed by the author   All lab results for the last 24 hours reviewed. Echo3/22:    Left Ventricle: Left ventricle size is normal. Mildly increased wall thickness. Mild global hypokinesis present. Septal motion consistent with pacemaker activation. Mildly reduced left ventricular systolic function. EF by 2D [employer] Biplane is 48%. Abnormal diastolic function.   Right Ventricle: Right ventricle size is normal. Low normal systolic function. Lead present in the right ventricle.   Aortic Valve: Moderately thickened cusps. Trace transvalvular regurgitation. No significant stenosis. AV mean gradient is 6 mmHg. AV peak gradient is 10 mmHg.   Mitral Valve: Mildly thickened leaflet. Mild transvalvular regurgitation.   Tricuspid Valve: Moderate transvalvular regurgitation. RVSP is 33 mmHg.   IVC diameter is greater than 21 mm and decreases greater than 50% during inspiration; therefore the estimated right atrial pressure is intermediate (~8 mmHg).   Contrast used: Definity.     TELEMETRY: Ventricular paced    Assessment/Plan:    Principal Problem:    Acute on chronic right-sided congestive heart failure ([address] (3/28/2022)  -  Patient with mild biventricular heart failure clinically and by echo. [facility] was admitted primarily with lower extremity edema.  Had no hypoxia or pulmonary edema.  He has been treated with Lasix in the face of acute kidney injury and lower extremity edema.  Lower extremity edema essentially resolved though renal function continues to worsen with BUN over 100.  Patient intermittently with worsening hypotension after Lasix drip but stable overnight with higher BP now after cessation of diuretics.      - Renal function continues to worsen. Hold  ARB/ACE/diuretics today again and recheck BMP in AM.  Gentle fluids today for 24 hours and reassess renal function in the morning.    - Overall prognosis is poor.    - Consider engaging palliative care if continues to decline.  -No new recommendation     Active Problems:    Coronary artery disease (9/10/2011)  - No angina. Follow clinically       Hypercholesteremia (9/10/2011)  - Continue atorvastatin 80 mg daily with outpatient surveillance.        Hypertension (9/10/2011)  - Holding losartan in the face of hypotension and acute kidney injury.  Continue metoprolol 50 mg daily.       Chronic combined systolic and diastolic heart failure (HCC) (9/17/2013)  - See above.       Automatic implantable cardioverter-defibrillator in situ (3/30/2016)  - Normal function       Persistent atrial fibrillation ([address] (9/18/2019)  - Status post watchman device implantation- outpatient surveillance.        Type 2 diabetes with nephropathy ([address] (11/6/2019)  - Chronic managed per primary team- recheck BMP in AM       Presence of Watchman left atrial appendage closure device (1/21/2020)  - Chronic stable       Bilateral leg edema (3/28/2022)  - Nearly resolved-worsening azotemia/renal failure. Avoid diuretics, ACE, ARB.   Gentle fluids at 50 cc an hour for 24 hours, encourage p.o. intake and reassess in the morning.       Cor pulmonale (chronic) ([address] (3/28/2022)  - See above       Pulmonary HTN ([address] (3/28/2022)  - See above- continue therapy, holding diuretics for now. Difficult situation.       Cellulitis of right leg (3/28/2022)  - See above- continue ABX per primary MD's       Moderate protein-calorie malnutrition ([facility] [facility] (3/30/2022)  - Chronic and managed per primary team      Acute kidney injury- worse- hold nephrotoxins and diuretics, recheck renal function in AM.  PO fluids as tolerated today. IVF's today as tolerated. Potassium better after stopping KCl supplementation. Recheck in the morning. Nephro following.     Call if needed    [facility] [place]

## 2022-04-03 NOTE — ROUTINE PROCESS
Bedside and Verbal shift change report given to self (oncoming nurse) by Alyssa Dupree RN (offgoing nurse). Report included the following information SBAR, Kardex, Intake/Output, MAR and Recent Results.

## 2022-04-03 NOTE — ROUTINE PROCESS
Bedside and Verbal shift change report given to Valentine Sutton and Chucky Estrada RN (oncoming nurse) by self (offgoing nurse). Report included the following information SBAR, Kardex, Intake/Output, MAR and Recent Results.

## 2022-04-03 NOTE — PROGRESS NOTES
[facility] PROGRESS NOTE    Follow up for:    Subjective:   Patient seen and examined.   No complaints    ROS:  Gen - no fever, no chills, appetite okay  CV - no chest pain, no orthopnea  Lung - no shortness of breath, no cough  Ext - no edema    Objective:   Exam:  Vitals:    04/03/22 0018 04/03/22 0533 04/03/22 0730 04/03/22 0851   BP: (!) 157/70 (!) 145/60 119/60 (!) 145/66   Pulse: 70 72 78 72   Resp: 18 18 22    Temp: 98.2 °F (36.8 °C) 98.2 °F (36.8 °C) 98.2 °F (36.8 °C)    SpO2: 98% 97% 100%    Weight:  83.1 kg (183 lb 4.8 oz)     Height:             Intake/Output Summary (Last 24 hours) at 4/3/2022 0940  Last data filed at 4/3/2022 0731  Gross per 24 hour   Intake --   Output 2900 ml   Net -2900 ml       Current Facility-Administered Medications   Medication Dose Route Frequency    amoxicillin-clavulanate (AUGMENTIN) 500-125 mg per tablet 1 Tablet  1 Tablet Oral BID WITH MEALS    sodium chloride (NS) flush 5-10 mL  5-10 mL IntraVENous Q8H    sodium chloride (NS) flush 5-10 mL  5-10 mL IntraVENous PRN    aspirin delayed-release tablet 81 mg  81 mg Oral DAILY    atorvastatin (LIPITOR) tablet 80 mg  80 mg Oral DAILY    [Held by provider] losartan (COZAAR) tablet 100 mg  100 mg Oral DAILY    metoprolol succinate (TOPROL-XL) XL tablet 50 mg  50 mg Oral DAILY    montelukast (SINGULAIR) tablet 10 mg  10 mg Oral DAILY    ondansetron (ZOFRAN) injection 4 mg  4 mg IntraVENous Q6H PRN    heparin (porcine) injection 5,000 Units  5,000 Units SubCUTAneous Q8H    morphine injection 2 mg  2 mg IntraVENous Q4H PRN    oxyCODONE IR (ROXICODONE) tablet 5 mg  5 mg Oral Q4H PRN    acetaminophen (TYLENOL) tablet 650 mg  650 mg Oral Q6H PRN    insulin glargine (LANTUS) injection 15 Units  0.2 Units/kg SubCUTAneous QHS    insulin lispro (HUMALOG) injection   SubCUTAneous AC&HS    glucose chewable tablet 16 g  16 g Oral PRN    glucagon (GLUCAGEN) injection 1 mg  1 mg IntraMUSCular PRN    dextrose 10% infusion 125-250 mL  125-250 mL IntraVENous PRN       EXAM  GEN - Alert, oriented, in no distress  CV - S1, S2, RRR, no rub, murmur, or gallop  Lung - clear to auscultation bilaterally  Abd - soft, nontender, BS present  Ext - venous stasis changes. trace edema    Recent Labs     04/03/22  0628 04/02/22  0602 04/01/22  0440   WBC 8.5 8.1 8.4   HGB 10.1* 10.4* 10.5*   HCT 30.6* 31.4* 31.2*    208 200        Recent Labs     04/03/22  0628 04/02/22  0602 04/01/22  0440   * 130* 128*   K 4.2 4.4 4.8    96* 94*   CO2 25 24 26   BUN 92* 107* 105*   CREA 2.50* 3.10* 3.80*   CA 9.4 9.3 9.1   * 113* 130*   MG 2.3 2.3 2.2   PHOS 5.1* 5.6* 6.0*       Assessment and Plan:      1) LALY on CKD III-  Likely 2/2 aggressive diuresis, improved again today with IVF. Will stop fluids today. 2) HFrEF- breathing comfortably. Stop fluids, but continue to hold diuretics    3) Hyponatremia-   improving.    4) Hyperphosphatemia-  2/2 LALY. trending down.     Reema Vega MD

## 2022-04-04 LAB
ALBUMIN SERPL-MCNC: 2 G/DL (ref 3.2–4.6)
ANION GAP SERPL CALC-SCNC: 7 MMOL/L (ref 7–16)
BASOPHILS # BLD: 0.1 K/UL (ref 0–0.2)
BASOPHILS NFR BLD: 1 % (ref 0–2)
BUN SERPL-MCNC: 82 MG/DL (ref 8–23)
CALCIUM SERPL-MCNC: 9.8 MG/DL (ref 8.3–10.4)
CHLORIDE SERPL-SCNC: 102 MMOL/L (ref 98–107)
CO2 SERPL-SCNC: 26 MMOL/L (ref 21–32)
CREAT SERPL-MCNC: 2.1 MG/DL (ref 0.8–1.5)
DIFFERENTIAL METHOD BLD: ABNORMAL
EOSINOPHIL # BLD: 0.4 K/UL (ref 0–0.8)
EOSINOPHIL NFR BLD: 4 % (ref 0.5–7.8)
ERYTHROCYTE [DISTWIDTH] IN BLOOD BY AUTOMATED COUNT: 15.7 % (ref 11.9–14.6)
GLUCOSE BLD STRIP.AUTO-MCNC: 115 MG/DL (ref 65–100)
GLUCOSE BLD STRIP.AUTO-MCNC: 142 MG/DL (ref 65–100)
GLUCOSE BLD STRIP.AUTO-MCNC: 174 MG/DL (ref 65–100)
GLUCOSE BLD STRIP.AUTO-MCNC: 274 MG/DL (ref 65–100)
GLUCOSE SERPL-MCNC: 118 MG/DL (ref 65–100)
HCT VFR BLD AUTO: 31.2 % (ref 41.1–50.3)
HGB BLD-MCNC: 10.2 G/DL (ref 13.6–17.2)
IMM GRANULOCYTES # BLD AUTO: 0.1 K/UL (ref 0–0.5)
IMM GRANULOCYTES NFR BLD AUTO: 1 % (ref 0–5)
LYMPHOCYTES # BLD: 1.2 K/UL (ref 0.5–4.6)
LYMPHOCYTES NFR BLD: 13 % (ref 13–44)
MAGNESIUM SERPL-MCNC: 2.2 MG/DL (ref 1.8–2.4)
MCH RBC QN AUTO: 33.1 PG (ref 26.1–32.9)
MCHC RBC AUTO-ENTMCNC: 32.7 G/DL (ref 31.4–35)
MCV RBC AUTO: 101.3 FL (ref 79.6–97.8)
MONOCYTES # BLD: 1.1 K/UL (ref 0.1–1.3)
MONOCYTES NFR BLD: 11 % (ref 4–12)
NEUTS SEG # BLD: 6.5 K/UL (ref 1.7–8.2)
NEUTS SEG NFR BLD: 70 % (ref 43–78)
NRBC # BLD: 0 K/UL (ref 0–0.2)
PHOSPHATE SERPL-MCNC: 4.1 MG/DL (ref 2.3–3.7)
PLATELET # BLD AUTO: 230 K/UL (ref 150–450)
PMV BLD AUTO: 9.4 FL (ref 9.4–12.3)
POTASSIUM SERPL-SCNC: 3.8 MMOL/L (ref 3.5–5.1)
RBC # BLD AUTO: 3.08 M/UL (ref 4.23–5.6)
SERVICE CMNT-IMP: ABNORMAL
SODIUM SERPL-SCNC: 135 MMOL/L (ref 138–145)
WBC # BLD AUTO: 9.3 K/UL (ref 4.3–11.1)

## 2022-04-04 PROCEDURE — 74011250637 HC RX REV CODE- 250/637: Performed by: FAMILY MEDICINE

## 2022-04-04 PROCEDURE — 74011000250 HC RX REV CODE- 250: Performed by: EMERGENCY MEDICINE

## 2022-04-04 PROCEDURE — 97530 THERAPEUTIC ACTIVITIES: CPT

## 2022-04-04 PROCEDURE — 74011250637 HC RX REV CODE- 250/637: Performed by: INTERNAL MEDICINE

## 2022-04-04 PROCEDURE — 82962 GLUCOSE BLOOD TEST: CPT

## 2022-04-04 PROCEDURE — 74011636637 HC RX REV CODE- 636/637: Performed by: INTERNAL MEDICINE

## 2022-04-04 PROCEDURE — 97535 SELF CARE MNGMENT TRAINING: CPT

## 2022-04-04 PROCEDURE — 84100 ASSAY OF PHOSPHORUS: CPT

## 2022-04-04 PROCEDURE — 36415 COLL VENOUS BLD VENIPUNCTURE: CPT

## 2022-04-04 PROCEDURE — 83735 ASSAY OF MAGNESIUM: CPT

## 2022-04-04 PROCEDURE — 74011250636 HC RX REV CODE- 250/636: Performed by: INTERNAL MEDICINE

## 2022-04-04 PROCEDURE — 82040 ASSAY OF SERUM ALBUMIN: CPT

## 2022-04-04 PROCEDURE — 65660000000 HC RM CCU STEPDOWN

## 2022-04-04 PROCEDURE — 80048 BASIC METABOLIC PNL TOTAL CA: CPT

## 2022-04-04 PROCEDURE — 85025 COMPLETE CBC W/AUTO DIFF WBC: CPT

## 2022-04-04 RX ADMIN — SODIUM CHLORIDE, PRESERVATIVE FREE 5 ML: 5 INJECTION INTRAVENOUS at 21:46

## 2022-04-04 RX ADMIN — OXYCODONE 5 MG: 5 TABLET ORAL at 21:42

## 2022-04-04 RX ADMIN — SODIUM CHLORIDE, PRESERVATIVE FREE 5 ML: 5 INJECTION INTRAVENOUS at 15:00

## 2022-04-04 RX ADMIN — ATORVASTATIN CALCIUM 80 MG: 80 TABLET, FILM COATED ORAL at 08:43

## 2022-04-04 RX ADMIN — SODIUM CHLORIDE, PRESERVATIVE FREE 10 ML: 5 INJECTION INTRAVENOUS at 05:25

## 2022-04-04 RX ADMIN — INSULIN LISPRO 6 UNITS: 100 INJECTION, SOLUTION INTRAVENOUS; SUBCUTANEOUS at 21:42

## 2022-04-04 RX ADMIN — ASPIRIN 81 MG: 81 TABLET ORAL at 08:43

## 2022-04-04 RX ADMIN — AMOXICILLIN AND CLAVULANATE POTASSIUM 1 TABLET: 500; 125 TABLET, FILM COATED ORAL at 08:43

## 2022-04-04 RX ADMIN — MONTELUKAST 10 MG: 10 TABLET, FILM COATED ORAL at 08:43

## 2022-04-04 RX ADMIN — INSULIN LISPRO 2 UNITS: 100 INJECTION, SOLUTION INTRAVENOUS; SUBCUTANEOUS at 17:00

## 2022-04-04 RX ADMIN — INSULIN GLARGINE 15 UNITS: 100 INJECTION, SOLUTION SUBCUTANEOUS at 21:42

## 2022-04-04 RX ADMIN — METOPROLOL SUCCINATE 50 MG: 25 TABLET, EXTENDED RELEASE ORAL at 08:44

## 2022-04-04 RX ADMIN — HEPARIN SODIUM 5000 UNITS: 5000 INJECTION INTRAVENOUS; SUBCUTANEOUS at 21:42

## 2022-04-04 RX ADMIN — HEPARIN SODIUM 5000 UNITS: 5000 INJECTION INTRAVENOUS; SUBCUTANEOUS at 05:24

## 2022-04-04 RX ADMIN — HEPARIN SODIUM 5000 UNITS: 5000 INJECTION INTRAVENOUS; SUBCUTANEOUS at 15:00

## 2022-04-04 RX ADMIN — ACETAMINOPHEN 650 MG: 325 TABLET ORAL at 08:43

## 2022-04-04 NOTE — PROGRESS NOTES
Comprehensive Nutrition Assessment    Type and Reason for Visit: Reassess,Positive nutrition screen,Consult  Best Practice Alert for Malnutrition Screening Tool: Recently Lost Weight Without Trying: Yes, If Yes, How Much Weight Loss: 2-13 lbs, Eating Poorly Due to Decreased Appetite: Yes    Nutrition Recommendations/Plan:   · Meals and Snacks:  · Continue current diet. · Nutrition Supplement Therapy:  · Medical food supplement therapy:  Initiate Glucerna Shake once per day (this provides 220 kcal and 10 grams protein per bottle)      Malnutrition Assessment:  Malnutrition Status: Moderate malnutrition  Context: Chronic illness  Findings of clinical characteristics of malnutrition:   Energy Intake:  Unable to assess (pt reports son, daughter in law, and neighbors provide meals)  Weight Loss:  Unable to assess (fluid fluctuations noted per chart review)     Body Fat Loss:  1 - Mild body fat loss, Fat overlying ribs,Triceps   Muscle Mass Loss:  1 - Mild muscle mass loss, Clavicles (pectoralis &deltoids),Hand (interosseous),Scapula (trapezius)  Fluid Accumulation:  Unable to assess,     Strength:  Not performed       Nutrition Assessment:   Nutrition History: Pt states having increased work of breathing \"on and off\" over the past year that has also impacted his ability to provide meals for himself. He states his daughter-in-law, son, and neighbors provide meals. Son arrived during initial assessment and states pt will eat meals provided, but not in one sitting. Pt typically will eat the meal for 1-2 days. Pt also reports drinking 1 8oz glass of 2% milk each day. Pt reports UBW of 225-230lbs ~1 year ago. Wt fluctuates between 180-203lbs per EMR. Pt also recently had lasix increased from 40mg daily to 80mg daily with addition of Zaroxolyn. Nutrition Background: Pt with PMH notable for CHF, DM II, HTN, HLD, CAD, CKD III, TIA, and OA. Presents with worsening BLE edema.  Pt admitted for acute on chronic CHF, and cellulitis of right leg. Nutrition Interval:  Spoke with patient and son in the room. Patient reports he has been eating much better over the last several days. He also says he does drink the supplement. Son agrees with patient reports and stated he thinks as patient started to feel better, his intake improved. Also son stated from seeing meals here likely patient not getting enough protein at home mostly at breakfast. Discussed options like, supplements, greek yogurt, eggs, sausage. Education still held as son was also trying to work this morning and patient watching TV, seemed like focus was not in place for education. Nutrition Related Findings:   NFPE findings as stated above Wound Type: None    Current Nutrition Therapies:  ADULT ORAL NUTRITION SUPPLEMENT Dinner; Diabetic Supplement  ADULT DIET Regular; 4 carb choices (60 gm/meal);  Low Sodium (2 gm); 2000 ml    Current Intake:   Average Meal Intake: % Average Supplement Intake: %      Anthropometric Measures:  Height: 6' 2\" (188 cm)  Current Body Wt: 82 kg (180 lb 12.4 oz) (4/4), Weight source: Bed scale  BMI: 23.2, Normal weight (BMI 22.0-24.9) age over 72  Admission Body Weight: 166 lb 0.1 oz (3/28; bed scale)  Ideal Body Weight (lbs) (Calculated): 190 lbs (86 kg), 95.1 %  Usual Body Wt:  (180-203lbs over the past year per EMR), Percent weight change:          Edema: LLE: 1+; Pitting (4/3/2022  8:34 PM)  RLE: 1+; Pitting (4/3/2022  8:34 PM)    Estimated Daily Nutrient Needs:  Energy (kcal/day): 5296-95044 (Kcal/kg (25-30), Weight Used: Current (75.4kg))  Protein (g/day): 75-90 Weight Used: (Current (1-1.2gm/kg))  Fluid (ml/day):   (1 ml/kcal)    Nutrition Diagnosis:   · Inadequate oral intake related to impaired respiratory function as evidenced by  (pt reports barrier to intake)    · Moderate malnutrition,In context of chronic illness related to inadequate protein-energy intake as evidenced by  (criteria provided in malnutrition screen above)    Nutrition Interventions:   Food and/or Nutrient Delivery: Continue current diet,Continue oral nutrition supplement     Coordination of Nutrition Care: Continue to monitor while inpatient    Goals:   Previous Goal Met: Goal(s) achieved  Active Goal: Meet >75% estimated nutrition needs by RD follow up.     Nutrition Monitoring and Evaluation:      Food/Nutrient Intake Outcomes: Food and nutrient intake,Supplement intake  Physical Signs/Symptoms Outcomes: Biochemical data,Meal time behavior,Weight    Discharge Planning:    Continue oral nutrition supplement,Continue current diet    Electronically signed by Jake Pedersen MS, RD, LD on 4/4/2022 at 10:14 AM.

## 2022-04-04 NOTE — PROGRESS NOTES
[facility] PROGRESS NOTE    Follow up for:    Subjective:   Patient seen and examined.   No complaints    ROS:  Gen - no fever, no chills, appetite okay  CV - no chest pain, no orthopnea  Lung - no shortness of breath, no cough  Ext - no edema    Objective:   Exam:  Vitals:    04/04/22 0034 04/04/22 0403 04/04/22 0806 04/04/22 1007   BP: 136/67 128/62 136/76    Pulse: 68 70 89    Resp: 22 22 18    Temp: 97.9 °F (36.6 °C) 98 °F (36.7 °C) 98.1 °F (36.7 °C)    SpO2: 98% 97% 96%    Weight:  82 kg (180 lb 11.2 oz)     Height:    6' 2\" (1.88 m)         Intake/Output Summary (Last 24 hours) at 4/4/2022 1015  Last data filed at 4/4/2022 0240  Gross per 24 hour   Intake 540 ml   Output 1875 ml   Net -1335 ml       Current Facility-Administered Medications   Medication Dose Route Frequency    sodium chloride (NS) flush 5-10 mL  5-10 mL IntraVENous Q8H    sodium chloride (NS) flush 5-10 mL  5-10 mL IntraVENous PRN    aspirin delayed-release tablet 81 mg  81 mg Oral DAILY    atorvastatin (LIPITOR) tablet 80 mg  80 mg Oral DAILY    [Held by provider] losartan (COZAAR) tablet 100 mg  100 mg Oral DAILY    metoprolol succinate (TOPROL-XL) XL tablet 50 mg  50 mg Oral DAILY    montelukast (SINGULAIR) tablet 10 mg  10 mg Oral DAILY    ondansetron (ZOFRAN) injection 4 mg  4 mg IntraVENous Q6H PRN    heparin (porcine) injection 5,000 Units  5,000 Units SubCUTAneous Q8H    morphine injection 2 mg  2 mg IntraVENous Q4H PRN    oxyCODONE IR (ROXICODONE) tablet 5 mg  5 mg Oral Q4H PRN    acetaminophen (TYLENOL) tablet 650 mg  650 mg Oral Q6H PRN    insulin glargine (LANTUS) injection 15 Units  0.2 Units/kg SubCUTAneous QHS    insulin lispro (HUMALOG) injection   SubCUTAneous AC&HS    glucose chewable tablet 16 g  16 g Oral PRN    glucagon (GLUCAGEN) injection 1 mg  1 mg IntraMUSCular PRN    dextrose 10% infusion 125-250 mL  125-250 mL IntraVENous PRN       EXAM  GEN - Alert, oriented, in no distress  CV - S1, S2, RRR, no rub, murmur, or gallop  Lung - clear to auscultation bilaterally  Abd - soft, nontender, BS present  Ext - venous stasis changes. trace edema    Recent Labs     04/04/22  0549 04/03/22  0628 04/02/22  0602   WBC 9.3 8.5 8.1   HGB 10.2* 10.1* 10.4*   HCT 31.2* 30.6* 31.4*    204 208        Recent Labs     04/04/22  0549 04/03/22  0628 04/02/22  0602   * 133* 130*   K 3.8 4.2 4.4    100 96*   CO2 26 25 24   BUN 82* 92* 107*   CREA 2.10* 2.50* 3.10*   CA 9.8 9.4 9.3   * 188* 113*   MG 2.2 2.3 2.3   PHOS 4.1* 5.1* 5.6*       Assessment and Plan:      1) LALY on CKD III-  Likely 2/2 aggressive diuresis, continues to improve, now off fluids and likely at or near baseline. Will schedule outpatient f/u.    2) HFrEF- breathing comfortably. Would discharge on PO diuretics - likely Lasix 40mg BID or Torsemide 40mg daily. 3) Hyponatremia-   improving.    4) Hyperphosphatemia-  2/2 LALY. resolved    Will s/o. Please recall as needed.      Javier Quintana MD

## 2022-04-04 NOTE — PROGRESS NOTES
Hospitalist Progress Note   Admit Date:  3/28/2022  3:15 PM   Name:  Geraldo Trevino   Age:  80 y.o. Sex:  male  :  1934   MRN:  [id number]   Room:  303/01    Presenting Complaint: Leg Pain    Reason(s) for Admission: Bilateral leg edema [R60.0]     Hospital Course & Interval History:     Geraldo Trevino is a 80year old CM with a PMH of chronic combined CHF, DM2, HTN, HLD, and pulmonary HTN with cor pulmonale with chronic BLE edema admitted with worsening bilateral lower extremity edema despite doubling Lasix dose for a week and adding Zaroxolyn. Cardiology consulted. Started on Lasix gtt. later discontinued on 3/30 due to worsening kidney functions. Lower extremity edema resolved. Also noted to have right lower extremity cellulitis and started on Unasyn and switched to p.o. Augmentin on 3/31. Subjective/24hr Events (22): Patient is seen at the bedside. No active complaint. Feeling better. No chest pain, palpitation, nausea, vomiting, abdominal pain. Son at bedside and updated on patient's current condition. ROS:  10 systems reviewed and negative except as noted above. Assessment & Plan:     LALY on CKD stage III:  Baseline creatinine 1.5, Creatinine 2.40 on admission  Kidney function worsened due to aggressive diuresis, diuretics on hold since 3/31  Renal US with bilateral renal cyst.   : Creatinine significantly improved. Off IV fluids. Nephrology recommend resume Lasix 40 mg bid or torsemide 40 daily on discharge    Acute on chronic congestive heart failure:  Bilateral lower extremity edema:  Last echocardiogram with ejection fraction 16% with diastolic dysfunction  Off lasix gtt on 3/30  Daily BMP  Lower extremity Doppler negative for DVT  Cardiology consulted, appreciate recommendation  Echocardiogram with ejection fraction 03%, and diastolic dysfunction. Lower extremity edema resolved. Diuresis on hold. Currently fluid status stable.     Cardiology signed off      Hypertension:  Persistent A. Fib:  CAD:  Continue Cozaar and Toprol  Continue aspirin and statin  Watchman device in place  losartan on hold d/t LALY, continue rest of the management    Hyperkalemia in the setting of LALY:  Treated with sodium bicarbonate, insulin, dextrose and Lokelma. Resolved    Right leg cellulitis:  Switched Unasyn to Augmentin on 3/31 to complete 7-day course, EOT 4/4  4/3: Continue Augmentin    Type 2 diabetes mellitus:  A1c 9.3 in 1/2022  Continue Lantus 15 units and sliding scale  We will continue to adjust accordingly  Hold home meds      Discharge Planning: PT/OT recommend STR    Diet:  ADULT ORAL NUTRITION SUPPLEMENT Dinner; Diabetic Supplement  ADULT DIET Regular; 4 carb choices (60 gm/meal);  Low Sodium (2 gm); 2000 ml  DVT PPx: Heparin  Code status: DNR    Hospital Problems as of 4/4/2022 Date Reviewed: 3/21/2022          Codes Class Noted - Resolved POA    Moderate protein-calorie malnutrition ([facility] ICD-10-CM: E44.0  ICD-9-CM: 263.0  3/30/2022 - Present Yes        Bilateral leg edema ICD-10-CM: R60.0  ICD-9-CM: 782.3  3/28/2022 - Present Yes        Cor pulmonale (chronic) ([facility] ICD-10-CM: I27.81  ICD-9-CM: 416.9  3/28/2022 - Present Yes        Pulmonary HTN ([facility] ICD-10-CM: I27.20  ICD-9-CM: 416.8  3/28/2022 - Present Yes        Cellulitis of right leg ICD-10-CM: L03.115  ICD-9-CM: 682.6  3/28/2022 - Present Yes        * (Principal) Acute on chronic right-sided congestive heart failure ([facility] ICD-10-CM: I50.813  ICD-9-CM: 428.0  3/28/2022 - Present Yes        Presence of Watchman left atrial appendage closure device ICD-10-CM: Z95.818  ICD-9-CM: V45.09  1/21/2020 - Present Yes    Overview Signed 1/21/2020  7:36 PM by Emely Pabon MD     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 (12/17/19)             Type 2 diabetes with nephropathy ([facility] ICD-10-CM: E11.21  ICD-9-CM: 250.40, 583.81  11/6/2019 - Present Yes        Persistent atrial fibrillation [facility]) ICD-10-CM: I48.19  ICD-9-CM: 427.31  9/18/2019 - Present Yes        Automatic implantable cardioverter-defibrillator in situ ICD-10-CM: Z95.810  ICD-9-CM: V45.02  3/30/2016 - Present Yes    Overview Signed 3/30/2016  9:01 AM by Maryse Soto     No shocks             Chronic combined systolic and diastolic heart failure ([address] ICD-10-CM: I50.42  ICD-9-CM: 428.42  9/17/2013 - Present Yes        Coronary artery disease (Chronic) ICD-10-CM: I25.10  ICD-9-CM: 414.00  9/10/2011 - Present Yes    Overview Signed 3/30/2016  9:03 AM by Mark Wise through [facility]             Hypercholesteremia (Chronic) ICD-10-CM: E78.00  ICD-9-CM: 272.0  9/10/2011 - Present Yes        Hypertension (Chronic) ICD-10-CM: I10  ICD-9-CM: 401.9  9/10/2011 - Present Yes              Objective:     Patient Vitals for the past 24 hrs:   Temp Pulse Resp BP SpO2   04/04/22 1145 98 °F (36.7 °C) 72 16 133/61 98 %   04/04/22 0806 98.1 °F (36.7 °C) 89 18 136/76 96 %   04/04/22 0403 98 °F (36.7 °C) 70 22 128/62 97 %   04/04/22 0034 97.9 °F (36.6 °C) 68 22 136/67 98 %   04/03/22 2026 98.1 °F (36.7 °C) 69 20 (!) 148/66 98 %   04/03/22 1541 97.9 °F (36.6 °C) 71 18 135/85 96 %     Oxygen Therapy  O2 Sat (%): 98 % (04/04/22 1145)  Pulse via Oximetry: 70 beats per minute (03/28/22 1713)  O2 Device: None (Room air) (04/04/22 1240)    Estimated body mass index is 23.2 kg/m² as calculated from the following:    Height as of this encounter: 6' 2\" (1.88 m). Weight as of this encounter: 82 kg (180 lb 11.2 oz). Intake/Output Summary (Last 24 hours) at 4/4/2022 1456  Last data filed at 4/4/2022 1240  Gross per 24 hour   Intake 540 ml   Output 1875 ml   Net -1335 ml         Physical Exam:     Blood pressure 133/61, pulse 72, temperature 98 °F (36.7 °C), resp. rate 16, height 6' 2\" (1.88 m), weight 82 kg (180 lb 11.2 oz), SpO2 98 %. General:    Well nourished.   No overt distress  Head:  Normocephalic, atraumatic  Eyes:  Sclerae appear normal.  Pupils equally round. ENT:  Nares appear normal, no drainage. Moist oral mucosa  Neck:  No restricted ROM. Trachea midline   CV:   RRR. No m/r/g. No jugular venous distension. Lungs:   Crackles on left lower lung  Abdomen: Bowel sounds present. Soft, nontender, nondistended. Extremities: bilateral lower extremity edema resolved, chronic erythema,  Right LE cellulitis, improved  Skin:     No rashes and normal coloration. Warm and dry. Neuro:  CN II-XII grossly intact. Sensation intact. A&Ox3  Psych:  Normal mood and affect.       I have reviewed ordered lab tests and independently visualized imaging below:    Recent Labs:  Recent Results (from the past 48 hour(s))   GLUCOSE, POC    Collection Time: 04/02/22  4:15 PM   Result Value Ref Range    Glucose (POC) 189 (H) 65 - 100 mg/dL    Performed by [facility]    GLUCOSE, POC    Collection Time: 04/02/22  9:09 PM   Result Value Ref Range    Glucose (POC) 207 (H) 65 - 100 mg/dL    Performed by [facility]    METABOLIC PANEL, BASIC    Collection Time: 04/03/22  6:28 AM   Result Value Ref Range    Sodium 133 (L) 138 - 145 mmol/L    Potassium 4.2 3.5 - 5.1 mmol/L    Chloride 100 98 - 107 mmol/L    CO2 25 21 - 32 mmol/L    Anion gap 8 7 - 16 mmol/L    Glucose 188 (H) 65 - 100 mg/dL    BUN 92 (H) 8 - 23 MG/DL    Creatinine 2.50 (H) 0.8 - 1.5 MG/DL    GFR est AA 32 (L) >60 ml/min/1.73m2    GFR est non-AA 26 (L) >60 ml/min/1.73m2    Calcium 9.4 8.3 - 10.4 MG/DL   CBC WITH AUTOMATED DIFF    Collection Time: 04/03/22  6:28 AM   Result Value Ref Range    WBC 8.5 4.3 - 11.1 K/uL    RBC 3.06 (L) 4.23 - 5.6 M/uL    HGB 10.1 (L) 13.6 - 17.2 g/dL    HCT 30.6 (L) 41.1 - 50.3 %    .0 (H) 79.6 - 97.8 FL    MCH 33.0 (H) 26.1 - 32.9 PG    MCHC 33.0 31.4 - 35.0 g/dL    RDW 15.7 (H) 11.9 - 14.6 %    PLATELET 993 457 - 506 K/uL    MPV 9.2 (L) 9.4 - 12.3 FL    ABSOLUTE NRBC 0.00 0.0 - 0.2 K/uL    DF AUTOMATED      NEUTROPHILS 68 43 - 78 %    LYMPHOCYTES 13 13 - 44 % MONOCYTES 12 4.0 - 12.0 %    EOSINOPHILS 5 0.5 - 7.8 %    BASOPHILS 1 0.0 - 2.0 %    IMMATURE GRANULOCYTES 1 0.0 - 5.0 %    ABS. NEUTROPHILS 5.8 1.7 - 8.2 K/UL    ABS. LYMPHOCYTES 1.1 0.5 - 4.6 K/UL    ABS. MONOCYTES 1.0 0.1 - 1.3 K/UL    ABS. EOSINOPHILS 0.4 0.0 - 0.8 K/UL    ABS. BASOPHILS 0.1 0.0 - 0.2 K/UL    ABS. IMM.  GRANS. 0.1 0.0 - 0.5 K/UL   ALBUMIN    Collection Time: 04/03/22  6:28 AM   Result Value Ref Range    Albumin 2.0 (L) 3.2 - 4.6 g/dL   MAGNESIUM    Collection Time: 04/03/22  6:28 AM   Result Value Ref Range    Magnesium 2.3 1.8 - 2.4 mg/dL   PHOSPHORUS    Collection Time: 04/03/22  6:28 AM   Result Value Ref Range    Phosphorus 5.1 (H) 2.3 - 3.7 MG/DL   GLUCOSE, POC    Collection Time: 04/03/22  7:29 AM   Result Value Ref Range    Glucose (POC) 174 (H) 65 - 100 mg/dL    [facility]    GLUCOSE, POC    Collection Time: 04/03/22 11:32 AM   Result Value Ref Range    Glucose (POC) 200 (H) 65 - 100 mg/dL    [facility]    GLUCOSE, POC    Collection Time: 04/03/22  3:45 PM   Result Value Ref Range    Glucose (POC) 200 (H) 65 - 100 mg/dL    [facility]    GLUCOSE, POC    Collection Time: 04/03/22  8:29 PM   Result Value Ref Range    Glucose (POC) 214 (H) 65 - 100 mg/dL    Performed by [facility]    METABOLIC PANEL, BASIC    Collection Time: 04/04/22  5:49 AM   Result Value Ref Range    Sodium 135 (L) 138 - 145 mmol/L    Potassium 3.8 3.5 - 5.1 mmol/L    Chloride 102 98 - 107 mmol/L    CO2 26 21 - 32 mmol/L    Anion gap 7 7 - 16 mmol/L    Glucose 118 (H) 65 - 100 mg/dL    BUN 82 (H) 8 - 23 MG/DL    Creatinine 2.10 (H) 0.8 - 1.5 MG/DL    GFR est AA 39 (L) >60 ml/min/1.73m2    GFR est non-AA 32 (L) >60 ml/min/1.73m2    Calcium 9.8 8.3 - 10.4 MG/DL   CBC WITH AUTOMATED DIFF    Collection Time: 04/04/22  5:49 AM   Result Value Ref Range    WBC 9.3 4.3 - 11.1 K/uL    RBC 3.08 (L) 4.23 - 5.6 M/uL    HGB 10.2 (L) 13.6 - 17.2 g/dL    HCT 31.2 (L) 41.1 - 50.3 %    .3 (H) 79.6 - 97.8 FL    MCH 33.1 (H) 26.1 - 32.9 PG    MCHC 32.7 31.4 - 35.0 g/dL    RDW 15.7 (H) 11.9 - 14.6 %    PLATELET 731 147 - 307 K/uL    MPV 9.4 9.4 - 12.3 FL    ABSOLUTE NRBC 0.00 0.0 - 0.2 K/uL    DF AUTOMATED      NEUTROPHILS 70 43 - 78 %    LYMPHOCYTES 13 13 - 44 %    MONOCYTES 11 4.0 - 12.0 %    EOSINOPHILS 4 0.5 - 7.8 %    BASOPHILS 1 0.0 - 2.0 %    IMMATURE GRANULOCYTES 1 0.0 - 5.0 %    ABS. NEUTROPHILS 6.5 1.7 - 8.2 K/UL    ABS. LYMPHOCYTES 1.2 0.5 - 4.6 K/UL    ABS. MONOCYTES 1.1 0.1 - 1.3 K/UL    ABS. EOSINOPHILS 0.4 0.0 - 0.8 K/UL    ABS. BASOPHILS 0.1 0.0 - 0.2 K/UL    ABS. IMM. GRANS. 0.1 0.0 - 0.5 K/UL   ALBUMIN    Collection Time: 04/04/22  5:49 AM   Result Value Ref Range    Albumin 2.0 (L) 3.2 - 4.6 g/dL   MAGNESIUM    Collection Time: 04/04/22  5:49 AM   Result Value Ref Range    Magnesium 2.2 1.8 - 2.4 mg/dL   PHOSPHORUS    Collection Time: 04/04/22  5:49 AM   Result Value Ref Range    Phosphorus 4.1 (H) 2.3 - 3.7 MG/DL   GLUCOSE, POC    Collection Time: 04/04/22  7:11 AM   Result Value Ref Range    Glucose (POC) 115 (H) 65 - 100 mg/dL    Performed by [facility]    GLUCOSE, POC    Collection Time: 04/04/22 11:01 AM   Result Value Ref Range    Glucose (POC) 142 (H) 65 - 100 mg/dL    Performed by [employer]        All Micro Results     None          Other Studies:  No results found.     Current Meds:  Current Facility-Administered Medications   Medication Dose Route Frequency    sodium chloride (NS) flush 5-10 mL  5-10 mL IntraVENous Q8H    sodium chloride (NS) flush 5-10 mL  5-10 mL IntraVENous PRN    aspirin delayed-release tablet 81 mg  81 mg Oral DAILY    atorvastatin (LIPITOR) tablet 80 mg  80 mg Oral DAILY    [Held by provider] losartan (COZAAR) tablet 100 mg  100 mg Oral DAILY    metoprolol succinate (TOPROL-XL) XL tablet 50 mg  50 mg Oral DAILY    montelukast (SINGULAIR) tablet 10 mg  10 mg Oral DAILY    ondansetron (ZOFRAN) injection 4 mg  4 mg IntraVENous Q6H PRN    heparin (porcine) injection 5,000 Units  5,000 Units SubCUTAneous Q8H    morphine injection 2 mg  2 mg IntraVENous Q4H PRN    oxyCODONE IR (ROXICODONE) tablet 5 mg  5 mg Oral Q4H PRN    acetaminophen (TYLENOL) tablet 650 mg  650 mg Oral Q6H PRN    insulin glargine (LANTUS) injection 15 Units  0.2 Units/kg SubCUTAneous QHS    insulin lispro (HUMALOG) injection   SubCUTAneous AC&HS    glucose chewable tablet 16 g  16 g Oral PRN    glucagon (GLUCAGEN) injection 1 mg  1 mg IntraMUSCular PRN    dextrose 10% infusion 125-250 mL  125-250 mL IntraVENous PRN       Signed:  Nereida Falcon MD    Part of this note may have been written by using a voice dictation software. The note has been proof read but may still contain some grammatical/other typographical errors.

## 2022-04-04 NOTE — PROGRESS NOTES
ACUTE OT GOALS:  (Developed with and agreed upon by patient and/or caregiver.)  1. Patient will complete lower body bathing and dressing with min A and adaptive equipment as needed. 2. Patient will complete toileting with min A.   3. Patient will tolerate 30 minutes of OT treatment with 1-2 rest breaks to increase activity tolerance for ADLs. 4. Patient will complete functional transfers with min A and adaptive equipment as needed. 5. Patient will complete functional activity while seated edge of bed with SBA and adaptive equipment as needed. 6. Patient will tolerate 15 minutes unsupported sitting balance with SBA in preparation for ADL performance. 7. Patient will tolerate 15 minutes BUE therapeutic activities to increase use of BUE during ADL performance. OCCUPATIONAL THERAPY: Daily Note OT Treatment Day # 5    Ishan Higginbotham is a 80 y.o. male   PRIMARY DIAGNOSIS: Acute on chronic right-sided congestive heart failure (HCC)  Bilateral leg edema [R60.0]       [place] / [place] / Product Type: Medicare /   ASSESSMENT:     REHAB RECOMMENDATIONS: CURRENT LEVEL OF FUNCTION:  (Most Recently Demonstrated)   Recommendation to date pending progress:  Setting:   Short-term Rehab  Equipment:    To Be Determined Bathing:   Moderate Assistance  Dressing:   Moderate Assistance UB; total assist LB  Feeding/Grooming:   Set Up  Toileting:   Contact Guard Assistance  Functional Mobility:   Moderate Assistance x 2     ASSESSMENT:  Mr. Rogelio Helms completed grooming and hygiene activities with set up in supine with HOB elevated with mirror on table tray. Pt completed bed mobility with mod a and STS with mod a x 2 with a rolling walker. Pt needed cues to increase posture and balance during standing. Pt completed steps to the chair with mod a x 2. Pt sat up in the chair and completed exercises. Good effort. Continue POC.      SUBJECTIVE:   Mr. Rogelio Helms states, \"My shoulder is bone on bone.\"    SOCIAL HISTORY/LIVING ENVIRONMENT:   Home Environment: Private residence  # Steps to Enter: 2  One/Two Story Residence: One story  Living Alone: Yes  Support Systems: Child(aleksey),Other Family Member(s),Friend/Neighbor,[employer]    OBJECTIVE:     PAIN: VITAL SIGNS: LINES/DRAINS:   Pre Treatment: Pain Screen  Pain Scale 1: Numeric (0 - 10)  Pain Intensity 1: 0  Post Treatment: 0   IV  O2 Device: None (Room air)     ACTIVITIES OF DAILY LIVING: I Mod I S SBA CGA Min Mod Max Total NT Comments   BASIC ADLs:              Bathing/ Showering [] [] [] [] [] [] [] [] [] [x]    Toileting [] [] [] [] [] [] [] [] [] [x]    Dressing [] [] [] [] [] [] [] [] [] [x]    Feeding [] [] [] [] [] [] [] [] [] [x]    Grooming [] [] [] [x] [] [] [] [] [] []    Personal Device Care [] [] [] [] [] [] [] [] [] [x]    Functional Mobility [] [] [] [] [] [] [x] [] [] [] Assist x 2   I=Independent, Mod I=Modified Independent, S=Supervision, SBA=Standby Assistance, CGA=Contact Guard Assistance,   Min=Minimal Assistance, Mod=Moderate Assistance, Max=Maximal Assistance, Total=Total Assistance, NT=Not Tested    MOBILITY: I Mod I S SBA CGA Min Mod Max Total  NT x2 Comments:   Supine to sit [] [] [] [] [] [x] [x] [] [] [] [x]    Sit to supine [] [] [] [] [] [] [x] [] [] [] [x]    Sit to stand [] [] [] [] [] [] [x] [] [] [] [x]    Bed to chair [] [] [] [] [] [] [x] [] [] [] [x]    I=Independent, Mod I=Modified Independent, S=Supervision, SBA=Standby Assistance, CGA=Contact Guard Assistance,   Min=Minimal Assistance, Mod=Moderate Assistance, Max=Maximal Assistance, Total=Total Assistance, NT=Not Tested    BALANCE: Good Fair+ Fair Fair- Poor NT Comments   Sitting Static [x] [] [] [] [] []    Sitting Dynamic [x] [] [] [] [] []              Standing Static [] [] [] [x] [] []    Standing Dynamic [] [] [] [x] [] []      PLAN:   FREQUENCY/DURATION: OT Plan of Care: 3 times/week for duration of hospital stay or until stated goals are met, whichever comes first.    TREATMENT:   TREATMENT:   ($$ Self Care/Home Management: 8-22 mins$$ Therapeutic Activity: 23-37 mins   )  Therapeutic Activity (25 Minutes): Therapeutic activity included Supine to Sit, Transfer Training, Ambulation on level ground, Sitting balance , Standing balance and exercises to improve functional Mobility, Strength, ROM and Activity tolerance. Self Care (16 Minutes): Self care including Personal Device Care, Grooming and Energy Conservation Training to increase independence and decrease level of assistance required.     TREATMENT GRID:  N/A    AFTER TREATMENT POSITION/PRECAUTIONS:  Chair, Needs within reach, RN notified and family in room    INTERDISCIPLINARY COLLABORATION:  RN/PCT, PT/PTA and OT/CALI    TOTAL TREATMENT DURATION:  OT Patient Time In/Time Out  Time In: 1035  Time Out: [address] [place]

## 2022-04-04 NOTE — PROGRESS NOTES
Spiritual Care Visit, initial visit. Visited with patient at bedside. Prayed for patient's healing and health. Visit by Josse Medellin, Staff .  Rosa., Jason.SLY., B.A.

## 2022-04-04 NOTE — PROGRESS NOTES
Problem: Pressure Injury - Risk of  Goal: *Prevention of pressure injury  Description: Document Salas Scale and appropriate interventions in the flowsheet. Outcome: Progressing Towards Goal  Note: Pressure Injury Interventions:  Sensory Interventions: Keep linens dry and wrinkle-free,Minimize linen layers,Pressure redistribution bed/mattress (bed type)    Moisture Interventions: Internal/External urinary devices,Minimize layers    Activity Interventions: Pressure redistribution bed/mattress(bed type)    Mobility Interventions: Pressure redistribution bed/mattress (bed type),PT/OT evaluation    Nutrition Interventions: Document food/fluid/supplement intake    Friction and Shear Interventions: Foam dressings/transparent film/skin sealants,Minimize layers                Problem: Patient Education: Go to Patient Education Activity  Goal: Patient/Family Education  Outcome: Progressing Towards Goal     Problem: Falls - Risk of  Goal: *Absence of Falls  Description: Document Anabell Fall Risk and appropriate interventions in the flowsheet.   Outcome: Progressing Towards Goal  Note: Fall Risk Interventions:  Mobility Interventions: Bed/chair exit alarm,Patient to call before getting OOB,Communicate number of staff needed for ambulation/transfer         Medication Interventions: Bed/chair exit alarm,Patient to call before getting OOB,Teach patient to arise slowly    Elimination Interventions: Call light in reach,Bed/chair exit alarm,Patient to call for help with toileting needs    History of Falls Interventions: Bed/chair exit alarm,Investigate reason for fall,Room close to nurse's station         Problem: Patient Education: Go to Patient Education Activity  Goal: Patient/Family Education  Outcome: Progressing Towards Goal     Problem: Patient Education: Go to Patient Education Activity  Goal: Patient/Family Education  Outcome: Progressing Towards Goal     Problem: Patient Education: Go to Patient Education Activity  Goal: Patient/Family Education  Outcome: Progressing Towards Goal

## 2022-04-04 NOTE — PROGRESS NOTES
No new discharge needs identified. Cr improving. Pt expected to transfer to [facility] for [address]; availability on Wednesday. PPD placed. Will continue to monitor. Care Management Interventions  PCP Verified by CM:  Yes  Mode of Transport at Discharge: BLS  Transition of Care Consult (CM Consult): Discharge Planning,SNF [facility])  Discharge Durable Medical Equipment: No  Physical Therapy Consult: Yes  Occupational Therapy Consult: Yes  Speech Therapy Consult: No  Support Systems: Child(aleksey),Other Family Member(s),Friend/Neighbor,[employer] Community  Confirm Follow Up Transport: Family  The Plan for Transition of Care is Related to the Following Treatment Goals : Return home and back to his baseline  Discharge Location  Patient Expects to be Discharged to[de-identified] Rehab hospital/unit acute [facility])

## 2022-04-04 NOTE — PROGRESS NOTES
Physical therapy note: Pt had just returned to bed from sitting up several hours. He declined getting up again so soon. Will continue to treat as pt is able and time/schedule permit.     Westley Vail, PTA

## 2022-04-04 NOTE — ROUTINE PROCESS
Bedside and Verbal shift change report given to Maria Dolores Osborne RN (oncoming nurse) by self (offgoing nurse). Report included the following information SBAR, Kardex, Intake/Output, MAR and Recent Results.

## 2022-04-05 LAB
ALBUMIN SERPL-MCNC: 2.2 G/DL (ref 3.2–4.6)
ANION GAP SERPL CALC-SCNC: 6 MMOL/L (ref 7–16)
BASOPHILS # BLD: 0.1 K/UL (ref 0–0.2)
BASOPHILS NFR BLD: 1 % (ref 0–2)
BUN SERPL-MCNC: 68 MG/DL (ref 8–23)
CALCIUM SERPL-MCNC: 10.3 MG/DL (ref 8.3–10.4)
CHLORIDE SERPL-SCNC: 102 MMOL/L (ref 98–107)
CO2 SERPL-SCNC: 26 MMOL/L (ref 21–32)
COVID-19 RAPID TEST, COVR: NOT DETECTED
CREAT SERPL-MCNC: 1.7 MG/DL (ref 0.8–1.5)
DIFFERENTIAL METHOD BLD: ABNORMAL
EOSINOPHIL # BLD: 0.3 K/UL (ref 0–0.8)
EOSINOPHIL NFR BLD: 3 % (ref 0.5–7.8)
ERYTHROCYTE [DISTWIDTH] IN BLOOD BY AUTOMATED COUNT: 15.8 % (ref 11.9–14.6)
GLUCOSE BLD STRIP.AUTO-MCNC: 110 MG/DL (ref 65–100)
GLUCOSE BLD STRIP.AUTO-MCNC: 178 MG/DL (ref 65–100)
GLUCOSE BLD STRIP.AUTO-MCNC: 198 MG/DL (ref 65–100)
GLUCOSE BLD STRIP.AUTO-MCNC: 212 MG/DL (ref 65–100)
GLUCOSE SERPL-MCNC: 115 MG/DL (ref 65–100)
HCT VFR BLD AUTO: 31.8 % (ref 41.1–50.3)
HGB BLD-MCNC: 10.6 G/DL (ref 13.6–17.2)
IMM GRANULOCYTES # BLD AUTO: 0.1 K/UL (ref 0–0.5)
IMM GRANULOCYTES NFR BLD AUTO: 1 % (ref 0–5)
LYMPHOCYTES # BLD: 1.3 K/UL (ref 0.5–4.6)
LYMPHOCYTES NFR BLD: 13 % (ref 13–44)
MAGNESIUM SERPL-MCNC: 2.1 MG/DL (ref 1.8–2.4)
MCH RBC QN AUTO: 33.8 PG (ref 26.1–32.9)
MCHC RBC AUTO-ENTMCNC: 33.3 G/DL (ref 31.4–35)
MCV RBC AUTO: 101.3 FL (ref 79.6–97.8)
MM INDURATION POC: 0 MM (ref 0–5)
MONOCYTES # BLD: 1.1 K/UL (ref 0.1–1.3)
MONOCYTES NFR BLD: 11 % (ref 4–12)
NEUTS SEG # BLD: 7 K/UL (ref 1.7–8.2)
NEUTS SEG NFR BLD: 71 % (ref 43–78)
NRBC # BLD: 0 K/UL (ref 0–0.2)
PHOSPHATE SERPL-MCNC: 3.8 MG/DL (ref 2.3–3.7)
PLATELET # BLD AUTO: 230 K/UL (ref 150–450)
PMV BLD AUTO: 9.3 FL (ref 9.4–12.3)
POTASSIUM SERPL-SCNC: 3.8 MMOL/L (ref 3.5–5.1)
PPD POC: NEGATIVE NEGATIVE
RBC # BLD AUTO: 3.14 M/UL (ref 4.23–5.6)
SERVICE CMNT-IMP: ABNORMAL
SODIUM SERPL-SCNC: 134 MMOL/L (ref 138–145)
SOURCE, COVRS: NORMAL
WBC # BLD AUTO: 9.8 K/UL (ref 4.3–11.1)

## 2022-04-05 PROCEDURE — 74011250636 HC RX REV CODE- 250/636: Performed by: INTERNAL MEDICINE

## 2022-04-05 PROCEDURE — 74011000250 HC RX REV CODE- 250: Performed by: EMERGENCY MEDICINE

## 2022-04-05 PROCEDURE — 97112 NEUROMUSCULAR REEDUCATION: CPT

## 2022-04-05 PROCEDURE — 83735 ASSAY OF MAGNESIUM: CPT

## 2022-04-05 PROCEDURE — 74011250637 HC RX REV CODE- 250/637: Performed by: INTERNAL MEDICINE

## 2022-04-05 PROCEDURE — 74011636637 HC RX REV CODE- 636/637: Performed by: INTERNAL MEDICINE

## 2022-04-05 PROCEDURE — 97535 SELF CARE MNGMENT TRAINING: CPT

## 2022-04-05 PROCEDURE — 85025 COMPLETE CBC W/AUTO DIFF WBC: CPT

## 2022-04-05 PROCEDURE — 77030041247 HC PROTECTOR HEEL HEELMEDIX MDII -B

## 2022-04-05 PROCEDURE — 97530 THERAPEUTIC ACTIVITIES: CPT

## 2022-04-05 PROCEDURE — 80069 RENAL FUNCTION PANEL: CPT

## 2022-04-05 PROCEDURE — 2709999900 HC NON-CHARGEABLE SUPPLY

## 2022-04-05 PROCEDURE — 36415 COLL VENOUS BLD VENIPUNCTURE: CPT

## 2022-04-05 PROCEDURE — 87635 SARS-COV-2 COVID-19 AMP PRB: CPT

## 2022-04-05 PROCEDURE — 65660000000 HC RM CCU STEPDOWN

## 2022-04-05 PROCEDURE — 74011250637 HC RX REV CODE- 250/637: Performed by: FAMILY MEDICINE

## 2022-04-05 PROCEDURE — 82962 GLUCOSE BLOOD TEST: CPT

## 2022-04-05 RX ORDER — FUROSEMIDE 40 MG/1
40 TABLET ORAL 2 TIMES DAILY
Status: DISCONTINUED | OUTPATIENT
Start: 2022-04-05 | End: 2022-04-06 | Stop reason: HOSPADM

## 2022-04-05 RX ADMIN — INSULIN GLARGINE 15 UNITS: 100 INJECTION, SOLUTION SUBCUTANEOUS at 22:31

## 2022-04-05 RX ADMIN — SODIUM CHLORIDE, PRESERVATIVE FREE 5 ML: 5 INJECTION INTRAVENOUS at 22:32

## 2022-04-05 RX ADMIN — SODIUM CHLORIDE, PRESERVATIVE FREE 10 ML: 5 INJECTION INTRAVENOUS at 05:35

## 2022-04-05 RX ADMIN — SODIUM CHLORIDE, PRESERVATIVE FREE 10 ML: 5 INJECTION INTRAVENOUS at 13:11

## 2022-04-05 RX ADMIN — HEPARIN SODIUM 5000 UNITS: 5000 INJECTION INTRAVENOUS; SUBCUTANEOUS at 13:11

## 2022-04-05 RX ADMIN — INSULIN LISPRO 2 UNITS: 100 INJECTION, SOLUTION INTRAVENOUS; SUBCUTANEOUS at 12:34

## 2022-04-05 RX ADMIN — ASPIRIN 81 MG: 81 TABLET ORAL at 09:02

## 2022-04-05 RX ADMIN — ATORVASTATIN CALCIUM 80 MG: 80 TABLET, FILM COATED ORAL at 09:02

## 2022-04-05 RX ADMIN — MONTELUKAST 10 MG: 10 TABLET, FILM COATED ORAL at 09:02

## 2022-04-05 RX ADMIN — HEPARIN SODIUM 5000 UNITS: 5000 INJECTION INTRAVENOUS; SUBCUTANEOUS at 05:33

## 2022-04-05 RX ADMIN — INSULIN LISPRO 2 UNITS: 100 INJECTION, SOLUTION INTRAVENOUS; SUBCUTANEOUS at 17:16

## 2022-04-05 RX ADMIN — FUROSEMIDE 40 MG: 40 TABLET ORAL at 17:17

## 2022-04-05 RX ADMIN — HEPARIN SODIUM 5000 UNITS: 5000 INJECTION INTRAVENOUS; SUBCUTANEOUS at 22:32

## 2022-04-05 RX ADMIN — OXYCODONE 5 MG: 5 TABLET ORAL at 22:41

## 2022-04-05 RX ADMIN — METOPROLOL SUCCINATE 50 MG: 25 TABLET, EXTENDED RELEASE ORAL at 09:02

## 2022-04-05 RX ADMIN — INSULIN LISPRO 4 UNITS: 100 INJECTION, SOLUTION INTRAVENOUS; SUBCUTANEOUS at 22:30

## 2022-04-05 NOTE — PROGRESS NOTES
Pt is expected to transfer to [facility] tomorrow, 4/6; room #315. Report: [phone]. Transport is scheduled for 1130. Pt and family aware. DNR signed. COVID order placed. Care Management Interventions  PCP Verified by CM:  Yes  Mode of Transport at Discharge: [address]  [address] Time of Discharge: [facility] (CM Consult): SNF,Discharge Planning [facility])  Discharge Durable Medical Equipment: No  Physical Therapy Consult: Yes  Occupational Therapy Consult: Yes  Speech Therapy Consult: No  Support Systems: Child(aleksey),Other Family Member(s),Friend/Neighbor,[employer] Community  Confirm Follow Up Transport: Family  The Plan for Transition of Care is Related to the Following Treatment Goals : Return home and back to his baseline  Discharge Location  Patient Expects to be Discharged to[de-identified] Rehab hospital/unit acute [facility])

## 2022-04-05 NOTE — PROGRESS NOTES
Problem: Diabetes Self-Management  Goal: *Disease process and treatment process  Description: Define diabetes and identify own type of diabetes; list 3 options for treating diabetes.   Outcome: Progressing Towards Goal     Problem: Heart Failure: Day 1  Goal: Off Pathway (Use only if patient is Off Pathway)  Outcome: Progressing Towards Goal     Problem: Heart Failure: Discharge Outcomes  Goal: *Demonstrates ability to perform prescribed activity without shortness of breath or discomfort  Outcome: Progressing Towards Goal

## 2022-04-05 NOTE — PROGRESS NOTES
ACUTE PHYSICAL THERAPY GOALS:  (Developed with and agreed upon by patient and/or caregiver. )  LTG:  (1.)Mr. Gregg will move from supine to sit and sit to supine , scoot up and down and roll side to side in bed with STAND BY ASSIST within 7 treatment day(s). (2.)Mr. Gregg will transfer from bed to chair and chair to bed with MINIMAL ASSIST using the least restrictive device within 7 treatment day(s). (3.)Mr. Gregg will ambulate with MODERATE ASSIST for 50 feet with the least restrictive device within 7 treatment day(s). (4.)Mr. Gregg will participate in therapeutic activity/exercises x 25 minutes for increased strength within 7 treatment days. PHYSICAL THERAPY: Daily Note and PM Treatment Day # 5    Kassy Winchester is a 80 y.o. male   PRIMARY DIAGNOSIS: Acute on chronic right-sided congestive heart failure (HCC)  Bilateral leg edema [R60.0]         ASSESSMENT:     REHAB RECOMMENDATIONS: CURRENT LEVEL OF FUNCTION:  (Most Recently Demonstrated)   Recommendation to date pending progress:  Setting:   Short-term Rehab  Equipment:    To Be Determined Bed Mobility:   Minimal Assistance x 2  Sit to Stand:   Moderate Assistance x 2  Transfers:   Moderate Assistance x 2  Gait/Mobility:   Moderate Assistance x 2     ASSESSMENT:  Mr. Kashif Dumont was supine in bed and agreeable to treatment. He performed bed mobility today with Niki x 2 and additional time. Pt also performed several STS transfers min-modA x 2 and fair-poor standing balance. He was able to take several side steps today with RW and modA x 2 but required sitting rest break. Pt assisted back to bed and placed in chair position. Slight progress in ambulation today.             SUBJECTIVE:   Mr. Kashif Dumont states, \"OK    SOCIAL HISTORY/ LIVING ENVIRONMENT: see eval  Home Environment: Private residence  # Steps to Enter: 2  One/Two Story Residence: One story  Living Alone: Yes  Support Systems: Child(aleksey),Other Family Member(s),Friend/Neighbor,[employer]  OBJECTIVE:     PAIN: VITAL SIGNS: LINES/DRAINS:   Pre Treatment: Pain Screen  Pain Scale 1: Numeric (0 - 10)  Pain Intensity 1: 7  Pain Onset 1: chronic  Pain Location 1: Foot  Pain Orientation 1: Left;Right  Post Treatment: 0   external male catheter  O2 Device: None (Room air)     MOBILITY: I Mod I S SBA CGA Min Mod Max Total  NT x2 Comments:   Bed Mobility    Rolling [] [] [] [] [] [x] [] [] [] [] [x]    Supine to Sit [] [] [] [] [] [x] [] [] [] [] [x]    Scooting [] [] [] [] [] [x] [x] [] [] [] [x]    Sit to Supine [] [] [] [] [] [] [x] [] [] [] [x]    Transfers    Sit to Stand [] [] [] [] [] [] [x] [] [] [] [x]    Bed to Chair [] [] [] [] [] [] [] [] [] [] []    Stand to Sit [] [] [] [] [] [] [x] [] [] [] [x]    I=Independent, Mod I=Modified Independent, S=Supervision, SBA=Standby Assistance, CGA=Contact Guard Assistance,   Min=Minimal Assistance, Mod=Moderate Assistance, Max=Maximal Assistance, Total=Total Assistance, NT=Not Tested    BALANCE: Good Fair+ Fair Fair- Poor NT Comments   Sitting Static [] [x] [] [] [] []    Sitting Dynamic [] [x] [] [] [] []              Standing Static [] [] [] [x] [] []    Standing Dynamic [] [] [] [] [x] []      GAIT: I Mod I S SBA CGA Min Mod Max Total  NT x2 Comments:   Level of Assistance [] [] [] [] [] [] [x] [] [] [] [x]    Distance 3 ft x 2    DME Rolling Walker    Gait Quality Shuffled, slow    Weightbearing  Status N/A     I=Independent, Mod I=Modified Independent, S=Supervision, SBA=Standby Assistance, CGA=Contact Guard Assistance,   Min=Minimal Assistance, Mod=Moderate Assistance, Max=Maximal Assistance, Total=Total Assistance, NT=Not Tested    PLAN:   FREQUENCY/DURATION: PT Plan of Care: 3 times/week for duration of hospital stay or until stated goals are met, whichever comes first.  TREATMENT:     TREATMENT:   ($$ Therapeutic Activity: 23-37 mins    )  Co-Treatment PT/OT necessary due to patient's decreased overall endurance/tolerance levels, as well as need for high level skilled assistance to complete functional transfers/mobility and functional tasks  Therapeutic Activity (23 Minutes): Therapeutic activity included Rolling, Supine to Sit, Sit to Supine, Scooting, Transfer Training, Ambulation on level ground, Sitting balance  and Standing balance to improve functional Mobility, Strength, ROM and Activity tolerance.     TREATMENT GRID:  N/A    AFTER TREATMENT POSITION/PRECAUTIONS:  Alarm Activated, Bed, Needs within reach, RN notified and Visitors at bedside    INTERDISCIPLINARY COLLABORATION:  RN/PCT, PT/PTA and OT/CALI    TOTAL TREATMENT DURATION:  PT Patient Time In/Time Out  Time In: 1403  Time Out: Debi Mena, PT, DPT

## 2022-04-05 NOTE — ROUTINE PROCESS
Bedside and Verbal shift change report given to self (oncoming nurse) by [facility], RN and Elana Granados RN (offgoing nurse). Report included the following information SBAR, Kardex, Intake/Output, MAR and Recent Results.

## 2022-04-05 NOTE — PROGRESS NOTES
Hospitalist Progress Note   Admit Date:  3/28/2022  3:15 PM   Name:  Germaine Hameed   Age:  80 y.o. Sex:  male  :  1934   MRN:  [id number]   Room:  303/01    Presenting Complaint: Leg Pain    Reason(s) for Admission: Bilateral leg edema [R60.0]     Hospital Course & Interval History:     Germaine Hameed is a 80year old CM with a PMH of chronic combined CHF, DM2, HTN, HLD, and pulmonary HTN with cor pulmonale with chronic BLE edema admitted with worsening bilateral lower extremity edema despite doubling Lasix dose for a week and adding Zaroxolyn. Cardiology consulted. Started on Lasix gtt. later discontinued on 3/30 due to worsening kidney functions. Lower extremity edema has been resolved. LALY significantly improved with gentle hydration. Off IV fluids. PO Lasix resumed on . Also noted to have right lower extremity cellulitis and started on Unasyn, switched to p.o. Augmentin on 3/31 and has completed antibiotic course. [facility]. Subjective/24hr Events (22): Patient is seen at the bedside. No new complaint. Denies chest pain, palpitation, shortness of breath, nausea, vomiting or abdominal pain. Son at the bedside and updated on patient's current condition. All questions were answered    ROS:  10 systems reviewed and negative except as noted above. Assessment & Plan:     LALY on CKD stage III:  Baseline creatinine 1.5, Creatinine 2.40 on admission  Kidney function worsened due to aggressive diuresis, diuretics on hold since 3/31  Renal US with bilateral renal cyst.   Nephrology recommend resume Lasix 40 mg bid or torsemide 40 daily on discharge  : Creatinine significantly improved, 1.70. Off IV fluids for more than 48 hours. Will start patient on Lasix 40 mg twice daily.   Nephrology signed off    Acute on chronic congestive heart failure:  Bilateral lower extremity edema:  Last echocardiogram with ejection fraction 02% with diastolic dysfunction  Off lasix gtt on 3/30  Daily BMP  Lower extremity Doppler negative for DVT  Cardiology consulted, appreciate recommendation  Echocardiogram with ejection fraction 97%, and diastolic dysfunction. 4/5: Lower extremity edema resolved. Fluid status stable. Cardiology signed off. Hypertension:  Persistent A. Fib:  CAD:  Continue Cozaar and Toprol  Continue aspirin and statin  Watchman device in place  4/5: Losartan on hold d/t LALY, continue rest of the management    Hyperkalemia in the setting of LALY:  Treated with sodium bicarbonate, insulin, dextrose and Lokelma. Resolved    Right leg cellulitis:  Switched Unasyn to Augmentin on 3/31 to complete 7-day course, EOT 4/4 4/5: Resolved, antibiotic completed    Type 2 diabetes mellitus:  A1c 9.3 in 1/2022  Continue Lantus 15 units and sliding scale  We will continue to adjust accordingly  Hold home meds  4/5: Blood glucose optimally controlled. Continue current regimen      Discharge Planning: Pending placement, discharge to [facility] rehab on 4/6    Diet:  ADULT ORAL NUTRITION SUPPLEMENT Dinner; Diabetic Supplement  ADULT DIET Regular; 4 carb choices (60 gm/meal);  Low Sodium (2 gm); 2000 ml  DVT PPx: Heparin  Code status: DNR    Hospital Problems as of 4/5/2022 Date Reviewed: 3/21/2022          Codes Class Noted - Resolved POA    Moderate protein[facility]calorie malnutrition [facility] ICD-10-CM: E44.0  ICD-9-CM: 263.0  3/30/2022 - Present Yes        Bilateral leg edema ICD-10-CM: R60.0  ICD-9-CM: 782.3  3/28/2022 - Present Yes        Cor pulmonale (chronic) (HCC) ICD-10-CM: I27.81  ICD-9-CM: 416.9  3/28/2022 - Present Yes        Pulmonary HTN ([facility] ICD-10-CM: I27.20  ICD-9-CM: 416.8  3/28/2022 - Present Yes        Cellulitis of right leg ICD-10-CM: L03.115  ICD-9-CM: 682.6  3/28/2022 - Present Yes        * (Principal) Acute on chronic right-sided congestive heart failure ([facility] ICD-10-CM: I50.813  ICD-9-CM: 428.0  3/28/2022 - Present Yes        Presence of Watchman left atrial appendage closure device ICD-10-CM: Z95.818  ICD-9-CM: V45.09  1/21/2020 - Present Yes    Overview Signed 1/21/2020  7:36 PM by Dyan Decker MD     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 (12/17/19)             Type 2 diabetes with nephropathy ([facility] ICD-10-CM: E11.21  ICD-9-CM: 250.40, 583.81  11/6/2019 - Present Yes        Persistent atrial fibrillation (HCC) ICD-10-CM: I48.19  ICD-9-CM: 427.31  9/18/2019 - Present Yes        Automatic implantable cardioverter-defibrillator in situ ICD-10-CM: Z95.810  ICD-9-CM: V45.02  3/30/2016 - Present Yes    Overview Signed 3/30/2016  9:01 AM by Kimball Duverney     No shocks             Chronic combined systolic and diastolic heart failure ([facility] ICD-10-CM: I50.42  ICD-9-CM: 428.42  9/17/2013 - Present Yes        Coronary artery disease (Chronic) ICD-10-CM: I25.10  ICD-9-CM: 414.00  9/10/2011 - Present Yes    Overview Signed 3/30/2016  9:03 AM by Violetta Kearney through [facility]             Hypercholesteremia (Chronic) ICD-10-CM: E78.00  ICD-9-CM: 272.0  9/10/2011 - Present Yes        Hypertension (Chronic) ICD-10-CM: I10  ICD-9-CM: 401.9  9/10/2011 - Present Yes              Objective:     Patient Vitals for the past 24 hrs:   Temp Pulse Resp BP SpO2   04/05/22 0909 98.4 °F (36.9 °C) 69 20 (!) 121/59 96 %   04/05/22 0427 98.4 °F (36.9 °C) 71 18 (!) 144/59 95 %   04/05/22 0012 98.4 °F (36.9 °C) 70 18 128/62 96 %   04/04/22 2026 97.8 °F (36.6 °C) 73 16 (!) 123/59 98 %   04/04/22 1550 98.2 °F (36.8 °C) 70 14 (!) 148/70 98 %     Oxygen Therapy  O2 Sat (%): 96 % (04/05/22 0909)  Pulse via Oximetry: 98 beats per minute (04/04/22 1550)  O2 Device: None (Room air) (04/05/22 0909)    Estimated body mass index is 23.39 kg/m² as calculated from the following:    Height as of this encounter: 6' 2\" (1.88 m). Weight as of this encounter: 82.6 kg (182 lb 3.2 oz).     Intake/Output Summary (Last 24 hours) at 4/5/2022 [address] filed at 4/5/2022 0953  Gross per 24 hour   Intake 720 ml   Output 1900 ml   Net -1180 ml         Physical Exam:     Blood pressure (!) 121/59, pulse 69, temperature 98.4 °F (36.9 °C), resp. rate 20, height 6' 2\" (1.88 m), weight 82.6 kg (182 lb 3.2 oz), SpO2 96 %. General:    Well nourished. No overt distress  Head:  Normocephalic, atraumatic  Eyes:  Sclerae appear normal.  Pupils equally round. ENT:  Nares appear normal, no drainage. Moist oral mucosa  Neck:  No restricted ROM. Trachea midline   CV:   RRR. No m/r/g. No jugular venous distension. Lungs:   Crackles on left lower lung  Abdomen: Bowel sounds present. Soft, nontender, nondistended. Extremities: bilateral lower extremity edema resolved, chronic erythema,  Right LE cellulitis, improved  Skin:     No rashes and normal coloration. Warm and dry. Neuro:  CN II-XII grossly intact. Sensation intact. A&Ox3  Psych:  Normal mood and affect.       I have reviewed ordered lab tests and independently visualized imaging below:    Recent Labs:  Recent Results (from the past 48 hour(s))   GLUCOSE, POC    Collection Time: 04/03/22  3:45 PM   Result Value Ref Range    Glucose (POC) 200 (H) 65 - 100 mg/dL    Performed by [facility]    GLUCOSE, POC    Collection Time: 04/03/22  8:29 PM   Result Value Ref Range    Glucose (POC) 214 (H) 65 - 100 mg/dL    Performed by [facility]    METABOLIC PANEL, BASIC    Collection Time: 04/04/22  5:49 AM   Result Value Ref Range    Sodium 135 (L) 138 - 145 mmol/L    Potassium 3.8 3.5 - 5.1 mmol/L    Chloride 102 98 - 107 mmol/L    CO2 26 21 - 32 mmol/L    Anion gap 7 7 - 16 mmol/L    Glucose 118 (H) 65 - 100 mg/dL    BUN 82 (H) 8 - 23 MG/DL    Creatinine 2.10 (H) 0.8 - 1.5 MG/DL    GFR est AA 39 (L) >60 ml/min/1.73m2    GFR est non-AA 32 (L) >60 ml/min/1.73m2    Calcium 9.8 8.3 - 10.4 MG/DL   CBC WITH AUTOMATED DIFF    Collection Time: 04/04/22  5:49 AM   Result Value Ref Range    WBC 9.3 4.3 - 11.1 K/uL    RBC 3.08 (L) 4.23 - 5.6 M/uL    HGB 10.2 (L) 13.6 - 17.2 g/dL    HCT 31.2 (L) 41.1 - 50.3 %    .3 (H) 79.6 - 97.8 FL    MCH 33.1 (H) 26.1 - 32.9 PG    MCHC 32.7 31.4 - 35.0 g/dL    RDW 15.7 (H) 11.9 - 14.6 %    PLATELET 767 136 - 386 K/uL    MPV 9.4 9.4 - 12.3 FL    ABSOLUTE NRBC 0.00 0.0 - 0.2 K/uL    DF AUTOMATED      NEUTROPHILS 70 43 - 78 %    LYMPHOCYTES 13 13 - 44 %    MONOCYTES 11 4.0 - 12.0 %    EOSINOPHILS 4 0.5 - 7.8 %    BASOPHILS 1 0.0 - 2.0 %    IMMATURE GRANULOCYTES 1 0.0 - 5.0 %    ABS. NEUTROPHILS 6.5 1.7 - 8.2 K/UL    ABS. LYMPHOCYTES 1.2 0.5 - 4.6 K/UL    ABS. MONOCYTES 1.1 0.1 - 1.3 K/UL    ABS. EOSINOPHILS 0.4 0.0 - 0.8 K/UL    ABS. BASOPHILS 0.1 0.0 - 0.2 K/UL    ABS. IMM.  GRANS. 0.1 0.0 - 0.5 K/UL   ALBUMIN    Collection Time: 04/04/22  5:49 AM   Result Value Ref Range    Albumin 2.0 (L) 3.2 - 4.6 g/dL   MAGNESIUM    Collection Time: 04/04/22  5:49 AM   Result Value Ref Range    Magnesium 2.2 1.8 - 2.4 mg/dL   PHOSPHORUS    Collection Time: 04/04/22  5:49 AM   Result Value Ref Range    Phosphorus 4.1 (H) 2.3 - 3.7 MG/DL   GLUCOSE, POC    Collection Time: 04/04/22  7:11 AM   Result Value Ref Range    Glucose (POC) 115 (H) 65 - 100 mg/dL    Performed by [facility]    GLUCOSE, POC    Collection Time: 04/04/22 11:01 AM   Result Value Ref Range    Glucose (POC) 142 (H) 65 - 100 mg/dL    Performed by [facility]    GLUCOSE, POC    Collection Time: 04/04/22  4:36 PM   Result Value Ref Range    Glucose (POC) 174 (H) 65 - 100 mg/dL    Performed by [facility]    GLUCOSE, POC    Collection Time: 04/04/22  8:09 PM   Result Value Ref Range    Glucose (POC) 274 (H) 65 - 100 mg/dL    Performed by Ro    MAGNESIUM    Collection Time: 04/05/22  5:13 AM   Result Value Ref Range    Magnesium 2.1 1.8 - 2.4 mg/dL   RENAL FUNCTION PANEL    Collection Time: 04/05/22  5:13 AM   Result Value Ref Range    Sodium 134 (L) 138 - 145 mmol/L    Potassium 3.8 3.5 - 5.1 mmol/L Chloride 102 98 - 107 mmol/L    CO2 26 21 - 32 mmol/L    Anion gap 6 (L) 7 - 16 mmol/L    Glucose 115 (H) 65 - 100 mg/dL    BUN 68 (H) 8 - 23 MG/DL    Creatinine 1.70 (H) 0.8 - 1.5 MG/DL    GFR est AA 49 (L) >60 ml/min/1.73m2    GFR est non-AA 41 (L) >60 ml/min/1.73m2    Calcium 10.3 8.3 - 10.4 MG/DL    Phosphorus 3.8 (H) 2.3 - 3.7 MG/DL    Albumin 2.2 (L) 3.2 - 4.6 g/dL   CBC WITH AUTOMATED DIFF    Collection Time: 04/05/22  5:13 AM   Result Value Ref Range    WBC 9.8 4.3 - 11.1 K/uL    RBC 3.14 (L) 4.23 - 5.6 M/uL    HGB 10.6 (L) 13.6 - 17.2 g/dL    HCT 31.8 (L) 41.1 - 50.3 %    .3 (H) 79.6 - 97.8 FL    MCH 33.8 (H) 26.1 - 32.9 PG    MCHC 33.3 31.4 - 35.0 g/dL    RDW 15.8 (H) 11.9 - 14.6 %    PLATELET 139 109 - 535 K/uL    MPV 9.3 (L) 9.4 - 12.3 FL    ABSOLUTE NRBC 0.00 0.0 - 0.2 K/uL    DF AUTOMATED      NEUTROPHILS 71 43 - 78 %    LYMPHOCYTES 13 13 - 44 %    MONOCYTES 11 4.0 - 12.0 %    EOSINOPHILS 3 0.5 - 7.8 %    BASOPHILS 1 0.0 - 2.0 %    IMMATURE GRANULOCYTES 1 0.0 - 5.0 %    ABS. NEUTROPHILS 7.0 1.7 - 8.2 K/UL    ABS. LYMPHOCYTES 1.3 0.5 - 4.6 K/UL    ABS. MONOCYTES 1.1 0.1 - 1.3 K/UL    ABS. EOSINOPHILS 0.3 0.0 - 0.8 K/UL    ABS. BASOPHILS 0.1 0.0 - 0.2 K/UL    ABS. IMM. GRANS. 0.1 0.0 - 0.5 K/UL   GLUCOSE, POC    Collection Time: 04/05/22  6:58 AM   Result Value Ref Range    Glucose (POC) 110 (H) 65 - 100 mg/dL    Performed by [facility]    GLUCOSE, POC    Collection Time: 04/05/22 11:11 AM   Result Value Ref Range    Glucose (POC) 178 (H) 65 - 100 mg/dL    Performed by [facility]        All Micro Results     None          Other Studies:  No results found.     Current Meds:  Current Facility-Administered Medications   Medication Dose Route Frequency    furosemide (LASIX) tablet 40 mg  40 mg Oral BID    sodium chloride (NS) flush 5-10 mL  5-10 mL IntraVENous Q8H    sodium chloride (NS) flush 5-10 mL  5-10 mL IntraVENous PRN    aspirin delayed-release tablet 81 mg  81 mg Oral DAILY    atorvastatin (LIPITOR) tablet 80 mg  80 mg Oral DAILY    [Held by provider] losartan (COZAAR) tablet 100 mg  100 mg Oral DAILY    metoprolol succinate (TOPROL-XL) XL tablet 50 mg  50 mg Oral DAILY    montelukast (SINGULAIR) tablet 10 mg  10 mg Oral DAILY    ondansetron (ZOFRAN) injection 4 mg  4 mg IntraVENous Q6H PRN    heparin (porcine) injection 5,000 Units  5,000 Units SubCUTAneous Q8H    morphine injection 2 mg  2 mg IntraVENous Q4H PRN    oxyCODONE IR (ROXICODONE) tablet 5 mg  5 mg Oral Q4H PRN    acetaminophen (TYLENOL) tablet 650 mg  650 mg Oral Q6H PRN    insulin glargine (LANTUS) injection 15 Units  0.2 Units/kg SubCUTAneous QHS    insulin lispro (HUMALOG) injection   SubCUTAneous AC&HS    glucose chewable tablet 16 g  16 g Oral PRN    glucagon (GLUCAGEN) injection 1 mg  1 mg IntraMUSCular PRN    dextrose 10% infusion 125-250 mL  125-250 mL IntraVENous PRN       Signed:  Angelia Greenwood MD    Part of this note may have been written by using a voice dictation software. The note has been proof read but may still contain some grammatical/other typographical errors.

## 2022-04-05 NOTE — PROGRESS NOTES
ACUTE OT GOALS:  (Developed with and agreed upon by patient and/or caregiver.)  1. Patient will complete lower body bathing and dressing with min A and adaptive equipment as needed. 2. Patient will complete toileting with min A.   3. Patient will tolerate 30 minutes of OT treatment with 1-2 rest breaks to increase activity tolerance for ADLs. 4. Patient will complete functional transfers with min A and adaptive equipment as needed. 5. Patient will complete functional activity while seated edge of bed with SBA and adaptive equipment as needed. 6. Patient will tolerate 15 minutes unsupported sitting balance with SBA in preparation for ADL performance. 7. Patient will tolerate 15 minutes BUE therapeutic activities to increase use of BUE during ADL performance. OCCUPATIONAL THERAPY: Daily Note OT Treatment Day # 6    Arely Covington is a 80 y.o. male   PRIMARY DIAGNOSIS: Acute on chronic right-sided congestive heart failure (HCC)  Bilateral leg edema [R60.0]       [place] / [place] / Product Type: Medicare /   ASSESSMENT:     REHAB RECOMMENDATIONS: CURRENT LEVEL OF FUNCTION:  (Most Recently Demonstrated)   Recommendation to date pending progress:  Setting:   Short-term Rehab  Equipment:    To Be Determined Bathing:   Moderate Assistance  Dressing:   Moderate Assistance UB; total assist LB  Feeding/Grooming:   Set Up  Toileting:   Moderate Assistance  Functional Mobility:   Moderate Assistance x 2     ASSESSMENT:  Mr. Tressa Bailey  completed bed mobility with mod a and STS with mod a x 2 with a rolling walker. Pt needed cues to increase posture and balance during standing. Pt completed toilet hygiene with mod a and hand hygiene with set up Pt completed side steps to the head of the bed with mod a x 2. Pt assisted back to supine with mod a x 2. Good effort. Continue POC. SUBJECTIVE:   Mr. Tressa Bailey states, \"My arthritis is bothering me. \"    SOCIAL HISTORY/LIVING ENVIRONMENT:   Home Environment: Private residence  # Steps to Enter: 2  One/Two Story Residence: One story  Living Alone: Yes  Support Systems: Child(aleksey),Other Family Member(s),Friend/Neighbor,[employer]    OBJECTIVE:     PAIN: VITAL SIGNS: LINES/DRAINS:   Pre Treatment: Pain Screen  Pain Scale 1: Visual  Pain Location 1: Back  Pain Description 1: Aching  Pain Intervention(s) 1: Repositioned  Post Treatment: 0   IV  O2 Device: None (Room air)     ACTIVITIES OF DAILY LIVING: I Mod I S SBA CGA Min Mod Max Total NT Comments   BASIC ADLs:              Bathing/ Showering [] [] [] [] [] [] [] [] [] [x]    Toileting [] [] [] [] [] [] [x] [] [] []    Dressing [] [] [] [] [] [] [] [] [] [x]    Feeding [] [] [] [] [] [] [] [] [] [x]    Grooming [] [] [] [x] [] [] [] [] [] []    Personal Device Care [] [] [] [] [] [] [] [] [] [x]    Functional Mobility [] [] [] [] [] [] [x] [] [] [] Assist x 2   I=Independent, Mod I=Modified Independent, S=Supervision, SBA=Standby Assistance, CGA=Contact Guard Assistance,   Min=Minimal Assistance, Mod=Moderate Assistance, Max=Maximal Assistance, Total=Total Assistance, NT=Not Tested    MOBILITY: I Mod I S SBA CGA Min Mod Max Total  NT x2 Comments:   Supine to sit [] [] [] [] [] [x] [x] [] [] [] [x]    Sit to supine [] [] [] [] [] [] [x] [] [] [] [x]    Sit to stand [] [] [] [] [] [] [x] [] [] [] [x]    Bed to chair [] [] [] [] [] [] [x] [] [] [] [x]    I=Independent, Mod I=Modified Independent, S=Supervision, SBA=Standby Assistance, CGA=Contact Guard Assistance,   Min=Minimal Assistance, Mod=Moderate Assistance, Max=Maximal Assistance, Total=Total Assistance, NT=Not Tested    BALANCE: Good Fair+ Fair Fair- Poor NT Comments   Sitting Static [x] [] [] [] [] []    Sitting Dynamic [x] [] [] [] [] []              Standing Static [] [] [] [x] [] []    Standing Dynamic [] [] [] [x] [] []      PLAN:   FREQUENCY/DURATION: OT Plan of Care: 3 times/week for duration of hospital stay or until stated goals are met, whichever comes first.    TREATMENT:   TREATMENT:   ($$ Self Care/Home Management: 8-22 mins$$ Neuromuscular Re-Education: 8-22 mins   )  Co-Treatment PT/OT necessary due to patient's decreased overall endurance/tolerance levels, as well as need for high level skilled assistance to complete functional transfers/mobility and functional tasks  Self Care (14 Minutes): Self care including Toileting, Personal Device Care, Grooming, Energy Conservation Training and mobility and transfers to increase independence and decrease level of assistance required. Neuromuscular Re-education (15 Minutes): Neuromuscular Re-education included Balance Training, Postural training, Sitting balance training and Standing balance training to improve Balance, Coordination, Functional Mobility and Postural Control.     TREATMENT GRID:  N/A    AFTER TREATMENT POSITION/PRECAUTIONS:  Bed, Chair, Needs within reach, RN notified and family in room    INTERDISCIPLINARY COLLABORATION:  RN/PCT, PT/PTA and OT/CALI    TOTAL TREATMENT DURATION:  OT Patient Time In/Time Out  Time In: 1402  Time Out: Elieser Toure

## 2022-04-05 NOTE — ROUTINE PROCESS
CHF teaching started post introduction to pt/family; aware of diagnosis. Planner/scale @ BS and will follow. Smoking/ ETOH/Illicit drug use cessation and maintain a healthy weight covered. Pt/family aware that I can not prescribe nor adjust  medications: 15mins  Palliative Care score:  Refused ACP on admission  Start 2L/D Fluid restriction/ cardiac diet  CHF teaching continues to pt/family. Emphasis on taking prescription meds as ordered, to keep F/U appts and to call MD STAT if any of the following occur:   If you gain 2 lbs in one day or 5 lbs in a week, and short of breath.  If you can not lay flat without developing short of breath or rapid breathing at night; or if it wakes you up. Develop a cough or wheezing.  If you notice swollen hands/feet/ankles or stomach with a bloated/ full feeling.  If you become confused or mentally fuzzy or dizzy.  If you notice a rapid or change in your heart rate.  If you become more exhausted all the time and unable to do the same level of activity without stopping to catch your breath. Drink no more than 8 cups a day in 8 oz. cups. Your Heart can not handle any more. Stay away from salt (limit anything with salt or sodium in it). Limit to 250mg per serving. Pt/family verbalizes understanding, will follow to reinforce teaching skills: 20 mins    CHF teaching completed, verbalize emphasis on monitoring self and report to MD:   If you gain 2 lbs in one day or 5 lbs in a week, and short of breath.  If you can not lay flat without developing short of breath or rapid breathing at night; or if it wakes you up. Develop a cough or wheezing.  If you notice swollen hands/feet/ankles or stomach with a bloated/ full feeling.  If you are  more confused or mentally fuzzy or dizzy.  If you notice a rapid or change in your heart rate.  If you become more exhausted all the time and unable to do the same level of activity without stopping to catch your breath.   Drink no more than 8 cups a day in 8 oz. cups. Limit Cola Drinks. Your Heart can not handle any more. Stay away from salt (limit anything with salt or sodium in it). Limit to 250mg per serving. Exercise needs to be started with your Doctors approval.  Reduce stress; Call myself or Provider if assistance is needed. Pass post test via teach back, will make self available post DC ,if an questions arise. Diabetic teaching completed.  Planner/scale @ BS:  60 mins total

## 2022-04-06 VITALS
SYSTOLIC BLOOD PRESSURE: 147 MMHG | HEIGHT: 74 IN | RESPIRATION RATE: 20 BRPM | HEART RATE: 66 BPM | BODY MASS INDEX: 23.1 KG/M2 | DIASTOLIC BLOOD PRESSURE: 58 MMHG | OXYGEN SATURATION: 96 % | WEIGHT: 180 LBS | TEMPERATURE: 97.8 F

## 2022-04-06 LAB
ALBUMIN SERPL-MCNC: 2.1 G/DL (ref 3.2–4.6)
ANION GAP SERPL CALC-SCNC: 6 MMOL/L (ref 7–16)
BASOPHILS # BLD: 0.1 K/UL (ref 0–0.2)
BASOPHILS NFR BLD: 1 % (ref 0–2)
BUN SERPL-MCNC: 59 MG/DL (ref 8–23)
CALCIUM SERPL-MCNC: 10.4 MG/DL (ref 8.3–10.4)
CHLORIDE SERPL-SCNC: 101 MMOL/L (ref 98–107)
CO2 SERPL-SCNC: 27 MMOL/L (ref 21–32)
CREAT SERPL-MCNC: 1.6 MG/DL (ref 0.8–1.5)
DIFFERENTIAL METHOD BLD: ABNORMAL
EOSINOPHIL # BLD: 0.3 K/UL (ref 0–0.8)
EOSINOPHIL NFR BLD: 3 % (ref 0.5–7.8)
ERYTHROCYTE [DISTWIDTH] IN BLOOD BY AUTOMATED COUNT: 15.8 % (ref 11.9–14.6)
GLUCOSE BLD STRIP.AUTO-MCNC: 131 MG/DL (ref 65–100)
GLUCOSE SERPL-MCNC: 121 MG/DL (ref 65–100)
HCT VFR BLD AUTO: 30.7 % (ref 41.1–50.3)
HGB BLD-MCNC: 10.1 G/DL (ref 13.6–17.2)
IMM GRANULOCYTES # BLD AUTO: 0.1 K/UL (ref 0–0.5)
IMM GRANULOCYTES NFR BLD AUTO: 1 % (ref 0–5)
LYMPHOCYTES # BLD: 1.6 K/UL (ref 0.5–4.6)
LYMPHOCYTES NFR BLD: 19 % (ref 13–44)
MAGNESIUM SERPL-MCNC: 2 MG/DL (ref 1.8–2.4)
MCH RBC QN AUTO: 33 PG (ref 26.1–32.9)
MCHC RBC AUTO-ENTMCNC: 32.9 G/DL (ref 31.4–35)
MCV RBC AUTO: 100.3 FL (ref 79.6–97.8)
MONOCYTES # BLD: 1 K/UL (ref 0.1–1.3)
MONOCYTES NFR BLD: 12 % (ref 4–12)
NEUTS SEG # BLD: 5.6 K/UL (ref 1.7–8.2)
NEUTS SEG NFR BLD: 64 % (ref 43–78)
NRBC # BLD: 0 K/UL (ref 0–0.2)
PHOSPHATE SERPL-MCNC: 3.4 MG/DL (ref 2.3–3.7)
PLATELET # BLD AUTO: 247 K/UL (ref 150–450)
PMV BLD AUTO: 9.1 FL (ref 9.4–12.3)
POTASSIUM SERPL-SCNC: 3.7 MMOL/L (ref 3.5–5.1)
RBC # BLD AUTO: 3.06 M/UL (ref 4.23–5.6)
SERVICE CMNT-IMP: ABNORMAL
SODIUM SERPL-SCNC: 134 MMOL/L (ref 138–145)
WBC # BLD AUTO: 8.8 K/UL (ref 4.3–11.1)

## 2022-04-06 PROCEDURE — 74011250636 HC RX REV CODE- 250/636: Performed by: INTERNAL MEDICINE

## 2022-04-06 PROCEDURE — 82962 GLUCOSE BLOOD TEST: CPT

## 2022-04-06 PROCEDURE — 74011000250 HC RX REV CODE- 250: Performed by: EMERGENCY MEDICINE

## 2022-04-06 PROCEDURE — 83735 ASSAY OF MAGNESIUM: CPT

## 2022-04-06 PROCEDURE — 36415 COLL VENOUS BLD VENIPUNCTURE: CPT

## 2022-04-06 PROCEDURE — 74011250637 HC RX REV CODE- 250/637: Performed by: INTERNAL MEDICINE

## 2022-04-06 PROCEDURE — 74011250637 HC RX REV CODE- 250/637: Performed by: FAMILY MEDICINE

## 2022-04-06 PROCEDURE — 80069 RENAL FUNCTION PANEL: CPT

## 2022-04-06 PROCEDURE — 85025 COMPLETE CBC W/AUTO DIFF WBC: CPT

## 2022-04-06 RX ORDER — FUROSEMIDE 40 MG/1
40 TABLET ORAL 2 TIMES DAILY
Qty: 60 TABLET | Refills: 1 | Status: SHIPPED | OUTPATIENT
Start: 2022-04-06

## 2022-04-06 RX ORDER — LOSARTAN POTASSIUM 25 MG/1
25 TABLET ORAL DAILY
Qty: 30 TABLET | Refills: 1 | Status: SHIPPED | OUTPATIENT
Start: 2022-04-06

## 2022-04-06 RX ADMIN — MONTELUKAST 10 MG: 10 TABLET, FILM COATED ORAL at 08:41

## 2022-04-06 RX ADMIN — ATORVASTATIN CALCIUM 80 MG: 80 TABLET, FILM COATED ORAL at 08:41

## 2022-04-06 RX ADMIN — HEPARIN SODIUM 5000 UNITS: 5000 INJECTION INTRAVENOUS; SUBCUTANEOUS at 06:24

## 2022-04-06 RX ADMIN — SODIUM CHLORIDE, PRESERVATIVE FREE 5 ML: 5 INJECTION INTRAVENOUS at 06:10

## 2022-04-06 RX ADMIN — METOPROLOL SUCCINATE 50 MG: 25 TABLET, EXTENDED RELEASE ORAL at 08:41

## 2022-04-06 RX ADMIN — ASPIRIN 81 MG: 81 TABLET ORAL at 08:41

## 2022-04-06 RX ADMIN — FUROSEMIDE 40 MG: 40 TABLET ORAL at 08:35

## 2022-04-06 NOTE — DISCHARGE SUMMARY
Hospitalist Discharge Summary   Admit Date:  3/28/2022  3:15 PM   DC Note date: 2022  Name:  Timi Trevino   Age:  80 y.o.   Sex:  male  :  1934   MRN:  [id number]   Room:  [facility]  PCP:  Maria Luisa Russell MD    Presenting Complaint: Leg Pain    Initial Admission Diagnosis: Bilateral leg edema [R60.0]     Problem List for this Hospitalization:  Hospital Problems as of 2022 Date Reviewed: 3/21/2022          Codes Class Noted - Resolved POA    Moderate protein-calorie malnutrition ([facility] ICD-10-CM: E44.0  ICD-9-CM: 263.0  3/30/2022 - Present Yes        Bilateral leg edema ICD-10-CM: R60.0  ICD-9-CM: 782.3  3/28/2022 - Present Yes        Cor pulmonale (chronic) ([facility] ICD-10-CM: I27.81  ICD-9-CM: 416.9  3/28/2022 - Present Yes        Pulmonary HTN ([facility] ICD-10-CM: I27.20  ICD-9-CM: 416.8  3/28/2022 - Present Yes        Cellulitis of right leg ICD-10-CM: L03.115  ICD-9-CM: 682.6  3/28/2022 - Present Yes        * (Principal) Acute on chronic right-sided congestive heart failure (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 ICD-10-CM: E11.21  ICD-9-CM: 250.40, 583.81  2019 - Present Yes        Persistent atrial fibrillation ([facility] ICD-10-CM: I48.19  ICD-9-CM: 427.31  2019 - Present Yes        Automatic implantable cardioverter-defibrillator in situ ICD-10-CM: Z95.810  ICD-9-CM: V45.02  3/30/2016 - Present Yes    Overview Signed 3/30/2016  9:01 AM by Lonnie Oviedo     No shocks             Chronic combined systolic and diastolic heart failure ([facility] ICD-10-CM: I50.42  ICD-9-CM: 428.42  2013 - Present Yes        Coronary artery disease (Chronic) ICD-10-CM: I25.10  ICD-9-CM: 414.00  9/10/2011 - Present Yes    Overview Signed 3/30/2016  9:03 AM by Aisha Mullen through [facility]             Hypercholesteremia (Chronic) ICD-10-CM: E78.00  ICD-9-CM: 272.0  9/10/2011 - Present Yes        Hypertension (Chronic) ICD-10-CM: I10  ICD-9-CM: 401.9  9/10/2011 - Present Yes            Did Patient have Sepsis (YES OR NO): no    Admission HPI from 3/28/2022:  \" Mary Elizabeth is a 80year old CM with a PMH of chronic combined CHF, DM2, HTN, HLD, and pulmonary HTN with cor pulmonale with chronic BLE edema who presented to the ER with worsening BLE edema and RLE erythema despite doubling his Lasix dose for a week and adding Zaroxolyn. Per the patient, he takes Lasix 40mg daily but noticed his legs were getting more swollen so he saw his PCP who referred him to Cardiology. The cardiologist double his Lasix to 80mg and added Zaroxolyn 2.5mg. Despite this increase and frequency urination, his legs continued to get more swollen. Then 2 days prior to admission, his right leg became red and tender and it worsened so they finally decided to come to the ER. Denies CP/SOB/cough. Denies eating salt in diet. Denies sick contacts. Denies F/C. Denies N/V/D. [facility] Course:  admitted with worsening bilateral lower extremity edema despite doubling Lasix dose for a week and adding Zaroxolyn. Cardiology consulted. Started on Lasix gtt. later discontinued on 3/30 due to worsening kidney functions. Lower extremity edema has been resolved. LALY significantly improved with gentle hydration. Off IV fluids. PO Lasix resumed on 4/5. Also noted to have right lower extremity cellulitis and started on Unasyn, switched to p.o. Augmentin on 3/31 and has completed antibiotic course. He has improved on current lasix 40mg BID. His BP is only mildly elevated at times despite not being on losartan. Pt reports he has lost weight last few months. It could be his med needs have decreased.   Will resume losartan at 25mg instead of 100mg. Continue current lasix dose. Close follow up with nephro and cardio to ensure stability. Disposition: Skilled Nursing Facility  Diet: ADULT ORAL NUTRITION SUPPLEMENT Dinner; Diabetic Supplement  ADULT DIET Regular; 4 carb choices (60 gm/meal); Low Sodium (2 gm); 2000 ml  Code Status: DNR    Follow Up Orders: Follow-up Appointments   Procedures    FOLLOW UP VISIT Appointment in: Ten Days [facility] [address]     [facility] [address]     Standing Status:   Standing     Number of Occurrences:   1     Order Specific Question:   Appointment in     Answer:   Ten Days    FOLLOW UP VISIT Appointment in: One Week Cardiology for CHF follow up     Cardiology for CHF follow up     Standing Status:   Standing     Number of Occurrences:   1     Order Specific Question:   Appointment in     Answer: One Week       Follow-up Information     Follow up With Specialties Details Why Contact Info    [facility] CARDIOLOGY  In 1 week Dr. Brad Layton CHF follow up [address] [place] [address] [place] [phone]  [facility] [address] [place] [address] today  [address] Krystina Garnett  [phone]    Dioni Mcneal MD Family Medicine Call As needed [address] [place], [place] [id number]  [phone]            Follow up labs/diagnostics (ultimately defer to outpatient provider):  CBC, BMP,. Mg    Time spent in patient discharge and coordination 35 minutes. Plan was discussed with pt. All questions answered. Patient was stable at time of discharge. Instructions given to call a physician or return if any concerns. Discharge Info:   Current Discharge Medication List      CONTINUE these medications which have CHANGED    Details   furosemide (LASIX) 40 mg tablet Take 1 Tablet by mouth two (2) times a day.   Qty: 60 Tablet, Refills: 1  Start date: 4/6/2022      losartan (COZAAR) 25 mg tablet Take 1 Tablet by mouth daily. Qty: 30 Tablet, Refills: 1  Start date: 4/6/2022         CONTINUE these medications which have NOT CHANGED    Details   montelukast (Singulair) 10 mg tablet Take 1 Tablet by mouth daily. Qty: 90 Tablet, Refills: 1      canagliflozin (Invokana) 100 mg tablet Take 1 Tablet by mouth Daily (before breakfast). Qty: 30 Tablet, Refills: 1      htusoabzc-Rkdswilv-Gwdal-W.Pet (PREPARATION H MAXIMUM STRENGTH) 0.25-1 % rectal cream Insert  into rectum as needed for Hemorrhoids. Qty: 51 g, Refills: 0      atorvastatin (LIPITOR) 80 mg tablet Take 1 Tablet by mouth daily. Qty: 90 Tablet, Refills: 1      nateglinide (Starlix) 120 mg tablet Take 120 mg by mouth Before breakfast, lunch, and dinner. diclofenac (VOLTAREN) 1 % gel Apply  to affected area four (4) times daily. metoprolol succinate (TOPROL-XL) 50 mg XL tablet Take 50 mg by mouth daily. nitroglycerin (NITROSTAT) 0.4 mg SL tablet Place 1 sl under the tongue q 5 min prn cp, max 3 sl in a 15-min time period. Call 911 if no relief after the 3rd sl. Qty: 1 Bottle, Refills: prn    Associated Diagnoses: Coronary artery disease involving native coronary artery of native heart without angina pectoris      multivitamin (ONE A DAY) tablet Take 1 Tab by mouth daily. aspirin delayed-release 81 mg tablet Take  by mouth daily. Bedside Commode XX Use as instructed  Qty: 1 Each, Refills: 0    Associated Diagnoses: Benign prostatic hyperplasia with nocturia; Unsteadiness      Wheel Chair elizabeth 1 wheelchair for difficulty walking and at risk for falls.   Qty: 1 Each, Refills: 0         STOP taking these medications       metOLazone (ZAROXOLYN) 2.5 mg tablet Comments:   Reason for Stopping:         terbinafine HCL (LAMISIL) 1 % topical cream Comments:   Reason for Stopping:               Procedures done this admission:  * No surgery found *    Consults this admission:  IP CONSULT TO CARDIOLOGY  IP CONSULT TO PALLIATIVE CARE - PROVIDER  IP CONSULT TO NEPHROLOGY    Echocardiogram/EKG results:  Results from Hospital Encounter encounter on 03/28/22    ECHO ADULT COMPLETE    Interpretation Summary    Left Ventricle: Left ventricle size is normal. Mildly increased wall thickness. Mild global hypokinesis present. Septal motion consistent with pacemaker activation. Mildly reduced left ventricular systolic function. EF by 2D Simpsons Biplane is 48%. Abnormal diastolic function.   Right Ventricle: Right ventricle size is normal. Low normal systolic function. Lead present in the right ventricle.   Aortic Valve: Moderately thickened cusps. Trace transvalvular regurgitation. No significant stenosis. AV mean gradient is 6 mmHg. AV peak gradient is 10 mmHg.   Mitral Valve: Mildly thickened leaflet. Mild transvalvular regurgitation.   Tricuspid Valve: Moderate transvalvular regurgitation. RVSP is 33 mmHg.   IVC diameter is greater than 21 mm and decreases greater than 50% during inspiration; therefore the estimated right atrial pressure is intermediate (~8 mmHg).   Contrast used: Definity. EKG Results     Procedure 720 Value Units Date/Time    EKG [[id number]] Collected: 03/28/22 1216    Order Status: Completed Updated: 03/28/22 1433     Ventricular Rate 74 BPM      Atrial Rate 60 BPM      QRS Duration 182 ms      Q-T Interval 472 ms      QTC Calculation (Bezet) 523 ms      Calculated R Axis -143 degrees      Calculated T Axis 24 degrees      Diagnosis --     Ventricular-paced rhythm with occasional Premature ventricular complexes  Abnormal ECG  When compared with ECG of 24-MAR-2022 01:09,  No significant change was found  Confirmed by Jonathan Guy ([id number]) on 3/28/2022 2:33:16 PM            Diagnostic Imaging/Tests:   US RETROPERITONEUM LTD    Result Date: 4/1/2022  Bilateral renal cysts and probable nonobstructive left lower pole nephrolithiasis.      DUPLEX LOWER EXT VENOUS BILAT    Result Date: 3/28/2022  No sonographic evidence for DVT in either lower extremity. All Micro Results     Procedure Component Value Units Date/Time    COVID-19 RAPID TEST [[id number]] Collected: 04/05/22 1734    Order Status: Completed Specimen: Nasopharyngeal Updated: 04/05/22 1830     Specimen source Nasopharyngeal        COVID-19 rapid test Not detected        Comment:      The specimen is NEGATIVE for SARS-CoV-2, the novel coronavirus associated with COVID-19. A negative result does not rule out COVID-19. This test has been authorized by the FDA under an Emergency Use Authorization (EUA) for use by authorized laboratories.         Fact sheet for Healthcare Providers: Convention[employer]ristol[employer] Squibbdate.co.nz  Fact sheet for Patients: Convention[employer]ristol[employer] Squ[employer]bdate[employer]co[employer]nz       Methodology: Isothermal Nucleic Acid Amplification               Labs: Results:       BMP, Mg, Phos Recent Labs     04/06/22  0546 04/05/22  0513 04/04/22  0549   * 134* 135*   K 3.7 3.8 3.8    102 102   CO2 27 26 26   AGAP 6* 6* 7   BUN 59* 68* 82*   CREA 1.60* 1.70* 2.10*   CA 10.4 10.3 9.8   * 115* 118*   MG 2.0 2.1 2.2   PHOS 3.4 3.8* 4.1*      CBC Recent Labs     04/06/22  0546 04/05/22  0513 04/04/22  0549   WBC 8.8 9.8 9.3   RBC 3.06* 3.14* 3.08*   HGB 10.1* 10.6* 10.2*   HCT 30.7* 31.8* 31.2*    230 230   GRANS 64 71 70   LYMPH 19 13 13   EOS 3 3 4   MONOS 12 11 11   BASOS 1 1 1   IG 1 1 1   ANEU 5.6 7.0 6.5   ABL 1.6 1.3 1.2   TOM 0.3 0.3 0.4   ABM 1.0 1.1 1.1   ABB 0.1 0.1 0.1   AIG 0.1 0.1 0.1      LFT Recent Labs     04/06/22  0546 04/05/22  0513 04/04/22  0549   ALB 2.1* 2.2* 2.0*      Cardiac Testing Lab Results   Component Value Date/Time     09/18/2013 05:09 AM     06/18/2013 08:25 AM     (H) 09/10/2011 08:36 PM     04/11/2010 04:01 AM    CK - MB <0.5 (L) 04/11/2010 04:01 AM    CK-MB Index CANNOT BE CALCULATED 04/11/2010 04:01 AM    Troponin-I <0.05 09/10/2011 08:36 PM    Troponin-I, Qt. <0.04 (L) 04/11/2010 04:01 AM    Troponin-I, Qt. <0.04 (L) 12/17/2008 11:07 AM    Troponin-I, Qt. <0.04 (L) 12/17/2008 07:00 AM      Coagulation Tests Lab Results   Component Value Date/Time    Prothrombin time 25.9 (H) 01/17/2020 08:39 AM    Prothrombin time 16.2 (H) 12/18/2019 04:46 AM    Prothrombin time 27.4 (H) 12/16/2019 08:34 AM    INR 2.3 01/17/2020 08:39 AM    INR 1.3 12/18/2019 04:46 AM    INR 2.5 12/16/2019 08:34 AM    aPTT 44.9 (H) 09/17/2019 11:24 PM    aPTT 33.4 (H) 11/24/2015 10:30 AM    aPTT 37.8 (H) 09/17/2013 10:35 AM      A1c Lab Results   Component Value Date/Time    Hemoglobin A1c 8.1 (H) 10/14/2021 01:58 PM    Hemoglobin A1c 7.4 (H) 09/19/2019 05:55 AM    Hemoglobin A1c 7.5 (H) 12/12/2015 04:43 AM      Lipid Panel Lab Results   Component Value Date/Time    Cholesterol, total 98 (L) 10/14/2021 01:58 PM    HDL Cholesterol 51 10/14/2021 01:58 PM    LDL, calculated 35 10/14/2021 01:58 PM    LDL, calculated 30.8 12/18/2019 04:46 AM    VLDL, calculated 12 10/14/2021 01:58 PM    VLDL, calculated 10.2 12/18/2019 04:46 AM    Triglyceride 49 10/14/2021 01:58 PM    CHOL/HDL Ratio 1.7 12/18/2019 04:46 AM      Thyroid Panel Lab Results   Component Value Date/Time    TSH 1.850 09/18/2019 03:09 PM        Most Recent UA Lab Results   Component Value Date/Time    Color YELLOW 04/01/2022 08:20 AM    Appearance CLEAR 04/01/2022 08:20 AM    Specific gravity 1.014 09/18/2019 04:59 AM    pH (UA) 6.0 04/01/2022 08:20 AM    Protein Negative 04/01/2022 08:20 AM    Glucose Negative 04/01/2022 08:20 AM    Ketone Negative 04/01/2022 08:20 AM    Bilirubin Negative 04/01/2022 08:20 AM    Blood TRACE (A) 04/01/2022 08:20 AM    Urobilinogen 0.2 04/01/2022 08:20 AM    Nitrites Negative 04/01/2022 08:20 AM    Leukocyte Esterase Negative 04/01/2022 08:20 AM    WBC 0 04/01/2022 08:20 AM    RBC 0-3 04/01/2022 08:20 AM    Epithelial cells 0 04/01/2022 08:20 AM    Bacteria 0 04/01/2022 08:20 AM    Casts 0-3 04/01/2022 08:20 AM    Crystals, urine 0 09/12/2011 05:00 AM    Mucus 0 09/12/2011 05:00 AM          All Labs from Last 24 Hrs:  Recent Results (from the past 24 hour(s))   GLUCOSE, POC    Collection Time: 04/05/22 11:11 AM   Result Value Ref Range    Glucose (POC) 178 (H) 65 - 100 mg/dL    Performed by [id number] [place], POC    Collection Time: 04/05/22  4:38 PM   Result Value Ref Range    Glucose (POC) 198 (H) 65 - 100 mg/dL    Performed by Tyrell    COVID-19 RAPID TEST    Collection Time: 04/05/22  5:34 PM   Result Value Ref Range    Specimen source Nasopharyngeal      COVID-19 rapid test Not detected NOTD     GLUCOSE, POC    Collection Time: 04/05/22  9:36 PM   Result Value Ref Range    Glucose (POC) 212 (H) 65 - 100 mg/dL    Performed by Ro    MAGNESIUM    Collection Time: 04/06/22  5:46 AM   Result Value Ref Range    Magnesium 2.0 1.8 - 2.4 mg/dL   RENAL FUNCTION PANEL    Collection Time: 04/06/22  5:46 AM   Result Value Ref Range    Sodium 134 (L) 138 - 145 mmol/L    Potassium 3.7 3.5 - 5.1 mmol/L    Chloride 101 98 - 107 mmol/L    CO2 27 21 - 32 mmol/L    Anion gap 6 (L) 7 - 16 mmol/L    Glucose 121 (H) 65 - 100 mg/dL    BUN 59 (H) 8 - 23 MG/DL    Creatinine 1.60 (H) 0.8 - 1.5 MG/DL    GFR est AA 53 (L) >60 ml/min/1.73m2    GFR est non-AA 44 (L) >60 ml/min/1.73m2    Calcium 10.4 8.3 - 10.4 MG/DL    Phosphorus 3.4 2.3 - 3.7 MG/DL    Albumin 2.1 (L) 3.2 - 4.6 g/dL   CBC WITH AUTOMATED DIFF    Collection Time: 04/06/22  5:46 AM   Result Value Ref Range    WBC 8.8 4.3 - 11.1 K/uL    RBC 3.06 (L) 4.23 - 5.6 M/uL    HGB 10.1 (L) 13.6 - 17.2 g/dL    HCT 30.7 (L) 41.1 - 50.3 %    .3 (H) 79.6 - 97.8 FL    MCH 33.0 (H) 26.1 - 32.9 PG    MCHC 32.9 31.4 - 35.0 g/dL    RDW 15.8 (H) 11.9 - 14.6 %    PLATELET 277 666 - 091 K/uL    MPV 9.1 (L) 9.4 - 12.3 FL    ABSOLUTE NRBC 0.00 0.0 - 0.2 K/uL    DF AUTOMATED      NEUTROPHILS 64 43 - 78 %    LYMPHOCYTES 19 13 - 44 %    MONOCYTES 12 4.0 - 12.0 %    EOSINOPHILS 3 0.5 - 7.8 % BASOPHILS 1 0.0 - 2.0 %    IMMATURE GRANULOCYTES 1 0.0 - 5.0 %    ABS. NEUTROPHILS 5.6 1.7 - 8.2 K/UL    ABS. LYMPHOCYTES 1.6 0.5 - 4.6 K/UL    ABS. MONOCYTES 1.0 0.1 - 1.3 K/UL    ABS. EOSINOPHILS 0.3 0.0 - 0.8 K/UL    ABS. BASOPHILS 0.1 0.0 - 0.2 K/UL    ABS. IMM.  GRANS. 0.1 0.0 - 0.5 K/UL   GLUCOSE, POC    Collection Time: 04/06/22  7:18 AM   Result Value Ref Range    Glucose (POC) 131 (H) 65 - 100 mg/dL    Performed by Tyrell        Current Med List in Hospital:   Current Facility-Administered Medications   Medication Dose Route Frequency    furosemide (LASIX) tablet 40 mg  40 mg Oral BID    sodium chloride (NS) flush 5-10 mL  5-10 mL IntraVENous Q8H    sodium chloride (NS) flush 5-10 mL  5-10 mL IntraVENous PRN    aspirin delayed-release tablet 81 mg  81 mg Oral DAILY    atorvastatin (LIPITOR) tablet 80 mg  80 mg Oral DAILY    [Held by provider] losartan (COZAAR) tablet 100 mg  100 mg Oral DAILY    metoprolol succinate (TOPROL-XL) XL tablet 50 mg  50 mg Oral DAILY    montelukast (SINGULAIR) tablet 10 mg  10 mg Oral DAILY    ondansetron (ZOFRAN) injection 4 mg  4 mg IntraVENous Q6H PRN    heparin (porcine) injection 5,000 Units  5,000 Units SubCUTAneous Q8H    morphine injection 2 mg  2 mg IntraVENous Q4H PRN    oxyCODONE IR (ROXICODONE) tablet 5 mg  5 mg Oral Q4H PRN    acetaminophen (TYLENOL) tablet 650 mg  650 mg Oral Q6H PRN    insulin glargine (LANTUS) injection 15 Units  0.2 Units/kg SubCUTAneous QHS    insulin lispro (HUMALOG) injection   SubCUTAneous AC&HS    glucose chewable tablet 16 g  16 g Oral PRN    glucagon (GLUCAGEN) injection 1 mg  1 mg IntraMUSCular PRN    dextrose 10% infusion 125-250 mL  125-250 mL IntraVENous PRN       Allergies   Allergen Reactions    Iodinated Contrast Media Swelling     Immunization History   Administered Date(s) Administered    COVID-19, Pfizer Purple top, DILUTE for use, 12+ yrs, 30mcg/0.3mL dose 01/21/2021, 02/10/2021, 11/19/2021    Influenza High Dose Vaccine PF 10/26/2017, 10/25/2018    Influenza Vaccine 10/15/2008    Influenza Vaccine Whole 10/15/2008    Influenza, Quadrivalent, Adjuvanted (>65 Yrs FLUAD QUAD [id number]) 11/11/2021    Pneumococcal Conjugate (PCV-13) 07/21/2015    Pneumococcal Polysaccharide (PPSV-23) 01/01/2004    TB Skin Test (PPD) 12/10/2015    TB Skin Test (PPD) Intradermal 12/10/2015, 03/29/2022    Tdap 10/30/2015       Recent Vital Data:  Patient Vitals for the past 24 hrs:   Temp Pulse Resp BP SpO2   04/06/22 0841 -- 75 -- 134/61 --   04/06/22 0835 -- 75 -- 134/61 --   04/06/22 0546 98.1 °F (36.7 °C) 72 18 (!) 146/64 96 %   04/06/22 0103 98.1 °F (36.7 °C) 67 18 116/60 97 %   04/05/22 2125 99 °F (37.2 °C) 69 18 134/61 96 %   04/05/22 1717 98 °F (36.7 °C) 68 18 (!) 140/67 97 %   04/05/22 1351 98 °F (36.7 °C) 75 20 (!) 123/55 98 %   04/05/22 0909 98.4 °F (36.9 °C) 69 20 (!) 121/59 96 %     Oxygen Therapy  O2 Sat (%): 96 % (04/06/22 0546)  Pulse via Oximetry: 98 beats per minute (04/04/22 1550)  O2 Device: None (Room air) (04/06/22 4847)    Estimated body mass index is 23.11 kg/m² as calculated from the following:    Height as of this encounter: 6' 2\" (1.88 m). Weight as of this encounter: 81.6 kg (180 lb). Intake/Output Summary (Last 24 hours) at 4/6/2022 0842  Last data filed at 4/6/2022 0456  Gross per 24 hour   Intake 480 ml   Output 1900 ml   Net -1420 ml         Physical Exam:    General:    Well nourished. No overt distress  Head:  Normocephalic, atraumatic  Eyes:  Sclerae appear normal.  Pupils equally round. HENT:  Nares appear normal, no drainage. Moist mucous membranes  Neck:  No restricted ROM. Trachea midline  CV:   RRR. No JVD  Lungs:   Even, unlabored  Abdomen:   Nondistended. Extremities: No cyanosis or clubbing. No edema. Skin:     No rashes. Normal coloration  Neuro:  CN II-XII grossly intact. Psych:  Normal mood and affect.       Signed:  Adri Pacheco MD    Part of this note may have been written by using a voice dictation software. The note has been proof read but may still contain some grammatical/other typographical errors.

## 2022-04-06 NOTE — PROGRESS NOTES
Discharge order is in. Pt is transferring to [place], [place] [facility] today. Transport arranged for 1130. Report: [phone]. DNR signed. Pt and family aware of dcp. No other discharge needs identified. Tx goals have been met. Care Management Interventions  PCP Verified by CM:  Yes Brittany Rodarte)  Mode of Transport at Discharge: [address]  [address] Time of Discharge: [facility] (CM Consult): Discharge Planning,SNF [facility])  Discharge Durable Medical Equipment: No  Physical Therapy Consult: No  Occupational Therapy Consult: No  Speech Therapy Consult: No  Support Systems: Child(aleksey),Other Family Member(s),Friend/Neighbor,[employer]/[place] [employer]  Confirm Follow Up Transport: Family  The Plan for Transition of Care is Related to the Following Treatment Goals : Return home and back to his baseline  The Patient and/or Patient Representative was Provided with a Choice of Provider and Agrees with the Discharge Plan?: Yes  Name of the Patient Representative Who was Provided with a Choice of Provider and Agrees with the Discharge Plan: Patient  Freedom of Choice List was Provided with Basic Dialogue that Supports the Patient's Individualized Plan of Care/Goals, Treatment Preferences and Shares the Quality Data Associated with the Providers?: Yes   Resource Information Provided?: No  Discharge Location  Patient Expects to be Discharged to[de-identified] Rehab hospital/unit acute [facility] )

## 2022-04-06 NOTE — ROUTINE PROCESS
Bedside and Verbal shift change report given to Lani Faustin RN and AGIL Melton (oncoming nurse) by self (offgoing nurse). Report included the following information SBAR, Kardex, Intake/Output, MAR and Recent Results.

## 2022-04-06 NOTE — ROUTINE PROCESS
TRANSFER - OUT REPORT:    Verbal report given to Priya Burrows RN on [address]  being transferred to [facility] for routine progression of care       Report consisted of patients Situation, Background, Assessment and Recommendations(SBAR). Information from the following report(s) SBAR, Kardex, Intake/Output, MAR and Med Rec Status was reviewed with the receiving nurse. Opportunity for questions and clarification was provided.

## 2022-04-06 NOTE — PROGRESS NOTES
Problem: Pressure Injury - Risk of  Goal: *Prevention of pressure injury  Description: Document Salas Scale and appropriate interventions in the flowsheet. Outcome: Resolved/Met     Problem: Patient Education: Go to Patient Education Activity  Goal: Patient/Family Education  Outcome: Resolved/Met     Problem: Falls - Risk of  Goal: *Absence of Falls  Description: Document Anabell Fall Risk and appropriate interventions in the flowsheet. Outcome: Resolved/Met     Problem: Patient Education: Go to Patient Education Activity  Goal: Patient/Family Education  Outcome: Resolved/Met     Problem: Patient Education: Go to Patient Education Activity  Goal: Patient/Family Education  Outcome: Resolved/Met     Problem: Patient Education: Go to Patient Education Activity  Goal: Patient/Family Education  Outcome: Resolved/Met     Problem: Diabetes Self-Management  Goal: *Disease process and treatment process  Description: Define diabetes and identify own type of diabetes; list 3 options for treating diabetes. Outcome: Resolved/Met  Goal: *Incorporating nutritional management into lifestyle  Description: Describe effect of type, amount and timing of food on blood glucose; list 3 methods for planning meals. Outcome: Resolved/Met  Goal: *Incorporating physical activity into lifestyle  Description: State effect of exercise on blood glucose levels. Outcome: Resolved/Met  Goal: *Developing strategies to promote health/change behavior  Description: Define the ABC's of diabetes; identify appropriate screenings, schedule and personal plan for screenings. Outcome: Resolved/Met  Goal: *Using medications safely  Description: State effect of diabetes medications on diabetes; name diabetes medication taking, action and side effects. Outcome: Resolved/Met  Goal: *Monitoring blood glucose, interpreting and using results  Description: Identify recommended blood glucose targets  and personal targets.   Outcome: Resolved/Met  Goal: *Prevention, detection, treatment of acute complications  Description: List symptoms of hyper- and hypoglycemia; describe how to treat low blood sugar and actions for lowering  high blood glucose level. Outcome: Resolved/Met  Goal: *Prevention, detection and treatment of chronic complications  Description: Define the natural course of diabetes and describe the relationship of blood glucose levels to long term complications of diabetes.   Outcome: Resolved/Met  Goal: *Developing strategies to address psychosocial issues  Description: Describe feelings about living with diabetes; identify support needed and support network  Outcome: Resolved/Met  Goal: *Insulin pump training  Outcome: Resolved/Met  Goal: *Sick day guidelines  Outcome: Resolved/Met  Goal: *Patient Specific Goal (EDIT GOAL, INSERT TEXT)  Outcome: Resolved/Met     Problem: Patient Education: Go to Patient Education Activity  Goal: Patient/Family Education  Outcome: Resolved/Met     Problem: Heart Failure: Day 1  Goal: Off Pathway (Use only if patient is Off Pathway)  Outcome: Resolved/Met  Goal: Activity/Safety  Outcome: Resolved/Met  Goal: Consults, if ordered  Outcome: Resolved/Met  Goal: Diagnostic Test/Procedures  Outcome: Resolved/Met  Goal: Nutrition/Diet  Outcome: Resolved/Met  Goal: Discharge Planning  Outcome: Resolved/Met  Goal: Medications  Outcome: Resolved/Met  Goal: Respiratory  Outcome: Resolved/Met  Goal: Treatments/Interventions/Procedures  Outcome: Resolved/Met  Goal: Psychosocial  Outcome: Resolved/Met  Goal: *Oxygen saturation within defined limits  Outcome: Resolved/Met  Goal: *Hemodynamically stable  Outcome: Resolved/Met  Goal: *Optimal pain control at patient's stated goal  Outcome: Resolved/Met  Goal: *Anxiety reduced or absent  Outcome: Resolved/Met     Problem: Heart Failure: Day 2  Goal: Off Pathway (Use only if patient is Off Pathway)  Outcome: Resolved/Met  Goal: Activity/Safety  Outcome: Resolved/Met  Goal: Consults, if ordered  Outcome: Resolved/Met  Goal: Diagnostic Test/Procedures  Outcome: Resolved/Met  Goal: Nutrition/Diet  Outcome: Resolved/Met  Goal: Discharge Planning  Outcome: Resolved/Met  Goal: Medications  Outcome: Resolved/Met  Goal: Respiratory  Outcome: Resolved/Met  Goal: Treatments/Interventions/Procedures  Outcome: Resolved/Met  Goal: Psychosocial  Outcome: Resolved/Met  Goal: *Oxygen saturation within defined limits  Outcome: Resolved/Met  Goal: *Hemodynamically stable  Outcome: Resolved/Met  Goal: *Optimal pain control at patient's stated goal  Outcome: Resolved/Met  Goal: *Anxiety reduced or absent  Outcome: Resolved/Met  Goal: *Demonstrates progressive activity  Outcome: Resolved/Met     Problem: Heart Failure: Day 3  Goal: Off Pathway (Use only if patient is Off Pathway)  Outcome: Resolved/Met  Goal: Activity/Safety  Outcome: Resolved/Met  Goal: Diagnostic Test/Procedures  Outcome: Resolved/Met  Goal: Nutrition/Diet  Outcome: Resolved/Met  Goal: Discharge Planning  Outcome: Resolved/Met  Goal: Medications  Outcome: Resolved/Met  Goal: Respiratory  Outcome: Resolved/Met  Goal: Treatments/Interventions/Procedures  Outcome: Resolved/Met  Goal: Psychosocial  Outcome: Resolved/Met  Goal: *Oxygen saturation within defined limits  Outcome: Resolved/Met  Goal: *Hemodynamically stable  Outcome: Resolved/Met  Goal: *Optimal pain control at patient's stated goal  Outcome: Resolved/Met  Goal: *Anxiety reduced or absent  Outcome: Resolved/Met  Goal: *Demonstrates progressive activity  Outcome: Resolved/Met     Problem: Heart Failure: Day 4  Goal: Off Pathway (Use only if patient is Off Pathway)  Outcome: Resolved/Met  Goal: Activity/Safety  Outcome: Resolved/Met  Goal: Diagnostic Test/Procedures  Outcome: Resolved/Met  Goal: Nutrition/Diet  Outcome: Resolved/Met  Goal: Discharge Planning  Outcome: Resolved/Met  Goal: Medications  Outcome: Resolved/Met  Goal: Respiratory  Outcome: Resolved/Met  Goal: Treatments/Interventions/Procedures  Outcome: Resolved/Met  Goal: Psychosocial  Outcome: Resolved/Met  Goal: *Oxygen saturation within defined limits  Outcome: Resolved/Met  Goal: *Hemodynamically stable  Outcome: Resolved/Met  Goal: *Optimal pain control at patient's stated goal  Outcome: Resolved/Met  Goal: *Anxiety reduced or absent  Outcome: Resolved/Met  Goal: *Demonstrates progressive activity  Outcome: Resolved/Met     Problem: Heart Failure: Day 5  Goal: Off Pathway (Use only if patient is Off Pathway)  Outcome: Resolved/Met  Goal: Activity/Safety  Outcome: Resolved/Met  Goal: Diagnostic Test/Procedures  Outcome: Resolved/Met  Goal: Nutrition/Diet  Outcome: Resolved/Met  Goal: Discharge Planning  Outcome: Resolved/Met  Goal: Medications  Outcome: Resolved/Met  Goal: Respiratory  Outcome: Resolved/Met  Goal: Treatments/Interventions/Procedures  Outcome: Resolved/Met  Goal: Psychosocial  Outcome: Resolved/Met     Problem: Heart Failure: Discharge Outcomes  Goal: *Demonstrates ability to perform prescribed activity without shortness of breath or discomfort  Outcome: Resolved/Met  Goal: *Left ventricular function assessment completed prior to or during stay, or planned for post-discharge  Outcome: Resolved/Met  Goal: *ACEI prescribed if LVEF less than 40% and no contraindications or ARB prescribed  Outcome: Resolved/Met  Goal: *Verbalizes understanding and describes prescribed diet  Outcome: Resolved/Met  Goal: *Verbalizes understanding/describes prescribed medications  Outcome: Resolved/Met  Goal: *Describes available resources and support systems  Description: (eg: Home Health, Palliative Care, Advanced Medical Directive)  Outcome: Resolved/Met  Goal: *Describes smoking cessation resources  Outcome: Resolved/Met  Goal: *Understands and describes signs and symptoms to report to providers(Stroke Metric)  Outcome: Resolved/Met  Goal: *Describes/verbalizes understanding of follow-up/return appt  Description: (eg: to physicians, diabetes treatment coordinator, and other resources  Outcome: Resolved/Met  Goal: *Describes importance of continuing daily weights and changes to report to physician  Outcome: Resolved/Met

## 2022-05-30 PROBLEM — Z66 DNR (DO NOT RESUSCITATE): Chronic | Status: ACTIVE | Noted: 2022-01-01

## 2022-05-30 PROBLEM — D64.9 ANEMIA: Status: ACTIVE | Noted: 2022-01-01

## 2022-05-30 PROBLEM — E11.622 DIABETIC ULCER OF ANKLE (HCC): Status: ACTIVE | Noted: 2022-01-01

## 2022-05-30 PROBLEM — K62.5 RECTAL BLEEDING: Status: ACTIVE | Noted: 2022-01-01

## 2022-05-30 PROBLEM — L03.115 CELLULITIS OF RIGHT LEG: Status: ACTIVE | Noted: 2022-01-01

## 2022-05-30 PROBLEM — I27.20 PULMONARY HTN (HCC): Status: ACTIVE | Noted: 2022-01-01

## 2022-05-30 PROBLEM — I42.0 DILATED CARDIOMYOPATHY (HCC): Status: ACTIVE | Noted: 2021-06-22

## 2022-05-30 PROBLEM — E11.21 TYPE 2 DIABETES WITH NEPHROPATHY (HCC): Status: ACTIVE | Noted: 2019-11-06

## 2022-05-30 PROBLEM — L97.309 DIABETIC ULCER OF ANKLE (HCC): Status: ACTIVE | Noted: 2022-01-01

## 2022-05-30 PROBLEM — Z95.818 PRESENCE OF WATCHMAN LEFT ATRIAL APPENDAGE CLOSURE DEVICE: Status: ACTIVE | Noted: 2020-01-21

## 2022-05-30 NOTE — PLAN OF CARE
Problem: Discharge Planning  Goal: Discharge to home or other facility with appropriate resources  Outcome: Progressing  Flowsheets (Taken 5/30/2022 1930)  Discharge to home or other facility with appropriate resources: Identify barriers to discharge with patient and caregiver     Problem: Pain  Goal: Verbalizes/displays adequate comfort level or baseline comfort level  Outcome: Progressing     Problem: Skin/Tissue Integrity  Goal: Absence of new skin breakdown  Description: 1. Monitor for areas of redness and/or skin breakdown  2. Assess vascular access sites hourly  3. Every 4-6 hours minimum:  Change oxygen saturation probe site  4. Every 4-6 hours:  If on nasal continuous positive airway pressure, respiratory therapy assess nares and determine need for appliance change or resting period.   Outcome: Progressing     Problem: Safety - Adult  Goal: Free from fall injury  Outcome: Progressing     Problem: ABCDS Injury Assessment  Goal: Absence of physical injury  Outcome: Progressing

## 2022-05-30 NOTE — ED TRIAGE NOTES
Pt reports edema and pain in right calf and ankle. Pt unable to bear weight on left leg. Hx of broken ankle. Injury turned into a diabetic ulcer.

## 2022-05-30 NOTE — ED PROVIDER NOTES
[facility] Emergency Department Provider Note                   PCP:                Lenard Ayon MD               Age: 80 y.o. Sex: male     No diagnosis found. DISPOSITION         New Prescriptions    No medications on file       Orders Placed This Encounter   Procedures    Culture, Blood 1    Culture, Blood 1    XR FOOT RIGHT (MIN 3 VIEWS)    XR ANKLE RIGHT (MIN 3 VIEWS)    Lactic Acid    Lactic Acid    CBC with Auto Differential    CMP    Procalcitonin    Urinalysis w rflx microscopic    Urinalysis, Micro    Straight Cath (Select if patient is unable to provide a sample)    Cardiac Monitor - ED Only    Saline lock IV        MDM  Number of Diagnoses or Management Options  Diagnosis management comments: Blood work unremarkable except for elevated white blood count 12.3 and renal insufficiency creatinine 1.6 BUN 29, glucose 190 without DKA. X-ray no definite bone abnormality. Patient is being treated with IV fluids and Zosyn. Exam is concerning for infected diabetic ulcer. Considering his age and underlying comorbidities will discuss with hospitalist for admission for IV antibiotics and possible surgical management. Amount and/or Complexity of Data Reviewed  Clinical lab tests: ordered and reviewed  Tests in the radiology section of CPT®: ordered and reviewed  Tests in the medicine section of CPT®: ordered and reviewed  Discuss the patient with other providers: yes  Independent visualization of images, tracings, or specimens: yes    Risk of Complications, Morbidity, and/or Mortality  Presenting problems: moderate  Diagnostic procedures: moderate  Management options: neil Tyson is a 80 y.o. male who presents to the Emergency Department with chief complaint of    Chief Complaint   Patient presents with    Leg Pain      61-year-old white male history of diabetes had been living at [facility] rehab facility for the past several weeks.   While he was in the facility he sustained a small injury to the inner aspect of his right ankle when he was being transferred from his wheelchair. Over the next several weeks this has enlarged and become more erythematous. Was seen by wound care 4 days ago. Was seen by home health nurse today who felt that the wound had worsened significantly and therefore he was sent here. No fever or vomiting. Glucose has reportedly been normal.    The history is provided by the patient. All other systems reviewed and are negative. Review of Systems   Constitutional: Negative for fever. HENT: Negative for congestion. Respiratory: Negative for cough and shortness of breath. Cardiovascular: Negative for chest pain. Gastrointestinal: Negative for abdominal pain and vomiting. Musculoskeletal: Negative for back pain. Neurological: Negative for headaches. All other systems reviewed and are negative. Past Medical History:   Diagnosis Date    A-fib [facility]) 12/17/2019    Abnormal EKG     Acute diastolic heart failure (HCC)     Atrial fibrillation ([address] 9/10/2011    Atrial flutter ([address] 9/2013    Biotronik biventricular implantable cardioverter defibrillator    Automatic implantable cardioverter-defibrillator in situ 3/30/2016    Breast lump     right- pt states bx was neg-- m. d. \"released\" him    CAD (coronary artery disease)     mi w angioplasty--1995---- mi then cabg--2008    Cardiomyopathy, ischemic 3/30/2016    Chronic    Chest pain     Chronic systolic heart failure ([address]     \"stable\" per cardiology office note (1/2015) and on lasix daily    CKD (chronic kidney disease) stage 3, GFR 30-59 ml/min ([address] 9/12/2011    Coronary artery disease 9/10/2011    Diabetes ([address] diag 2002    type2, oral meds, range 96-98, does not know last hgba1c, hypo s/s <70    Heart attack ([address] 1995/2008    History of kidney stones     none in years     Hypercholesteremia 9/10/2011    Hypercholesterolemia     no meds currently    Hypertension diag 1981    controlled with med    Kidney stone     LBBB (left bundle branch block) 9/17/2013    Moderate protein-calorie malnutrition ([address] 3/30/2022    Orthostatic hypotension 3/30/2016    Osteoarthritis     hands    Pneumonia 9/10/2011    Preop cardiovascular exam     Renal insufficiency     S/P total knee arthroplasty 12/11/2015    TIA (transient ischemic attack) 9/18/2019    Unstable angina [facility])         Past Surgical History:   Procedure Laterality Date    ANKLE FRACTURE SURGERY Right 5/11/2005    BREAST BIOPSY Right 7/2013    BUNIONECTOMY Left 2001    CARDIAC DEFIBRILLATOR PLACEMENT  9/17/2013    biotronik    CARPAL TUNNEL RELEASE Bilateral 1972    CATARACT REMOVAL  2006/2009    COLONOSCOPY  2003/2010    CORONARY ARTERY BYPASS GRAFT  12/19/2008    3 vessel    KNEE ARTHROSCOPY Left 9/17/2009    KNEE ARTHROSCOPY Right 4/30/2014    LITHOTRIPSY      x 5    ORTHOPEDIC SURGERY  2005    right hip    OTHER SURGICAL HISTORY  2002    hydrocele repair and circumcision    PACEMAKER      pacemaker- defib    TOTAL HIP ARTHROPLASTY Right 12/28/2005    TOTAL KNEE ARTHROPLASTY Right 12/2015    TOTAL KNEE ARTHROPLASTY Left 4/8/2010        Family History   Problem Relation Age of Onset    Heart Disease Sister     Heart Disease Brother     Other Neg Hx     Post-op Cognitive Dysfunction Neg Hx     Emergence Delirium Neg Hx     Post-op Nausea/Vomiting Neg Hx     Delayed Awakening Neg Hx     Pseudochol.  Deficiency Neg Hx     Malig Hypertherm Neg Hx     Heart Disease Other     Asthma Father     Stroke Sister     Heart Disease Brother     Cancer Brother            Social Connections:     Frequency of Communication with Friends and Family: Not on file    Frequency of Social Gatherings with Friends and Family: Not on file    Attends Gnosticist Services: Not on file    Active Member of Clubs or Organizations: Not on file    Attends Club or Organization Meetings: Not on file   Josh Self Marital Status: Not on file        Allergies   Allergen Reactions    Iodine Swelling        Vitals signs and nursing note reviewed. Patient Vitals for the past 4 hrs:   Temp Pulse Resp BP SpO2   05/30/22 1616 -- -- -- -- 93 %   05/30/22 1615 -- -- -- (!) 170/71 --   05/30/22 1601 -- -- -- (!) 143/80 100 %   05/30/22 1525 98.6 °F (37 °C) 69 18 (!) 144/76 100 %          Physical Exam  Vitals and nursing note reviewed. Constitutional:       General: He is not in acute distress. Appearance: Normal appearance. He is not toxic-appearing. HENT:      Head: Normocephalic and atraumatic. Nose: Nose normal.      Mouth/Throat:      Mouth: Mucous membranes are moist.   Eyes:      Conjunctiva/sclera: Conjunctivae normal.      Pupils: Pupils are equal, round, and reactive to light. Cardiovascular:      Rate and Rhythm: Normal rate and regular rhythm. Pulmonary:      Effort: Pulmonary effort is normal.      Breath sounds: Normal breath sounds. Musculoskeletal:      Cervical back: Normal range of motion and neck supple. Comments: Bilateral lower extremity edema. Right ankle has large erythematous ulceration over the medial malleolus. Also hemorrhagic bulla to the tip of the great toe and some erythema to the second toe. Skin:     General: Skin is warm and dry. Neurological:      Mental Status: He is alert and oriented to person, place, and time.    Psychiatric:         Mood and Affect: Mood normal.         Behavior: Behavior normal.          Procedures    Labs Reviewed   CBC WITH AUTO DIFFERENTIAL - Abnormal; Notable for the following components:       Result Value    WBC 12.3 (*)     RBC 3.64 (*)     Hemoglobin 11.8 (*)     Hematocrit 36.9 (*)     .4 (*)     RDW 14.9 (*)     MPV 9.1 (*)     Segs Absolute 9.4 (*)     All other components within normal limits   COMPREHENSIVE METABOLIC PANEL - Abnormal; Notable for the following components:    Sodium 137 (*)     Anion Gap 6 (*)     Glucose 190 (*)     BUN 29 (*)     CREATININE 1.60 (*)     GFR  53 (*)     GFR Non-African American 44 (*)     Alk Phosphatase 191 (*)     Albumin 2.9 (*)     Globulin 4.5 (*)     Albumin/Globulin Ratio 0.6 (*)     All other components within normal limits   URINALYSIS - Abnormal; Notable for the following components:    Blood, Urine TRACE (*)     All other components within normal limits   CULTURE, BLOOD 1   CULTURE, BLOOD 1   LACTIC ACID   PROCALCITONIN   URINALYSIS, MICRO        XR FOOT RIGHT (MIN 3 VIEWS)   Final Result   1. No radiographic evidence of acute osseous abnormality. 2. Chronic changes. XR ANKLE RIGHT (MIN 3 VIEWS)   Final Result   1. No radiographic evidence of acute osseous abnormality. 2. Chronic changes. Viktoriya Coma Scale  Eye Opening: Spontaneous  Best Verbal Response: Oriented  Best Motor Response: Obeys commands  Viktoriya Coma Scale Score: 15               ED Course as of 05/30/22 1658   Mon May 30, 2022   1526 Patient here with swelling, redness, warmth, pain to his right ankle and foot. He broke it years ago and the hardware is starting to come out. The home health nurse thought it looked worse today. He is a diabetic. There is tenderness to palpation over the ankle and foot on the right. Good distal pulses. Patient evaluated initially in triage. Rapid Medical Evaluation was conducted and necessary orders have been placed. I have performed a medical screening exam.  Care will now be transferred to the provider in the back of the emergency department. DIRK Soto Mt, PA 3:27 PM   [SM]      ED Course User Index  [SM] DIRK Soto Mt        Voice dictation software was used during the making of this note. This software is not perfect and grammatical and other typographical errors may be present. This note has not been completely proofread for errors.       Du Irvin MD  05/30/22 1359

## 2022-05-30 NOTE — ED NOTES
TRANSFER - OUT REPORT:    Verbal report given to [address] [place] [address] on [facility]  being transferred to  for routine progression of patient care       Report consisted of patient's Situation, Background, Assessment and   Recommendations(SBAR). Information from the following report(s) ED SBAR was reviewed with the receiving nurse. Lines:   Peripheral IV 05/30/22 Left Antecubital (Active)        Opportunity for questions and clarification was provided.       Patient transported with:  Navjot Zheng RN  05/30/22 1038

## 2022-05-31 NOTE — WOUND CARE
Patient seen for right medial ankle wound. Spoke with Dr Stanton Coleman prior to assessment. Wound base has some areas of pink and some loose necrotic yellow and gray areas. Larger red stuart wound that is peeling. Started silver gel to area. Noted left tibial area has small almost healed 0.7cm wound. Continue covering with bandaid or telfa dressing. Updated patient, primary nurse saw wound during assessment. Wound team will follow and adjust treatment as needed.

## 2022-05-31 NOTE — PROGRESS NOTES
RNCM received call from [facility] with [facility], received be request however uncertain if bed will be available by Friday, June 3rd. [facility] will keep CM updated in regards. Patient made aware.

## 2022-05-31 NOTE — PROGRESS NOTES
OCCUPATIONAL THERAPY Initial Assessment       OT Visit Days: 1  Acknowledge Orders  Time  OT Charge Capture  Rehab Caseload Tracker      Mariana Wilcox is a 80 y.o. male   PRIMARY DIAGNOSIS: Diabetic ulcer of ankle ([facility]  Diabetic ulcer of ankle ([facility] [P61.310, L97.309]  Cellulitis of right leg [L03.115]  Cellulitis, unspecified cellulitis site [L03.90]       Reason for Referral: Generalized Muscle Weakness (M62.81)  Difficulty in walking, Not elsewhere classified (R26.2)  Inpatient: Payor: MEDICARE / Plan: MEDICARE PART A AND B / Product Type: *No Product type* /     ASSESSMENT:     REHAB RECOMMENDATIONS:   Recommendation to date pending progress:  Setting:   Short-term Rehab    Equipment:     None     ASSESSMENT:  Mr. Arabella Mensah is a 80 y M with hx of L ankle fx, DMII, afib, ICM, HTN, CAD. Pt admitted for diabetic ulcer of R ankle caused by hitting it on [facility] foot rest at [place] during transfer. Pt received supine in bed upon arrival, 3/10 pain bilateral ankles. Supine > sit Min A due to weakness. Static sitting EOB F+ balance. Sitting EOB, max A donning L sock due to poor dynamic sitting balance. STS at [address] [place] [address] min-mod A x2. Fx'l mobility bed > chair side steps to L via RW Min A x2 due to weakness, pain. Pt requires continued skilled OT services due to performing functionally below baseline. Pt has deficits in strength, balance, activity tolerance, and performing ADLs.       [employer] [facility] 6 Clicks Daily Activity Inpatient Short Form:    AM-PAC Daily Activity Inpatient   How much help for putting on and taking off regular lower body clothing?: A Lot  How much help for Bathing?: A Lot  How much help for Toileting?: A Lot  How much help for putting on and taking off regular upper body clothing?: None  How much help for taking care of personal grooming?: None  How much help for eating meals?: None  [facility] Inpatient Daily Activity Raw Score: 18  AM-PAC Inpatient ADL T-Scale Score : 38.66  ADL Inpatient CMS 0-100% Score: 46.65  ADL Inpatient CMS G-Code Modifier : CK           SUBJECTIVE:     Mr. Lex Davis states, \"I want to go home. \"     Social/Functional Lives With: Alone  Type of Home: House  Home Layout: One level  Home Access: Ramped entrance  Bathroom Shower/Tub: [address] chair with back,Walk-in shower  Bathroom Equipment: Grab bars in [place] [facility]  Home Equipment: Grab bars,Hospital bed,Reacher,Rollator,Walker, rolling,Wheelchair-manual  Receives Help From: Family  ADL Assistance: Independent    OBJECTIVE:     Ken Tubbs / Mary Book / Meera Comas: IV    RESTRICTIONS/PRECAUTIONS:  Restrictions/Precautions: Weight Bearing (No weightbearing specifications per conversation c MD)    PAIN: Chaseley Cleaves / O2:   Pre Treatment:   Pain Assessment: 0-10  Pain Level: 3      Post Treatment: 3/10       Vitals          Oxygen            GROSS EVALUATION: INTACT IMPAIRED   (See Comments)   UE AROM [] [x]L sh. 1/2 AROM due to \"bone on bone\" joint   UE PROM [] []   Strength []  generally decreased     Posture / Balance [] Sitting - Static: Fair,+  Sitting - Dynamic: Poor  Standing - Static: Fair  Standing - Dynamic: Fair   Sensation [x]     Coordination [x]       Tone [x]       Edema [] NA    Activity Tolerance []  generally decreased     Hand Dominance R [] L []      COGNITION/  PERCEPTION: INTACT IMPAIRED   (See Comments)   Orientation [x]     Vision [x]     Hearing [x]     Cognition  [x]       MOBILITY: I Mod I S SBA CGA Min Mod Max Total  NT x2 Comments:   Bed Mobility    Rolling [] [] [] [] [] [] [] [] [] [] []    Supine to Sit [] [] [] [] [] [x] [] [] [] [] []    Scooting [] [] [] [] [] [] [] [] [] [] []    Sit to Supine [] [] [] [] [] [] [] [] [] [x] []    Transfers    Sit to Stand [] [] [] [] [] [x] [x] [] [] [] [x]    Bed to Chair [] [] [] [] [] [x] [] [] [] [] [x] RW   Stand to Sit [] [] [] [] [] [x] [x] [] [] [] []    I=Independent, Mod I=Modified Independent, S=Supervision/Setup, SBA=Standby Assistance, CGA=Contact Guard Assistance, Min=Minimal Assistance, Mod=Moderate Assistance, Max=Maximal Assistance, Total=Total Assistance, NT=Not Tested    ACTIVITIES OF DAILY LIVING: I Mod I S SBA CGA Min Mod Max Total NT Comments   BASIC ADLs:              Bathing/ Showering [] [] [] [] [] [] [] [] [] [x]    Toileting [] [] [] [] [] [] [] [] [] [x]    Upper Body Dressing [] [] [] [] [] [] [] [] [] [x]    Lower Body Dressing [] [] [] [] [] [] [] [x] [] [] See assessment   Feeding [] [] [] [] [] [] [] [] [] [x]    Grooming [] [] [] [] [] [] [] [] [] [x]    Personal Device Care [] [] [] [] [] [] [] [] [] [x]    Functional Mobility [] [] [] [] [] [x] [] [] [] [] x2 RW   I=Independent, Mod I=Modified Independent, S=Supervision/Setup, SBA=Standby Assistance, CGA=Contact Guard Assistance, Min=Minimal Assistance, Mod=Moderate Assistance, Max=Maximal Assistance, Total=Total Assistance, NT=Not Tested    PLAN:     FREQUENCY/DURATION   OT Plan of Care: 3 times/week for duration of hospital stay or until stated goals are met, whichever comes first.    ACUTE OCCUPATIONAL THERAPY GOALS:   (Developed with and agreed upon by patient and/or caregiver.)  1. Pt will complete LBD Mod A with AE as needed. 2. Pt will complete toileting Min A with AE as needed. 3. Pt will complete UBD set up only. 4. Pt will tolerate 25 minutes of OT treatment requiring 1-2 breaks as needed. 5. Pt will complete grooming tasks while standing at sink Min A.  6. Pt will complete functional mobility via RW CGA.      PROBLEM LIST:   (Skilled intervention is medically necessary to address:)  Decreased ADL/Functional Activities  Decreased Activity Tolerance  Decreased AROM/PROM  Decreased Balance  Decreased Strength  Decreased Transfer Abilities  Increased Pain   INTERVENTIONS PLANNED:  (Benefits and precautions of occupational therapy have been discussed with the patient.)  Self Care Training  Therapeutic Activity  Therapeutic Exercise/HEP  Neuromuscular Re-education  Education TREATMENT:     EVALUATION: MODERATE COMPLEXITY: (Untimed Charge)    TREATMENT:   Co-Treatment PT/OT necessary due to patient's decreased overall endurance/tolerance levels, as well as need for high level skilled assistance to complete functional transfers/mobility and functional tasks  Self Care: (23): Procedure(s) (per grid) utilized to improve and/or restore self-care/home management as related to dressing. Required no   cueing to facilitate activities of daily living skills.     TREATMENT GRID:  N/A    AFTER TREATMENT PRECAUTIONS: Call light within reach, Chair, Needs within reach, RN notified and Visitors at bedside    INTERDISCIPLINARY COLLABORATION:  RN/ PCT, PT/ PTA and OT/ COLON    EDUCATION:       TOTAL TREATMENT DURATION AND TIME:  Time In: 0900  Time Out: 0926  Minutes: [address], OT

## 2022-05-31 NOTE — PROGRESS NOTES
VANCO DAILY FOLLOW UP NOTE  [address] [facility] Pharmacokinetic Monitoring Service - Vancomycin    Consulting Provider: Dr. Finn Thomas   Indication: SSTI  Target Concentration: Goal AUC/-600 mg*hr/L  Day of Therapy: 2  Additional Antimicrobials: Pip/Tazo    Pertinent Laboratory Values: Wt Readings from Last 1 Encounters:   05/30/22 166 lb 3.6 oz (75.4 kg)     Temp Readings from Last 1 Encounters:   05/31/22 97.9 °F (36.6 °C) (Oral)     Recent Labs     05/30/22  1533 05/31/22  0604   BUN 29* 27*   CREATININE 1.60* 1.40   WBC 12.3* 10.7   PROCAL 0.06  --      Estimated Creatinine Clearance: 39 mL/min (based on SCr of 1.4 mg/dL). No results found for: Sangita Mar    MRSA Nasal Swab: N/A. Non-respiratory infection. .      Assessment:  Date/Time Dose Concentration AUC         Note: Serum concentrations collected for AUC dosing may appear elevated if collected in close proximity to the dose administered, this is not necessarily an indication of toxicity    Plan:  Continue current dose of 750 mg every 24 hours for now  Repeat vancomycin concentration ordered for 6/1 @ 0600    Pharmacy will continue to monitor patient and adjust therapy as indicated    Thank you for the consult,  Berenice Patricia [facility]

## 2022-05-31 NOTE — CONSULTS
[address]   [address] [facility] FAX: [address] [facility]  1934    Chief Complaint   Patient presents with    Leg Pain           HPI   Mr. Blair Holcomb is a 80y.o. year old male who chronic right diabetic medial malleolus ulcer. Patient states also been there for several months. He denies any fever chills.     Current Facility-Administered Medications   Medication Dose Route Frequency Provider Last Rate Last Admin    [Held by provider] aspirin EC tablet 81 mg  81 mg Oral Daily David Ingram MD        losartan (COZAAR) tablet 25 mg  25 mg Oral Daily David Ingram MD        metoprolol succinate (TOPROL XL) extended release tablet 50 mg  50 mg Oral Daily David Ingram MD        sodium chloride flush 0.9 % injection 5-40 mL  5-40 mL IntraVENous 2 times per day David Ingram MD   10 mL at 05/30/22 2111    sodium chloride flush 0.9 % injection 5-40 mL  5-40 mL IntraVENous PRN David Ingram MD        0.9 % sodium chloride infusion   IntraVENous PRN David Ingram MD        ondansetron (ZOFRAN-ODT) disintegrating tablet 4 mg  4 mg Oral Q8H PRN David Ingram MD        Or    ondansetron (ZOFRAN) injection 4 mg  4 mg IntraVENous Q6H PRN David Ingram MD        polyethylene glycol (GLYCOLAX) packet 17 g  17 g Oral Daily PRN David Ingram MD        acetaminophen (TYLENOL) tablet 650 mg  650 mg Oral Q6H PRN David Ingram MD   650 mg at 05/31/22 0405    Or    acetaminophen (TYLENOL) suppository 650 mg  650 mg Rectal Q6H PRN David Ingram MD        atorvastatin (LIPITOR) tablet 80 mg  80 mg Oral Nightly David Ingram MD   80 mg at 05/30/22 2110    furosemide (LASIX) tablet 40 mg  40 mg Oral Daily David Ingram MD        montelukast (SINGULAIR) tablet 10 mg  10 mg Oral Nightly David Ingram MD   10 mg at 05/30/22 2110    hydrocortisone (ANUSOL-HC) 2.5 % rectal cream   Rectal BID Diana Dietz MD   Given at 05/30/22 2111    piperacillin-tazobactam (ZOSYN) 3.75 mg in sodium chloride 0.9 % 50 mL IVPB (mini-bag)  3.75 mg IntraVENous Q8H Diana Dietz MD   Stopped at 05/31/22 0005    insulin lispro (HUMALOG) injection vial 0-4 Units  0-4 Units SubCUTAneous TID WC Diana Dietz MD        insulin lispro (HUMALOG) injection vial 0-4 Units  0-4 Units SubCUTAneous Nightly Diana Dietz MD        hydrALAZINE (APRESOLINE) injection 10 mg  10 mg IntraVENous Q6H PRN Diana Dietz MD        traMADol (ULTRAM) tablet 50 mg  50 mg Oral Q6H PRN Diana Dietz MD   50 mg at 05/30/22 2342    lidocaine 4 % external patch 1 patch  1 patch TransDERmal Q24H Diana Dietz MD   1 patch at 05/30/22 2110    melatonin tablet 3 mg  3 mg Oral Nightly PRN Diana Dietz MD   3 mg at 05/30/22 2110    allopurinol (ZYLOPRIM) tablet 100 mg  100 mg Oral Daily Diana Dietz MD       [place] [START ON 6/1/2022] vancomycin (VANCOCIN) 750 mg in sodium chloride 0.9 % 250 mL IVPB ([id number])  750 mg IntraVENous Q24H Diana Dietz MD         Allergies   Allergen Reactions    Iodine Swelling     Past Medical History:   Diagnosis Date    [facility] 12/17/2019    Abnormal EKG     Acute diastolic heart failure ([facility]     Atrial fibrillation ([facility] 9/10/2011    Atrial flutter ([facility] 9/2013    Biotronik biventricular implantable cardioverter defibrillator    Automatic implantable cardioverter-defibrillator in situ 3/30/2016    Breast lump     right- pt states bx was neg-- m. d. \"released\" him    CAD (coronary artery disease)     mi w angioplasty--1995---- mi then cabg--2008    Cardiomyopathy, ischemic 3/30/2016    Chronic    Chest pain     Chronic systolic heart failure ([facility]     \"stable\" per cardiology office note (1/2015) and on lasix daily    CKD (chronic kidney disease) stage 3, GFR 30-59 ml/min ([address] 9/12/2011    Coronary artery disease 9/10/2011  Diabetes ([facility] diag 2002    type2, oral meds, range 96-98, does not know last hgba1c, hypo s/s <70    Heart attack ([address] 1995/2008    History of kidney stones     none in years     Hypercholesteremia 9/10/2011    Hypercholesterolemia     no meds currently    Hypertension diag 1981    controlled with med    Kidney stone     LBBB (left bundle branch block) 9/17/2013    Moderate protein-calorie malnutrition ([address] 3/30/2022    Orthostatic hypotension 3/30/2016    Osteoarthritis     hands    Pneumonia 9/10/2011    Preop cardiovascular exam     Renal insufficiency     S/P total knee arthroplasty 12/11/2015    TIA (transient ischemic attack) 9/18/2019    Unstable angina (HCC)      Family History   Problem Relation Age of Onset    Heart Disease Sister     Heart Disease Brother     Other Neg Hx     Post-op Cognitive Dysfunction Neg Hx     Emergence Delirium Neg Hx     Post-op Nausea/Vomiting Neg Hx     Delayed Awakening Neg Hx     Pseudochol.  Deficiency Neg Hx     Malig Hypertherm Neg Hx     Heart Disease Other     Asthma Father     Stroke Sister     Heart Disease Brother     Cancer Brother      Past Surgical History:   Procedure Laterality Date    ANKLE FRACTURE SURGERY Right 5/11/2005    BREAST BIOPSY Right 7/2013    BUNIONECTOMY Left 2001    CARDIAC DEFIBRILLATOR PLACEMENT  9/17/2013    biotronik    CARPAL TUNNEL RELEASE Bilateral 1972    CATARACT REMOVAL  2006/2009    COLONOSCOPY  2003/2010    CORONARY ARTERY BYPASS GRAFT  12/19/2008    3 vessel    KNEE ARTHROSCOPY Left 9/17/2009    KNEE ARTHROSCOPY Right 4/30/2014    LITHOTRIPSY      x 5    ORTHOPEDIC SURGERY  2005    right hip    OTHER SURGICAL HISTORY  2002    hydrocele repair and circumcision    PACEMAKER      pacemaker- defib    TOTAL HIP ARTHROPLASTY Right 12/28/2005    TOTAL KNEE ARTHROPLASTY Right 12/2015    TOTAL KNEE ARTHROPLASTY Left 4/8/2010     Social History     Tobacco Use    Smoking status: Never Smoker  Smokeless tobacco: Never Used   Substance Use Topics    Alcohol use: No        Review of Systems  Constitutional: Negative for fever and chills. HENT: Negative for congestion and sore throat. Skin: Negative for rash and itching. Eyes: Negative for blurred vision and double vision. Respiratory: Negative for cough and shortness of breath. Cardiovascular: Negative for chest pain, palpitations, claudication and leg swelling. Gastrointestinal: Negative for nausea, vomiting and abdominal pain. Genitourinary: Negative for dysuria, hematuria and flank pain. Musculoskeletal: Negative for joint pain and falls. Neurological: Negative for dizziness, sensory change, focal weakness, loss of consciousness and headaches. PHYSICAL EXAM     Constitutional: he appears well-developed. No distress. HENT: Hearing intact. Head: Atraumatic. Eyes: Pupils are equal, round, and reactive to light. Neck: Normal range of motion. Cardiovascular: Regular rhythm. Pulmonary/Chest: Effort normal and breath sounds normal. No respiratory distress. Abdominal: Soft. Bowel sounds are normal. he exhibits no distension. There is no tenderness. There is no guarding. No hernia. Musculoskeletal: Normal range of motion. Neurological: He is alert. CN II- XII grossly intact  Skin: Warm and dry. Vascular: Femoral pulses nonpalpable pedal pulses medial malleolus ulcer measuring 3 x 4 cm      Imaging Results      ASSESSMENT AND PLAN   Mr. Mis Pinzon is a 80y.o. year old male with multiple comorbidities with a nonhealing right heel ulcer. Reviewed the duplex study patient has no evidence of any occlusion but has small vessel disease secondary to chronic diabetes. Had a long discussion with patient and son in detail. With patient's age and comorbidities would not recommend arteriogram.  Will consult wound care for superficial wound care. With toe pressure 26 mmHg he does not have adequate healing long-term. Would not recommend any surgical debridement. Patient does not need MRI of his foot as that would not change any surgical planning. From vascular standpoint can be discharged home with wound care. This was communicated with patient's son in detail. Elements of this note have been dictated using speech recognition software. As a result, errors of speech recognition may have occurred. 50 minutes of time was spent on this consult with greater than 50% of time spent face-to-face with the patient discussing the disease process, education and treatment options.

## 2022-05-31 NOTE — PROGRESS NOTES
RNCM met with patient in room 203 to discuss discharge planning. Confirmed demographics, emergency contact information and PCP. RNCM also called patient's son Tk Almaguer [phone] to discuss PT/OT evals for SNF. Referral made to [facility]. PPD ordered. GI following. Vascular consulted with recommendations for wound care. CM following.         ASSESSMENT NOTE    Attending Physician: Krupa Duong MD  Admit Problem: Diabetic ulcer of ankle ([address] [G05.789, L97.309]  Cellulitis of right leg [Z01.633]  Cellulitis, unspecified cellulitis site [L03.90]  Date/Time of Admission: 5/30/2022  3:46 PM  Problem List:  Patient Active Problem List   Diagnosis    Hypertension    Dyspnea on exertion    Atrial flutter ([address]    Chest pain    Dilated cardiomyopathy ([address]    Longstanding persistent atrial fibrillation ([address]    History of kidney stones    Persistent atrial fibrillation ([address]    Automatic implantable cardioverter-defibrillator in situ    Coronary artery disease    CKD (chronic kidney disease) stage 3, GFR 30-59 ml/min (HCC)    Diabetes ([address]    TIA (transient ischemic attack)    Numbness and tingling in right hand    Orthostatic hypotension    Hx of long term use of blood thinners    Microhematuria    Osteoarthritis    Presence of Watchman left atrial appendage closure device    Cardiomyopathy, ischemic    Preop cardiovascular exam    S/P total knee arthroplasty    Renal insufficiency    Abnormal EKG    Type 2 diabetes with nephropathy (HCC)    Anticoagulant long-term use    History of BPH    Hypercholesteremia    Pneumonia    LBBB (left bundle branch block)    Breast lump    Acute diastolic heart failure (HCC)    Localized edema    Bilateral leg edema    Cor pulmonale (chronic) (HCC)    Pulmonary HTN (HCC)    Cellulitis of right leg    Chronic combined systolic and diastolic heart failure (HCC)    Acute on chronic right-sided congestive heart failure (HCC)    Moderate protein-calorie malnutrition ([facility]    Diabetic ulcer of ankle ([facility]    DNR (do not resuscitate)    Rectal bleeding    Anemia       Service Assessment  Patient Orientation Alert and Oriented   Cognition Alert   History Provided By Patient   Primary Caregiver Family   Accompanied By/Relationship     Support Systems Children (Patient's son:  Navya Gomes  [phone])   [address] [facility] is: Legal Next of Roseann [address] (Patient's son, Narayan Younger  [phone])   PCP Verified by CM Yes (Dr Hernan Courtney  [phone])   Last Visit to PCP Within last 3 months   Prior Functional Level Assistance with the following:   Current Functional Level Assistance with the following:   Can patient return to prior living arrangement Yes   Ability to make needs known: Good   Family able to assist with home care needs: Yes   Would you like for me to discuss the discharge plan with any other family members/significant others, and if so, who?      Financial Resources     Community Resources     CM/SW Referral       Social/Functional History  Lives With Family   Type of [address] [facility] One level   Home Access Ramped entrance   [address] [facility] [facility] [address] - Number of Steps     Entrance Stairs - Rails     Bathroom Shower/Tub Walk-in shower,Shower chair with back   [address] [facility] [place] & [facility] Financial chair   [facility] bed,Reacher,Rollator,Walker, rolling,Wheelchair-manual   Receives Help From [address] Work     Driving     Shopping          Other (Comment)     Homemaking Responsibilities     Meal Prep Responsibility     Laundry Responsibility     Cleaning Responsibility     Bill Paying/Finance Responsibility     Shopping Responsibility     Dependent Care Responsibility Health Care Management     Other (Comment)     Ambuation Assistance     Transfer [employer]     Active      Patient's  Info     Mode of Transportation     Education     Occupation Retired   Type of Occupation       Discharge [facility] (Skilled nursing facility then home)   [address] (Patient's son:  Cassie Bedoya  [phone])   Current Services Prior To Admission None   Potential [address]   DME     DME     DME Ordered? No (patient has wheelchair/walker)   Potential Assistance Purchasing Medications No   Meds-to-Beds: Does the patient want to have any new prescriptions delivered to bedside prior to discharge? Type of Home Care Services None   Patient expects to be discharged to: Skilled nursing facility   Follow Up Appointment: Best Day/Time Friday PM   One/Two Story Residence: One story   # of Interior Steps     Height of Each Step (in)     [employer] Available     History of Falls? Yes     Services At/After Discharge  Transition of Care Consult (CM Consult): Discharge Planning,SNF   Internal Home Health     Internal Hospice     Reason Outside Agency [address]     Partner SNF Yes   Reason Why Partner SNF Not Chosen     Internal Comfort Care     Reason Outside [address] Discharge [place] (SNF)   [address] Provided? No   Mode of Transport at Discharge [address] [facility] [address] Time of Discharge     Confirm Follow Up Transport Family     Condition of Participation: Discharge Planning  The plan for Transition of Care is related to the following treatment goals: return to safe level of independence by participating in skilled therapies   The Patient and/or Patient Representative was provided with a Choice of Provider?  Patient   Name of the Patient Representative who was provided with the Choice of Provider and agrees with the Discharge Plan? The Patient and/or Patient Representative Agree with the Discharge Plan? Yes   Freedom of Choice list was provided with basic dialogue that supports the individualized plan of care/goals, treatment preferences, and shares the quality data associated with the providers?  Yes     Documentation for Discharge Appeal  Discharge Appealed by     Date notified by QIO of appeal request:     Time notified by QIO of appeal request:     Detailed Notice of Discharge given to:     Date Notice of Discharge given:     Time Notice of Discharge given:     Date records sent to [address] [place] [address]     Time records sent to [address] [place] [address]     Date Notified of Outcome     Time Notified of Outcome     Outcome of appeal           Kat Roth RN 05/31/22 11:50 AM

## 2022-05-31 NOTE — CONSULTS
[facility] Consult Note       Primary GI Physician: Dr. Wallace Vuong    Referring Provider: Dr. Yue More Date:  5/31/2022    Admit Date:  5/30/2022    Chief Complaint: anemia, rectal bleeding    Subjective:     History of Present Illness:  Patient is a 80 y.o. male with PMH including but not limited to, DM,  A Fib s/p Watchman with ICD, HTN, CAD, CKD, pulmonary HTN, hx of CORDELL who is seen in consultation at the request of Dr. Kris Reynolds for anemia and rectal bleeding. He presented to the ED on 5/30/2022 from Rehab with RLE ulcer. He has been in rehab for cellulitis of the LE receiving outpatient wound care. While in the ED, he had some BRRB with known hemorrhoids. He has known constipation with BM every 2-3 days. He was straining prior to rectal bleeding. He was started on rectal steroid cream. Hgb was 11.8 on admission. Most recent Hgb 10.9 . 3, Hct 31.3, RDW 15, plt 196. BUN 27, creat 1.40 5/30/2022 ferritin 248, iron 47, TIBC 235, folate 52.5, Vit B 12 946. He has a long standing history of CORDELL without overt GI bleeding source. He did have H pylori in 2019. I don't see that he ever had repeat breath test of stool antigen for H pylori s/p treatment. His last ov was also in 2019 with a Hgb of 9.5 at that time. He has not been on oral or IV iron. He does not want to undergo repeat colonoscopy. EGD 7/23/2019 by Dr. Wallace Vuong revealed nonerosive nonspecific gastritis for CORDELL. He did have a VARSHA positive for H pylori. Capsule endoscopy on 8/15/2019 by Dr. Wallace Vuong revealed no obvious source of bleeding. He was encouraged to follow up with his PCP and continue IV iron.    Colonoscopy on 4/15/2016 by Dr. Wallace Vuong revealed a normal colonoscopy and no further surveillance due to age     PMH:  Past Medical History:   Diagnosis Date    A-fib [facility]) 12/17/2019    Abnormal EKG     Acute diastolic heart failure (HCC)     Atrial fibrillation ([address] 9/10/2011    Atrial flutter ([address] 9/2013    Biotronik biventricular implantable cardioverter defibrillator    Automatic implantable cardioverter-defibrillator in situ 3/30/2016    Breast lump     right- pt states bx was neg-- m. d. \"released\" him    CAD (coronary artery disease)     mi w angioplasty--1995---- mi then cabg--2008    Cardiomyopathy, ischemic 3/30/2016    Chronic    Chest pain     Chronic systolic heart failure ([address]     \"stable\" per cardiology office note (1/2015) and on lasix daily    CKD (chronic kidney disease) stage 3, GFR 30-59 ml/min ([address] 9/12/2011    Coronary artery disease 9/10/2011    Diabetes ([address] diag 2002    type2, oral meds, range 96-98, does not know last hgba1c, hypo s/s <70    Heart attack ([address] 1995/2008    History of kidney stones     none in years     Hypercholesteremia 9/10/2011    Hypercholesterolemia     no meds currently    Hypertension diag 1981    controlled with med    Kidney stone     LBBB (left bundle branch block) 9/17/2013    Moderate protein-calorie malnutrition ([address] 3/30/2022    Orthostatic hypotension 3/30/2016    Osteoarthritis     hands    Pneumonia 9/10/2011    Preop cardiovascular exam     Renal insufficiency     S/P total knee arthroplasty 12/11/2015    TIA (transient ischemic attack) 9/18/2019    Unstable angina (HCC)        PSH:  Past Surgical History:   Procedure Laterality Date    ANKLE FRACTURE SURGERY Right 5/11/2005    BREAST BIOPSY Right 7/2013    BUNIONECTOMY Left 2001    CARDIAC DEFIBRILLATOR PLACEMENT  9/17/2013    biotronik    CARPAL TUNNEL RELEASE Bilateral 1972    CATARACT REMOVAL  2006/2009    COLONOSCOPY  2003/2010    CORONARY ARTERY BYPASS GRAFT  12/19/2008    3 vessel    KNEE ARTHROSCOPY Left 9/17/2009    KNEE ARTHROSCOPY Right 4/30/2014    LITHOTRIPSY      x 5    ORTHOPEDIC SURGERY  2005    right hip    OTHER SURGICAL HISTORY  2002    hydrocele repair and circumcision    PACEMAKER      pacemaker- defib    TOTAL HIP ARTHROPLASTY Right 12/28/2005    TOTAL KNEE ARTHROPLASTY Right 12/2015    TOTAL KNEE ARTHROPLASTY Left 4/8/2010       Allergies: Allergies   Allergen Reactions    Iodine Swelling       Home Medications:  Prior to Admission medications    Medication Sig Start Date End Date Taking? Authorizing Provider   allopurinol (ZYLOPRIM) 100 MG tablet Take 100 mg by mouth daily   Yes Historical Provider, MD   traMADol (ULTRAM) 50 MG tablet Take 50 mg by mouth every 12 hours as needed for Pain. Yes Historical Provider, MD   aspirin 81 MG EC tablet Take by mouth daily    Ar Automatic Reconciliation   atorvastatin (LIPITOR) 80 MG tablet Take 80 mg by mouth daily 10/28/21   Ar Automatic Reconciliation   canagliflozin (INVOKANA) 100 MG TABS tablet Take 100 mg by mouth every morning (before breakfast)  Patient not taking: Reported on 5/30/2022 1/26/22   Ar Automatic Reconciliation   diclofenac sodium (VOLTAREN) 1 % GEL Apply topically 4 times daily 7/22/20   Ar Automatic Reconciliation   furosemide (LASIX) 40 MG tablet Take 40 mg by mouth 2 times daily 4/6/22   Ar Automatic Reconciliation   losartan (COZAAR) 25 MG tablet Take 25 mg by mouth daily 4/6/22   Ar Automatic Reconciliation   metoprolol succinate (TOPROL XL) 50 MG extended release tablet Take 50 mg by mouth daily 2/23/21   Ar Automatic Reconciliation   montelukast (SINGULAIR) 10 MG tablet Take 10 mg by mouth daily 3/10/22   Ar Automatic Reconciliation   nateglinide (STARLIX) 120 MG tablet Take 120 mg by mouth 3 times daily (before meals)    Ar Automatic Reconciliation   nitroGLYCERIN (NITROSTAT) 0.4 MG SL tablet Place 1 sl under the tongue q 5 min prn cp, max 3 sl in a 15-min time period.  Call 911 if no relief after the 3rd sl. 12/17/20   Ar Automatic Reconciliation   pramox-PE-glycerin-petrolatum 1-0.25-14.4-15 % cream Place rectally as needed 1/24/22   Ar Automatic Reconciliation       Hospital Medications:  Current Facility-Administered Medications   Medication Dose Route Frequency    piperacillin-tazobactam (ZOSYN) 3,375 mg in sodium chloride 0.9 % 50 mL IVPB (mini-bag)  3,375 mg IntraVENous Q8H    [Held by provider] aspirin EC tablet 81 mg  81 mg Oral Daily    losartan (COZAAR) tablet 25 mg  25 mg Oral Daily    metoprolol succinate (TOPROL XL) extended release tablet 50 mg  50 mg Oral Daily    sodium chloride flush 0.9 % injection 5-40 mL  5-40 mL IntraVENous 2 times per day    sodium chloride flush 0.9 % injection 5-40 mL  5-40 mL IntraVENous PRN    0.9 % sodium chloride infusion   IntraVENous PRN    ondansetron (ZOFRAN-ODT) disintegrating tablet 4 mg  4 mg Oral Q8H PRN    Or    ondansetron (ZOFRAN) injection 4 mg  4 mg IntraVENous Q6H PRN    polyethylene glycol (GLYCOLAX) packet 17 g  17 g Oral Daily PRN    acetaminophen (TYLENOL) tablet 650 mg  650 mg Oral Q6H PRN    Or    acetaminophen (TYLENOL) suppository 650 mg  650 mg Rectal Q6H PRN    atorvastatin (LIPITOR) tablet 80 mg  80 mg Oral Nightly    furosemide (LASIX) tablet 40 mg  40 mg Oral Daily    montelukast (SINGULAIR) tablet 10 mg  10 mg Oral Nightly    hydrocortisone (ANUSOL-HC) 2.5 % rectal cream   Rectal BID    insulin lispro (HUMALOG) injection vial 0-4 Units  0-4 Units SubCUTAneous TID WC    insulin lispro (HUMALOG) injection vial 0-4 Units  0-4 Units SubCUTAneous Nightly    hydrALAZINE (APRESOLINE) injection 10 mg  10 mg IntraVENous Q6H PRN    traMADol (ULTRAM) tablet 50 mg  50 mg Oral Q6H PRN    lidocaine 4 % external patch 1 patch  1 patch TransDERmal Q24H    melatonin tablet 3 mg  3 mg Oral Nightly PRN    allopurinol (ZYLOPRIM) tablet 100 mg  100 mg Oral Daily    [START ON 6/1/2022] vancomycin (VANCOCIN) 750 mg in sodium chloride 0.9 % 250 mL IVPB ([id number])  750 mg IntraVENous Q24H       Social History:  Social History     Tobacco Use    Smoking status: Never Smoker    Smokeless tobacco: Never Used   Substance Use Topics    Alcohol use: No         Family History:  Family History   Problem Relation Age of Onset    Heart Disease Sister     Heart Disease Brother     Other Neg Hx     Post-op Cognitive Dysfunction Neg Hx     Emergence Delirium Neg Hx     Post-op Nausea/Vomiting Neg Hx     Delayed Awakening Neg Hx     Pseudochol. Deficiency Neg Hx     Malig Hypertherm Neg Hx     Heart Disease Other     Asthma Father     Stroke Sister     Heart Disease Brother     Cancer Brother        Review of Systems:  A detailed 10 system ROS is obtained, with pertinent positives as listed above. All others are negative. Diet:  3 carb DM    Objective:     Physical Exam:  Vitals:  /60   Pulse 69   Temp 97.9 °F (36.6 °C) (Oral)   Resp 16   Ht 6' 0.44\" (1.84 m)   Wt 166 lb 3.6 oz (75.4 kg)   SpO2 97%   BMI 22.27 kg/m²   Gen:  Pt is alert, cooperative, no acute distress SITTING UP IN CHAIR; SON AT BEDSIDE  Skin:  Extremities and face reveal no rashes. HEENT: Sclerae anicteric. Extra-occular muscles are intact. No oral ulcers. No abnormal pigmentation of the lips. The neck is supple. Cardiovascular: Regular rate and rhythm. No murmurs, gallops, or rubs. Respiratory:  Comfortable breathing with no accessory muscle use. Clear breath sounds anteriorly with no wheezes, rales, or rhonchi. GI:  Abdomen nondistended, soft, and nontender. Normal active bowel sounds. No enlargement of the liver or spleen. No masses palpable. Musculoskeletal:  RIGHT LEG IN WRAP; EDEMA BILATERAL    Neurological:  Gross memory appears intact. Patient is alert and oriented. Psychiatric:  Mood appears appropriate with judgement intact. Lymphatic:  No cervical or supraclavicular adenopathy.     Laboratory:    Recent Labs     05/30/22  1533 05/31/22  0604   WBC 12.3* 10.7   HGB 11.8* 10.0*   HCT 36.9* 31.3*    196   .4* 101.3*   * 139   K 3.9 3.6    109*   CO2 26 24   BUN 29* 27*   AST 25  --    ALT 27  --           Assessment:     Principal Problem:    Diabetic ulcer of ankle (HCC)  Active Problems:    DNR (do not resuscitate)    Rectal bleeding    Anemia    Hypertension    Atrial flutter (HCC)    Dilated cardiomyopathy (HCC)    Automatic implantable cardioverter-defibrillator in situ    Coronary artery disease    CKD (chronic kidney disease) stage 3, GFR 30-59 ml/min (HCC)    Presence of Watchman left atrial appendage closure device    Cardiomyopathy, ischemic    Type 2 diabetes with nephropathy (HCC)    Pulmonary HTN (HCC)    Cellulitis of right leg    Chronic combined systolic and diastolic heart failure ([address]  Resolved Problems:    * No resolved hospital problems. *    80 y.o. male with PMH including but not limited to, DM,  A Fib s/p Watchman with ICD, HTN, CAD, CKD, pulmonary HTN, hx of CORDELL who is seen in consultation at the request of Dr. Case Avery for anemia and rectal bleeding. He has a long standing history of CORDELL with negative GI workup dating back to 2016 and last evaluated in 2019. His Hgb is actually improved from 2019 and he has been off his iron. His rectal bleeding is likely hemorrhoidal in nature. He has a history of H. Pylori and will recheck H pylori antigen. Plan:     -H pylori antigen stool  -continue PPI therapy  -Agree with anusol HC bid  -Offered repeat colonoscopy, but patient does not want to proceed at this time.   -Call for worsening bleeding  -Miralax 17 gm daily for constipation. [place]   [facility]    Patient is seen and examined in collaboration with Dr. Jostin Castañeda. Assessment and plan as per Dr. Jostin Castañeda.

## 2022-05-31 NOTE — PROGRESS NOTES
Hospitalist Progress Note   Admit Date:  2022  3:46 PM   Name:  Naga Vale   Age:  80 y.o. Sex:  male  :  1934   MRN:  [id number]   Room:      Presenting Complaint: Leg Pain     Reason(s) for Admission: Diabetic ulcer of ankle ([facility] [G08.273, L97.309]  Cellulitis of right leg [L03.115]  Cellulitis, unspecified cellulitis site [L03.90]     Hospital Course & Interval History:     Naga Vael is a 80 y.o. male with medical history of DM2, AFIB s/p watchman with PPM/ICD, ICM, HTN, CAD, CKD3, pulmonary hypertension admitted with right lower extremity cellulitis and chronic right diabetic medial malleolus ulcer. Of note patient had been admitted for LE cellulitis 2022, discharged to rehab where he spent 42 days then recently returned to home. Patient started on empiric antibiotics. Vascular surgery consulted and recommend no acute intervention at this time. Patient does not need MRI as it would not change any surgical plan. Vascular surgery okay to discharge patient with wound care. Wound care consulted. Patient also noted to have hematochezia with long standing history of CORDELL with negative GI work-up in the past.  GI consulted and thinks for rectal bleeding is likely hemorrhoidal in nature. Offered repeat colonoscopy but patient refused at this time. GI signed off. PT/OT recommend short-term rehab. Case management consulted. Subjective/24hr Events (22): Patient is seen at bedside. Reports feeling the same. Denies chest pain, palpitation, shortness of breath, nausea, vomiting or abdominal pain. Reports rectal surgery has seen him this morning and recommend no surgical intervention.       Assessment & Plan:     Right lower extremity cellulitis:  Chronic right diabetic medial malleolus ulcer:  X-ray negative for osteomyelitis  On vancomycin/Zosyn for empiric coverage  Follow-up on blood cultures  Wound care consulted  Vascular surgery consulted no acute intervention at this time. Patient does not need MRI as it would not change any surgical plan. Vascular surgery okay to discharge patient with wound care. Rectal bleeding:  Patient noted to have hematochezia with long standing history of CORDELL with negative GI work-up in the past  GI consulted and offered repeat colonoscopy but patient refused at this time  H. Pylori antigen   Continue Anusol HC twice daily  Continue PPI  Monitor H&H  GI signed off    Hypertension:  Atrial flutter:  Dilated cardiomyopathy:  Automatic implantable cardioverter defibrillator in situ  CAD:  Status post watchman  Pulmonary hypertension:  Stable  Continue home meds: Lipitor, Lasix, Cozaar, metoprolol  As needed IV hydralazine    CKD stage III:  Stable  Continue to monitor BMP    Type 2 diabetes mellitus:  Hold oral hypoglycemic  Continue sliding scale    Gout:  Allopurinol    Osteoarthritis:  Continue Lidoderm patch    Debility:  PT/OT recommend short-term rehab. Case management consult      Discharge Planning: PT/OT recommend short-term rehab. PPD ordered. Case management consult    Diet:  ADULT DIET; Regular; 3 carb choices (45 gm/meal);  Low Fat/Low Chol/High Fiber/ASHLEY  ADULT ORAL NUTRITION SUPPLEMENT; Breakfast, Lunch, Dinner; Diabetic Oral Supplement  DVT PPx: Rectal bleed  Code status: DNR      Hospital Problems:  Principal Problem:    Diabetic ulcer of ankle ([facility]  Active Problems:    DNR (do not resuscitate)    Rectal bleeding    Anemia    Hypertension    Atrial flutter (HCC)    Dilated cardiomyopathy (HCC)    Automatic implantable cardioverter-defibrillator in situ    Coronary artery disease    CKD (chronic kidney disease) stage 3, GFR 30-59 ml/min (HCC)    Presence of Watchman left atrial appendage closure device    Cardiomyopathy, ischemic    Type 2 diabetes with nephropathy (HCC)    Pulmonary HTN (HCC)    Cellulitis of right leg    Chronic combined systolic and diastolic heart failure ([address]  Resolved Problems: * No resolved hospital problems. *      Objective:     Patient Vitals for the past 24 hrs:   Temp Pulse Resp BP SpO2   05/31/22 0729 97.9 °F (36.6 °C) 69 16 115/60 97 %   05/31/22 0302 97.8 °F (36.6 °C) 68 18 (!) 128/58 97 %   05/31/22 0012 -- -- 18 -- --   05/30/22 2342 -- -- 18 -- --   05/30/22 2307 97.3 °F (36.3 °C) 69 17 (!) 115/51 99 %   05/30/22 1919 98.2 °F (36.8 °C) 71 17 (!) 137/59 98 %   05/30/22 1831 -- -- -- 125/60 99 %   05/30/22 1816 -- -- -- (!) 116/54 97 %   05/30/22 1806 -- -- -- (!) 132/56 98 %   05/30/22 1800 -- -- -- 118/78 98 %   05/30/22 1746 -- -- -- (!) 141/59 97 %   05/30/22 1731 -- -- -- (!) 144/64 97 %   05/30/22 1723 -- 69 18 139/68 98 %   05/30/22 1715 -- -- -- 139/68 98 %   05/30/22 1701 -- -- -- (!) 161/81 97 %   05/30/22 1626 -- -- -- (!) 157/87 100 %   05/30/22 1616 -- -- -- -- 93 %   05/30/22 1615 -- -- -- (!) 170/71 --   05/30/22 1601 -- -- -- (!) 143/80 100 %   05/30/22 1525 98.6 °F (37 °C) 69 18 (!) 144/76 100 %       Oxygen Therapy  SpO2: 97 %  O2 Device: None (Room air)    Estimated body mass index is 22.27 kg/m² as calculated from the following:    Height as of this encounter: 6' 0.44\" (1.84 m). Weight as of this encounter: 166 lb 3.6 oz (75.4 kg). Intake/Output Summary (Last 24 hours) at 5/31/2022 1109  Last data filed at 5/31/2022 0911  Gross per 24 hour   Intake 480 ml   Output 800 ml   Net -320 ml         Physical Exam:     Blood pressure 115/60, pulse 69, temperature 97.9 °F (36.6 °C), temperature source Oral, resp. rate 16, height 6' 0.44\" (1.84 m), weight 166 lb 3.6 oz (75.4 kg), SpO2 97 %. General:    No overt distress  Head:  Normocephalic, atraumatic  Eyes:  Sclerae appear normal.  Pupils equally round. ENT:  Nares appear normal, no drainage. Moist oral mucosa  Neck:  No restricted ROM. Trachea midline   CV:   RRR. No m/r/g. No jugular venous distension. Lungs:   CTAB. No wheezing, rhonchi, or rales. Respirations even, unlabored  Abdomen:    Bowel sounds present. Soft, nontender, nondistended. Extremities: Large ulcer right medial malleoli with erythema up to shin,   Skin:     No rashes and normal coloration. Warm and dry. Neuro:  CN II-XII grossly intact. Sensation intact. A&Ox3  Psych:  Normal mood and affect.       I have reviewed ordered lab tests and independently visualized imaging below:    Recent Labs:  Recent Results (from the past 48 hour(s))   Culture, Blood 1    Collection Time: 05/30/22  3:33 PM    Specimen: Blood   Result Value Ref Range    Special Requests RIGHT  Antecubital        Culture NO GROWTH AFTER 14 HOURS     Lactic Acid    Collection Time: 05/30/22  3:33 PM   Result Value Ref Range    Lactic Acid, Plasma 1.6 0.4 - 2.0 MMOL/L   CBC with Auto Differential    Collection Time: 05/30/22  3:33 PM   Result Value Ref Range    WBC 12.3 (H) 4.3 - 11.1 K/uL    RBC 3.64 (L) 4.23 - 5.6 M/uL    Hemoglobin 11.8 (L) 13.6 - 17.2 g/dL    Hematocrit 36.9 (L) 41.1 - 50.3 %    .4 (H) 79.6 - 97.8 FL    MCH 32.4 26.1 - 32.9 PG    MCHC 32.0 31.4 - 35.0 g/dL    RDW 14.9 (H) 11.9 - 14.6 %    Platelets 334 430 - 992 K/uL    MPV 9.1 (L) 9.4 - 12.3 FL    nRBC 0.00 0.0 - 0.2 K/uL    Differential Type AUTOMATED      Seg Neutrophils 78 43 - 78 %    Lymphocytes 13 13 - 44 %    Monocytes 8 4.0 - 12.0 %    Eosinophils % 1 0.5 - 7.8 %    Basophils 0 0.0 - 2.0 %    Immature Granulocytes 0 0.0 - 5.0 %    Segs Absolute 9.4 (H) 1.7 - 8.2 K/UL    Absolute Lymph # 1.6 0.5 - 4.6 K/UL    Absolute Mono # 1.0 0.1 - 1.3 K/UL    Absolute Eos # 0.2 0.0 - 0.8 K/UL    Basophils Absolute 0.1 0.0 - 0.2 K/UL    Absolute Immature Granulocyte 0.1 0.0 - 0.5 K/UL   CMP    Collection Time: 05/30/22  3:33 PM   Result Value Ref Range    Sodium 137 (L) 138 - 145 mmol/L    Potassium 3.9 3.5 - 5.1 mmol/L    Chloride 105 98 - 107 mmol/L    CO2 26 21 - 32 mmol/L    Anion Gap 6 (L) 7 - 16 mmol/L    Glucose 190 (H) 65 - 100 mg/dL    BUN 29 (H) 8 - 23 MG/DL    CREATININE 1.60 (H) 0.8 - 1.5 MG/DL    GFR  53 (L) >60 ml/min/1.73m2    GFR Non- 44 (L) >60 ml/min/1.73m2    Calcium 9.8 8.3 - 10.4 MG/DL    Total Bilirubin 0.8 0.2 - 1.1 MG/DL    ALT 27 12 - 65 U/L    AST 25 15 - 37 U/L    Alk Phosphatase 191 (H) 50 - 136 U/L    Total Protein 7.4 6.3 - 8.2 g/dL    Albumin 2.9 (L) 3.2 - 4.6 g/dL    Globulin 4.5 (H) 2.3 - 3.5 g/dL    Albumin/Globulin Ratio 0.6 (L) 1.2 - 3.5     Procalcitonin    Collection Time: 05/30/22  3:33 PM   Result Value Ref Range    Procalcitonin 0.06 0.00 - 0.49 ng/mL   Urinalysis w rflx microscopic    Collection Time: 05/30/22  4:16 PM   Result Value Ref Range    Color, UA YELLOW      Appearance CLEAR      Specific Gravity, UA 1.020 1.001 - 1.023      pH, Urine 6.0 5.0 - 9.0      Protein, UA Negative NEG mg/dL    Glucose,  mg/dL    Ketones, Urine Negative NEG mg/dL    Bilirubin Urine Negative NEG      Blood, Urine TRACE (A) NEG      Urobilinogen, Urine 1.0 0.2 - 1.0 EU/dL    Nitrite, Urine Negative NEG      Leukocyte Esterase, Urine Negative NEG     Urinalysis, Micro    Collection Time: 05/30/22  4:16 PM   Result Value Ref Range    WBC, UA 0-3 0 /hpf    RBC, UA 3-5 0 /hpf    Epithelial Cells UA 0-3 0 /hpf    BACTERIA, URINE TRACE 0 /hpf    Casts HYALINE 0 /lpf    Crystals 0 0 /LPF    Mucus, UA 0 0 /lpf    OTHER OBSERVATIONS RESULTS VERIFIED MANUALLY     POCT Glucose    Collection Time: 05/30/22  7:25 PM   Result Value Ref Range    POC Glucose 155 (H) 65 - 100 mg/dL    Performed by: Nas Hurt    Culture, Blood 1    Collection Time: 05/30/22  8:20 PM    Specimen: Blood   Result Value Ref Range    Special Requests RIGHT  Antecubital        Culture NO GROWTH AFTER 9 HOURS     Ferritin    Collection Time: 05/30/22  8:20 PM   Result Value Ref Range    Ferritin 248 8 - 388 NG/ML   Transferrin Saturation    Collection Time: 05/30/22  8:20 PM   Result Value Ref Range    Iron 47 35 - 150 ug/dL    TIBC 235 (L) 250 - 450 ug/dL    TRANSFERRIN SATURATION 20 (L) >20 %   Vitamin B12    Collection Time: 05/30/22  8:20 PM   Result Value Ref Range    Vitamin B-12 946 193 - 986 pg/mL   Folate    Collection Time: 05/30/22  8:20 PM   Result Value Ref Range    Folate 52.5 (H) 3.1 - 17.5 ng/mL   POCT Glucose    Collection Time: 05/30/22  9:37 PM   Result Value Ref Range    POC Glucose 207 (H) 65 - 100 mg/dL    Performed by: Jazzy Fitzpatrick    Basic Metabolic Panel w/ Reflex to MG    Collection Time: 05/31/22  6:04 AM   Result Value Ref Range    Sodium 139 138 - 145 mmol/L    Potassium 3.6 3.5 - 5.1 mmol/L    Chloride 109 (H) 98 - 107 mmol/L    CO2 24 21 - 32 mmol/L    Anion Gap 6 (L) 7 - 16 mmol/L    Glucose 160 (H) 65 - 100 mg/dL    BUN 27 (H) 8 - 23 MG/DL    CREATININE 1.40 0.8 - 1.5 MG/DL    GFR African American >60 >60 ml/min/1.73m2    GFR Non- 51 (L) >60 ml/min/1.73m2    Calcium 9.2 8.3 - 10.4 MG/DL   CBC with Auto Differential    Collection Time: 05/31/22  6:04 AM   Result Value Ref Range    WBC 10.7 4.3 - 11.1 K/uL    RBC 3.09 (L) 4.23 - 5.6 M/uL    Hemoglobin 10.0 (L) 13.6 - 17.2 g/dL    Hematocrit 31.3 (L) 41.1 - 50.3 %    .3 (H) 79.6 - 97.8 FL    MCH 32.4 26.1 - 32.9 PG    MCHC 31.9 31.4 - 35.0 g/dL    RDW 15.0 (H) 11.9 - 14.6 %    Platelets 056 901 - 352 K/uL    MPV 9.4 9.4 - 12.3 FL    nRBC 0.00 0.0 - 0.2 K/uL    Differential Type AUTOMATED      Seg Neutrophils 74 43 - 78 %    Lymphocytes 12 (L) 13 - 44 %    Monocytes 10 4.0 - 12.0 %    Eosinophils % 2 0.5 - 7.8 %    Basophils 1 0.0 - 2.0 %    Immature Granulocytes 1 0.0 - 5.0 %    Segs Absolute 8.0 1.7 - 8.2 K/UL    Absolute Lymph # 1.3 0.5 - 4.6 K/UL    Absolute Mono # 1.1 0.1 - 1.3 K/UL    Absolute Eos # 0.2 0.0 - 0.8 K/UL    Basophils Absolute 0.1 0.0 - 0.2 K/UL    Absolute Immature Granulocyte 0.1 0.0 - 0.5 K/UL   POCT Glucose    Collection Time: 05/31/22  7:31 AM   Result Value Ref Range    POC Glucose 165 (H) 65 - 100 mg/dL    Performed by: Getachew        Other Studies:  XR ANKLE RIGHT (MIN 3 VIEWS)    Result Date: 5/30/2022  Right ankle Right foot Clinical indication: Subacute worsening redness with swelling, draining wound, difficulty walking. Diabetes, prior surgery of the distal tibia and fibula. Comparison: Radiography of right tibia fibula 5/21/2015 correlation Technique: 3 views right foot and 3 views right ankle Findings: Bone density is low throughout which limits sensitivity for osseous abnormalities. Right foot: There is no evidence of fracture or dislocation. There are chronic arthropathic changes and marginal erosions at the first metatarsal-phalangeal joint with metatarsal primus varus and hallux valgus deformity, overlying soft tissue swelling. Scattered vascular calcifications are noted within soft tissues. Chronic mild degenerative arthropathic changes are noted elsewhere in the foot, not unusual for age. Right ankle: Screw-plate fixation hardware again traverses the distal fibula, fixation screws and wires again traverse the medial malleolus. Chronic old healed fracture deformities are again noted at these levels. No acute fracture, dislocation or osseous erosion is seen. Chronic joint space narrowing, osteophytes, cortical sclerosis are noted at the ankle and hindfoot. Radiographs can be insensitive in early osteomyelitis. If this is a persistent clinical concern, consider followup radiographs in 7-10 days, or nuclear medicine bone scan. 1. No radiographic evidence of acute osseous abnormality. 2. Chronic changes. XR FOOT RIGHT (MIN 3 VIEWS)    Result Date: 5/30/2022  Right ankle Right foot Clinical indication: Subacute worsening redness with swelling, draining wound, difficulty walking. Diabetes, prior surgery of the distal tibia and fibula. Comparison: Radiography of right tibia fibula 5/21/2015 correlation Technique: 3 views right foot and 3 views right ankle Findings: Bone density is low throughout which limits sensitivity for osseous abnormalities.  Right foot: There is no evidence of fracture or dislocation. There are chronic arthropathic changes and marginal erosions at the first metatarsal-phalangeal joint with metatarsal primus varus and hallux valgus deformity, overlying soft tissue swelling. Scattered vascular calcifications are noted within soft tissues. Chronic mild degenerative arthropathic changes are noted elsewhere in the foot, not unusual for age. Right ankle: Screw-plate fixation hardware again traverses the distal fibula, fixation screws and wires again traverse the medial malleolus. Chronic old healed fracture deformities are again noted at these levels. No acute fracture, dislocation or osseous erosion is seen. Chronic joint space narrowing, osteophytes, cortical sclerosis are noted at the ankle and hindfoot. Radiographs can be insensitive in early osteomyelitis. If this is a persistent clinical concern, consider followup radiographs in 7-10 days, or nuclear medicine bone scan. 1. No radiographic evidence of acute osseous abnormality. 2. Chronic changes. Vascular duplex lower extremity arteries bilateral with MARLENY    Result Date: 5/31/2022  History: Right first toe ulcer and claudication bilaterally. FINDINGS: Duplex Doppler arterial exam of the lower extremities bilaterally. Resting ankle-brachial index on the right of 0.34 and on the left of 0.43. Right toe index of 0.28 and left toe index of 0.55. Triphasic right common femoral and profunda femoral arteries. Triphasic right superficial femoral artery and right popliteal artery. Monophasic right peroneal artery and posterior tibial and right anterior tibial arteries. Triphasic left common femoral and profunda femoral arteries. Triphasic left superficial femoral artery and left popliteal artery. Monophasic left posterior tibial and anterior tibial arteries. Abdominal aorta measures 1.8 cm. Tibial artery disease bilaterally.       Current Meds:  Current Facility-Administered Medications Medication Dose Route Frequency    piperacillin-tazobactam (ZOSYN) 3,375 mg in sodium chloride 0.9 % 50 mL IVPB (mini-bag)  3,375 mg IntraVENous Q8H    polyethylene glycol (GLYCOLAX) packet 17 g  17 g Oral Daily    [Held by provider] aspirin EC tablet 81 mg  81 mg Oral Daily    losartan (COZAAR) tablet 25 mg  25 mg Oral Daily    metoprolol succinate (TOPROL XL) extended release tablet 50 mg  50 mg Oral Daily    sodium chloride flush 0.9 % injection 5-40 mL  5-40 mL IntraVENous 2 times per day    sodium chloride flush 0.9 % injection 5-40 mL  5-40 mL IntraVENous PRN    0.9 % sodium chloride infusion   IntraVENous PRN    ondansetron (ZOFRAN-ODT) disintegrating tablet 4 mg  4 mg Oral Q8H PRN    Or    ondansetron (ZOFRAN) injection 4 mg  4 mg IntraVENous Q6H PRN    acetaminophen (TYLENOL) tablet 650 mg  650 mg Oral Q6H PRN    Or    acetaminophen (TYLENOL) suppository 650 mg  650 mg Rectal Q6H PRN    atorvastatin (LIPITOR) tablet 80 mg  80 mg Oral Nightly    furosemide (LASIX) tablet 40 mg  40 mg Oral Daily    montelukast (SINGULAIR) tablet 10 mg  10 mg Oral Nightly    hydrocortisone (ANUSOL-HC) 2.5 % rectal cream   Rectal BID    insulin lispro (HUMALOG) injection vial 0-4 Units  0-4 Units SubCUTAneous TID WC    insulin lispro (HUMALOG) injection vial 0-4 Units  0-4 Units SubCUTAneous Nightly    hydrALAZINE (APRESOLINE) injection 10 mg  10 mg IntraVENous Q6H PRN    traMADol (ULTRAM) tablet 50 mg  50 mg Oral Q6H PRN    lidocaine 4 % external patch 1 patch  1 patch TransDERmal Q24H    melatonin tablet 3 mg  3 mg Oral Nightly PRN    allopurinol (ZYLOPRIM) tablet 100 mg  100 mg Oral Daily    [START ON 6/1/2022] vancomycin (VANCOCIN) 750 mg in sodium chloride 0.9 % 250 mL IVPB ([id number])  750 mg IntraVENous Q24H       Signed:  Jomar Contreras MD    Part of this note may have been written by using a voice dictation software.   The note has been proof read but may still contain some grammatical/other typographical errors.

## 2022-05-31 NOTE — PROGRESS NOTES
[facility] Pharmacokinetic Monitoring Service - Vancomycin     Romana Billy is a 80 y.o. male starting on vancomycin therapy for  diabetic ulcer and cellulitis . Pharmacy consulted by Dr Finn Thomas for monitoring and adjustment. Target Concentration: Goal AUC/-600 mg*hr/L    Additional Antimicrobials: Zosyn    Pertinent Laboratory Values: Wt Readings from Last 1 Encounters:   05/30/22 166 lb 3.6 oz (75.4 kg)     Temp Readings from Last 1 Encounters:   05/30/22 97.3 °F (36.3 °C) (Oral)     Recent Labs     05/30/22  1533   BUN 29*   CREATININE 1.60*   WBC 12.3*     Estimated Creatinine Clearance: 34 mL/min (A) (based on SCr of 1.6 mg/dL (H)). No results found for: Sangita Mar    MRSA Nasal Swab: N/A. Non-respiratory infection. .    Plan:  Dosing recommendations based on Bayesian software  Start vancomycin 1750 mg x1, then 750 mg q 24 hours  Anticipated AUC of 446 and trough concentration of 14.8 at steady state  Renal labs as indicated   Vancomycin concentrations will be ordered as clinically appropriate   Pharmacy will continue to monitor patient and adjust therapy as indicated    Thank you for the consult,  Yoselin Biswas, PharmD, BCPS  Clinical Pharmacist

## 2022-05-31 NOTE — PROGRESS NOTES
Patient denies any needs at this time. Bed in low position, locked and call light/personal items within reach. Patient passing flatus and denies pain at this time. Patient voiding well through the shift, documented in flowsheet. Will continue to monitor and give report to day shift nurse.

## 2022-05-31 NOTE — PROGRESS NOTES
Comprehensive Nutrition Assessment    Type and Reason for Visit: Initial,Positive Nutrition Screen  Malnutrition Screening Tool: Malnutrition Screen  Have you recently lost weight without trying?: 14 to 23 pounds (2 points)  Have you been eating poorly because of a decreased appetite?: No (0 points)  Malnutrition Screening Tool Score: 2    Nutrition Recommendations/Plan:   Meals and Snacks:  Continue current diet. Nutrition Supplement Therapy:   Medical food supplement therapy:  Change Ensure High Protein three times per day (this provides 160 kcal and 16 grams protein per bottle) ; greater protein content and fewer CHO compared to diabetic specific supplement    Initiate Supa twice per day (this provides 90 kcal and 2.5 grams protein per packet)     Malnutrition Assessment:  Malnutrition Status: Moderate malnutrition  Context: Chronic Illness  Findings of clinical characteristics of malnutrition:   Energy Intake:  Mild decrease in energy intake (Comment) (pt reports variable appetite/intake PTA)  Weight Loss:  10% over 6 months     Body Fat Loss:  Mild body fat loss Buccal region,Triceps,Orbital   Muscle Mass Loss:  Mild muscle mass loss Scapula (trapezius),Clavicles (pectoralis & deltoids),Temples (temporalis)  Fluid Accumulation:  No significant fluid accumulation     Strength:  Not Performed  Nutrition Assessment:  Nutrition History:  Pt reports poor intake/appetite PTA, unknown reason. Pt estimates ~20-30lb loss within last 6mo-1yr. Pt reports UBW of 180-190lb, verified weight of 185lb at MD encounter 12/2021. Pt reports sporadic consumption of oral nutrition supplements at home. Nutrition Background:  Pt with PMH significant for DM2, AFIB s/p watchman with PPM/ICD, ICM, HTN, CAD, CKD3, pulmonary hypertension admitted with right lower extremity cellulitis and chronic right diabetic medial malleolus ulcer 5/30. Nutrition Interval:  Pt seen in recliner, no visitors present.  Pt reports appetite and intake are above baseline during current admission, pt reports consuming % of all meals. Pt reports variable intake of supplement thus far in admission. Pt is agreeable to starting Supa BID for wound healing, RD explain wound healing modular only effective if pt continues to consume >50% of meal tray. Pt verbalized understanding. Nutrition Related Findings:   NFPE findings as above. Wound Type: Diabetic Ulcer    Current Nutrition Therapies:  ADULT DIET; Regular; 3 carb choices (45 gm/meal); Low Fat/Low Chol/High Fiber/ASHLEY  ADULT ORAL NUTRITION SUPPLEMENT; Breakfast, Lunch, Dinner; Diabetic Oral Supplement    Current Intake:   Average Meal Intake: 51-75% Average Supplements Intake: Unable to assess      Anthropometric Measures:  Height: 6' 0.44\" (184 cm)  Current Body Wt: 166 lb 3.6 oz (75.4 kg) (5/30), Weight source: Stated  BMI: 22.3  Admission Body Weight: 167 lb 1.7 oz (75.8 kg) (5/30 ; no source)  Ideal Body Weight (Kg) (Calculated): 82 kg (181 lbs), 91.8 %  Usual Body Wt:  (185lb per MD office 12/2021), Percent weight change:         Edema: No data recorded  BMI Category Normal Weight (BMI 22.0 to 24.9) age over 72  Estimated Daily Nutrient Needs:  Energy (kcal/day): 1492-7584 (22-26kcal/kg) (Kcal/kg Weight used: 75.4 kg Current  Protein (g/day): 75-90 (1-1.2g/kg) Weight Used: (Current) 75.4 kg  Fluid (ml/day):   (1 ml/kcal)    Nutrition Diagnosis:   · Inadequate protein-energy intake related to increase demand for energy/nutrients as evidenced by wounds    · Moderate malnutrition,In context of chronic illness related to inadequate protein-energy intake as evidenced by Criteria as identified in malnutrition assessment     Nutrition Interventions:   Food and/or Nutrient Delivery: Continue Current Diet,Modify Oral Nutrition Supplement     Coordination of Nutrition Care: Continue to monitor while inpatient       Goals:       Active Goal: Meet at least 75% of estimated needs,by next RD assessment Nutrition Monitoring and Evaluation:      Food/Nutrient Intake Outcomes: Food and Nutrient Intake,Supplement Intake  Physical Signs/Symptoms Outcomes: Biochemical Data,GI Status,Meal Time Behavior,Weight    Discharge Planning:    Continue Oral Nutrition Supplement    Sunil Saucedo RD, LD  Contact: [phone]

## 2022-05-31 NOTE — PROGRESS NOTES
PHYSICAL THERAPY Initial Assessment  (Link to Caseload Tracking: PT Visit Days : 1  Acknowledge Orders  Time In/Out  PT Charge Capture  Rehab Caseload Tracker    Victor Baumgarten is a 80 y.o. male   PRIMARY DIAGNOSIS: Diabetic ulcer of ankle ([facility] [address]  Diabetic ulcer of ankle ([facility] [address] [A25.520, L97.309]  Cellulitis of right leg [L03.115]  Cellulitis, unspecified cellulitis site [L03.90]       Reason for Referral: Difficulty in walking, Not elsewhere classified (R26.2)  Other abnormalities of gait and mobility (R26.89)  Inpatient: Payor: MEDICARE / Plan: MEDICARE PART A AND B / Product Type: *No Product type* /     ASSESSMENT:     REHAB RECOMMENDATIONS:   Recommendation to date pending progress:  Setting:   Short-term Rehab    Equipment:     None   To Be Determined     ASSESSMENT:  Mr. Quinn Diaz Is a 80 y.o. male presenting to PT after being admitted on 5/30 for diabetic ulcer and cellulitis of RLE. At his typical baseline; pt lives alone in single level and is mod (I) c use of RW/WC for mobility needs. He also has a son who lives 4 miles away and is usually available to (A) PRN. At time of initial evaluation, pt presents below baseline LOF with deficits in bed mobility, strength, transfers, balance, gait and activity tolerance limiting his overall functional mobility. Today, pt performed bed mobility with Min (A) while requiring Mod (A) for sit-to-stand and min (A) c use of RW for brief ambulation. Of note, pt requiring inc (A) at BLE while moving in bed d/t pain. He also required inc (A) to rise from bedside as well as weight shift forward onto mid-foot. Pt initially unsteady in standing but improved c inc time and cueing. Once adjusted to standing, pt able to ambulate c slow, shuffling and antalgic pattern 5' from EOB to chair; he showed inc postural sway throughout but had no major LOB.  At this time, pt is an appropriate candidate for skilled PT and will benefit from POC designed to address the aforementioned deficits. Upon completion of treatment, pt was positioned to comfort in chair with needs in reach. RN was made aware of pt performance.      DC Recommendation: [facility] AM-PAC 6 Clicks Basic Mobility Inpatient Short Form  AM-PAC Mobility Inpatient   How much difficulty turning over in bed?: None  How much difficulty sitting down on / standing up from a chair with arms?: A Lot  How much difficulty moving from lying on back to sitting on side of bed?: A Little  How much help from another person moving to and from a bed to a chair?: A Lot  How much help from another person needed to walk in hospital room?: A Lot  How much help from another person for climbing 3-5 steps with a railing?: A Lot  AM-PAC Inpatient Mobility Raw Score : 15  AM-PAC Inpatient T-Scale Score : 39.45  Mobility Inpatient CMS 0-100% Score: 57.7  Mobility Inpatient CMS G-Code Modifier : CK    SUBJECTIVE:   Mr. Edda Jones states, \"I'd like to go home\"     Social/Functional      OBJECTIVE:     PAIN: Jonathan Octavio / O2: PRECAUTION / Wan Kebede / [place]:   Pre Treatment:   Pain Assessment: 0-10  Pain Level: 3      Post Treatment: 3 Vitals        Oxygen  O2 Device: None (Room air)   IV and RLE dressing    RESTRICTIONS/PRECAUTIONS:  Restrictions/Precautions: Weight Bearing (No weightbearing specifications per conversation c MD)                 GROSS EVALUATION: Intact Impaired (Comments):   AROM []  limited by pain at BLE knees and R ankle   PROM [] NT   Strength []   limited by pain at BLE knees and R ankle   Balance []  initially unsteady in standing but improves c inc time and cueing    Posture [] Forward Head  Rounded Shoulders  Thoracic Kyphosis   Sensation []  NT   Coordination []   NT   Tone []     Edema []    Activity Tolerance []  well-below baseline level    []      COGNITION/  PERCEPTION: Intact Impaired (Comments):   Orientation [x]     Vision [x]   Not Formally Assessed    Hearing [x]   Not Formally Assessed    Cognition  [x]       MOBILITY: I Mod I S SBA CGA Min Mod Max Total  NT x2 Comments:   Bed Mobility    Rolling [] [] [] [] [] [x] [] [] [] [] []    Supine to Sit [] [] [] [] [] [x] [] [] [] [] []    Scooting [] [] [] [] [] [x] [] [] [] [] []    Sit to Supine [] [] [] [] [] [] [] [] [] [x] []    Transfers    Sit to Stand [] [] [] [] [] [] [x] [] [] [] []    Bed to Chair [] [] [] [] [] [] [x] [] [] [] []    Stand to Sit [] [] [] [] [] [] [x] [] [] [] []    I=Independent, Mod I=Modified Independent, S=Supervision, SBA=Standby Assistance, CGA=Contact Guard Assistance,   Min=Minimal Assistance, Mod=Moderate Assistance, Max=Maximal Assistance, Total=Total Assistance, NT=Not Tested    GAIT: I Mod I S SBA CGA Min Mod Max Total  NT x2 Comments:   Level of Assistance [] [] [] [] [] [x] [] [] [] [] []    Distance 5 feet    DME Gait Belt and Rolling Walker    Gait Quality Antalgic, Decreased jem , Decreased step clearance, Decreased step length and Shuffling     Weightbearing Status Restrictions/Precautions  Restrictions/Precautions: Weight Bearing (No weightbearing specifications per conversation c MD)    Stairs      I=Independent, Mod I=Modified Independent, S=Supervision, SBA=Standby Assistance, CGA=Contact Guard Assistance,   Min=Minimal Assistance, Mod=Moderate Assistance, Max=Maximal Assistance, Total=Total Assistance, NT=Not Tested    PLAN:   ACUTE PHYSICAL THERAPY GOALS:   (Developed with and agreed upon by patient and/or caregiver.)  1. Pt will perform bed mobility Mod (I) c inc time and use of rails in 7 therapy sessions. 2. Pt will perform sit-to-stand/ stand-to-sit transfers Mod (I) c use of LRAD in 7 therapy sessions. 3. Pt will ambulate 20 ft SB (A) with use of LRAD and breaks as needed in 7 therapy sessions. 4. Pt will perform standing dynamic balance activities with minimal postural sway in 7 therapy sessions. 5. Pt will tolerate multiple sets and reps of BLE exercises in 7 therapy sessions.       FREQUENCY AND DURATION: 3 times/week for duration of hospital stay or until stated goals are met, whichever comes first.    THERAPY PROGNOSIS: Good    PROBLEM LIST:   (Skilled intervention is medically necessary to address:)  Decreased ADL/Functional Activities  Decreased Activity Tolerance  Decreased AROM/PROM  Decreased Balance  Decreased Gait Ability  Decreased Strength  Decreased Transfer Abilities  Increased Pain INTERVENTIONS PLANNED:   (Benefits and precautions of physical therapy have been discussed with the patient.)  Self Care Training  Therapeutic Activity  Therapeutic Exercise/HEP  Gait Training  Manual Therapy  Education       TREATMENT:   EVALUATION: LOW COMPLEXITY: (Untimed Charge)    TREATMENT:   Therapeutic Activity (25 Minutes): Therapeutic activity included Rolling, Supine to Sit, Scooting, Lateral Scooting, Transfer Training, Ambulation on level ground, Sitting balance  and Standing balance to improve functional Activity tolerance, Balance, Mobility, Strength and ROM.     TREATMENT GRID:  N/A    AFTER TREATMENT PRECAUTIONS: Chair, Needs within reach, RN notified and Visitors at bedside    INTERDISCIPLINARY COLLABORATION:  RN/ PCT, PT/ PTA and OT/ COLON    EDUCATION: Education Given To: Patient  Education Provided: Role of Therapy;Plan of Care;Energy Conservation    TIME IN/OUT:  Time In: [address]  Time Out: [facility]  Minutes: [address], PT

## 2022-05-31 NOTE — PROGRESS NOTES
RNCM called [facility], [phone]; left message for [facility] 0743 to return call in regards to referral.     CM following

## 2022-06-01 PROBLEM — E44.0 MODERATE PROTEIN-CALORIE MALNUTRITION (HCC): Status: ACTIVE | Noted: 2022-01-01

## 2022-06-01 NOTE — PROGRESS NOTES
RNCM reviewed chart and discussed in IDR. GI following for anemia, Gi Bleed. Patient HGB 10.1 today. H Pylori stool pending. PT/OT recommending STR. Patient and family want to go to [facility]. Bed will not be available until Saturday. Family to review SNF list and update CM.

## 2022-06-01 NOTE — PROGRESS NOTES
Notified patient's son, Unique Lopez that [facility] and [facility] declined. Per Unique Lopez, will discuss again with family and patient to determine where to go. CM following.

## 2022-06-01 NOTE — PROGRESS NOTES
Patient and patient's son, Mckenzie Prescott updated in regards to bed offer from [facility]. Both in agreement for SNF for when patient medically stable. CM following.

## 2022-06-01 NOTE — PROGRESS NOTES
VANCO DAILY FOLLOW UP NOTE  [address] [facility] Pharmacokinetic Monitoring Service - Vancomycin    Consulting Provider: Dr. Ricardo Gillis   Indication: SSTI  Target Concentration: Goal AUC/-600 mg*hr/L  Day of Therapy: 3 of 7  Additional Antimicrobials: pip/tazo    Pertinent Laboratory Values: Wt Readings from Last 1 Encounters:   05/30/22 166 lb 3.6 oz (75.4 kg)     Temp Readings from Last 1 Encounters:   06/01/22 98.6 °F (37 °C) (Oral)     Recent Labs     05/30/22  1533 05/31/22  0604   BUN 29* 27*   CREATININE 1.60* 1.40   WBC 12.3* 10.7   PROCAL 0.06  --      Estimated Creatinine Clearance: 39 mL/min (based on SCr of 1.4 mg/dL). Lab Results   Component Value Date    VANCORANDOM 20.3 06/01/2022     MRSA Nasal Swab: N/A. Non-respiratory infection.     Assessment:  Date/Time Dose Concentration AUC   6/1 0648 750 mg q24h 20.3 441   Note: Serum concentrations collected for AUC dosing may appear elevated if collected in close proximity to the dose administered, this is not necessarily an indication of toxicity    Plan:  Current dosing regimen is therapeutic  Continue current dose  Repeat vancomycin concentrations will be ordered as clinically appropriate   Pharmacy will continue to monitor patient and adjust therapy as indicated    Thank you for the consult,  [facility]

## 2022-06-01 NOTE — PROGRESS NOTES
[facility] Progress Note         Admit Date:  5/30/2022    Today's Date:  6/1/2022    CC:  Anemia; rectal bleeding    Subjective:     Patient : had normal BM without bleeding. Denies any abdominal pain, N&V, melena or GERD.      Medications:   Current Facility-Administered Medications   Medication Dose Route Frequency    glucose chewable tablet 16 g  4 tablet Oral PRN    dextrose bolus 10% 125 mL  125 mL IntraVENous PRN    Or    dextrose bolus 10% 250 mL  250 mL IntraVENous PRN    glucagon injection 1 mg  1 mg IntraMUSCular PRN    dextrose 5 % solution  100 mL/hr IntraVENous PRN    piperacillin-tazobactam (ZOSYN) 3,375 mg in sodium chloride 0.9 % 50 mL IVPB (mini-bag)  3,375 mg IntraVENous Q8H    polyethylene glycol (GLYCOLAX) packet 17 g  17 g Oral Daily    tuberculin injection 5 Units  5 Units IntraDERmal Once    [Held by provider] aspirin EC tablet 81 mg  81 mg Oral Daily    losartan (COZAAR) tablet 25 mg  25 mg Oral Daily    metoprolol succinate (TOPROL XL) extended release tablet 50 mg  50 mg Oral Daily    sodium chloride flush 0.9 % injection 5-40 mL  5-40 mL IntraVENous 2 times per day    sodium chloride flush 0.9 % injection 5-40 mL  5-40 mL IntraVENous PRN    0.9 % sodium chloride infusion   IntraVENous PRN    ondansetron (ZOFRAN-ODT) disintegrating tablet 4 mg  4 mg Oral Q8H PRN    Or    ondansetron (ZOFRAN) injection 4 mg  4 mg IntraVENous Q6H PRN    acetaminophen (TYLENOL) tablet 650 mg  650 mg Oral Q6H PRN    Or    acetaminophen (TYLENOL) suppository 650 mg  650 mg Rectal Q6H PRN    atorvastatin (LIPITOR) tablet 80 mg  80 mg Oral Nightly    furosemide (LASIX) tablet 40 mg  40 mg Oral Daily    montelukast (SINGULAIR) tablet 10 mg  10 mg Oral Nightly    hydrocortisone (ANUSOL-HC) 2.5 % rectal cream   Rectal BID    insulin lispro (HUMALOG) injection vial 0-4 Units  0-4 Units SubCUTAneous TID WC    insulin lispro (HUMALOG) injection vial 0-4 Units  0-4 Units SubCUTAneous Nightly    hydrALAZINE (APRESOLINE) injection 10 mg  10 mg IntraVENous Q6H PRN    traMADol (ULTRAM) tablet 50 mg  50 mg Oral Q6H PRN    lidocaine 4 % external patch 1 patch  1 patch TransDERmal Q24H    melatonin tablet 3 mg  3 mg Oral Nightly PRN    allopurinol (ZYLOPRIM) tablet 100 mg  100 mg Oral Daily    vancomycin (VANCOCIN) 750 mg in sodium chloride 0.9 % 250 mL IVPB ([id number])  750 mg IntraVENous Q24H       Review of Systems:  ROS was obtained, with pertinent positives as listed above. No chest pain or SOB. Diet:  Regular 3 carb choices; low fat, high fiber    Objective:   Vitals:  BP (!) 108/55   Pulse 69   Temp 98.6 °F (37 °C) (Oral)   Resp 18   Ht 6' 0.44\" (1.84 m)   Wt 166 lb 3.6 oz (75.4 kg)   SpO2 95%   BMI 22.27 kg/m²   Intake/Output:  06/01 0701 - 06/01 1900  In: -   Out: 200 [Urine:200]  05/30 1901 - 06/01 0700  In: 1518.8 [P.O.:1200; I.V.:318.8]  Out: 1550 [Urine:1550]  Exam:  General appearance: alert, cooperative, no distress nursing at bedside  Lungs: clear to auscultation bilaterally anteriorly  Heart: regular rate and rhythm  Abdomen: soft, non-tender.  Bowel sounds normal. No masses, no organomegaly  Extremities: extremities right leg wrap, no cyanosis; bilateral edema   Neuro:  alert and oriented x4    Data Review (Labs):    Recent Labs     05/30/22  1533 05/31/22  0604 05/31/22  1853 06/01/22  0648   WBC 12.3* 10.7  --   --    HGB 11.8* 10.0* 11.6* 10.1*   HCT 36.9* 31.3*  --   --     196  --   --    .4* 101.3*  --   --    * 139  --   --    K 3.9 3.6  --   --     109*  --   --    CO2 26 24  --   --    BUN 29* 27*  --   --    AST 25  --   --   --    ALT 27  --   --   --        Assessment:     Principal Problem:    Diabetic ulcer of ankle (HCC)  Active Problems:    DNR (do not resuscitate)    Rectal bleeding    Anemia    Hypertension    Atrial flutter (HCC)    Dilated cardiomyopathy (HCC)    Automatic implantable cardioverter-defibrillator in situ    Coronary artery disease    CKD (chronic kidney disease) stage 3, GFR 30-59 ml/min (HCC)    Presence of Watchman left atrial appendage closure device    Cardiomyopathy, ischemic    Type 2 diabetes with nephropathy (HCC)    Pulmonary HTN (HCC)    Cellulitis of right leg    Chronic combined systolic and diastolic heart failure (HCC)  Resolved Problems:    * No resolved hospital problems. *  80 y.o. male with PMH including but not limited to, DM,  A Fib s/p Watchman with ICD, HTN, CAD, CKD, pulmonary HTN, hx of CORDELL who we are following for anemia and rectal bleeding. He has a long standing history of CORDELL with negative GI workup dating back to 2016 and last evaluated in 2019. His Hgb is actually improved from 2019 and he has been off his iron. His rectal bleeding is likely hemorrhoidal in nature. He has a history of H. Pylori and awaiting stool antigen results. 6/1/2022 hgb stable 10.1. Had normal BM. No further rectal bleeding    Plan:     -Await repeat H pylori stool antigen  -Continue Anusol HC bid  -Continue Miralax daily  -Call for worsening bleeding. Jose Ramirez in collaboration with Dr. Colonel Choi  [facility]

## 2022-06-01 NOTE — WOUND CARE
Patient seen for follow up on right ankle wound. Spoke with son yesterday per phone for update and he wanted to see with wound nurse today. He showed wound pic on his phone from prior to infection. Noted today patient wound has decreased in redness. Center necrotic area soft but still needing wound gel to assist with autolytic debridement. Some  pink 0.5cm around outer base of wound. Son took updated photo today. They area being followed by OP wound center in [facility]. Wound team will monitor. Primary nurse aware of dressing change.

## 2022-06-01 NOTE — PROGRESS NOTES
Hospitalist Progress Note   Admit Date:  2022  3:46 PM   Name:  Mis Pinzon   Age:  80 y.o. Sex:  male  :  1934   MRN:  [id number]   Room:      Presenting Complaint: RLE swelling  Reason(s) for Admission: Diabetic ulcer of ankle ([facility] [address] [C45.182, L97.309]  Cellulitis of right leg [L03.115]  Cellulitis, unspecified cellulitis site [L03.90]     Hospital Course & Interval History:   Mr. Robel Campoverde a very nice 79 y/o WM with a h/o DM2, AFIB s/p watchman with PPM/ICD, ICM, HTN, CAD, CKD3, pHTN who was admitted on  with RLE cellulitis and chronic R diabetic medial malleolus ulcer. He was admitted for LE cellulitis 2022, discharged to rehab where he spent 42 days then recently returned to home where he remained for about 1 week. He was admitted and started on IV antibiotics. X-rays unremarkable. Vascular surgery consulted and no intervention recommended. Patient does not need MRI as it would not change any surgical plan. Wound care following. GI saw for anemia and hematochezia, felt hemorrhoidal, and patient deferred any inpatient endoscopy. Subjective/24hr Events (22): Resting comfortably, says his RLE swelling looks a little better and is less painful. Good appetite. No chest pain or SOB. Assessment & Plan:   # RLE cellulitis with chronic diabetic foot ulcer of R medial malleolus   - No intervention recommended by Vascular, MRI not necessary. Plain films unremarkable. Con't wound care. Blood cxs from  negative. Stop vanc and pip/tazo and change to PO Keflex/doxy. Poor wound healing to the area due to DM. # Hematochezia   - Hb 10.1 today. Seen by GI and deferred repeat colonoscopy. Anusol for suspected hemorrhoids. # DM2   - SSI    # CKD3   - At baseline, monitor. # HTN   - Toprol    # H/o gout   - Allopurinol    # Dilated CM, CAD, atrial fibrillation   - S/p Watchman, has ICD   - Con't home medications, outpatient f/u    Discharge Planning:  To STR, likely Saturday pending bed availability. Medically stable for discharge otherwise. Diet:  ADULT DIET; Regular; 3 carb choices (45 gm/meal); Low Fat/Low Chol/High Fiber/ASHLEY  ADULT ORAL NUTRITION SUPPLEMENT; Breakfast, Lunch, Dinner; Low Calorie/High Protein Oral Supplement  ADULT ORAL NUTRITION SUPPLEMENT; Breakfast, Dinner;  Wound Healing Oral Supplement  DVT PPx: SCDs  Code status: DNR    Hospital Problems           Last Modified POA    * (Principal) Diabetic ulcer of ankle ([address] 5/30/2022 Yes    DNR (do not resuscitate) (Chronic) 5/30/2022 Yes    Rectal bleeding 5/30/2022 Yes    Anemia 5/30/2022 Yes    Hypertension 5/30/2022 Yes    Atrial flutter ([address] 5/30/2022 Yes    Dilated cardiomyopathy ([address] 5/30/2022 Yes    Overview Signed 3/18/2022  9:24 PM by Gloria Contreras MD     Added automatically from request for surgery [id number]           Automatic implantable cardioverter-defibrillator in situ 5/30/2022 Yes    Overview Signed 3/28/2022  4:54 PM by Katrina Kawasaki,      No shocks           Coronary artery disease 5/30/2022 Yes    Overview Signed 3/28/2022  4:54 PM by Katrina Kawasaki, DO     Got through [facility] surg           CKD (chronic kidney disease) stage 3, GFR 30-59 ml/min ([address] 5/30/2022 Yes    Presence of Watchman left atrial appendage closure device 5/30/2022 Yes    Overview Signed 3/28/2022  4:54 PM by Katrina Kawasaki, DO     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 (12/17/19)           Cardiomyopathy, ischemic 5/30/2022 Yes    Overview Signed 3/19/2022 12:40 PM by Melyssa Metcalf     Chronic           Type 2 diabetes with nephropathy ([address] 5/30/2022 Yes    Pulmonary HTN ([address] 5/30/2022 Yes    Cellulitis of right leg 5/30/2022 Yes    Chronic combined systolic and diastolic heart failure ([address] 5/30/2022 Yes    Moderate protein-calorie malnutrition ([address] 6/1/2022 Yes          Objective:     Patient Vitals for the past 24 hrs:   Temp Pulse Resp BP SpO2   06/01/22 1126 -- -- -- -- 98 %   06/01/22 1122 98.8 °F (37.1 °C) 72 18 (!) 103/50 96 %   06/01/22 0830 98.6 °F (37 °C) 69 18 (!) 108/55 --   06/01/22 0313 98.1 °F (36.7 °C) 70 17 (!) 116/49 95 %   06/01/22 0030 99.1 °F (37.3 °C) 70 18 (!) 104/45 --   05/31/22 2340 100.4 °F (38 °C) 70 17 (!) 123/45 97 %   05/31/22 1915 -- 71 -- -- 98 %   05/31/22 1908 97.5 °F (36.4 °C) 71 17 (!) 143/62 98 %   05/31/22 1507 97.9 °F (36.6 °C) 70 22 (!) 109/58 99 %       Estimated body mass index is 22.27 kg/m² as calculated from the following:    Height as of this encounter: 6' 0.44\" (1.84 m). Weight as of this encounter: 166 lb 3.6 oz (75.4 kg). Intake/Output Summary (Last 24 hours) at 6/1/2022 1308  Last data filed at 6/1/2022 1247  Gross per 24 hour   Intake 1038.84 ml   Output 1075 ml   Net -36.16 ml         Physical Exam:   Blood pressure (!) 103/50, pulse 72, temperature 98.8 °F (37.1 °C), temperature source Oral, resp. rate 18, height 6' 0.44\" (1.84 m), weight 166 lb 3.6 oz (75.4 kg), SpO2 98 %. General:    Well nourished. No overt distress. Appears stated age. Head:  Normocephalic, atraumatic  Eyes:  Sclerae appear normal. Pupils equally round. ENT:  Nares appear normal, no drainage. Moist oral mucosa  Neck:  No restricted ROM. Trachea midline. CV:   RRR. No m/r/g. No jugular venous distension. Lungs:   CTAB. No wheezing, rhonchi, or rales. Respirations even, unlabored. Abdomen: Bowel sounds present. Soft, nontender, nondistended. Extremities: No cyanosis or clubbing. Trace RLE edema and mild erythema of the pre-tibial area; R foot ulcer recently bandaged so it was not examined today. LLE ok; both legs with chronic stasis dermatitis. Skin:     No rashes and normal coloration. Warm and dry. Neuro:  CN II-XII grossly intact. Sensation intact. A&Ox3  Psych:  Normal mood and affect.       I have reviewed ordered lab tests and independently visualized imaging below:    Recent Labs:  Recent Results (from the past 48 hour(s))   Culture, Blood 1    Collection Time: 05/30/22  3:33 PM    Specimen: Blood   Result Value Ref Range    Special Requests RIGHT  Antecubital        Culture NO GROWTH 2 DAYS     Lactic Acid    Collection Time: 05/30/22  3:33 PM   Result Value Ref Range    Lactic Acid, Plasma 1.6 0.4 - 2.0 MMOL/L   CBC with Auto Differential    Collection Time: 05/30/22  3:33 PM   Result Value Ref Range    WBC 12.3 (H) 4.3 - 11.1 K/uL    RBC 3.64 (L) 4.23 - 5.6 M/uL    Hemoglobin 11.8 (L) 13.6 - 17.2 g/dL    Hematocrit 36.9 (L) 41.1 - 50.3 %    .4 (H) 79.6 - 97.8 FL    MCH 32.4 26.1 - 32.9 PG    MCHC 32.0 31.4 - 35.0 g/dL    RDW 14.9 (H) 11.9 - 14.6 %    Platelets 439 449 - 925 K/uL    MPV 9.1 (L) 9.4 - 12.3 FL    nRBC 0.00 0.0 - 0.2 K/uL    Differential Type AUTOMATED      Seg Neutrophils 78 43 - 78 %    Lymphocytes 13 13 - 44 %    Monocytes 8 4.0 - 12.0 %    Eosinophils % 1 0.5 - 7.8 %    Basophils 0 0.0 - 2.0 %    Immature Granulocytes 0 0.0 - 5.0 %    Segs Absolute 9.4 (H) 1.7 - 8.2 K/UL    Absolute Lymph # 1.6 0.5 - 4.6 K/UL    Absolute Mono # 1.0 0.1 - 1.3 K/UL    Absolute Eos # 0.2 0.0 - 0.8 K/UL    Basophils Absolute 0.1 0.0 - 0.2 K/UL    Absolute Immature Granulocyte 0.1 0.0 - 0.5 K/UL   CMP    Collection Time: 05/30/22  3:33 PM   Result Value Ref Range    Sodium 137 (L) 138 - 145 mmol/L    Potassium 3.9 3.5 - 5.1 mmol/L    Chloride 105 98 - 107 mmol/L    CO2 26 21 - 32 mmol/L    Anion Gap 6 (L) 7 - 16 mmol/L    Glucose 190 (H) 65 - 100 mg/dL    BUN 29 (H) 8 - 23 MG/DL    CREATININE 1.60 (H) 0.8 - 1.5 MG/DL    GFR  53 (L) >60 ml/min/1.73m2    GFR Non- 44 (L) >60 ml/min/1.73m2    Calcium 9.8 8.3 - 10.4 MG/DL    Total Bilirubin 0.8 0.2 - 1.1 MG/DL    ALT 27 12 - 65 U/L    AST 25 15 - 37 U/L    Alk Phosphatase 191 (H) 50 - 136 U/L    Total Protein 7.4 6.3 - 8.2 g/dL    Albumin 2.9 (L) 3.2 - 4.6 g/dL    Globulin 4.5 (H) 2.3 - 3.5 g/dL    Albumin/Globulin Ratio 0.6 (L) 1.2 - 3.5     Procalcitonin Collection Time: 05/30/22  3:33 PM   Result Value Ref Range    Procalcitonin 0.06 0.00 - 0.49 ng/mL   Urinalysis w rflx microscopic    Collection Time: 05/30/22  4:16 PM   Result Value Ref Range    Color, UA YELLOW      Appearance CLEAR      Specific Gravity, UA 1.020 1.001 - 1.023      pH, Urine 6.0 5.0 - 9.0      Protein, UA Negative NEG mg/dL    Glucose,  mg/dL    Ketones, Urine Negative NEG mg/dL    Bilirubin Urine Negative NEG      Blood, Urine TRACE (A) NEG      Urobilinogen, Urine 1.0 0.2 - 1.0 EU/dL    Nitrite, Urine Negative NEG      Leukocyte Esterase, Urine Negative NEG     Urinalysis, Micro    Collection Time: 05/30/22  4:16 PM   Result Value Ref Range    WBC, UA 0-3 0 /hpf    RBC, UA 3-5 0 /hpf    Epithelial Cells UA 0-3 0 /hpf    BACTERIA, URINE TRACE 0 /hpf    Casts HYALINE 0 /lpf    Crystals 0 0 /LPF    Mucus, UA 0 0 /lpf    OTHER OBSERVATIONS RESULTS VERIFIED MANUALLY     POCT Glucose    Collection Time: 05/30/22  7:25 PM   Result Value Ref Range    POC Glucose 155 (H) 65 - 100 mg/dL    Performed by: Maximiliano Mini    Culture, Blood 1    Collection Time: 05/30/22  8:20 PM    Specimen: Blood   Result Value Ref Range    Special Requests RIGHT  Antecubital        Culture NO GROWTH 2 DAYS     Ferritin    Collection Time: 05/30/22  8:20 PM   Result Value Ref Range    Ferritin 248 8 - 388 NG/ML   Transferrin Saturation    Collection Time: 05/30/22  8:20 PM   Result Value Ref Range    Iron 47 35 - 150 ug/dL    TIBC 235 (L) 250 - 450 ug/dL    TRANSFERRIN SATURATION 20 (L) >20 %   Vitamin B12    Collection Time: 05/30/22  8:20 PM   Result Value Ref Range    Vitamin B-12 946 193 - 986 pg/mL   Folate    Collection Time: 05/30/22  8:20 PM   Result Value Ref Range    Folate 52.5 (H) 3.1 - 17.5 ng/mL   POCT Glucose    Collection Time: 05/30/22  9:37 PM   Result Value Ref Range    POC Glucose 207 (H) 65 - 100 mg/dL    Performed by: Maximiliano Mini    Basic Metabolic Panel w/ Reflex to MG    Collection Time: 05/31/22  6:04 AM   Result Value Ref Range    Sodium 139 138 - 145 mmol/L    Potassium 3.6 3.5 - 5.1 mmol/L    Chloride 109 (H) 98 - 107 mmol/L    CO2 24 21 - 32 mmol/L    Anion Gap 6 (L) 7 - 16 mmol/L    Glucose 160 (H) 65 - 100 mg/dL    BUN 27 (H) 8 - 23 MG/DL    CREATININE 1.40 0.8 - 1.5 MG/DL    GFR African American >60 >60 ml/min/1.73m2    GFR Non- 51 (L) >60 ml/min/1.73m2    Calcium 9.2 8.3 - 10.4 MG/DL   CBC with Auto Differential    Collection Time: 05/31/22  6:04 AM   Result Value Ref Range    WBC 10.7 4.3 - 11.1 K/uL    RBC 3.09 (L) 4.23 - 5.6 M/uL    Hemoglobin 10.0 (L) 13.6 - 17.2 g/dL    Hematocrit 31.3 (L) 41.1 - 50.3 %    .3 (H) 79.6 - 97.8 FL    MCH 32.4 26.1 - 32.9 PG    MCHC 31.9 31.4 - 35.0 g/dL    RDW 15.0 (H) 11.9 - 14.6 %    Platelets 265 191 - 497 K/uL    MPV 9.4 9.4 - 12.3 FL    nRBC 0.00 0.0 - 0.2 K/uL    Differential Type AUTOMATED      Seg Neutrophils 74 43 - 78 %    Lymphocytes 12 (L) 13 - 44 %    Monocytes 10 4.0 - 12.0 %    Eosinophils % 2 0.5 - 7.8 %    Basophils 1 0.0 - 2.0 %    Immature Granulocytes 1 0.0 - 5.0 %    Segs Absolute 8.0 1.7 - 8.2 K/UL    Absolute Lymph # 1.3 0.5 - 4.6 K/UL    Absolute Mono # 1.1 0.1 - 1.3 K/UL    Absolute Eos # 0.2 0.0 - 0.8 K/UL    Basophils Absolute 0.1 0.0 - 0.2 K/UL    Absolute Immature Granulocyte 0.1 0.0 - 0.5 K/UL   POCT Glucose    Collection Time: 05/31/22  7:31 AM   Result Value Ref Range    POC Glucose 165 (H) 65 - 100 mg/dL    Performed by: Getachew    POCT Glucose    Collection Time: 05/31/22 11:49 AM   Result Value Ref Range    POC Glucose 157 (H) 65 - 100 mg/dL    Performed by: Getachew    COVID-19, Rapid    Collection Time: 05/31/22  1:10 PM    Specimen: Nasopharyngeal   Result Value Ref Range    Source NASAL      SARS-CoV-2, Rapid Not detected NOTD     POCT Glucose    Collection Time: 05/31/22  4:40 PM   Result Value Ref Range    POC Glucose 245 (H) 65 - 100 mg/dL    Performed by: Getachew Hemoglobin    Collection Time: 05/31/22  6:53 PM   Result Value Ref Range    Hemoglobin 11.6 (L) 13.6 - 17.2 g/dL   POCT Glucose    Collection Time: 05/31/22  9:11 PM   Result Value Ref Range    POC Glucose 242 (H) 65 - 100 mg/dL    Performed by: Helen    Vancomycin Level, Random    Collection Time: 06/01/22  6:48 AM   Result Value Ref Range    Vancomycin Rm 20.3 UG/ML   Hemoglobin    Collection Time: 06/01/22  6:48 AM   Result Value Ref Range    Hemoglobin 10.1 (L) 13.6 - 17.2 g/dL   POCT Glucose    Collection Time: 06/01/22  8:07 AM   Result Value Ref Range    POC Glucose 156 (H) 65 - 100 mg/dL    Performed by: Elin    POCT Glucose    Collection Time: 06/01/22 12:19 PM   Result Value Ref Range    POC Glucose 242 (H) 65 - 100 mg/dL    Performed by: Ag          Other Studies:  XR ANKLE RIGHT (MIN 3 VIEWS)    Result Date: 5/30/2022  Right ankle Right foot Clinical indication: Subacute worsening redness with swelling, draining wound, difficulty walking. Diabetes, prior surgery of the distal tibia and fibula. Comparison: Radiography of right tibia fibula 5/21/2015 correlation Technique: 3 views right foot and 3 views right ankle Findings: Bone density is low throughout which limits sensitivity for osseous abnormalities. Right foot: There is no evidence of fracture or dislocation. There are chronic arthropathic changes and marginal erosions at the first metatarsal-phalangeal joint with metatarsal primus varus and hallux valgus deformity, overlying soft tissue swelling. Scattered vascular calcifications are noted within soft tissues. Chronic mild degenerative arthropathic changes are noted elsewhere in the foot, not unusual for age. Right ankle: Screw-plate fixation hardware again traverses the distal fibula, fixation screws and wires again traverse the medial malleolus. Chronic old healed fracture deformities are again noted at these levels.  No acute fracture, dislocation or osseous erosion is seen. Chronic joint space narrowing, osteophytes, cortical sclerosis are noted at the ankle and hindfoot. Radiographs can be insensitive in early osteomyelitis. If this is a persistent clinical concern, consider followup radiographs in 7-10 days, or nuclear medicine bone scan. 1. No radiographic evidence of acute osseous abnormality. 2. Chronic changes. XR FOOT RIGHT (MIN 3 VIEWS)    Result Date: 5/30/2022  Right ankle Right foot Clinical indication: Subacute worsening redness with swelling, draining wound, difficulty walking. Diabetes, prior surgery of the distal tibia and fibula. Comparison: Radiography of right tibia fibula 5/21/2015 correlation Technique: 3 views right foot and 3 views right ankle Findings: Bone density is low throughout which limits sensitivity for osseous abnormalities. Right foot: There is no evidence of fracture or dislocation. There are chronic arthropathic changes and marginal erosions at the first metatarsal-phalangeal joint with metatarsal primus varus and hallux valgus deformity, overlying soft tissue swelling. Scattered vascular calcifications are noted within soft tissues. Chronic mild degenerative arthropathic changes are noted elsewhere in the foot, not unusual for age. Right ankle: Screw-plate fixation hardware again traverses the distal fibula, fixation screws and wires again traverse the medial malleolus. Chronic old healed fracture deformities are again noted at these levels. No acute fracture, dislocation or osseous erosion is seen. Chronic joint space narrowing, osteophytes, cortical sclerosis are noted at the ankle and hindfoot. Radiographs can be insensitive in early osteomyelitis. If this is a persistent clinical concern, consider followup radiographs in 7-10 days, or nuclear medicine bone scan. 1. No radiographic evidence of acute osseous abnormality. 2. Chronic changes.     Vascular duplex lower extremity arteries bilateral with MARLENY    Result Date: 5/31/2022  History: Right first toe ulcer and claudication bilaterally. FINDINGS: Duplex Doppler arterial exam of the lower extremities bilaterally. Resting ankle-brachial index on the right of 0.34 and on the left of 0.43. Right toe index of 0.28 and left toe index of 0.55. Triphasic right common femoral and profunda femoral arteries. Triphasic right superficial femoral artery and right popliteal artery. Monophasic right peroneal artery and posterior tibial and right anterior tibial arteries. Triphasic left common femoral and profunda femoral arteries. Triphasic left superficial femoral artery and left popliteal artery. Monophasic left posterior tibial and anterior tibial arteries. Abdominal aorta measures 1.8 cm. Tibial artery disease bilaterally.       Current Meds:  Current Facility-Administered Medications   Medication Dose Route Frequency    glucose chewable tablet 16 g  4 tablet Oral PRN    dextrose bolus 10% 125 mL  125 mL IntraVENous PRN    Or    dextrose bolus 10% 250 mL  250 mL IntraVENous PRN    glucagon injection 1 mg  1 mg IntraMUSCular PRN    dextrose 5 % solution  100 mL/hr IntraVENous PRN    piperacillin-tazobactam (ZOSYN) 3,375 mg in sodium chloride 0.9 % 50 mL IVPB (mini-bag)  3,375 mg IntraVENous Q8H    polyethylene glycol (GLYCOLAX) packet 17 g  17 g Oral Daily    [Held by provider] aspirin EC tablet 81 mg  81 mg Oral Daily    losartan (COZAAR) tablet 25 mg  25 mg Oral Daily    metoprolol succinate (TOPROL XL) extended release tablet 50 mg  50 mg Oral Daily    sodium chloride flush 0.9 % injection 5-40 mL  5-40 mL IntraVENous 2 times per day    sodium chloride flush 0.9 % injection 5-40 mL  5-40 mL IntraVENous PRN    0.9 % sodium chloride infusion   IntraVENous PRN    ondansetron (ZOFRAN-ODT) disintegrating tablet 4 mg  4 mg Oral Q8H PRN    Or    ondansetron (ZOFRAN) injection 4 mg  4 mg IntraVENous Q6H PRN    acetaminophen (TYLENOL) tablet 650 mg  650 mg Oral Q6H PRN    Or  acetaminophen (TYLENOL) suppository 650 mg  650 mg Rectal Q6H PRN    atorvastatin (LIPITOR) tablet 80 mg  80 mg Oral Nightly    furosemide (LASIX) tablet 40 mg  40 mg Oral Daily    montelukast (SINGULAIR) tablet 10 mg  10 mg Oral Nightly    hydrocortisone (ANUSOL-HC) 2.5 % rectal cream   Rectal BID    insulin lispro (HUMALOG) injection vial 0-4 Units  0-4 Units SubCUTAneous TID WC    insulin lispro (HUMALOG) injection vial 0-4 Units  0-4 Units SubCUTAneous Nightly    hydrALAZINE (APRESOLINE) injection 10 mg  10 mg IntraVENous Q6H PRN    traMADol (ULTRAM) tablet 50 mg  50 mg Oral Q6H PRN    lidocaine 4 % external patch 1 patch  1 patch TransDERmal Q24H    melatonin tablet 3 mg  3 mg Oral Nightly PRN    allopurinol (ZYLOPRIM) tablet 100 mg  100 mg Oral Daily    vancomycin (VANCOCIN) 750 mg in sodium chloride 0.9 % 250 mL IVPB ([id number])  750 mg IntraVENous Q24H       Signed:  Peter Trotter MD    Part of this note may have been written by using a voice dictation software. The note has been proof read but may still contain some grammatical/other typographical errors.

## 2022-06-02 NOTE — PROGRESS NOTES
Hospitalist Progress Note   Admit Date:  2022  3:46 PM   Name:  Yulissa Looney   Age:  80 y.o. Sex:  male  :  1934   MRN:  [id number]   Room:      Presenting Complaint: Ankle wound  Reason(s) for Admission: Diabetic ulcer of ankle ([facility] [address] [E49.121, L97.309]  Cellulitis of right leg [L03.115]  Cellulitis, unspecified cellulitis site [L03.90]     Hospital Course & Interval History:   Mr. Susi Dunn a very nice 79 y/o WM with a h/o DM2, AFIB s/p watchman with PPM/ICD, ICM, HTN, CAD, CKD3, pHTN who was admitted on  with RLE cellulitis and chronic R diabetic medial malleolus ulcer. He was admitted for LE cellulitis 2022, discharged to rehab where he spent 42 days then recently returned to home where he remained for about 1 week. He was admitted and started on IV antibiotics. X-rays unremarkable. Vascular surgery consulted and no intervention recommended. Patient does not need MRI as it would not change any surgical plan. Wound care following. GI saw for anemia and hematochezia, felt hemorrhoidal, and patient deferred any inpatient endoscopy. Changed to PO abx on , planning discharge to [address] [place] [address] on 6/3. Subjective/24hr Events (22): In bed, says he doesn't feel \"worth a hoot\" but didn't really elaborate further. Son at bedside, showed me pictures of his R ankle wound from a few days ago and was better than PTA. Patient denies chest pain, N/V/D. Assessment & Plan:   # RLE cellulitis with chronic diabetic foot ulcer of R medial malleolus              - No intervention recommended by Vascular, MRI not necessary. Plain films unremarkable. Con't wound care. Blood cxs from  negative.  Changed to PO abx on  which we will con't at discharge.     # Hematochezia              - Hb stable, seen by GI and deferred endoscopy for the time being.     # DM2              - SSI     # CKD3              - At baseline, monitor.     # HTN              - Toprol, losartan     # H/o gout - Allopurinol     # Dilated CM, CAD, atrial fibrillation              - S/p Watchman, has ICD              - Con't home medications, outpatient f/u      Discharge Planning: To STR tomorrow. Diet:  ADULT DIET; Regular; 3 carb choices (45 gm/meal); Low Fat/Low Chol/High Fiber/ASHLEY  ADULT ORAL NUTRITION SUPPLEMENT; Breakfast, Lunch, Dinner; Low Calorie/High Protein Oral Supplement  ADULT ORAL NUTRITION SUPPLEMENT; Breakfast, Dinner;  Wound Healing Oral Supplement  DVT PPx: SCDs  Code status: DNR    Hospital Problems           Last Modified POA    * (Principal) Diabetic ulcer of ankle ([address] 5/30/2022 Yes    DNR (do not resuscitate) (Chronic) 5/30/2022 Yes    Rectal bleeding 5/30/2022 Yes    Anemia 5/30/2022 Yes    Hypertension 5/30/2022 Yes    Atrial flutter ([address] 5/30/2022 Yes    Dilated cardiomyopathy ([address] 5/30/2022 Yes    Overview Signed 3/18/2022  9:24 PM by Clara Moore MD     Added automatically from request for surgery [id number]           Automatic implantable cardioverter-defibrillator in situ 5/30/2022 Yes    Overview Signed 3/28/2022  4:54 PM by Emily Ritter, DO     No shocks           Coronary artery disease 5/30/2022 Yes    Overview Signed 3/28/2022  4:54 PM by Emily Ritter, DO     Got through [facility] surg           CKD (chronic kidney disease) stage 3, GFR 30-59 ml/min ([address] 5/30/2022 Yes    Presence of Watchman left atrial appendage closure device 5/30/2022 Yes    Overview Signed 3/28/2022  4:54 PM by Emily Ritter,      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 (12/17/19)           Cardiomyopathy, ischemic 5/30/2022 Yes    Overview Signed 3/19/2022 12:40 PM by Melyssa Metcalf     Chronic           Type 2 diabetes with nephropathy ([address] 5/30/2022 Yes    Pulmonary HTN ([address] 5/30/2022 Yes    Cellulitis of right leg 5/30/2022 Yes    Chronic combined systolic and diastolic heart failure ([address] 5/30/2022 Yes    Moderate protein-calorie malnutrition ([facility] 6/1/2022 Yes Objective:     Patient Vitals for the past 24 hrs:   Temp Pulse Resp BP SpO2   06/02/22 0708 97.5 °F (36.4 °C) 70 18 (!) 157/73 --   06/02/22 0454 98.1 °F (36.7 °C) 70 18 129/61 95 %   06/01/22 2353 99 °F (37.2 °C) 70 18 (!) 113/50 96 %   06/01/22 1951 -- 70 -- -- --   06/01/22 1941 99.3 °F (37.4 °C) 70 18 (!) 118/50 96 %   06/01/22 1915 -- -- -- -- 96 %   06/01/22 1458 98.6 °F (37 °C) 71 18 (!) 117/55 --   06/01/22 1126 -- -- -- -- 98 %   06/01/22 1122 98.8 °F (37.1 °C) 72 18 (!) 103/50 96 %       Estimated body mass index is 23.75 kg/m² as calculated from the following:    Height as of this encounter: 6' 0.44\" (1.84 m). Weight as of this encounter: 177 lb 4 oz (80.4 kg). Intake/Output Summary (Last 24 hours) at 6/2/2022 1012  Last data filed at 6/2/2022 0842  Gross per 24 hour   Intake 830 ml   Output 1520 ml   Net -690 ml         Physical Exam:   Blood pressure (!) 157/73, pulse 70, temperature 97.5 °F (36.4 °C), temperature source Oral, resp. rate 18, height 6' 0.44\" (1.84 m), weight 177 lb 4 oz (80.4 kg), SpO2 95 %. General:    Well nourished. No overt distress. Head:  Normocephalic, atraumatic. Eyes:  Sclerae appear normal. Pupils equally round. ENT:  Nares appear normal, no drainage. Moist oral mucosa  Neck:  No restricted ROM. Trachea midline. CV:   RRR. No m/r/g. No jugular venous distension. Lungs:   CTAB. No wheezing, rhonchi, or rales. Respirations even, unlabored. Abdomen: Bowel sounds present. Soft, nontender, nondistended. Extremities: No cyanosis or clubbing. Chronic stasis dermatitis. Trace edema RLE, gauze bandage to R ankle. Skin:     No rashes and normal coloration. Warm and dry. Neuro:  CN II-XII grossly intact. Sensation intact. A&Ox3  Psych:  Normal mood and affect.       I have reviewed ordered lab tests and independently visualized imaging below:    Recent Labs:  Recent Results (from the past 48 hour(s))   POCT Glucose    Collection Time: 05/31/22 11:49 AM Result Value Ref Range    POC Glucose 157 (H) 65 - 100 mg/dL    Performed by: Getachew    COVID-19, Rapid    Collection Time: 05/31/22  1:10 PM    Specimen: Nasopharyngeal   Result Value Ref Range    Source NASAL      SARS-CoV-2, Rapid Not detected NOTD     POCT Glucose    Collection Time: 05/31/22  4:40 PM   Result Value Ref Range    POC Glucose 245 (H) 65 - 100 mg/dL    Performed by: Getachew    Hemoglobin    Collection Time: 05/31/22  6:53 PM   Result Value Ref Range    Hemoglobin 11.6 (L) 13.6 - 17.2 g/dL   POCT Glucose    Collection Time: 05/31/22  9:11 PM   Result Value Ref Range    POC Glucose 242 (H) 65 - 100 mg/dL    Performed by: Helen    Vancomycin Level, Random    Collection Time: 06/01/22  6:48 AM   Result Value Ref Range    Vancomycin Rm 20.3 UG/ML   Hemoglobin    Collection Time: 06/01/22  6:48 AM   Result Value Ref Range    Hemoglobin 10.1 (L) 13.6 - 17.2 g/dL   POCT Glucose    Collection Time: 06/01/22  8:07 AM   Result Value Ref Range    POC Glucose 156 (H) 65 - 100 mg/dL    Performed by: Elin    POCT Glucose    Collection Time: 06/01/22 12:19 PM   Result Value Ref Range    POC Glucose 242 (H) 65 - 100 mg/dL    Performed by: Ag    PLEASE READ & DOCUMENT PPD TEST IN 24 HRS    Collection Time: 06/01/22  1:05 PM   Result Value Ref Range    PPD, (POC) Negative Negative    mm Induration 0 0 - 5 mm   POCT Glucose    Collection Time: 06/01/22  4:47 PM   Result Value Ref Range    POC Glucose 241 (H) 65 - 100 mg/dL    Performed by: Ag    POCT Glucose    Collection Time: 06/01/22  9:13 PM   Result Value Ref Range    POC Glucose 245 (H) 65 - 100 mg/dL    Performed by: Irma    POCT Glucose    Collection Time: 06/02/22  7:05 AM   Result Value Ref Range    POC Glucose 160 (H) 65 - 100 mg/dL    Performed by: Ag          Other Studies:  No results found.     Current Meds:  Current Facility-Administered Medications   Medication Dose Route Frequency    glucose chewable tablet 16 g  4 tablet Oral PRN    dextrose bolus 10% 125 mL  125 mL IntraVENous PRN    Or    dextrose bolus 10% 250 mL  250 mL IntraVENous PRN    glucagon injection 1 mg  1 mg IntraMUSCular PRN    dextrose 5 % solution  100 mL/hr IntraVENous PRN    doxycycline hyclate (VIBRAMYCIN) capsule 100 mg  100 mg Oral 2 times per day    cephALEXin (KEFLEX) capsule 500 mg  500 mg Oral 4 times per day    polyethylene glycol (GLYCOLAX) packet 17 g  17 g Oral Daily    [Held by provider] aspirin EC tablet 81 mg  81 mg Oral Daily    losartan (COZAAR) tablet 25 mg  25 mg Oral Daily    metoprolol succinate (TOPROL XL) extended release tablet 50 mg  50 mg Oral Daily    sodium chloride flush 0.9 % injection 5-40 mL  5-40 mL IntraVENous 2 times per day    sodium chloride flush 0.9 % injection 5-40 mL  5-40 mL IntraVENous PRN    0.9 % sodium chloride infusion   IntraVENous PRN    ondansetron (ZOFRAN-ODT) disintegrating tablet 4 mg  4 mg Oral Q8H PRN    Or    ondansetron (ZOFRAN) injection 4 mg  4 mg IntraVENous Q6H PRN    acetaminophen (TYLENOL) tablet 650 mg  650 mg Oral Q6H PRN    Or    acetaminophen (TYLENOL) suppository 650 mg  650 mg Rectal Q6H PRN    atorvastatin (LIPITOR) tablet 80 mg  80 mg Oral Nightly    furosemide (LASIX) tablet 40 mg  40 mg Oral Daily    montelukast (SINGULAIR) tablet 10 mg  10 mg Oral Nightly    hydrocortisone (ANUSOL-HC) 2.5 % rectal cream   Rectal BID    insulin lispro (HUMALOG) injection vial 0-4 Units  0-4 Units SubCUTAneous TID WC    insulin lispro (HUMALOG) injection vial 0-4 Units  0-4 Units SubCUTAneous Nightly    hydrALAZINE (APRESOLINE) injection 10 mg  10 mg IntraVENous Q6H PRN    traMADol (ULTRAM) tablet 50 mg  50 mg Oral Q6H PRN    lidocaine 4 % external patch 1 patch  1 patch TransDERmal Q24H    melatonin tablet 3 mg  3 mg Oral Nightly PRN    allopurinol (ZYLOPRIM) tablet 100 mg  100 mg Oral Daily       Signed:  Wendie Salazar, MD    Part of this note may have been written by using a voice dictation software. The note has been proof read but may still contain some grammatical/other typographical errors.

## 2022-06-02 NOTE — PROGRESS NOTES
PHYSICAL THERAPY Daily Note and AM  (Link to Caseload Tracking: PT Visit Days : 1  Time In/Out PT Charge Capture  Rehab Caseload Tracker  Orders      Aubree Smoker is a 80 y.o. male   PRIMARY DIAGNOSIS: Diabetic ulcer of ankle ([facility] [address]  Diabetic ulcer of ankle ([facility] [address] [I39.938, L97.309]  Cellulitis of right leg [L03.115]  Cellulitis, unspecified cellulitis site [L03.90]       Inpatient: Payor: MEDICARE / Plan: MEDICARE PART A AND B / Product Type: *No Product type* /     ASSESSMENT:     REHAB RECOMMENDATIONS:   Recommendation to date pending progress:  Setting:   Short-term Rehab    Equipment:     To Be Determined     ASSESSMENT:  Mr. Zahira Siddiqui is supine in bed and agreeable to therapy with some encouragement. Son at bedside but is leaving. Bed mobility is with mod assist to the edge of the bed as the patient states that he can't move his left side. Sitting balance is not too bad. Sit to stand and stand pivot transfer tot he recliner with mod assist as the patient is noted to have some weight bearing onto his BLE. He is able to position himself in the recliner. Participates in some exercises mostly passive and painful but he does allow to an extent. Patient is left sitting in the recliner with needs within reach. Good session and a little progress is demonstrated. Nice man but does not tolerate a lot of movement right now.   Rehab at d/c.     SUBJECTIVE:   Mr. Zahira Siddiqui states, \"I can move my left side\"     Social/Functional Lives With: Alone  Type of Home: House  Home Layout: One level  Home Access: Ramped entrance  Bathroom Shower/Tub: Shower chair with back,Walk-in shower  Bathroom Equipment: Grab bars in [facility] chair  Home Equipment: Grab bars,Hospital bed,Reacher,Rollator,Walker, rolling,Wheelchair-manual  Receives Help From: Family  ADL Assistance: Independent  OBJECTIVE:     PAIN: VITALS / O2: Hezzie Messier / Elenore Raspberry / DRAINS:   Pre Treatment: no number given         Post Treatment: no number given Vitals        Oxygen    None    RESTRICTIONS/PRECAUTIONS:  Restrictions/Precautions  Restrictions/Precautions: Weight Bearing (No weightbearing specifications per conversation c MD)  Restrictions/Precautions: Weight Bearing (No weightbearing specifications per conversation c MD)     MOBILITY: I Mod I S SBA CGA Min Mod Max Total  NT x2 Comments:   Bed Mobility    Rolling [] [] [] [] [] [] [x] [] [] [] []    Supine to Sit [] [] [] [] [] [] [x] [] [] [] []    Scooting [] [] [] [] [] [] [x] [] [] [] []    Sit to Supine [] [] [] [] [] [] [] [] [] [x] []    Transfers    Sit to Stand [] [] [] [] [] [] [x] [] [] [] []    Bed to Chair [] [] [] [] [] [] [x] [] [] [] []    Stand to Sit [] [] [] [] [] [] [x] [] [] [] []     [] [] [] [] [] [] [x] [] [] [] []    I=Independent, Mod I=Modified Independent, S=Supervision, SBA=Standby Assistance, CGA=Contact Guard Assistance,   Min=Minimal Assistance, Mod=Moderate Assistance, Max=Maximal Assistance, Total=Total Assistance, NT=Not Tested    BALANCE: Good Fair+ Fair Fair- Poor NT Comments   Sitting Static [] [x] [] [] [] []    Sitting Dynamic [] [x] [] [] [] []              Standing Static [] [] [] [] [x] []    Standing Dynamic [] [] [] [] [x] []      GAIT: I Mod I S SBA CGA Min Mod Max Total  NT x2 Comments:   Level of Assistance [] [] [] [] [] [] [] [] [] [x] []    Distance n/a    DME n/a    Gait Quality n/a    Weightbearing Status      Stairs      I=Independent, Mod I=Modified Independent, S=Supervision, SBA=Standby Assistance, CGA=Contact Guard Assistance,   Min=Minimal Assistance, Mod=Moderate Assistance, Max=Maximal Assistance, Total=Total Assistance, NT=Not Tested    PLAN:   ACUTE PHYSICAL THERAPY GOALS:   (Developed with and agreed upon by patient and/or caregiver.)  Developed with and agreed upon by patient and/or caregiver.)  1. Pt will perform bed mobility Mod (I) c inc time and use of rails in 7 therapy sessions.   2. Pt will perform sit-to-stand/ stand-to-sit transfers Mod (I) c use of LRAD in 7 therapy sessions. 3. Pt will ambulate 20 ft SB (A) with use of LRAD and breaks as needed in 7 therapy sessions. 4. Pt will perform standing dynamic balance activities with minimal postural sway in 7 therapy sessions. 5. Pt will tolerate multiple sets and reps of BLE exercises in 7 therapy sessions.       FREQUENCY AND DURATION: 3 times/week for duration of hospital stay or until stated goals are met, whichever comes first.    TREATMENT:   TREATMENT:   Therapeutic Activity (26 Minutes): Therapeutic activity included Rolling, Supine to Sit, Scooting, Transfer Training, Ambulation on level ground, Sitting balance  and Standing balance to improve functional Activity tolerance, Balance, Coordination, Mobility, Strength, ROM and LE exercises.     TREATMENT GRID:  N/A    AFTER TREATMENT PRECAUTIONS: Call light within reach, Chair, Heels floated and RN notified    INTERDISCIPLINARY COLLABORATION:  RN/ PCT and PT/ PTA    EDUCATION:      TIME IN/OUT:  Time In: 1025  Time Out: Theron  Minutes: 32    Jennyfer Kraft PTA

## 2022-06-03 PROBLEM — K62.5 RECTAL BLEEDING: Status: RESOLVED | Noted: 2022-01-01 | Resolved: 2022-01-01

## 2022-06-03 NOTE — PROGRESS NOTES
SBAR report given to MILIND Morales RN at [id number] [place] [address]. Opportunity for questions and clarification provided.

## 2022-06-03 NOTE — DISCHARGE SUMMARY
Hospitalist Discharge Summary   Admit Date:  2022  3:46 PM   DC Note date: 6/3/2022  Name:  Kristine Gill   Age:  80 y.o. Sex:  male  :  1934   MRN:  [id number]   Room:    PCP:  Caitlin Oliveira MD    Presenting Complaint: Leg Pain     Initial Admission Diagnosis: Diabetic ulcer of ankle ([address] [T89.090, L97.309]  Cellulitis of right leg [L03.115]  Cellulitis, unspecified cellulitis site [L03.90]     Problem List for this Hospitalization (present on admission):    Principal Problem:    Diabetic ulcer of ankle ([address]  Active Problems:    DNR (do not resuscitate)    Anemia    Hypertension    Atrial flutter ([address]    Dilated cardiomyopathy ([address]    Automatic implantable cardioverter-defibrillator in situ    Coronary artery disease    CKD (chronic kidney disease) stage 3, GFR 30-59 ml/min (HCC)    Presence of Watchman left atrial appendage closure device    Cardiomyopathy, ischemic    Type 2 diabetes with nephropathy ([address]    Pulmonary HTN (HCC)    Cellulitis of right leg    Chronic combined systolic and diastolic heart failure (HCC)    Moderate protein-calorie malnutrition ([address]  Resolved Problems:    Rectal bleeding    Did Patient have Sepsis (YES OR NO): No    Hospital Course:  Mr. Cook is a very nice 79 y/o WM with a h/o DM2, AFIB s/p watchman with PPM/ICD, ICM, HTN, CAD, CKD3, pHTN who was admitted on  with RLE cellulitis and chronic R diabetic medial malleolus ulcer. He was admitted for LE cellulitis 2022, discharged to rehab where he spent 42 days then recently returned to home where he remained for about 1 week. [facility] who felt like his wound was not healing well and advised hime to come to the ER. He was admitted and started on IV antibiotics. X-rays unremarkable. Vascular surgery was consulted and no intervention was recommended. They also felt he does not need MRI as it would not change any surgical plan. Wound care was also consulted.  GI consulted for anemia and hematochezia, felt to likely be hemorrhoidal, and patient deferred any inpatient endoscopy. Hb stabilized. He was changed to PO abx on 6/1 which he is tolerating. He is medically stable for discharge to rehab today. Instructions from [id number] [address] [facility] team:  Right medial ankle wound: Clean with wound cleanser. Cover with silvasorb gel then adaptic then dry gauze dressing. Change daily. Left tibial: Clean with wound cleanser. Cover with telfa dressing daily. Disposition: STR  Diet: ADULT DIET; Regular; 3 carb choices (45 gm/meal); Low Fat/Low Chol/High Fiber/ASHLEY  ADULT ORAL NUTRITION SUPPLEMENT; Breakfast, Lunch, Dinner; Low Calorie/High Protein Oral Supplement  ADULT ORAL NUTRITION SUPPLEMENT; Breakfast, Dinner; Wound Healing Oral Supplement  Code Status: DNR    Follow up labs/diagnostics (ultimately defer to outpatient provider):  N/A    Time spent in patient discharge and coordination 35 minutes. Plan was discussed with patient, RN, CM. All questions answered. Patient was stable at time of discharge. Instructions given to call a physician or return if any concerns. Current Discharge Medication List      START taking these medications    Details   doxycycline hyclate (VIBRAMYCIN) 100 mg capsule Take 1 capsule by mouth every 12 hours for 7 doses  Qty: 7 capsule, Refills: 0      cephALEXin (KEFLEX) 500 MG capsule Take 1 capsule by mouth 4 times daily for 12 doses  Qty: 12 capsule, Refills: 0         CONTINUE these medications which have CHANGED    Details   furosemide (LASIX) 40 MG tablet Take 1 tablet by mouth daily  Qty: 1 tablet, Refills: 0      traMADol (ULTRAM) 50 MG tablet Take 1 tablet by mouth every 8 hours as needed for Pain for up to 3 days.   Qty: 10 tablet, Refills: 0    Comments: Reduce doses taken as pain becomes manageable  Associated Diagnoses: Cellulitis of right leg         CONTINUE these medications which have NOT CHANGED    Details   allopurinol (ZYLOPRIM) 100 MG tablet Take 100 mg by mouth daily      aspirin 81 MG EC tablet Take by mouth daily      atorvastatin (LIPITOR) 80 MG tablet Take 80 mg by mouth daily      canagliflozin (INVOKANA) 100 MG TABS tablet Take 100 mg by mouth every morning (before breakfast)      diclofenac sodium (VOLTAREN) 1 % GEL Apply topically 4 times daily      losartan (COZAAR) 25 MG tablet Take 25 mg by mouth daily      metoprolol succinate (TOPROL XL) 50 MG extended release tablet Take 50 mg by mouth daily      montelukast (SINGULAIR) 10 MG tablet Take 10 mg by mouth daily      nateglinide (STARLIX) 120 MG tablet Take 120 mg by mouth 3 times daily (before meals)      nitroGLYCERIN (NITROSTAT) 0.4 MG SL tablet Place 1 sl under the tongue q 5 min prn cp, max 3 sl in a 15-min time period. Call 911 if no relief after the 3rd sl.      pramox-PE-glycerin-petrolatum 1-0.25-14.4-15 % cream Place rectally as needed             Procedures done this admission:  * No surgery found *    Consults this admission:  IP CONSULT TO GI  IP WOUND CARE NURSE CONSULT TO EVAL  IP CONSULT TO VASCULAR SURGERY  IP CONSULT TO CASE MANAGEMENT    Echocardiogram results:  03/28/22    TRANSTHORACIC ECHOCARDIOGRAM (TTE) COMPLETE (CONTRAST/BUBBLE/3D PRN) 03/29/2022 12:41 PM (Final)    Narrative  This is a summary report. The complete report is available in the patient's medical record. If you cannot access the medical record, please contact the sending organization for a detailed fax or copy.   Left Ventricle: Left ventricle size is normal. Mildly increased wall thickness. Mild global hypokinesis present. Septal motion consistent with pacemaker activation. Mildly reduced left ventricular systolic function. EF by 2D Simpsons Biplane is 48%. Abnormal diastolic function.   Right Ventricle: Right ventricle size is normal. Low normal systolic function. Lead present in the right ventricle.   Aortic Valve: Moderately thickened cusps. Trace transvalvular regurgitation.  No significant stenosis. AV mean gradient is 6 mmHg. AV peak gradient is 10 mmHg.   Mitral Valve: Mildly thickened leaflet. Mild transvalvular regurgitation.   Tricuspid Valve: Moderate transvalvular regurgitation. RVSP is 33 mmHg.   IVC diameter is greater than 21 mm and decreases greater than 50% during inspiration; therefore the estimated right atrial pressure is intermediate (~8 mmHg).   Contrast used: Definity. Signed by: Milagros Hanson MD on 3/29/2022 12:41 PM      Diagnostic Imaging/Tests:   XR ANKLE RIGHT (MIN 3 VIEWS)    Result Date: 5/30/2022  Right ankle Right foot Clinical indication: Subacute worsening redness with swelling, draining wound, difficulty walking. Diabetes, prior surgery of the distal tibia and fibula. Comparison: Radiography of right tibia fibula 5/21/2015 correlation Technique: 3 views right foot and 3 views right ankle Findings: Bone density is low throughout which limits sensitivity for osseous abnormalities. Right foot: There is no evidence of fracture or dislocation. There are chronic arthropathic changes and marginal erosions at the first metatarsal-phalangeal joint with metatarsal primus varus and hallux valgus deformity, overlying soft tissue swelling. Scattered vascular calcifications are noted within soft tissues. Chronic mild degenerative arthropathic changes are noted elsewhere in the foot, not unusual for age. Right ankle: Screw-plate fixation hardware again traverses the distal fibula, fixation screws and wires again traverse the medial malleolus. Chronic old healed fracture deformities are again noted at these levels. No acute fracture, dislocation or osseous erosion is seen. Chronic joint space narrowing, osteophytes, cortical sclerosis are noted at the ankle and hindfoot. Radiographs can be insensitive in early osteomyelitis. If this is a persistent clinical concern, consider followup radiographs in 7-10 days, or nuclear medicine bone scan.      1. No radiographic evidence of acute osseous abnormality. 2. Chronic changes. XR FOOT RIGHT (MIN 3 VIEWS)    Result Date: 5/30/2022  Right ankle Right foot Clinical indication: Subacute worsening redness with swelling, draining wound, difficulty walking. Diabetes, prior surgery of the distal tibia and fibula. Comparison: Radiography of right tibia fibula 5/21/2015 correlation Technique: 3 views right foot and 3 views right ankle Findings: Bone density is low throughout which limits sensitivity for osseous abnormalities. Right foot: There is no evidence of fracture or dislocation. There are chronic arthropathic changes and marginal erosions at the first metatarsal-phalangeal joint with metatarsal primus varus and hallux valgus deformity, overlying soft tissue swelling. Scattered vascular calcifications are noted within soft tissues. Chronic mild degenerative arthropathic changes are noted elsewhere in the foot, not unusual for age. Right ankle: Screw-plate fixation hardware again traverses the distal fibula, fixation screws and wires again traverse the medial malleolus. Chronic old healed fracture deformities are again noted at these levels. No acute fracture, dislocation or osseous erosion is seen. Chronic joint space narrowing, osteophytes, cortical sclerosis are noted at the ankle and hindfoot. Radiographs can be insensitive in early osteomyelitis. If this is a persistent clinical concern, consider followup radiographs in 7-10 days, or nuclear medicine bone scan. 1. No radiographic evidence of acute osseous abnormality. 2. Chronic changes. Vascular duplex lower extremity arteries bilateral with MARLENY    Result Date: 5/31/2022  History: Right first toe ulcer and claudication bilaterally. FINDINGS: Duplex Doppler arterial exam of the lower extremities bilaterally. Resting ankle-brachial index on the right of 0.34 and on the left of 0.43. Right toe index of 0.28 and left toe index of 0.55.  Triphasic right common femoral and profunda femoral arteries. Triphasic right superficial femoral artery and right popliteal artery. Monophasic right peroneal artery and posterior tibial and right anterior tibial arteries. Triphasic left common femoral and profunda femoral arteries. Triphasic left superficial femoral artery and left popliteal artery. Monophasic left posterior tibial and anterior tibial arteries. Abdominal aorta measures 1.8 cm. Tibial artery disease bilaterally. Labs: Results:       BMP, Mg, Phos No results for input(s): NA, K, CL, CO2, AGAP, BUN, CREA, CA, GLU, MG, PHOS in the last 72 hours. CBC Recent Labs     05/31/22  1853 06/01/22  0648   HGB 11.6* 10.1*      LFT No results for input(s): ALT, TP, ALB, GLOB in the last 72 hours. Invalid input(s): SGOT, TBIL, AP, AGRAT, GPT   Cardiac Testing Lab Results   Component Value Date    BNP 2,478 03/28/2022    BNP 2,461 03/24/2022      Coagulation Tests Lab Results   Component Value Date    INR 2.3 01/17/2020    INR 1.3 12/18/2019    INR 2.5 12/16/2019    APTT 44.9 09/17/2019      A1c No results found for: HBA1C   Lipid Panel Lab Results   Component Value Date    CHOL 98 10/14/2021    HDL 51 10/14/2021    VLDL 12 10/14/2021      Thyroid Panel Lab Results   Component Value Date    TSH 1.850 09/18/2019        Most Recent UA Lab Results   Component Value Date    MUCUS 0 05/30/2022    UCOM RESULTS VERIFIED MANUALLY 05/30/2022          All Labs from Last 24 Hrs:  Recent Results (from the past 24 hour(s))   POCT Glucose    Collection Time: 06/02/22 11:21 AM   Result Value Ref Range    POC Glucose 234 (H) 65 - 100 mg/dL    Performed by: Ag    PLEASE READ & DOCUMENT PPD TEST IN 48 HRS    Collection Time: 06/02/22  4:00 PM   Result Value Ref Range    PPD, (POC) Negative Negative    mm Induration 0 0 - 5 mm   POCT Glucose    Collection Time: 06/02/22  4:44 PM   Result Value Ref Range    POC Glucose 271 (H) 65 - 100 mg/dL    Performed by:  Ag    POCT Glucose    Collection Time: 06/02/22  8:44 PM   Result Value Ref Range    POC Glucose 194 (H) 65 - 100 mg/dL    Performed by: Bret    POCT Glucose    Collection Time: 06/03/22  7:53 AM   Result Value Ref Range    POC Glucose 150 (H) 65 - 100 mg/dL    Performed by: Lili        Allergies   Allergen Reactions    Iodine Swelling     Immunization History   Administered Date(s) Administered    COVID-19, Pfizer Purple top, DILUTE for use, 12+ yrs, 30mcg/0.3mL dose 01/21/2021, 02/10/2021, 11/19/2021    Influenza Virus Vaccine 10/15/2008, 10/15/2008    Influenza, High Dose (Fluzone 65 yrs and older) 10/26/2017, 10/25/2018    Influenza, Quadv, adjuvanted, 65 yrs +, IM, PF (Fluad) 11/11/2021    PPD Test 12/10/2015, 03/29/2022, 05/31/2022    Pneumococcal Conjugate 13-valent (Pbibhmf25) 07/21/2015    Pneumococcal Polysaccharide (Nncosenoq79) 01/01/2004    Tdap (Boostrix, Adacel) 10/30/2015    Tst, Unspecified Formulation 12/10/2015       Recent Vital Data:  Patient Vitals for the past 24 hrs:   Temp Pulse Resp BP SpO2   06/03/22 0748 97.7 °F (36.5 °C) 68 17 (!) 148/69 96 %   06/03/22 0319 98.6 °F (37 °C) 72 17 (!) 109/54 96 %   06/02/22 2229 99.1 °F (37.3 °C) 71 17 129/60 96 %   06/02/22 1907 99.1 °F (37.3 °C) 70 18 (!) 137/57 98 %   06/02/22 1905 -- 70 -- -- 98 %   06/02/22 1522 98.4 °F (36.9 °C) 56 18 111/64 --   06/02/22 1125 97.4 °F (36.3 °C) 71 18 (!) 165/72 --       Oxygen Therapy  SpO2: 96 %  Pulse Oximeter Device Mode: Other (Comment)  Pulse Oximeter Device Location: Right,Finger  O2 Device: None (Room air)    Estimated body mass index is 23.75 kg/m² as calculated from the following:    Height as of this encounter: 6' 0.44\" (1.84 m). Weight as of this encounter: 177 lb 4 oz (80.4 kg).     Intake/Output Summary (Last 24 hours) at 6/3/2022 0823  Last data filed at 6/3/2022 0551  Gross per 24 hour   Intake 1050 ml   Output 1625 ml   Net -575 ml         Physical Exam:  General:    Well nourished. No overt distress. Appears stated age. Head:  Normocephalic, atraumatic. Eyes:  Sclerae appear normal.  Pupils equally round. HENT:  Nares appear normal, no drainage. Moist mucous membranes  Neck:  No restricted ROM. Trachea midline  CV:   RRR. No m/r/g. No JVD  Lungs:   CTAB. No wheezing, rhonchi, or rales. Even, unlabored  Abdomen:   Soft, nontender, nondistended. Extremities: Warm and dry. No cyanosis or clubbing. Trace edema RLE; chronic stasis dermatitis. Skin:     No rashes. Normal coloration  Neuro:  CN II-XII grossly intact. Psych:  Normal mood and affect. Signed:  Eddie Howell MD    Part of this note may have been written by using a voice dictation software. The note has been proof read but may still contain some grammatical/other typographical errors.

## 2022-06-03 NOTE — PROGRESS NOTES
Patient with bed acceptance at [address] [facility], room 316. Report line [phone]. Herve Jerome transport pick scheduled for 10. RNCM left message on voicemail for patient's son, Blaine Beckford to return call in regards to room,  time.

## 2022-06-03 NOTE — PROGRESS NOTES
Patient with discharge orders today. Patient accepted to [facility], room 316. [facility] to provide transportation via stretcher with  scheduled for 10 a.m. Patient and family in agreement for [place] placement. No further supportive needs identified to CM. Patient has met all CM treatment goals and milestones. CM following until discharge today.        ASSESSMENT NOTE    Attending Physician: Edelmiro Lefort, MD  Admit Problem: Diabetic ulcer of ankle ([facility] [N65.810, L97.309]  Cellulitis of right leg [B67.010]  Cellulitis, unspecified cellulitis site [L03.90]  Date/Time of Admission: 5/30/2022  3:46 PM  Problem List:  Patient Active Problem List   Diagnosis    Hypertension    Dyspnea on exertion    Atrial flutter (HCC)    Chest pain    Dilated cardiomyopathy ([address]    Longstanding persistent atrial fibrillation ([address]    History of kidney stones    Persistent atrial fibrillation ([address]    Automatic implantable cardioverter-defibrillator in situ    Coronary artery disease    CKD (chronic kidney disease) stage 3, GFR 30-59 ml/min (HCC)    Diabetes ([address]    TIA (transient ischemic attack)    Numbness and tingling in right hand    Orthostatic hypotension    Hx of long term use of blood thinners    Microhematuria    Osteoarthritis    Presence of Watchman left atrial appendage closure device    Cardiomyopathy, ischemic    Preop cardiovascular exam    S/P total knee arthroplasty    Renal insufficiency    Abnormal EKG    Type 2 diabetes with nephropathy (HCC)    Anticoagulant long-term use    History of BPH    Hypercholesteremia    Pneumonia    LBBB (left bundle branch block)    Breast lump    Acute diastolic heart failure (HCC)    Localized edema    Bilateral leg edema    Cor pulmonale (chronic) (HCC)    Pulmonary HTN (HCC)    Cellulitis of right leg    Chronic combined systolic and diastolic heart failure (HCC)    Acute on chronic right-sided congestive heart failure ([address]    Moderate protein-calorie malnutrition ([facility]    Diabetic ulcer of ankle ([facility]    DNR (do not resuscitate)    Anemia       Service Assessment  Patient Orientation Alert and Oriented   Cognition Alert   History Provided By Patient   Primary Caregiver Family   Accompanied By/Relationship     Support Systems Children (Patient's son:  Lolly Lr  [phone])   [address] [facility] is: Legal Next of Roseann 69 (Patient's son, Yin House  [phone])   PCP Verified by CM Yes (Dr Harris Persons  [phone])   Last Visit to PCP Within last 3 months   Prior Functional Level Assistance with the following:   Current Functional Level Assistance with the following:   Can patient return to prior living arrangement Yes   Ability to make needs known: Good   Family able to assist with home care needs: Yes   Would you like for me to discuss the discharge plan with any other family members/significant others, and if so, who?      Financial Resources     Community Resources     CM/SW Referral       Social/Functional History  Lives With Family   Type of [address] One level   Home Access Ramped entrance   [address] [facility]  - Number of Steps     Entrance Stairs - Rails     Bathroom Shower/Tub Walk-in shower,Shower chair with back   [address] [facility] bars in [place] & [facility] Financial chair   [facility],Hospital bed,Reacher,Rollator,Walker, rolling,Wheelchair-manual   Receives Help From [address] Work     Driving     Shopping          Other ([place])     [address] [place] Paying/Finance [address] [place] [address] Responsibility     Health Care Management     Other (Comment)     Ambuation Assistance     Transfer Assisstance     Active      Patient's  Info     Mode of Transportation     Education     Occupation Retired   Type of Occupation       Discharge [facility] (Skilled nursing facility then home)   [address] (Patient's son:  Tenzin Burdick  [phone])   Current Services Prior To Admission None   Potential [address]   DME     DME     DME Ordered? No (patient has wheelchair/walker)   Potential Assistance Purchasing Medications No   Meds-to-Beds: Does the patient want to have any new prescriptions delivered to bedside prior to discharge? Type of Home Care Services None   Patient expects to be discharged to: Skilled nursing facility   Follow Up Appointment: Best Day/Time Friday PM   One/Two Story Residence: One story   # of Interior Steps     Height of Each Step (in)     [employer] Available     History of [place]? Yes     Services At/After Discharge  Transition of Care Consult (CM Consult): Discharge Planning,SNF   Internal Home Health     Internal Hospice     Reason Outside Agency [address]     Partner SNF Yes   Reason Why Partner SNF Not Chosen     Internal Comfort Care     Reason Outside [address] Discharge [place] (SNF)   [address] Provided? No   Mode of Transport at Discharge [address] Time of Discharge     Confirm Follow Up Transport Family     Condition of Participation: Discharge Planning  The plan for Transition of Care is related to the following treatment goals: return to safe level of independence by participating in skilled therapies   The Patient and/or Patient Representative was provided with a Choice of Provider?  Patient   Name of the Patient Representative who was provided with the Choice of Provider and agrees with the Discharge Plan? The Patient and/or Patient Representative Agree with the Discharge Plan? Yes   Freedom of Choice list was provided with basic dialogue that supports the individualized plan of care/goals, treatment preferences, and shares the quality data associated with the providers?  Yes     Documentation for Discharge Appeal  Discharge Appealed by     Date notified by QIO of appeal request:     Time notified by QIO of appeal request:     Detailed Notice of Discharge given to:     Date Notice of Discharge given:     Time Notice of Discharge given:     Date records sent to [facility]     Time records sent to 2 [facility]     Date Notified of Outcome     Time Notified of Outcome     Outcome of appeal           Yannick Dietrich RN 06/03/22 8:46 AM

## 2022-06-06 NOTE — CARE COORDINATION
Notification received of patient discharge and transfer to [facility] for [address] [place]. Will follow progress.

## 2022-06-06 NOTE — CARE COORDINATION
[facility] Discharge Call    2022    Patient: Malia Jacobs Patient : 1934   MRN: [id number]  Reason for Admission: cellulitis  Discharge Date: 6/3/22 RARS: Readmission Risk Score: 15.9 ( )         Discharge Facility: [address] [place] [address]    Transition of care outreach postponed due to patient's discharge to SNF.     Care Transitions Post Acute Facility Transition          Care Transitions Interventions         Future Appointments   Date Time Provider Marcy Hernandez   2022  2:45 PM [address] DEVICE [place] [address] [facility]       Erendira Velásquez RN

## 2022-06-06 NOTE — PROGRESS NOTES
Physician Progress Note      Vandana Yepez  CSN #:                  [id number]  :                       1934  ADMIT DATE:       2022 3:46 PM  [address] [place] DATE:  Christopheryamilex Ferris  PROVIDER #:        Luma Coleman MD          QUERY TEXT:    Pt admitted with R lower leg  cellulitis. Pt noted to have DM2. . If possible,   please document in progress notes and discharge summary the relationship, if   any, between cellulitis and DM. [Cellulitis is unrelated to Diabetes[de-identified] Cellulitis is unrelated to Diabetes.]]    The medical record reflects the following:  Risk Factors: DM, HTN , Afib, CKD  Clinical Indicators: diabetic ulcer of ankle with cellulitis. originally   cellulitis from hitting chair with ankle at rehab, ankle is draining and   painful. Treatment: iv vanc, zosyn, wound care, vascular surgery consult, MRI, xray,   labs, DM control, essential home meds. Options provided:  -- R lower leg cellulitis associated with Diabetes  -- Other - I will add my own diagnosis  -- Disagree - Not applicable / Not valid  -- Disagree - Clinically unable to determine / Unknown  -- Refer to Clinical Documentation Reviewer    PROVIDER RESPONSE TEXT:    R lower leg cellulitis associated with Diabetes.     Query created by: Starla Osman on 2022 7:48 AM      Electronically signed by:  Luma Coleman MD 2022 11:43 AM

## 2022-06-17 NOTE — CARE COORDINATION
Greg Update Call - [facility]    2022    Patient: Denise Martinez [facility] : 1934   MRN: [id number]    Reason for Admission: h/o DM2, AFIB s/p watchman with PPM/ICD, ICM, HTN, CAD, CKD3, pHTN who was admitted on  with RLE cellulitis and chronic R diabetic medial malleolus ulcer. Diabetic ulcer of ankle,Cellulitis of right leg. GI consulted for anemia and hematochezia,   Discharge Date: 6/3/22 :   Home with caregiver and family -TBD     Care Transitions Post Acute Facility Update    Care Transitions Interventions  Post Acute Facility Update  Reported Nursing Issues: currently on isolation precautions, v/s stable. Right medial ankle wound: Clean with wound cleanser. Cover with silvasorb gel then adaptic then dry gauze dressing. Change daily. Left tibial: Clean with wound cleanser. Cover with telfa dressing daily     ADLs: Moderate Assistance   Bed Mobility: Moderate Assistance   Transfer Assistance: Moderate Assistance   Ambulation Assistance: Moderate Assistance   How far (in feet) is the patient ambulating?: 20   Does patient use an assistive device?: Yes (Comment: RW)   Assistive Devices: RW   Anticipated discharge services: Home with caregiver and family.  HH services TBD

## 2022-06-21 NOTE — TELEPHONE ENCOUNTER
Good Afternoon we are over booked and he has a depostion at 2 that day .  We can see him on Friday at 2:40pm

## 2022-06-21 NOTE — TELEPHONE ENCOUNTER
latrice-  Thank you   The rehab  Declined  My  Offer  They are  Ok  To  Leave  Him in  For  Next  Week.  Thank you for  Your  answer

## 2022-06-21 NOTE — TELEPHONE ENCOUNTER
Can you see this  Pt  This  Thursday  Or  Friday? The  Wound  Care  Doctor  Checked him today in  Rehab  And  Can  See the   Hardware  Backing  Out  Of  His  Foot and they want  To prevent  Infection. I have  Him  Scheduled  For  Next  Wed 29th    MET saw him a long  Time ago in 2013  I am not  Sure who did his  Surgery  Or  How  Long  Ago.

## 2022-06-24 PROBLEM — A41.9 SEPSIS (HCC): Status: ACTIVE | Noted: 2022-01-01

## 2022-06-24 PROBLEM — N17.9 AKI (ACUTE KIDNEY INJURY) (HCC): Status: ACTIVE | Noted: 2022-01-01

## 2022-06-24 PROBLEM — S91.009A ANKLE WOUND: Status: ACTIVE | Noted: 2022-01-01

## 2022-06-24 PROBLEM — U07.1 COVID-19: Status: ACTIVE | Noted: 2022-01-01

## 2022-06-24 NOTE — ED PROVIDER NOTES
[employer] Emergency Department Provider Note                   PCP:                Codie Gonzalez MD               Age: 80 y.o. Sex: male       ICD-10-CM    1. Pneumonia of left lower lobe due to infectious organism  J18.9    2. Acute on chronic combined systolic and diastolic congestive heart failure (HCC)  I50.43    3. Cellulitis of right leg  L03.115    4. Acute renal failure, unspecified acute renal failure type ([facility] [address]  N17.9    5. Severe sepsis ([facility] [address]  A41.9     R65.20    6. Sepsis, due to unspecified organism, unspecified whether acute organ dysfunction present [facility])  A41.9        DISPOSITION Admitted 06/24/2022 07:20:22 PM       Current Discharge Medication List          Orders Placed This Encounter   Procedures    Culture, Blood 1    Culture, Blood 1    Respiratory Panel, Molecular, with COVID-19 (Restricted: peds pts or suitable admitted adults)    XR CHEST PORTABLE    Lactic Acid    CBC with Auto Differential    CMP    Procalcitonin    Troponin    Urinalysis    proBNP, N-TERMINAL    Lactic Acid    Basic Metabolic Panel w/ Reflex to MG    CBC with Auto Differential    Hemoglobin A1c    Troponin    Hepatic Function Panel    Lactic Acid    ADULT DIET; Regular; 3 carb choices (45 gm/meal);  Low Fat/Low Chol/High Fiber/ASHLEY    Straight Cath (Select if patient is unable to provide a sample)    Cardiac Monitor - ED Only    Telemetry monitoring - 48 hour duration    Vital signs per unit routine    Up with assistance    Notify Provider    HYPOGLYCEMIA TREATMENT: blood glucose LESS than 50 mg/dL and patient  ALERT and TOLERATING PO    HYPOGLYCEMIA TREATMENT: blood glucose LESS than 70 mg/dL and patient ALERT and TOLERATING PO    HYPOGLYCEMIA TREATMENT: blood glucose LESS than 70 mg/dL and patient NOT ALERT or NPO    Place INT pneumatic compression device    Do Not Resuscitate    Inpatient consult to [address] [place] [address] Drive to Dose: Vancomycin; Pneumonia (HAP); 7 days    IP WOUND CARE NURSE CONSULT TO EVAL    Droplet Plus Isolation    Droplet Plus Isolation    OT eval and treat    PT eval and treat    Initiate Oxygen Therapy Protocol    SLP eval and treat    POCT glucose    POCT Glucose    POCT Glucose    EKG 12 Lead    Saline lock IV    ADMIT TO INPATIENT    ADMIT TO INPATIENT        Vijay Hamilton MD 11:08 PM      MDM  Number of Diagnoses or Management Options  Acute on chronic combined systolic and diastolic congestive heart failure (HCC)  Acute renal failure, unspecified acute renal failure type ([address]  Cellulitis of right leg  Pneumonia of left lower lobe due to infectious organism  Severe sepsis [facility])  Diagnosis management comments: 80-year-old male with ongoing treatment of lower extremity cellulitis, pneumonia and recent COVID-19 infection presented emergency department with increased shortness of breath. Patient with elevated white blood cell count, lactic acid and procalcitonin. He was given antibiotics and IV fluids initially. Patient's BNP was also significantly elevated consistent with acute on chronic heart failure and patient was given IV Lasix. Patient will be admitted to hospitalist for further treatment evaluation.        Amount and/or Complexity of Data Reviewed  Clinical lab tests: ordered and reviewed  Tests in the radiology section of CPT®: ordered and reviewed  Decide to obtain previous medical records or to obtain history from someone other than the patient: yes  Obtain history from someone other than the patient: yes  Review and summarize past medical records: yes  Discuss the patient with other providers: yes  Independent visualization of images, tracings, or specimens: yes    Patient Progress  Patient progress: stable       Portillo Aburto is a 80 y.o. male who presents to the Emergency Department with chief complaint of    Chief Complaint   Patient presents with    Shortness of Breath      80-year-old  male with history of recent COVID-19 infection, currently being treated for pneumonia with Levaquin and history of congestive heart failure presented to the emergency department with increased shortness of breath that has gotten worse over the past 24 to 48 hours. Patient with cough and congestion. He states that he was significantly more short of breath earlier today. He is requiring oxygen supplementation which is not normal for him. Patient was hypoxic per EMS. Patient with cough and congestion. He has had subjective fevers and chills. He denies any chest pain, abdominal pain, nausea, vomiting, diarrhea. Patient was sent in by the skilled nursing facility secondary to his increased work of breathing and not improving despite only 1 dose of his Levaquin. Patient also was recently admitted to the hospital for ulceration to his right medial malleolus with exposure of his previous orthopedic hardware. Patient was admitted to the hospital for cellulitis and IV antibiotics. He has a scheduled follow-up appointment with orthopedics next week. Patient denies any changes in the swelling of his legs or the redness. The history is provided by the patient, medical records and the EMS personnel. All other systems reviewed and are negative. Review of Systems   Constitutional: Positive for chills, fatigue and fever. HENT: Negative. Respiratory: Positive for cough and shortness of breath. Cardiovascular: Positive for leg swelling. Gastrointestinal: Negative. Genitourinary: Negative. Musculoskeletal: Positive for arthralgias. Skin: Positive for wound. Neurological: Negative. Psychiatric/Behavioral: Negative. All other systems reviewed and are negative.       Past Medical History:   Diagnosis Date    A-fib [facility]) 12/17/2019    Abnormal EKG     Acute diastolic heart failure (HCC)     Atrial fibrillation ([facility] [address] 9/10/2011    Atrial flutter ([facility] [address] 9/2013    Biotronik biventricular implantable cardioverter ARTHROPLASTY Left 4/8/2010        Family History   Problem Relation Age of Onset    Heart Disease Sister     Heart Disease Brother     Other Neg Hx     Post-op Cognitive Dysfunction Neg Hx     Emergence Delirium Neg Hx     Post-op Nausea/Vomiting Neg Hx     Delayed Awakening Neg Hx     Pseudochol. Deficiency Neg Hx     Malig Hypertherm Neg Hx     Heart Disease Other     Asthma Father     Stroke Sister     Heart Disease Brother     Cancer Brother            Social Connections:     Frequency of Communication with Friends and Family: Not on file    Frequency of Social Gatherings with Friends and Family: Not on file    Attends Scientologist Services: Not on file    Active Member of Clubs or Organizations: Not on file    Attends Club or Organization Meetings: Not on file    Marital Status: Not on file        Allergies   Allergen Reactions    Iodine Swelling        Vitals signs and nursing note reviewed. Patient Vitals for the past 4 hrs:   Temp Pulse Resp BP SpO2   06/24/22 2059 98.1 °F (36.7 °C) 70 20 (!) 130/57 99 %   06/24/22 2000 -- 72 27 (!) 138/58 97 %   06/24/22 1928 -- 70 (!) 32 (!) 130/54 99 %   06/24/22 1915 -- 70 (!) 32 (!) 135/58 98 %          Physical Exam  Vitals and nursing note reviewed. Constitutional:       General: He is in acute distress (Respiratory distress). Appearance: He is well-developed. He is ill-appearing. HENT:      Head: Normocephalic and atraumatic. Mouth/Throat:      Comments: Dry mucous membranes  Eyes:      Extraocular Movements: Extraocular movements intact. Pupils: Pupils are equal, round, and reactive to light. Cardiovascular:      Rate and Rhythm: Normal rate. Rhythm irregular. Pulmonary:      Effort: Tachypnea, accessory muscle usage and respiratory distress present. Breath sounds: Examination of the right-lower field reveals rhonchi and rales. Examination of the left-lower field reveals rhonchi and rales. Rhonchi and rales present. Chest:      Chest wall: No tenderness. Abdominal:      Palpations: Abdomen is soft. Tenderness: There is no abdominal tenderness. There is no guarding or rebound. Musculoskeletal:      Cervical back: Normal range of motion. Right lower leg: Tenderness present. Edema present. Left lower leg: Tenderness present. Edema present. Comments: Ulceration noted to the right medial malleolus with exposure of patient's underlying orthopedic equipment. Diffuse swelling and erythema of bilateral lower extremities (right greater than left)   Skin:     General: Skin is warm and dry. Neurological:      General: No focal deficit present. Mental Status: He is alert and oriented to person, place, and time. Psychiatric:         Mood and Affect: Mood normal.         Behavior: Behavior normal.          ED EKG Interpretation  EKG was interpreted in the absence of a cardiologist.    Rate: Rate: Normal rate 86.   EKG Interpretation: EKG Interpretation: sinus rhythm, paced rhythm and left bundle branch block  ST Segments: Nonspecific ST segments - NO STEMI    Labs Reviewed   RESPIRATORY PANEL, MOLECULAR, WITH COVID-19 - Abnormal; Notable for the following components:       Result Value    SARS-CoV-2, PCR Detected (*)     All other components within normal limits   LACTIC ACID - Abnormal; Notable for the following components:    Lactic Acid, Plasma 2.6 (*)     All other components within normal limits   CBC WITH AUTO DIFFERENTIAL - Abnormal; Notable for the following components:    WBC 15.0 (*)     RBC 3.39 (*)     Hemoglobin 10.7 (*)     Hematocrit 33.5 (*)     MCV 98.8 (*)     RDW 16.0 (*)     Seg Neutrophils 88 (*)     Lymphocytes 8 (*)     Monocytes 3 (*)     Eosinophils % 0 (*)     Segs Absolute 13.2 (*)     All other components within normal limits   COMPREHENSIVE METABOLIC PANEL - Abnormal; Notable for the following components:    Sodium 135 (*)     CO2 20 (*)     Glucose 135 (*)      (*) CREATININE 3.00 (*)     GFR  26 (*)     GFR Non- 21 (*)     AST 58 (*)     Alk Phosphatase 320 (*)     Albumin 2.5 (*)     Globulin 5.5 (*)     Albumin/Globulin Ratio 0.5 (*)     All other components within normal limits   PROCALCITONIN - Abnormal; Notable for the following components:    Procalcitonin 1.69 (*)     All other components within normal limits   TROPONIN - Abnormal; Notable for the following components:    Troponin, High Sensitivity 101.6 (*)     All other components within normal limits   TROPONIN - Abnormal; Notable for the following components:    Troponin, High Sensitivity 87.0 (*)     All other components within normal limits   URINALYSIS - Abnormal; Notable for the following components:    Protein,  (*)     Blood, Urine LARGE (*)     Leukocyte Esterase, Urine SMALL (*)     All other components within normal limits   PROBNP, N-TERMINAL - Abnormal; Notable for the following components:    NT Pro-BNP 15,298 (*)     All other components within normal limits   LACTIC ACID - Abnormal; Notable for the following components:    Lactic Acid, Plasma 2.3 (*)     All other components within normal limits   POCT GLUCOSE - Abnormal; Notable for the following components:    POC Glucose 140 (*)     All other components within normal limits   CULTURE, BLOOD 1   CULTURE, BLOOD 1   BASIC METABOLIC PANEL W/ REFLEX TO MG FOR LOW K   CBC WITH AUTO DIFFERENTIAL   HEMOGLOBIN A1C   TROPONIN   TROPONIN   HEPATIC FUNCTION PANEL   LACTIC ACID   LACTIC ACID   LACTIC ACID   POCT GLUCOSE   POCT GLUCOSE   POCT GLUCOSE        XR CHEST PORTABLE   Final Result   Small left pleural effusion                                  Voice dictation software was used during the making of this note. This software is not perfect and grammatical and other typographical errors may be present. This note has not been completely proofread for errors.        Moni Yarbrough MD  06/24/22 [id number]

## 2022-06-24 NOTE — H&P
Hospitalist History and Physical   Admit Date:  2022  2:56 PM   Name:  Yarelis Wellington   Age:  80 y.o. Sex:  male  :  1934   MRN:  [id number]   Room:  ER06/    Presenting Complaint: Shortness of Breath     Reason(s) for Admission: Sepsis ([facility] [A41.9]  COLLETTE (acute kidney injury) ([facility] [N17.9]     History of Present Illness:     Yarelis Wellington is a 80 y.o. male with medical history of DM2, AFIB s/p watchman with PPM/ICD, ICM, HTN, CAD, CKD3, pulmonary HTN, RLE cellulitis/ medial malleolus ulcer 22 and admitted 22 to 6-3-22 and discharged to rehab who is re-evaluated with wound issues and COVID pneumonia. He was seen by vascular surgery recent admit without need for intervention and completed antibiotics. While  Rehab the wound has worsened and hardware is now exposed. He has a pending appointment with orthopedics. At rehab he also was diagnosed with COVID 19 and completed 10 days isolation on 22. He was diagnosed with pneumonia 22 and started levaquin. Denies taking steroids. He became more short of breath with increased LE edema and was  Transferred to the ED. His son Douglas French is present. [phone]. CXR shows small effusion. He has received maxipime, vancomycin, lasix in the ED.     ECHO EF 48% 2022, with LVDD. He has COLLETTE on CKD- creatnine 3.0- taking home lasix. He meets sepsis criteria due to lactic acidosis and leukocytosis. UA negative. He is DNR- son aware      Review of Systems:  10 systems reviewed and negative except as noted in HPI. - no chest pain, has edema and dyspnea, no anorexia, no change in bowel , no ear or throat pain, no vision changes          Assessment & Plan:     Principal Problem:    Sepsis ([facility]  Plan:   COVID-19  Plan:   Pneumonia  Plan:   · Admit to remote tele   · D1 vancomycin and maxipime  · Droplet plus isolation  · Pending respiratory viral panel   · Add Decadron 6 mg daily for 10 days   · Trend lactic acid  · Hold on furhter IVF  · followup blood cultures  · O2 as needed, on 2 L with good sats             Ankle wound  Plan: Active Problems:    Diabetic ulcer of ankle ([facility]  Plan:   · Covering with broad antibiotics though looks clean  · Will ask for ortho consult with exposed hardaware that is new             DNR (do not resuscitate)  Plan:  · Discussed with patient and son             Anemia  Plan:   · hgb 10. 7-chronic range  · Trend labs               COLLETTE (acute kidney injury) ([facility]  Plan: On CKD 3:  · S/p IVF in the ED   · Holding on further fluids with COVID and CHF  · Trend BMP   · Holding cozaar                   Acute diastolic heart failure ([facility]  Plan:     Atrial flutter (HCC)  Plan:     Automatic implantable cardioverter-defibrillator in situ  Plan:     Coronary artery disease  Plan:     Presence of Watchman left atrial appendage closure device  Plan:   · continued asa, lipitor, metoprolol   · Resume lasix tomorrow pending course and renal function   · EKG personally reviewed as paced          Type 2 diabetes with nephropathy ([facility]  Plan:   · Sliding scale insulin             Dispo/Discharge Planning:     Pending     Diet: No diet orders on file  VTE ppx:  Code status: Prior    Hospital Problems:  Principal Problem:    Sepsis ([facility]  Active Problems:    Diabetic ulcer of ankle ([facility]    DNR (do not resuscitate)    Anemia    COLLETTE (acute kidney injury) ([facility]    Ankle wound    COVID-19    Atrial flutter (HCC)    Automatic implantable cardioverter-defibrillator in situ    Coronary artery disease    CKD (chronic kidney disease) stage 3, GFR 30-59 ml/min (HCC)    Presence of Watchman left atrial appendage closure device    Type 2 diabetes with nephropathy (HCC)    Pneumonia    Acute diastolic heart failure ([facility]  Resolved Problems:    * No resolved hospital problems.  *       Past History:     Past Medical History:   Diagnosis Date    A-fib ([facility] 12/17/2019    Abnormal EKG     Acute diastolic heart  PACEMAKER      pacemaker- defib    TOTAL HIP ARTHROPLASTY Right 12/28/2005    TOTAL KNEE ARTHROPLASTY Right 12/2015    TOTAL KNEE ARTHROPLASTY Left 4/8/2010      Social History     Tobacco Use    Smoking status: Never Smoker    Smokeless tobacco: Never Used   Substance Use Topics    Alcohol use: No      Social History     Substance and Sexual Activity   Drug Use No     Family History   Problem Relation Age of Onset    Heart Disease Sister     Heart Disease Brother     Other Neg Hx     Post-op Cognitive Dysfunction Neg Hx     Emergence Delirium Neg Hx     Post-op Nausea/Vomiting Neg Hx     Delayed Awakening Neg Hx     Pseudochol. Deficiency Neg Hx     Malig Hypertherm Neg Hx     Heart Disease Other     Asthma Father     Stroke Sister     Heart Disease Brother     Cancer Brother       Family history reviewed and negative except as noted above.       Immunization History   Administered Date(s) Administered    COVID-19, Pfizer Purple top, DILUTE for use, 12+ yrs, 30mcg/0.3mL dose 01/21/2021, 02/10/2021, 11/19/2021    Influenza Virus Vaccine 10/15/2008, 10/15/2008    Influenza, High Dose (Fluzone 65 yrs and older) 10/26/2017, 10/25/2018    Influenza, Quadv, adjuvanted, 65 yrs +, IM, PF (Fluad) 11/11/2021    PPD Test 12/10/2015, 03/29/2022, 05/31/2022    Pneumococcal Conjugate 13-valent (Favymae79) 07/21/2015    Pneumococcal Polysaccharide (Sqislieth38) 01/01/2004    Tdap (Boostrix, Adacel) 10/30/2015    Tst, Unspecified Formulation 12/10/2015     Allergies   Allergen Reactions    Iodine Swelling     Prior to Admit Medications:  Current Outpatient Medications   Medication Instructions    allopurinol (ZYLOPRIM) 100 mg, Oral, DAILY    aspirin 81 MG EC tablet Oral, DAILY    atorvastatin (LIPITOR) 80 mg, Oral, DAILY    canagliflozin (INVOKANA) 100 mg, DAILY BEFORE BREAKFAST    diclofenac sodium (VOLTAREN) 1 % GEL Topical, 4 TIMES DAILY    furosemide (LASIX) 40 mg, Oral, DAILY    losartan (COZAAR) 25 mg, Oral, DAILY    metoprolol succinate (TOPROL XL) 50 mg, Oral, DAILY    montelukast (SINGULAIR) 10 mg, Oral, DAILY    nateglinide (STARLIX) 120 mg, Oral, 3 TIMES DAILY BEFORE MEALS    nitroGLYCERIN (NITROSTAT) 0.4 MG SL tablet Place 1 sl under the tongue q 5 min prn cp, max 3 sl in a 15-min time period. Call 911 if no relief after the 3rd sl.  pramox-PE-glycerin-petrolatum 1-0.25-14.4-15 % cream Rectal, PRN         Objective:     Patient Vitals for the past 24 hrs:   Temp Pulse Resp BP SpO2   06/24/22 1742 -- 70 (!) 31 (!) 128/53 97 %   06/24/22 1712 -- 70 (!) 34 (!) 133/50 96 %   06/24/22 1700 -- 70 (!) 33 116/72 97 %   06/24/22 1642 -- 70 (!) 31 (!) 134/50 97 %   06/24/22 1627 -- 72 30 (!) 141/60 95 %   06/24/22 1612 -- 70 29 (!) 155/58 95 %   06/24/22 1600 -- 78 (!) 32 (!) 159/69 95 %   06/24/22 1542 -- 81 (!) 34 (!) 150/82 92 %   06/24/22 1514 -- -- 26 -- 96 %   06/24/22 1512 -- 79 22 (!) 179/96 95 %   06/24/22 1505 98.4 °F (36.9 °C) 81 30 (!) 144/82 93 %       Oxygen Therapy  SpO2: 97 %  Pulse Oximeter Device Mode: Continuous  O2 Device: Nasal cannula  O2 Flow Rate (L/min): 2 L/min    Estimated body mass index is 22.65 kg/m² as calculated from the following:    Height as of this encounter: 6' (1.829 m). Weight as of this encounter: 167 lb (75.8 kg). No intake or output data in the 24 hours ending 06/24/22 1925      Physical Exam:    Blood pressure (!) 128/53, pulse 70, temperature 98.4 °F (36.9 °C), temperature source Oral, resp. rate (!) 31, height 6' (1.829 m), weight 167 lb (75.8 kg), SpO2 97 %. General:    Well nourished. No overt distress, elderly  Head:  Normocephalic, atraumatic  Eyes:  Sclerae appear normal.  Pupils equally round. ENT:  Nares appear normal, no drainage. dry  oral mucosa  Neck:  No restricted ROM. Trachea midline   CV:   RRR. No m/r/g. No jugular venous distension. Trace edema  Lungs:   CTAB. No wheezing, rhonchi, or rales.   Respirations even, unlabored anterior   Abdomen: Bowel sounds present. Soft, nontender, nondistended. Extremities: Open circular wound right lateral malleolus with exposed screw  Skin:     No rashes and normal coloration. Warm and dry. Neuro:   grossly intact. Psych:  Normal mood and affect.       I have reviewed ordered lab tests and independently visualized imaging below:    Last 24hr Labs:  Recent Results (from the past 24 hour(s))   Lactic Acid    Collection Time: 06/24/22  3:15 PM   Result Value Ref Range    Lactic Acid, Plasma 2.6 (H) 0.4 - 2.0 MMOL/L   CBC with Auto Differential    Collection Time: 06/24/22  3:15 PM   Result Value Ref Range    WBC 15.0 (H) 4.3 - 11.1 K/uL    RBC 3.39 (L) 4.23 - 5.6 M/uL    Hemoglobin 10.7 (L) 13.6 - 17.2 g/dL    Hematocrit 33.5 (L) 41.1 - 50.3 %    MCV 98.8 (H) 79.6 - 97.8 FL    MCH 31.6 26.1 - 32.9 PG    MCHC 31.9 31.4 - 35.0 g/dL    RDW 16.0 (H) 11.9 - 14.6 %    Platelets 804 234 - 105 K/uL    MPV 9.7 9.4 - 12.3 FL    nRBC 0.00 0.0 - 0.2 K/uL    Differential Type AUTOMATED      Seg Neutrophils 88 (H) 43 - 78 %    Lymphocytes 8 (L) 13 - 44 %    Monocytes 3 (L) 4.0 - 12.0 %    Eosinophils % 0 (L) 0.5 - 7.8 %    Basophils 0 0.0 - 2.0 %    Immature Granulocytes 1 0.0 - 5.0 %    Segs Absolute 13.2 (H) 1.7 - 8.2 K/UL    Absolute Lymph # 1.3 0.5 - 4.6 K/UL    Absolute Mono # 0.4 0.1 - 1.3 K/UL    Absolute Eos # 0.1 0.0 - 0.8 K/UL    Basophils Absolute 0.0 0.0 - 0.2 K/UL    Absolute Immature Granulocyte 0.1 0.0 - 0.5 K/UL   CMP    Collection Time: 06/24/22  3:15 PM   Result Value Ref Range    Sodium 135 (L) 138 - 145 mmol/L    Potassium 4.4 3.5 - 5.1 mmol/L    Chloride 104 98 - 107 mmol/L    CO2 20 (L) 21 - 32 mmol/L    Anion Gap 11 7 - 16 mmol/L    Glucose 135 (H) 65 - 100 mg/dL     (H) 8 - 23 MG/DL    CREATININE 3.00 (H) 0.8 - 1.5 MG/DL    GFR  26 (L) >60 ml/min/1.73m2    GFR Non- 21 (L) >60 ml/min/1.73m2    Calcium 9.7 8.3 - 10.4 MG/DL    Total Bilirubin 1.0 0.2 - 1.1 MG/DL    ALT 38 12 - 65 U/L    AST 58 (H) 15 - 37 U/L    Alk Phosphatase 320 (H) 50 - 136 U/L    Total Protein 8.0 6.3 - 8.2 g/dL    Albumin 2.5 (L) 3.2 - 4.6 g/dL    Globulin 5.5 (H) 2.3 - 3.5 g/dL    Albumin/Globulin Ratio 0.5 (L) 1.2 - 3.5     Procalcitonin    Collection Time: 06/24/22  3:15 PM   Result Value Ref Range    Procalcitonin 1.69 (H) 0.00 - 0.49 ng/mL   Troponin    Collection Time: 06/24/22  3:15 PM   Result Value Ref Range    Troponin, High Sensitivity 101.6 (HH) 0 - 14 pg/mL   proBNP, N-TERMINAL    Collection Time: 06/24/22  3:15 PM   Result Value Ref Range    NT Pro-BNP 15,298 (H) <450 PG/ML   Troponin    Collection Time: 06/24/22  5:20 PM   Result Value Ref Range    Troponin, High Sensitivity 87.0 (H) 0 - 14 pg/mL   Urinalysis    Collection Time: 06/24/22  5:22 PM   Result Value Ref Range    Color, UA YELLOW/STRAW      Appearance CLOUDY      Specific Gravity, UA 1.018 1.001 - 1.023      pH, Urine 5.0 5.0 - 9.0      Protein,  (A) NEG mg/dL    Glucose,  mg/dL    Ketones, Urine Negative NEG mg/dL    Bilirubin Urine Negative NEG      Blood, Urine LARGE (A) NEG      Urobilinogen, Urine 1.0 0.2 - 1.0 EU/dL    Nitrite, Urine Negative NEG      Leukocyte Esterase, Urine SMALL (A) NEG         Other Studies:  XR CHEST PORTABLE    Result Date: 6/24/2022  Chest X-ray INDICATION: Shortness of breath. History of recent COVID-19 A portable AP view of the chest was obtained. FINDINGS: There is hazy density in the left lung base. Probably small effusion. The right lung is clear. There is cardiomegaly. Sternotomy changes and pacemaker are present. Small left pleural effusion       Echocardiogram:  03/28/22    TRANSTHORACIC ECHOCARDIOGRAM (TTE) COMPLETE (CONTRAST/BUBBLE/3D PRN) 03/29/2022 12:41 PM (Final)    Narrative  This is a summary report. The complete report is available in the patient's medical record.  If you cannot access the medical record, please contact the sending organization for a detailed fax or copy.   Left Ventricle: Left ventricle size is normal. Mildly increased wall thickness. Mild global hypokinesis present. Septal motion consistent with pacemaker activation. Mildly reduced left ventricular systolic function. EF by 2D Simpsons Biplane is 48%. Abnormal diastolic function.   Right Ventricle: Right ventricle size is normal. Low normal systolic function. Lead present in the right ventricle.   Aortic Valve: Moderately thickened cusps. Trace transvalvular regurgitation. No significant stenosis. AV mean gradient is 6 mmHg. AV peak gradient is 10 mmHg.   Mitral Valve: Mildly thickened leaflet. Mild transvalvular regurgitation.   Tricuspid Valve: Moderate transvalvular regurgitation. RVSP is 33 mmHg.   IVC diameter is greater than 21 mm and decreases greater than 50% during inspiration; therefore the estimated right atrial pressure is intermediate (~8 mmHg).   Contrast used: Definity. Signed by: Markel Andrews MD on 3/29/2022 12:41 PM      Meds previously ordered:  Orders Placed This Encounter   Medications    lactated ringers bolus    cefepime (MAXIPIME) 2000 mg IVPB minibag in NS     Order Specific Question:   Antimicrobial Indications     Answer:   Pneumonia (HAP)     Order Specific Question:   Antimicrobial Indications     Answer:   Sepsis of Unknown Etiology    furosemide (LASIX) injection 40 mg    vancomycin (VANCOCIN) 2000 mg in 0.9% sodium chloride 500 mL IVPB     Order Specific Question:   Antimicrobial Indications     Answer:   Pneumonia (HAP)         Signed:  Chidi Carvalho MD    Part of this note may have been written by using a voice dictation software. The note has been proof read but may still contain some grammatical/other typographical errors.

## 2022-06-24 NOTE — ED TRIAGE NOTES
Patient arrives via EMS from home w son for c/o SOB. Was covid+, finished quarantine this past Wed. EMS reports L sided pneumonia. Per EMS pt received 1x dose levoquin 500mg PO this AM.     Patient on 3L nasal cannula O2 sat 98% RR 38  /78 HR 80 T 98.3 oral . Arrives w 20gauge L FA IV. Also c/o R knee and ankle pain r/t wounds. Pt reports unable to feel touch to R  Foot \"for several months. I have diabetes. \"    VSS.

## 2022-06-25 NOTE — PROGRESS NOTES
Patient resting in bed with no pain or distress at this time. 02 on at 2 liters via Nc with RR even/unlabored. Telemetry on and working with patient V-paced.   Call light in reach and will prepare bedside shift report for oncoming nurse

## 2022-06-25 NOTE — PROGRESS NOTES
Late entry: BSR received  Patient resting in bed quietly  RR even/unlabored on RA with NAD noted at this time

## 2022-06-25 NOTE — PROGRESS NOTES
VANCO RENAL INSUFFICIENCY NOTE [facility] Pharmacokinetic Monitoring Service - Vancomycin    Berkley Garcia is a 80 y.o. male starting on vancomycin therapy for HAP. Pharmacy consulted by Dr. Abel Gaytan for monitoring and adjustment. Target Concentration: Random level ? 15 mg/L  Additional Antimicrobials: cefepime    Pertinent Laboratory Values: Wt Readings from Last 1 Encounters:   06/24/22 167 lb (75.8 kg)     Temp Readings from Last 1 Encounters:   06/24/22 98.1 °F (36.7 °C) (Oral)     Recent Labs     06/24/22  1515 06/24/22  1823   *  --    CREATININE 3.00*  --    WBC 15.0*  --    PROCAL 1.69*  --    LACACIDPL 2.6* 2.3*       Lab Results   Component Value Date    VANCORANDOM 20.3 06/01/2022       MRSA Nasal Swab: N/A. Non-respiratory infection. .    Plan:  Concentration-guided dosing due to renal impairment  Start vancomycin 2g x 1 dose; followed by intermittent dosing  Vancomycin concentration ordered for 6/25 @ 1800    Pharmacy will continue to monitor patient and adjust therapy as indicated    Thank you for the consult,  [address] [facility]

## 2022-06-25 NOTE — PROGRESS NOTES
Reviewed notes for new spiritual concerns      DNR        [place]      Will follow as needed

## 2022-06-25 NOTE — PROGRESS NOTES
Late entry: BSR received  Patient resting in bed  RR even /unlabored on RA with NAD noted at this time

## 2022-06-25 NOTE — PROGRESS NOTES
Progress Note    Patient: Berkley Garcia MRN: [id number]  SSN: [id number]    YOB: 1934  Age: 80 y.o. Sex: male      Admit Date: 6/24/2022    LOS: 1 day     Assessment and Plan:   80 y.o. male with medical history of DM2, AFIB s/p watchman with PPM/ICD, ICM, HTN, CAD, CKD3, pulmonary HTN, RLE cellulitis/ medial malleolus ulcer 5-30-22 and admitted 5-30-22 to 6-3-22 and discharged to rehab who is re-evaluated with wound issues and COVID pneumonia    1. Sepsis likely in setting of COVID-19 pneumonia and concerns of superimposed bacterial infection  -Continue cefepime and vancomycin  -Follow blood cultures  -Continue Decadron  -Obtain inflammatory markers  -We will consider baricitinib if elevated inflammatory markers  -Symptomatic management    2. Coronary artery disease  -Continue aspirin and statin    3. Diabetes mellitus  -Continue insulin sliding scale  -Blood sugar checks before meals and at bedtime    4. 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 Right lower extremity medial malleolus ulcer  -Wound care consult    6. CKD stage III  -Monitor renal function  -Avoid nephrotoxic agents    DVT prophylaxis with SCD    Subjective:   80 y.o. male with medical history of DM2, AFIB s/p watchman with PPM/ICD, ICM, HTN, CAD, CKD3, pulmonary HTN, RLE cellulitis/ medial malleolus ulcer 5-30-22 and admitted 5-30-22 to 6-3-22 and discharged to rehab who is re-evaluated with wound issues and COVID pneumonia. Patient seen and examined at bedside. This morning the patient still presented with some shortness of breath and cough.     Objective:     Vitals:    06/25/22 0416 06/25/22 0719 06/25/22 1100 06/25/22 1514   BP: (!) 141/67 (!) 154/66 (!) 145/62 137/63   Pulse: 70 70 70 70   Resp: 20 20 18    Temp: 97.9 °F (36.6 °C) 98.6 °F (37 °C) 98.1 °F (36.7 °C) 98.1 °F (36.7 °C)   TempSrc: Oral Axillary Oral    SpO2: 98% 99% 100% 97% Weight:       Height:            Intake and Output:  Current Shift: 06/25 0701 - 06/25 1900  In: 240 [P.O.:240]  Out: -   Last three shifts: 06/23 1901 - 06/25 0700  In: 200 [P.O.:200]  Out: 440 [Urine:440]    ROS  10 ROS negative except from stated on subjective    Physical Exam:   General: Alert, oriented, NAD  HEENT: NC/AT, EOM are intact  Neck: supple, no JVD  Cardiovascular: RRR, S1, S2, no murmurs  Respiratory: Lungs are clear, no wheezes or rales  Abdomen: Soft, NT, ND  Back: No CVA tenderness, no paraspinal tenderness  Extremities: LE without pedal edema, no erythema  Neuro: A&O, CN are intact, no focal deficits  Skin: no rash or ulcers  Psych: good mood and affect    Lab/Data Review:  I have personally reviewed patients laboratory data showing  Recent Results (from the past 24 hour(s))   Culture, Blood 1    Collection Time: 06/24/22  3:32 PM    Specimen: Blood   Result Value Ref Range    Special Requests RIGHT  Antecubital        Culture NO GROWTH AFTER 21 HOURS     Troponin    Collection Time: 06/24/22  5:20 PM   Result Value Ref Range    Troponin, High Sensitivity 87.0 (H) 0 - 14 pg/mL   Urinalysis    Collection Time: 06/24/22  5:22 PM   Result Value Ref Range    Color, UA YELLOW/STRAW      Appearance CLOUDY      Specific Gravity, UA 1.018 1.001 - 1.023      pH, Urine 5.0 5.0 - 9.0      Protein,  (A) NEG mg/dL    Glucose,  mg/dL    Ketones, Urine Negative NEG mg/dL    Bilirubin Urine Negative NEG      Blood, Urine LARGE (A) NEG      Urobilinogen, Urine 1.0 0.2 - 1.0 EU/dL    Nitrite, Urine Negative NEG      Leukocyte Esterase, Urine SMALL (A) NEG      WBC, UA 3-5 0 /hpf    RBC, UA 0-3 0 /hpf    BACTERIA, URINE 0 0 /hpf    Casts GRANULAR 0 /lpf   Lactic Acid    Collection Time: 06/24/22  6:23 PM   Result Value Ref Range    Lactic Acid, Plasma 2.3 (H) 0.4 - 2.0 MMOL/L   Respiratory Panel, Molecular, with COVID-19 (Restricted: peds pts or suitable admitted adults)    Collection Time: 06/24/22 7:08 PM    Specimen: Nasopharyngeal   Result Value Ref Range    Adenovirus by PCR NOT DETECTED NOTDET      Coronavirus 229E by PCR NOT DETECTED NOTDET      Coronavirus HKU1 by PCR NOT DETECTED NOTDET      Coronavirus NL63 by PCR NOT DETECTED NOTDET      Coronavirus OC43 by PCR NOT DETECTED NOTDET      SARS-CoV-2, PCR Detected (A) NOTDET      Human Metapneumovirus by PCR NOT DETECTED NOTDET      Rhinovirus Enterovirus PCR NOT DETECTED NOTDET      Influenza A by PCR NOT DETECTED NOTDET      INFLUENZA B PCR NOT DETECTED NOTDET      Parainfluenza 1 PCR NOT DETECTED NOTDET      Parainfluenza 2 PCR NOT DETECTED NOTDET      Parainfluenza 3 PCR NOT DETECTED NOTDET      Parainfluenza 4 PCR NOT DETECTED NOTDET      Respiratory Syncytial Virus by PCR NOT DETECTED NOTDET      Bordetella parapertussis by PCR NOT DETECTED NOTDET      Bordetella pertussis by PCR NOT DETECTED NOTDET      Chlamydophila Pneumonia PCR NOT DETECTED NOTDET      Mycoplasma pneumo by PCR NOT DETECTED NOTDET     POCT Glucose    Collection Time: 06/24/22  9:07 PM   Result Value Ref Range    POC Glucose 140 (H) 65 - 100 mg/dL    Performed by: [email or web address]    Hemoglobin A1c    Collection Time: 06/24/22 11:38 PM   Result Value Ref Range    Hemoglobin A1C 6.8 (H) 4.20 - 6.30 %    eAG 148 mg/dL   Troponin    Collection Time: 06/24/22 11:38 PM   Result Value Ref Range    Troponin, High Sensitivity 127.8 (HH) 0 - 14 pg/mL   Lactic Acid    Collection Time: 06/24/22 11:38 PM   Result Value Ref Range    Lactic Acid, Plasma 1.8 0.4 - 2.0 MMOL/L   Basic Metabolic Panel w/ Reflex to MG    Collection Time: 06/25/22  3:38 AM   Result Value Ref Range    Sodium 135 (L) 136 - 145 mmol/L    Potassium 4.5 3.5 - 5.1 mmol/L    Chloride 108 (H) 98 - 107 mmol/L    CO2 17 (L) 21 - 32 mmol/L    Anion Gap 10 7 - 16 mmol/L    Glucose 221 (H) 65 - 100 mg/dL     (H) 8 - 23 MG/DL    CREATININE 3.20 (H) 0.8 - 1.5 MG/DL    GFR  24 (L) >60 ml/min/1.73m2    GFR Non-African American 20 (L) >60 ml/min/1.73m2    Calcium 9.1 8.3 - 10.4 MG/DL   CBC with Auto Differential    Collection Time: 06/25/22  3:38 AM   Result Value Ref Range    WBC 18.4 (H) 4.3 - 11.1 K/uL    RBC 2.69 (L) 4.23 - 5.6 M/uL    Hemoglobin 8.5 (L) 13.6 - 17.2 g/dL    Hematocrit 26.2 (L) 41.1 - 50.3 %    MCV 97.4 79.6 - 97.8 FL    MCH 31.6 26.1 - 32.9 PG    MCHC 32.4 31.4 - 35.0 g/dL    RDW 15.9 (H) 11.9 - 14.6 %    Platelets 079 281 - 812 K/uL    MPV 9.9 9.4 - 12.3 FL    nRBC 0.00 0.0 - 0.2 K/uL    Differential Type AUTOMATED      Seg Neutrophils 86 (H) 43 - 78 %    Lymphocytes 5 (L) 13 - 44 %    Monocytes 7 4.0 - 12.0 %    Eosinophils % 0 (L) 0.5 - 7.8 %    Basophils 0 0.0 - 2.0 %    Immature Granulocytes 2 0.0 - 5.0 %    Segs Absolute 16.0 (H) 1.7 - 8.2 K/UL    Absolute Lymph # 0.8 0.5 - 4.6 K/UL    Absolute Mono # 1.2 0.1 - 1.3 K/UL    Absolute Eos # 0.0 0.0 - 0.8 K/UL    Basophils Absolute 0.0 0.0 - 0.2 K/UL    Absolute Immature Granulocyte 0.3 0.0 - 0.5 K/UL   Troponin    Collection Time: 06/25/22  3:38 AM   Result Value Ref Range    Troponin, High Sensitivity 124.7 (HH) 0 - 14 pg/mL   Hepatic Function Panel    Collection Time: 06/25/22  3:38 AM   Result Value Ref Range    Total Protein 6.4 6.3 - 8.2 g/dL    Albumin 2.0 (L) 3.2 - 4.6 g/dL    Globulin 4.4 (H) 2.3 - 3.5 g/dL    Albumin/Globulin Ratio 0.5 (L) 1.2 - 3.5      Total Bilirubin 0.6 0.2 - 1.1 MG/DL    Bilirubin, Direct 0.3 <0.4 MG/DL    Alk Phosphatase 261 (H) 50 - 136 U/L    AST 45 (H) 15 - 37 U/L    ALT 35 12 - 65 U/L   Lactic Acid    Collection Time: 06/25/22  3:38 AM   Result Value Ref Range    Lactic Acid, Plasma 1.6 0.4 - 2.0 MMOL/L   Lactic Acid    Collection Time: 06/25/22  7:37 AM   Result Value Ref Range    Lactic Acid, Plasma 1.4 0.4 - 2.0 MMOL/L   POCT Glucose    Collection Time: 06/25/22  7:51 AM   Result Value Ref Range    POC Glucose 146 (H) 65 - 100 mg/dL    Performed by: Joyce    POCT Glucose    Collection Time: 06/25/22 11:01 AM   Result Value Ref Range    POC Glucose 140 (H) 65 - 100 mg/dL    Performed by: Luisito    Troponin    Collection Time: 06/25/22 12:57 PM   Result Value Ref Range    Troponin, High Sensitivity 113.0 (HH) 0 - 14 pg/mL      [unfilled]     Image:  I have personally reviewed patients imaging showing  XR CHEST PORTABLE   Final Result   Small left pleural effusion              Current Facility-Administered Medications   Medication Dose Route Frequency Provider Last Rate Last Admin    aspirin EC tablet 81 mg  81 mg Oral Daily Alyson Oropeza MD   81 mg at 06/25/22 1023    atorvastatin (LIPITOR) tablet 80 mg  80 mg Oral Nightly Alyson Oropeza MD   80 mg at 06/24/22 2244    metoprolol succinate (TOPROL XL) extended release tablet 50 mg  50 mg Oral Daily Alyson Oropeza MD   50 mg at 06/25/22 1024    montelukast (SINGULAIR) tablet 10 mg  10 mg Oral Daily Alyson Oropeza MD   10 mg at 06/25/22 1024    sodium chloride flush 0.9 % injection 5-40 mL  5-40 mL IntraVENous 2 times per day Alyson Oropeza MD   10 mL at 06/25/22 1024    sodium chloride flush 0.9 % injection 5-40 mL  5-40 mL IntraVENous PRN Alyson Oropeza MD        0.9 % sodium chloride infusion   IntraVENous PRN Alyson Oropeza MD        ondansetron (ZOFRAN-ODT) disintegrating tablet 4 mg  4 mg Oral Q8H PRN Alyson Oropeza MD        Or    ondansetron (ZOFRAN) injection 4 mg  4 mg IntraVENous Q6H PRN Alyson Oropeza MD        polyethylene glycol (GLYCOLAX) packet 17 g  17 g Oral Daily PRN Alyson Oropeza MD        acetaminophen (TYLENOL) tablet 650 mg  650 mg Oral Q6H PRN Alyson Oropeza MD   650 mg at 06/24/22 2245    Or    acetaminophen (TYLENOL) suppository 650 mg  650 mg Rectal Q6H PRN Alyson Oropeza MD        glucose chewable tablet 16 g  4 tablet Oral PRN Alyson Oropeza MD        dextrose bolus 10% 125 mL  125 mL IntraVENous PRN Alyson Oropeza MD        Or  dextrose bolus 10% 250 mL  250 mL IntraVENous PRN Moses Mcgowan MD        glucagon injection 1 mg  1 mg IntraMUSCular PRN Moses Mcgowan MD        dextrose 5 % solution  100 mL/hr IntraVENous PRN Moses Mcgowan MD        insulin lispro (HUMALOG) injection vial 0-4 Units  0-4 Units SubCUTAneous TID WC Moses Mcgowan MD        insulin lispro (HUMALOG) injection vial 0-4 Units  0-4 Units SubCUTAneous Nightly Moses Mcgowan MD        dexamethasone (DECADRON) tablet 6 mg  6 mg Oral Daily Moses Mcgowan MD   6 mg at 06/25/22 1023    cefepime (MAXIPIME) 1000 mg IVPB minibag in NS  1,000 mg IntraVENous Q12H Moses Mcgowan MD   Stopped at 06/25/22 1024    vancomycin (VANCOCIN) intermittent dosing (placeholder)   Other RX Deanna Szymanski MD            Hospital problems     Patient Active Problem List   Diagnosis    Hypertension    Dyspnea on exertion    Atrial flutter (HCC)    Chest pain    Dilated cardiomyopathy ([address]    Longstanding persistent atrial fibrillation (HCC)    History of kidney stones    Persistent atrial fibrillation (HCC)    Automatic implantable cardioverter-defibrillator in situ    Coronary artery disease    CKD (chronic kidney disease) stage 3, GFR 30-59 ml/min (HCC)    Diabetes ([address]    TIA (transient ischemic attack)    Numbness and tingling in right hand    Orthostatic hypotension    Hx of long term use of blood thinners    Microhematuria    Osteoarthritis    Presence of Watchman left atrial appendage closure device    Cardiomyopathy, ischemic    Preop cardiovascular exam    S/P total knee arthroplasty    Renal insufficiency    Abnormal EKG    Type 2 diabetes with nephropathy (HCC)    Anticoagulant long-term use    History of BPH    Hypercholesteremia    Pneumonia    LBBB (left bundle branch block)    Breast lump    Acute diastolic heart failure (HCC)    Localized edema    Bilateral leg edema    Cor

## 2022-06-25 NOTE — PROGRESS NOTES
SPEECH LANGUAGE PATHOLOGY: DYSPHAGIA  Initial Assessment and Discharge    NAME: Steve Ards  : 1934  MRN: [id number]    ADMISSION DATE: 2022  ADMITTING DIAGNOSIS: has Hypertension; Dyspnea on exertion; Atrial flutter ([address] Chest pain; Dilated cardiomyopathy ([address] Longstanding persistent atrial fibrillation ([address] History of kidney stones; Persistent atrial fibrillation ([address] Automatic implantable cardioverter-defibrillator in situ; Coronary artery disease; CKD (chronic kidney disease) stage 3, GFR 30-59 ml/min ([address] Diabetes ([address] TIA (transient ischemic attack); Numbness and tingling in right hand; Orthostatic hypotension; Hx of long term use of blood thinners; Microhematuria; Osteoarthritis; Presence of Watchman left atrial appendage closure device; Cardiomyopathy, ischemic; Preop cardiovascular exam; S/P total knee arthroplasty; Renal insufficiency; Abnormal EKG; Type 2 diabetes with nephropathy ([address] Anticoagulant long-term use; History of BPH; Hypercholesteremia; Pneumonia; LBBB (left bundle branch block); Breast lump; Acute diastolic heart failure ([address] Localized edema; Bilateral leg edema; Cor pulmonale (chronic) ([address] Pulmonary HTN ([address] Cellulitis of right leg; Chronic combined systolic and diastolic heart failure ([address] Acute on chronic right-sided congestive heart failure ([address] Moderate protein-calorie malnutrition ([address] Diabetic ulcer of ankle ([address] DNR (do not resuscitate); Anemia; Sepsis ([address] COLLETTE (acute kidney injury) ([address] Ankle wound; and COVID-19 on their problem list.    ICD-10: Treatment Diagnosis: R13.12 Dysphagia, Oropharyngeal Phase    RECOMMENDATIONS   Diet:  Diet Solids Recommendation: Regular  Liquid Consistency Recommendation:  Thin    Medications: PO         Compensatory Swallowing Strategies:Small bites/sips;Eat/Feed slowly;Upright as possible for all oral intake   Therapeutic Intervention:    Patient continues to require skilled intervention: No   D/C Recommendations: Ongoing speech therapy is recommended at next level of care (Patient complains of low breath support for adequate voicing - better to be addressed once no longer actively COVID +. Patient has a Breather device but not used it. May also benefit from incentive spirometer first. No current SLP intervention indicated.)     ASSESSMENT    Dysphagia Diagnosis: Swallow function appears [facility]  Patient presents with oral and pharyngeal phase of swallow within functional limits with no overt signs or symptoms of aspiration observed with any consistency assessed. Swallows of all textures timely, clear to cervical auscultation and with adequate laryngeal excursion. Voice clear after swallow. No oral residue observed. Conversational speech clear and appropriate, but with low volume. Patient's son reports patient receiving speech therapy for intelligibility and breath support prior to this hospitalization. Patient is 100% intelligible during this assessment. Son reports when patient puts more breath support into speaking, he is intelligible. Patient has a Breather device but has not started to use it. Recommend initiate Breather exercises after discharge and no longer COVID+. In the meantime, consider incentive spirometer initially. Recommend continue current regular texture diet and thin liquids. Give meds with water. No further skilled speech/swallow intervention currently indicated. [facility] with [facility] speech therapy upon discharge (or speech therapy at SNF, if patient does not get discharged directly home).       GENERAL    History of Present Injury/Illness: Mr. Germaine Rivera  has a past medical history of A-fib ([address] Abnormal EKG, Acute diastolic heart failure ([address] Atrial fibrillation ([address] Atrial flutter ([address] Automatic implantable cardioverter-defibrillator in situ, Breast lump, CAD (coronary artery disease), Cardiomyopathy, ischemic, Chest pain, Chronic systolic heart failure ([address] CKD (chronic kidney disease) stage 3, GFR 30-59 ml/min ([facility]), Coronary artery disease, Diabetes ([facility] Heart attack ([facility] History of kidney stones, Hypercholesteremia, Hypercholesterolemia, Hypertension, Kidney stone, LBBB (left bundle branch block), Moderate protein-calorie malnutrition ([facility]), Orthostatic hypotension, Osteoarthritis, Pneumonia, Preop cardiovascular exam, Renal insufficiency, S/P total knee arthroplasty, TIA (transient ischemic attack), and Unstable angina ([facility] . He also  has a past surgical history that includes Total knee arthroplasty (Right, 12/2015); orthopedic surgery (2005); Cataract removal (2006/2009); pacemaker; Coronary artery bypass graft (12/19/2008); Carpal tunnel release (Bilateral, 1972); Colonoscopy (2003/2010); Cardiac defibrillator placement (9/17/2013); Bunionectomy (Left, 2001); Total hip arthroplasty (Right, 12/28/2005); Breast biopsy (Right, 7/2013); other surgical history (2002); Lithotripsy; total knee arthroplasty (Left, 4/8/2010); Knee arthroscopy (Left, 9/17/2009); Ankle fracture surgery (Right, 5/11/2005); and Knee arthroscopy (Right, 4/30/2014). Chart Reviewed: Yes  Behavior/Cognition: Alert; Cooperative;Pleasant mood  Patient Position: Upright  Communication Observation: Functional  Respiratory Status: O2 via nasual cannula     O2 Device: Nasal cannula              Current Diet : Regular  Current Liquid Diet : Thin  Pain:   Patient does not c/o pain                                        OBJECTIVE    Patient Positioning: Upright in bed   Oral Motor   Labial: No impairment  Dentition: Intact  Oral Hygiene: Moist;Clean  Lingual: No impairment  Dentition: Adequate     Volitional Cough: Strong  Baseline Vocal Quality: Normal (Low volume)  Oropharyngeal Phase:  Regular;Pureed;Thin;Mixed Consistencies  Assessment Method(s): Observation;Palpation  Patient Position: Upright  Vocal Quality: Low volume  Consistency Presented: Mixed consistency; Regular;Pureed; Thin  Bolus Acceptance: No impairment  Bolus Formation/Control: No impairment  Propulsion: No impairment  Oral Residue: None  Initiation of Swallow: No impairment  Laryngeal Elevation: Functional  Aspiration Signs/Symptoms: None  Pharyngeal Phase Characteristics: No impairment, issues, or problems        Oral Phase - Comment: WFL  Pharyngeal Phase: WFL  PLAN    Duration/Frequency: No treatment indicated at this time in acute setting. Dysphagia Outcome and Severity Scale (ALEX)  Dysphagia Outcome Severity Scale: Level 6: Within functional limits/Modified independence  Interpretation of Tool: The Dysphagia Outcome and Severity Scale (ALEX) is a simple, easy-to-use, 7-point scale developed to systematically rate the functional severity of dysphagia based on objective assessment and make recommendations for diet level, independence level, and type of nutrition. Normal(7), Functional(6), Mild(5), Mild-Moderate(4), Moderate(3), Moderate-Severe(2), Severe(1)         Education: Family member,Patient,RN   Patient Education: Patient and family member   Patient Education Response: Verbalizes understanding    Current Medications:   No current facility-administered medications on file prior to encounter.      Current Outpatient Medications on File Prior to Encounter   Medication Sig Dispense Refill    furosemide (LASIX) 40 MG tablet Take 1 tablet by mouth daily 1 tablet 0    allopurinol (ZYLOPRIM) 100 MG tablet Take 100 mg by mouth daily      aspirin 81 MG EC tablet Take by mouth daily      atorvastatin (LIPITOR) 80 MG tablet Take 80 mg by mouth daily      canagliflozin (INVOKANA) 100 MG TABS tablet Take 100 mg by mouth every morning (before breakfast) (Patient not taking: Reported on 5/30/2022)      diclofenac sodium (VOLTAREN) 1 % GEL Apply topically 4 times daily      losartan (COZAAR) 25 MG tablet Take 25 mg by mouth daily      metoprolol succinate (TOPROL XL) 50 MG extended release tablet Take 50 mg by mouth daily      montelukast (SINGULAIR) 10 MG tablet Take 10 mg by mouth daily      nateglinide (STARLIX) 120 MG tablet Take 120 mg by mouth 3 times daily (before meals)      nitroGLYCERIN (NITROSTAT) 0.4 MG SL tablet Place 1 sl under the tongue q 5 min prn cp, max 3 sl in a 15-min time period. Call 911 if no relief after the 3rd sl.       pramox-PE-glycerin-petrolatum 1-0.25-14.4-15 % cream Place rectally as needed         PRECAUTIONS/ALLERGIES: Iodine   Safety Devices in place: Yes  Type of devices: Left in bed,Call light within reach,Nurse notified    Therapy Time  SLP Individual Minutes  Time In: 1300  Time Out: [address]  Minutes: [address]  6/25/2022 1:46 PM

## 2022-06-25 NOTE — PROGRESS NOTES
Laboratory notified this nurse that troponin level is elevated to 127.8. This nurse notified Dr. Jordan Rivera at this time.   Will check back for response

## 2022-06-25 NOTE — PROGRESS NOTES
Telemetry placed on patient at this time with box #[id number] and patient is V-paced and 69 on pulse per monitor tech.   Call light in reach

## 2022-06-25 NOTE — PROGRESS NOTES
Lab called and results from the RSV panel have come back positive for COVID. Dropelt + precautions have been placed and Dr. Kong Olivares has been notified.

## 2022-06-25 NOTE — PROGRESS NOTES
VANCO DAILY FOLLOW UP RENAL INSUFFICIENCY PATIENT   [address] Pharmacokinetic Monitoring Service - Vancomycin    Consulting Provider: Dr. Amairani Haywood   Indication: HAP  Target Concentration: Random level ? 15 mg/L  Day of Therapy: 2  Additional Antimicrobials: cefepime    Pertinent Laboratory Values: Wt Readings from Last 1 Encounters:   06/24/22 167 lb (75.8 kg)     Temp Readings from Last 1 Encounters:   06/25/22 98.6 °F (37 °C) (Axillary)     Recent Labs     06/24/22  1515 06/24/22  1823 06/24/22  2338 06/25/22  0338 06/25/22  0737   *  --   --  112*  --    CREATININE 3.00*  --   --  3.20*  --    WBC 15.0*  --   --  18.4*  --    PROCAL 1.69*  --   --   --   --    LACACIDPL 2.6*   < > 1.8 1.6 1.4    < > = values in this interval not displayed. Lab Results   Component Value Date    VANCORANDOM 20.3 06/01/2022       MRSA Nasal Swab: not ordered. Order placed by pharmacy. .    Assessment:  Date:  Dose/Freq Admin Times Level/Time:   6/24 2000 mg  1845    6/25 6/26   Rd @ (0400)                 Plan:  Concentration-guided dosing due to renal impairment  No dose needed at this time  Repeat vancomycin concentration ordered for 6/26 @ 0400    Pharmacy will continue to monitor patient and adjust therapy as indicated    Thank you for the consult,  Skip Hernandez, [facility]

## 2022-06-25 NOTE — PROGRESS NOTES
TRANSFER - IN REPORT:    Verbal report received from Lynn Godoy 69 on [facility]  being received from ED for routine progression of patient care      Report consisted of patient's Situation, Background, Assessment and   Recommendations(SBAR). Information from the following report(s) Nurse Handoff Report, ED SBAR, Intake/Output, MAR and Recent Results was reviewed with the receiving nurse. Opportunity for questions and clarification was provided. Assessment completed upon patient's arrival to unit and care assumed.

## 2022-06-25 NOTE — PROGRESS NOTES
Tylenol 650 mg po given for right foot pain with pain level of 3 and will assess medication with call light in reach

## 2022-06-25 NOTE — PROGRESS NOTES
TRANSFER - OUT REPORT:    Verbal report given to [address] RN on Mariana Wilcox  being transferred to room 827 for routine progression of patient care       Report consisted of patient's Situation, Background, Assessment and   Recommendations(SBAR). Information from the following report(s) ED SBAR was reviewed with the receiving nurse. Lines:   Peripheral IV 06/24/22 Right;Ventral Forearm (Active)        Opportunity for questions and clarification was provided.       Patient transported with:  Ti Knight

## 2022-06-26 NOTE — PROGRESS NOTES
Bedside report received from night nurse Moriah. Assessment done as noted  Respiration even and unlabored 20/min; denies pain or nausea at present. Remains afebrile this morning. Reports non-productive coughing at interval. O2 intact with 2 liter via nasal canula with O2 sats 94% at rest. Remains on Droplet plus isolation for positive COVID-19 screening. Ordered PPE in place and in use. Encouraged to call with needs.

## 2022-06-26 NOTE — PROGRESS NOTES
Comprehensive Nutrition Assessment    Type and Reason for Visit: Initial,Positive Nutrition Screen  Malnutrition Screening Tool: Malnutrition Screen  Have you recently lost weight without trying?: 14 to 23 pounds (2 points)  Have you been eating poorly because of a decreased appetite?: No (0 points)  Malnutrition Screening Tool Score: 2    Nutrition Recommendations/Plan:   Meals and Snacks:  Diet: Continue current order  Nutrition Supplement Therapy:   Medical food supplement therapy:  Continue Glucerna Shake three times per day (this provides 220 kcal and 10 grams protein per bottle)     Malnutrition Assessment:  Malnutrition Status: Insufficient data  Context: Chronic Illness  Findings of clinical characteristics of malnutrition:   Energy Intake:  Mild decrease in energy intake (Comment) (pt reports variable appetite/intake PTA)  Weight Loss:  Mild weight loss (specify amount and time period) (8% loss over 6 months via EMR )     Body Fat Loss:  Unable to assess     Muscle Mass Loss:  Unable to assess    Fluid Accumulation:        Strength:        Nutrition Assessment:  Nutrition History:   Pt reports variable intake/appetite PTA, eating 3 meals a day, at least half or all of meals. Son bring some meals to pt. Per last RD note on 5/30 \"Pt estimates ~20-30lb loss within last 6mo-1yr. \" Pt reports UBW of 180-190lb, verified weight of 185lb at MD encounter 12/2021. Do You Have Any Cultural, Zoroastrianism, or Ethnic Food Preferences?: No   Nutrition Background: Patient with PMH CHF, DM2, AFIB s/p watchman with PPM/ICD, ICM, HTN, CAD, CKD3, pulmonary hypertension admitted with wound issues + COVID pneumonia on 6/23. Nutrition Interval:  Assessment performed remotely d/t isolation. Contacted patient via room phone no answer. Successfully reached pt on mobile phone number.  He reports eating very little of breakfast today (doesn't like omelet or potatoes, tired of eating them) but did drink Glucerna, decaf coffee, and 2% milk. He reports his current appetite is normal. He wasn't sure if Glucerna was brought to him yesterday but knows he drank the one provided at breakfast today. Nutrition Related Findings:   NFPE unable to be performed d/t isolation. Wound Type: Diabetic Ulcer    Current Nutrition Therapies:  ADULT DIET; Regular; 3 carb choices (45 gm/meal); Low Fat/Low Chol/High Fiber/ASHLEY  ADULT ORAL NUTRITION SUPPLEMENT; Breakfast, AM Snack, Lunch, PM Snack, Dinner, HS Snack; Diabetic Oral Supplement    Current Intake:   Average Meal Intake: 26-50%,51-75% Average Supplements Intake: %      Anthropometric Measures:  Height: 6' (182.9 cm)  Current Body Wt: 167 lb (75.8 kg), Weight source: Stated (6/24)  BMI: 22.6  Admission Body Weight: 167 lb (75.8 kg)  Ideal Body Weight (Kg) (Calculated): 81 kg (178 lbs), 93.8 %  Usual Body Wt: 181 lb (82.1 kg) (via EMR 3,6 month and 1 year weights.), Percent weight change: -7.7       Edema:    BMI Category Normal Weight (BMI 22.0 to 24.9) age over 72  Estimated Daily Nutrient Needs:  Energy (kcal/day): 8625-9762 (22-26 kcal/kg) (Kcal/kg Weight used: 75.8 kg Admission  Protein (g/day): 75-90g (1-1.2 g/kg) Weight Used: (Admission)    Fluid (ml/day):   (1 ml/kcal)    Nutrition Diagnosis:   · Unintended weight loss related to inadequate protein-energy intake as evidenced by poor intake prior to admission,weight loss    Nutrition Interventions:   Food and/or Nutrient Delivery: Continue Current Diet,Continue Oral Nutrition Supplement     Coordination of Nutrition Care: Continue to monitor while inpatient  Plan of Care discussed with: patient    Goals: Active Goal: PO intake 50% or greater,by next RD assessment       Nutrition Monitoring and Evaluation:     Food/Nutrient Intake Outcomes: Food and Nutrient Intake,Supplement Intake  Physical Signs/Symptoms Outcomes: Meal Time Behavior    Discharge Planning:     Too soon to determine    Robert, [address] [place] [address], [address] [place] [address]

## 2022-06-26 NOTE — PROGRESS NOTES
Assumed care of pt from [facility]. Pt resting in bed awake. Alert and oriented times 3 at this time. 2L NC in place. No s/sx of distress noted. Bilateral pressure reduction boots in place. Pt denies any pain. Encouraged to call for assistance as needed. Call light within reach. Will continue to monitor.

## 2022-06-26 NOTE — PROGRESS NOTES
PHYSICAL THERAPY Initial Assessment  (Link to Caseload Tracking: PT Visit Days : 1  Acknowledge Orders  Time In/Out  PT Charge Capture  Rehab Caseload Tracker    Alli Feng is a 80 y.o. male   PRIMARY DIAGNOSIS: Sepsis ([facility] [address]  COLLETTE (acute kidney injury) ([address] [N17.9]  Cellulitis of right leg [L03.115]  Acute on chronic combined systolic and diastolic congestive heart failure (HCC) [I50.43]  Sepsis ([address] [A41.9]  Severe sepsis ([address] [A41.9, R65.20]  Acute renal failure, unspecified acute renal failure type ([address] [N17.9]  Pneumonia of left lower lobe due to infectious organism [J18.9]  Sepsis, due to unspecified organism, unspecified whether acute organ dysfunction present ([address] [A41.9]       Reason for Referral: Generalized Muscle Weakness (M62.81)  Other lack of cordination (R27.8)  Difficulty in walking, Not elsewhere classified (R26.2)  Other abnormalities of gait and mobility (R26.89)  Inpatient: Payor: MEDICARE / Plan: MEDICARE PART A AND B / Product Type: *No Product type* /     ASSESSMENT:     REHAB RECOMMENDATIONS:   Recommendation to date pending progress:  Setting:   Short-term Rehab    Equipment:     To Be Determined     ASSESSMENT:  Mr. Kalina Egan is a pleasant frail elderly male with above diagnosis who demonstrates with decreased transfers, ambulation and mobility below his prior functional baseline. All transfers are currently limited at MOD- MAX A x 1 out of bed to sit and stand followed by ambulation with 2 wheel rolling walker for 2 feet with maximal assistance with the deficits stated below. His standing balance is poor + . He then is seated with MOD - MAX  A x 1. Skilled PT is indicated for this patient's functional mobility deficits.         [address] [facility] [address] [address] AMPAC 6 Clicks Basic Mobility Inpatient Short Form  AM-PAC Mobility Inpatient   How much difficulty turning over in bed?: A Lot  How much difficulty sitting down on / standing up from a chair with arms?: A Lot  How much difficulty moving from lying on back to sitting on side of bed?: A Lot  How much help from another person moving to and from a bed to a chair?: A Lot  How much help from another person needed to walk in hospital room?: A Lot    SUBJECTIVE:   Mr. Justin Hill states, \"I am back in here. \"     Social/Functional Lives With: Alone  Type of Home: House  Home Layout: One level  Home Access: Ramped entrance  Bathroom Shower/Tub: Tub/Shower unit,Shower chair with back,Walk-in shower  Bathroom Equipment: Grab bars in shower,Shower chair  Home Equipment: Grab bars,Hospital bed,Reacher,Rollator,Walker, rolling,Wheelchair-manual  Receives Help From: Family       OBJECTIVE:     PAIN: VITALS / O2: PRECAUTION / Jeanann Early / DRAINS:   Pre Treatment:   Pain Assessment: None - Denies Pain      Post Treatment: 0/10  Vitals        Oxygen      IV    RESTRICTIONS/PRECAUTIONS:  Restrictions/Precautions: None  Required Braces or Orthoses? :  (PRAFOS FOR SKIN)                 GROSS EVALUATION: Intact Impaired (Comments):   AROM []  stiffness denoted.      PROM []    Strength []  debility less than 3/5    Balance [x]     Posture [] Forward Head  Rounded Shoulders  Thoracic Kyphosis   Sensation [x]     Coordination []   limited    Tone []  increased    Edema [x]    Activity Tolerance []      []      COGNITION/  PERCEPTION: Intact Impaired (Comments):   Orientation [x]     Vision [x]     Hearing [x]     Cognition  [x]       MOBILITY: I Mod I S SBA CGA Min Mod Max Total  NT x2 Comments:   Bed Mobility    Rolling [] [] [] [] [] [] [x] [x] [] [] []    Supine to Sit [] [] [] [] [] [] [x] [x] [] [] []    Scooting [] [] [] [] [] [] [x] [x] [] [] []    Sit to Supine [] [] [] [] [] [] [x] [x] [] [] []    Transfers    Sit to Stand [] [] [] [] [] [] [x] [x] [] [] []    Bed to Chair [] [] [] [] [] [] [x] [x] [] [] []    Stand to Sit [] [] [] [] [] [] [x] [x] [] [] []     [] [] [] [] [] [] [] [] [] [] []    I=Independent, Mod I=Modified Independent, S=Supervision, SBA=Standby Assistance, CGA=Contact Guard Assistance,   Min=Minimal Assistance, Mod=Moderate Assistance, Max=Maximal Assistance, Total=Total Assistance, NT=Not Tested    GAIT: I Mod I S SBA CGA Min Mod Max Total  NT x2 Comments:   Level of Assistance [] [] [] [] [] [] [x] [x] [] [] []    Distance 2 feet    DME Rolling Walker    Gait Quality Decreased jem , Decreased step clearance, Decreased step length and Decreased stance    Weightbearing Status Restrictions/Precautions  Restrictions/Precautions: None  Required Braces or Orthoses? :  (PRAFOS FOR SKIN)    Stairs      I=Independent, Mod I=Modified Independent, S=Supervision, SBA=Standby Assistance, CGA=Contact Guard Assistance,   Min=Minimal Assistance, Mod=Moderate Assistance, Max=Maximal Assistance, Total=Total Assistance, NT=Not Tested    PLAN:   ACUTE PHYSICAL THERAPY GOALS:   (Developed with and agreed upon by patient and/or caregiver.)  STG:  (1.)Mr. Cook will move from supine to sit and sit to supine , scoot up and down and roll side to side with MINIMAL ASSIST within 4 treatment day(s). (2.)Mr. Cook will transfer from bed to chair and chair to bed with MINIMAL ASSIST using the least restrictive device within 4 treatment day(s). (3.)Mr. Cook will ambulate with MINIMAL ASSIST for 15 feet with the least restrictive device within 4 treatment day(s). LTG:  (1.)Mr. Cook will move from supine to sit and sit to supine , scoot up and down and roll side to side in bed with CONTACT GUARD ASSIST within 7 treatment day(s). (2.)Mr. Cook will transfer from bed to chair and chair to bed with CONTACT GUARD ASSIST using the least restrictive device within 7 treatment day(s). (3.)Mr. Cook will ambulate with CONTACT GUARD ASSIST for 100 feet with the least restrictive device within 7 treatment day(s).   ________________________________________________________________________________________________    FREQUENCY AND DURATION: 3 times/week for duration of hospital stay or until stated goals are met, whichever comes first.    THERAPY PROGNOSIS: Fair    PROBLEM LIST:   (Skilled intervention is medically necessary to address:)  Decreased ADL/Functional Activities  Decreased Activity Tolerance  Decreased AROM/PROM  Decreased Balance  Decreased Cognition  Decreased Coordination INTERVENTIONS PLANNED:   (Benefits and precautions of physical therapy have been discussed with the patient.)  Therapeutic Activity  Therapeutic Exercise/HEP  Neuromuscular Re-education       TREATMENT:   EVALUATION: MODERATE COMPLEXITY: (Untimed Charge)    TREATMENT:   Therapeutic Activity (23 Minutes): Therapeutic activity included Rolling, Supine to Sit, Sit to Supine, Scooting, Lateral Scooting, Transfer Training, Ambulation on level ground, Sitting balance  and Standing balance to improve functional Activity tolerance, Balance, Coordination, Mobility and Strength.     TREATMENT GRID:  N/A    AFTER TREATMENT PRECAUTIONS: Bed, Bed/Chair Locked, Call light within reach, Needs within reach and Side rails x3    INTERDISCIPLINARY COLLABORATION:  RN/ PCT and PT/ PTA    EDUCATION: Education Given To: Patient  Education Provided: Role of Therapy  Education Method: Verbal  Barriers to Learning: None  Education Outcome: Verbalized understanding    TIME IN/OUT:  Time In: 1430  Time Out: 1500  Minutes: [address]

## 2022-06-26 NOTE — PROGRESS NOTES
VANCO DAILY FOLLOW UP RENAL INSUFFICIENCY PATIENT   [address] [facility] Pharmacokinetic Monitoring Service - Vancomycin    Consulting Provider: Dr. Mitchell Coates   Indication: HAP  Target Concentration: Random level ? 15 mg/L  Day of Therapy: 3  Additional Antimicrobials: cefepime    Pertinent Laboratory Values: Wt Readings from Last 1 Encounters:   06/24/22 167 lb (75.8 kg)     Temp Readings from Last 1 Encounters:   06/26/22 97.5 °F (36.4 °C) (Oral)     Recent Labs     06/24/22  1515 06/24/22  1823 06/24/22  2338 06/25/22  0338 06/25/22  0737 06/26/22  0400   *  --   --  112*  --  120*   CREATININE 3.00*  --   --  3.20*  --  3.10*   WBC 15.0*  --   --  18.4*  --  13.1*   PROCAL 1.69*  --   --   --   --   --    LACACIDPL 2.6*   < > 1.8 1.6 1.4  --     < > = values in this interval not displayed. Lab Results   Component Value Date    VANCORANDOM 16.3 06/26/2022       MRSA Nasal Swab: not ordered. Order placed by pharmacy. .    Assessment:  Date:  Dose/Freq Admin Times Level/Time:   6/24 2000 mg  1845    6/25 6/26   Rd @ 0400 = 16.3   6/27 750 mg x 1 (0500)            Plan:  Concentration-guided dosing due to renal impairment  No dose needed at this time - will re dose in AM  Repeat vancomycin concentration will be ordered as clinically indicated    Pharmacy will continue to monitor patient and adjust therapy as indicated    Thank you for the consult,  Sandrine Slaughter [facility]

## 2022-06-26 NOTE — PROGRESS NOTES
Progress Note    Patient: Alli Feng MRN: [id number]  SSN: [id number]    YOB: 1934  Age: 80 y.o. Sex: male      Admit Date: 6/24/2022    LOS: 2 days     Assessment and Plan:   80 y.o. male with medical history of DM2, AFIB s/p watchman with PPM/ICD, ICM, HTN, CAD, CKD3, pulmonary HTN, RLE cellulitis/ medial malleolus ulcer 5-30-22 and admitted 5-30-22 to 6-3-22 and discharged to rehab who is re-evaluated with wound issues and COVID pneumonia    1. Sepsis likely in setting of COVID-19 pneumonia and concerns of superimposed bacterial infection  -Continue cefepime and vancomycin  -Follow blood cultures  -Continue Decadron  -Elevated CRP but saturating 100% on 2 L, so we will hold off on baricitinib for now  -Symptomatic management    2. Coronary artery disease  -Continue aspirin and statin    3. Diabetes mellitus  -Continue insulin sliding scale  -Blood sugar checks before meals and at bedtime    4. 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 Right lower extremity medial malleolus ulcer ?wound infection  -Wound care consult  -On vancomycin  -Will consider orhto consult    6. COLLETTE on CKD stage III  -IVF  -Renal US  -Monitor renal function  -Avoid nephrotoxic agents    DVT prophylaxis with SCD    Subjective:   80 y.o. male with medical history of DM2, AFIB s/p watchman with PPM/ICD, ICM, HTN, CAD, CKD3, pulmonary HTN, RLE cellulitis/ medial malleolus ulcer 5-30-22 and admitted 5-30-22 to 6-3-22 and discharged to rehab who is re-evaluated with wound issues and COVID pneumonia. Patient seen and examined at bedside. This morning the patient still presented with some shortness of breath and cough. Denies any chest pain, no abdominal pain, no nausea vomiting or diarrhea.     Objective:     Vitals:    06/26/22 0755 06/26/22 0820 06/26/22 1150 06/26/22 1228   BP: (!) 142/72   (!) 144/67   Pulse: 70   70   Resp: 18   18 Temp: 97.5 °F (36.4 °C)   97.5 °F (36.4 °C)   TempSrc: Oral   Oral   SpO2: 98% 95%  100%   Weight:       Height:   6' (1.829 m)         Intake and Output:  Current Shift: 06/26 0701 - 06/26 1900  In: 236 [P.O.:236]  Out: -   Last three shifts: 06/24 1901 - 06/26 0700  In: 18 [P.O.:560; I.V.:10]  Out: 1080 [Urine:1080]    ROS  10 ROS negative except from stated on subjective    Physical Exam:   General: Alert, oriented, NAD  HEENT: NC/AT, EOM are intact  Neck: supple, no JVD  Cardiovascular: RRR, S1, S2, no murmurs  Respiratory: Lungs are clear, no wheezes or rales  Abdomen: Soft, NT, ND  Back: No CVA tenderness, no paraspinal tenderness  Extremities: LE without pedal edema, no erythema  Neuro: A&O, CN are intact, no focal deficits  Skin: no rash or ulcers  Psych: good mood and affect    Lab/Data Review:  I have personally reviewed patients laboratory data showing  Recent Results (from the past 24 hour(s))   POCT Glucose    Collection Time: 06/25/22  4:16 PM   Result Value Ref Range    POC Glucose 263 (H) 65 - 100 mg/dL    Performed by: Joyce    Vancomycin Level, Random    Collection Time: 06/25/22  5:57 PM   Result Value Ref Range    Vancomycin Rm 16.1 UG/ML   POCT Glucose    Collection Time: 06/25/22  8:16 PM   Result Value Ref Range    POC Glucose 291 (H) 65 - 100 mg/dL    Performed by: Brook    Vancomycin Level, Random    Collection Time: 06/26/22  4:00 AM   Result Value Ref Range    Vancomycin Rm 16.3 UG/ML   CBC with Auto Differential    Collection Time: 06/26/22  4:00 AM   Result Value Ref Range    WBC 13.1 (H) 4.3 - 11.1 K/uL    RBC 2.66 (L) 4.23 - 5.6 M/uL    Hemoglobin 8.5 (L) 13.6 - 17.2 g/dL    Hematocrit 25.9 (L) 41.1 - 50.3 %    MCV 97.4 79.6 - 97.8 FL    MCH 32.0 26.1 - 32.9 PG    MCHC 32.8 31.4 - 35.0 g/dL    RDW 15.9 (H) 11.9 - 14.6 %    Platelets 820 131 - 526 K/uL    MPV 10.0 9.4 - 12.3 FL    nRBC 0.00 0.0 - 0.2 K/uL    Differential Type AUTOMATED      Seg Neutrophils 92 (H) 43 - 78 %    Lymphocytes 4 (L) 13 - 44 %    Monocytes 3 (L) 4.0 - 12.0 %    Eosinophils % 0 (L) 0.5 - 7.8 %    Basophils 0 0.0 - 2.0 %    Immature Granulocytes 1 0.0 - 5.0 %    Segs Absolute 12.1 (H) 1.7 - 8.2 K/UL    Absolute Lymph # 0.5 0.5 - 4.6 K/UL    Absolute Mono # 0.4 0.1 - 1.3 K/UL    Absolute Eos # 0.0 0.0 - 0.8 K/UL    Basophils Absolute 0.0 0.0 - 0.2 K/UL    Absolute Immature Granulocyte 0.1 0.0 - 0.5 K/UL   Basic Metabolic Panel    Collection Time: 06/26/22  4:00 AM   Result Value Ref Range    Sodium 135 (L) 138 - 145 mmol/L    Potassium 4.5 3.5 - 5.1 mmol/L    Chloride 106 98 - 107 mmol/L    CO2 20 (L) 21 - 32 mmol/L    Anion Gap 9 7 - 16 mmol/L    Glucose 268 (H) 65 - 100 mg/dL     (H) 8 - 23 MG/DL    CREATININE 3.10 (H) 0.8 - 1.5 MG/DL    GFR African American 25 (L) >60 ml/min/1.73m2    GFR Non- 20 (L) >60 ml/min/1.73m2    Calcium 9.3 8.3 - 10.4 MG/DL   POCT Glucose    Collection Time: 06/26/22  7:52 AM   Result Value Ref Range    POC Glucose 468 (HH) 65 - 100 mg/dL    Performed by: [facility]    POCT Glucose    Collection Time: 06/26/22 12:26 PM   Result Value Ref Range    POC Glucose 276 (H) 65 - 100 mg/dL    Performed by: [facility]       @[email or web address]@     Image:  I have personally reviewed patients imaging showing  XR CHEST PORTABLE   Final Result   1. Cardiomegaly, pulmonary edema and left pleural effusion. 2.  Left basilar atelectasis versus infiltrate.          XR CHEST PORTABLE   Final Result   Small left pleural effusion         US RETROPERITONEAL COMPLETE    (Results Pending)        Current Facility-Administered Medications   Medication Dose Route Frequency Provider Last Rate Last Admin    lactated ringers infusion   IntraVENous Continuous Fantasma Bruno MD 75 mL/hr at 06/26/22 0920 New Bag at 06/26/22 0920    [START ON 6/27/2022] vancomycin (VANCOCIN) 750 mg in sodium chloride 0.9 % 250 mL IVPB ([id number])  750 mg IntraVENous Once [facility] Jelani Huntley MD        aspirin EC tablet 81 mg  81 mg Oral Daily Talisha Estevez MD   81 mg at 06/26/22 0919    atorvastatin (LIPITOR) tablet 80 mg  80 mg Oral Nightly Talisha Estevez MD   80 mg at 06/25/22 2203    metoprolol succinate (TOPROL XL) extended release tablet 50 mg  50 mg Oral Daily Talisha Estevez MD   50 mg at 06/26/22 0919    montelukast (SINGULAIR) tablet 10 mg  10 mg Oral Daily Talisha Estevez MD   10 mg at 06/26/22 0920    sodium chloride flush 0.9 % injection 5-40 mL  5-40 mL IntraVENous 2 times per day Talisha Estevez MD   10 mL at 06/26/22 4138    sodium chloride flush 0.9 % injection 5-40 mL  5-40 mL IntraVENous PRN Talisha Estevez MD        0.9 % sodium chloride infusion   IntraVENous PRN Talisha Estevez MD        ondansetron (ZOFRAN-ODT) disintegrating tablet 4 mg  4 mg Oral Q8H PRN Talisha Estevez MD        Or    ondansetron (ZOFRAN) injection 4 mg  4 mg IntraVENous Q6H PRN Talisha Estevez MD        polyethylene glycol (GLYCOLAX) packet 17 g  17 g Oral Daily PRN Talisha Estevez MD        acetaminophen (TYLENOL) tablet 650 mg  650 mg Oral Q6H PRN Talisha Estevez MD   650 mg at 06/24/22 2245    Or    acetaminophen (TYLENOL) suppository 650 mg  650 mg Rectal Q6H PRN Talisha Estevez MD        glucose chewable tablet 16 g  4 tablet Oral PRN Talisha Estevez MD        dextrose bolus 10% 125 mL  125 mL IntraVENous PRN Talisha Estevez MD        Or    dextrose bolus 10% 250 mL  250 mL IntraVENous PRMISAEL Estevez MD        glucagon injection 1 mg  1 mg IntraMUSCular PRN Talisha Estevez MD        dextrose 5 % solution  100 mL/hr IntraVENous PRN Talisha Estevez MD        insulin lispro (HUMALOG) injection vial 0-4 Units  0-4 Units SubCUTAneous TID RITU Estevez MD   2 Units at 06/26/22 1252    insulin lispro (HUMALOG) injection vial 0-4 Units  0-4 Units SubCUTAneous Nightly MD [facility] dexamethasone (DECADRON) tablet 6 mg  6 mg Oral Daily Maurisio Thornton MD   6 mg at 06/26/22 0919    cefepime (MAXIPIME) 1000 mg IVPB minibag in NS  1,000 mg IntraVENous Q12H Maurisio Thornton MD   Stopped at 06/26/22 0920    vancomycin (VANCOCIN) intermittent dosing (placeholder)   Other RX Placeholder Maurisio Thornton MD            Hospital problems     Patient Active Problem List   Diagnosis    Hypertension    Dyspnea on exertion    Atrial flutter (HCC)    Chest pain    Dilated cardiomyopathy (HCC)    Longstanding persistent atrial fibrillation (HCC)    History of kidney stones    Persistent atrial fibrillation (HCC)    Automatic implantable cardioverter-defibrillator in situ    Coronary artery disease    CKD (chronic kidney disease) stage 3, GFR 30-59 ml/min (HCC)    Diabetes (HCC)    TIA (transient ischemic attack)    Numbness and tingling in right hand    Orthostatic hypotension    Hx of long term use of blood thinners    Microhematuria    Osteoarthritis    Presence of Watchman left atrial appendage closure device    Cardiomyopathy, ischemic    Preop cardiovascular exam    S/P total knee arthroplasty    Renal insufficiency    Abnormal EKG    Type 2 diabetes with nephropathy (HCC)    Anticoagulant long-term use    History of BPH    Hypercholesteremia    Pneumonia    LBBB (left bundle branch block)    Breast lump    Acute diastolic heart failure (HCC)    Localized edema    Bilateral leg edema    Cor pulmonale (chronic) (HCC)    Pulmonary HTN (HCC)    Cellulitis of right leg    Chronic combined systolic and diastolic heart failure (HCC)    Acute on chronic right-sided congestive heart failure (HCC)    Moderate protein-calorie malnutrition (HCC)    Diabetic ulcer of ankle ([address]    DNR (do not resuscitate)    Anemia    Sepsis ([address]    COLLETTE (acute kidney injury) ([address]    Ankle wound    COVID-19        I have reviewed, updated, and verified this note's content and spent 36 minutes of my 40 minutes visit performing counseling and coordination of care regarding medical management.        Signed By: Kelli Hanks MD     June 26, 2022

## 2022-06-27 NOTE — WOUND CARE
Patient has known DM, per Dr. Adalid Phan with vascular, vascular studies May 2022 showed small vessel disease, not recommended for intervention at that time, patient is not agreeable to amputation at this time. Followed by ortho and seen by [facility], the wound has not increased in severity, the slough has self/ debrided and the tissue is friable and bleeds easy with cleansing, so now the hardware and bone is exposed. Since patient has been sick with covid, he has not been able to get to all the follow up appointments. He has referral for [facility] that is pending. Also has referral to foot/ ankle specialist in [facility] with appointment later this week, if patient is able to keep, may need rescheduled. At this point, it will be best to keep clean and bandaged to prevent infection and get him healthy for outpatient work ups by [facility] wound clinic and vascular and or [address] [place] [address] ortho. The exposed hardware and bone has him at javed risk for multiple infection/ osteomyelitis and need for amputation at some point. Left great toe tip, noted to also have a deep purple blister with eschar forming. Left lateral posterior heel with partial thickness skin loss. Recommend opticel ag cut to fit both open (even over harware) areas cover with ABD's and wrap with clif, daily in acute care and can decrease to every other day outpatient. Will monitor.

## 2022-06-27 NOTE — CONSULTS
[facility]  Consultation Note    Patient ID:  Name: Kerry Amaya  MRN: [id number]  AGE: 80 y.o.  : 1934    Date of Consultation:  2022  Referring Physician:  Hospitalist     Subjective: Pt hospitalized with COVID after they presented to the [address] [place] [address] Emergency Dept yesterday with SOB. The patient has history of right medial ankle wound. The patient was seen for this by Dr. 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 He did not think he would heal any surgical treatment to his right ankle and was discharged to follow-up with the wound center. The patient has scheduled an appointment to see Dr. Catrachita Robles to discuss this right ankle on Friday. He has a history of ORIF of the right ankle in . They have no other orthopedic concerns at this time. The patient has a history of bilateral total knee arthroplasties and a right total hip arthroplasty. Past Medical History Includes:   Past Medical History:   Diagnosis Date    A-fib ([address] 2019    Abnormal EKG     Acute diastolic heart failure (HCC)     Atrial fibrillation ([address] 9/10/2011    Atrial flutter ([address] 2013    Biotronik biventricular implantable cardioverter defibrillator    Automatic implantable cardioverter-defibrillator in situ 3/30/2016    Breast lump     right- pt states bx was neg-- m. d. \"released\" him    CAD (coronary artery disease)     mi w angioplasty------ mi then cabg--    Cardiomyopathy, ischemic 3/30/2016    Chronic    Chest pain     Chronic systolic heart failure ([address]     \"stable\" per cardiology office note (2015) and on lasix daily    CKD (chronic kidney disease) stage 3, GFR 30-59 ml/min ([address] 2011    Coronary artery disease 9/10/2011    Diabetes ([address] diag     type2, oral meds, range 96-98, does not know last hgba1c, hypo s/s <70    Heart attack ([address]     History of kidney stones     none in years     Hypercholesteremia 9/10/2011    Hypercholesterolemia     no meds currently    Hypertension diag 1981    controlled with med    Kidney stone     LBBB (left bundle branch block) 9/17/2013    Moderate protein-calorie malnutrition ([address] 3/30/2022    Orthostatic hypotension 3/30/2016    Osteoarthritis     hands    Pneumonia 9/10/2011    Preop cardiovascular exam     Renal insufficiency     S/P total knee arthroplasty 12/11/2015    TIA (transient ischemic attack) 9/18/2019    Unstable angina ([address]    ,   Past Surgical History:   Procedure Laterality Date    ANKLE FRACTURE SURGERY Right 5/11/2005    BREAST BIOPSY Right 7/2013    BUNIONECTOMY Left 2001    CARDIAC DEFIBRILLATOR PLACEMENT  9/17/2013    biotronik    CARPAL TUNNEL RELEASE Bilateral 1972    CATARACT REMOVAL  2006/2009    COLONOSCOPY  2003/2010    CORONARY ARTERY BYPASS GRAFT  12/19/2008    3 vessel    KNEE ARTHROSCOPY Left 9/17/2009    KNEE ARTHROSCOPY Right 4/30/2014    LITHOTRIPSY      x 5    ORTHOPEDIC SURGERY  2005    right hip    OTHER SURGICAL HISTORY  2002    hydrocele repair and circumcision    PACEMAKER      pacemaker- defib    TOTAL HIP ARTHROPLASTY Right 12/28/2005    TOTAL KNEE ARTHROPLASTY Right 12/2015    TOTAL KNEE ARTHROPLASTY Left 4/8/2010     Family History:   Family History   Problem Relation Age of Onset    Heart Disease Sister     Heart Disease Brother     Other Neg Hx     Post-op Cognitive Dysfunction Neg Hx     Emergence Delirium Neg Hx     Post-op Nausea/Vomiting Neg Hx     Delayed Awakening Neg Hx     Pseudochol.  Deficiency Neg Hx     Malig Hypertherm Neg Hx     Heart Disease Other     Asthma Father     Stroke Sister     Heart Disease Brother     Cancer Brother       Social History:   Social History     Tobacco Use    Smoking status: Never Smoker    Smokeless tobacco: Never Used   Substance Use Topics    Alcohol use: No       ALLERGIES:   Allergies   Allergen Reactions    Iodine Swelling        Patient Medications    Current Facility-Administered Medications   Medication Dose Route Frequency    lactated ringers infusion   IntraVENous Continuous    aspirin EC tablet 81 mg  81 mg Oral Daily    atorvastatin (LIPITOR) tablet 80 mg  80 mg Oral Nightly    metoprolol succinate (TOPROL XL) extended release tablet 50 mg  50 mg Oral Daily    montelukast (SINGULAIR) tablet 10 mg  10 mg Oral Daily    sodium chloride flush 0.9 % injection 5-40 mL  5-40 mL IntraVENous 2 times per day    sodium chloride flush 0.9 % injection 5-40 mL  5-40 mL IntraVENous PRN    0.9 % sodium chloride infusion   IntraVENous PRN    ondansetron (ZOFRAN-ODT) disintegrating tablet 4 mg  4 mg Oral Q8H PRN    Or    ondansetron (ZOFRAN) injection 4 mg  4 mg IntraVENous Q6H PRN    polyethylene glycol (GLYCOLAX) packet 17 g  17 g Oral Daily PRN    acetaminophen (TYLENOL) tablet 650 mg  650 mg Oral Q6H PRN    Or    acetaminophen (TYLENOL) suppository 650 mg  650 mg Rectal Q6H PRN    glucose chewable tablet 16 g  4 tablet Oral PRN    dextrose bolus 10% 125 mL  125 mL IntraVENous PRN    Or    dextrose bolus 10% 250 mL  250 mL IntraVENous PRN    glucagon injection 1 mg  1 mg IntraMUSCular PRN    dextrose 5 % solution  100 mL/hr IntraVENous PRN    insulin lispro (HUMALOG) injection vial 0-4 Units  0-4 Units SubCUTAneous TID WC    insulin lispro (HUMALOG) injection vial 0-4 Units  0-4 Units SubCUTAneous Nightly    dexamethasone (DECADRON) tablet 6 mg  6 mg Oral Daily    cefepime (MAXIPIME) 1000 mg IVPB minibag in NS  1,000 mg IntraVENous Q12H    vancomycin (VANCOCIN) intermittent dosing (placeholder)   Other RX Placeholder         Review of Systems:  Pertinent items are noted in HPI.     Physical Exam:      General: NAD, Alert, Oriented x 3   Mental Status: Appropriate   Psych: Normal Affect, Normal Mood    HEENT: Normal Cephalic/Atraumatic, PERRL   Lungs: Respirations even and unlabored, Breath Sounds were clear, no respiratory distress   Heart: Regular Rate and Rhythm   Vascular: Distal pulses intact, good capillary refill   Skin: No redness, No Rashes, Skin is dry   Musculoskeletal: see images on chart, wound appears unchanged from last months wound care images. There is a exposure of the plate and screws. No redness or swelling with either knee. No knee pain on palpation.    Lymphatic: No lympahdenopathy, No distal edema   Neuro: No gross deficits   Abdomen: Soft, Non tender, No distension      VITALS:   Patient Vitals for the past 8 hrs:   BP Temp Temp src Pulse Resp SpO2   22 0729 (!) 157/77 97.8 °F (36.6 °C) Oral 73 18 98 %   22 0316 (!) 150/85 97.7 °F (36.5 °C) -- 71 14 99 %    , Temp (24hrs), Av.8 °F (36.6 °C), Min:97.5 °F (36.4 °C), Max:98.1 °F (36.7 °C)           Diagnosis   Patient Active Problem List   Diagnosis    Hypertension    Dyspnea on exertion    Atrial flutter (HCC)    Chest pain    Dilated cardiomyopathy (HCC)    Longstanding persistent atrial fibrillation (HCC)    History of kidney stones    Persistent atrial fibrillation (HCC)    Automatic implantable cardioverter-defibrillator in situ    Coronary artery disease    CKD (chronic kidney disease) stage 3, GFR 30-59 ml/min (HCC)    Diabetes (HCC)    TIA (transient ischemic attack)    Numbness and tingling in right hand    Orthostatic hypotension    Hx of long term use of blood thinners    Microhematuria    Osteoarthritis    Presence of Watchman left atrial appendage closure device    Cardiomyopathy, ischemic    Preop cardiovascular exam    S/P total knee arthroplasty    Renal insufficiency    Abnormal EKG    Type 2 diabetes with nephropathy (HCC)    Anticoagulant long-term use    History of BPH    Hypercholesteremia    Pneumonia    LBBB (left bundle branch block)    Breast lump    Acute diastolic heart failure (HCC)    Localized edema    Bilateral leg edema    Cor [facility]ale [facility]chronic) ([facility])    Pulmonary HTN ([address]    Cellulitis of right leg    Chronic combined systolic and diastolic heart failure (HCC)    Acute on chronic right-sided congestive heart failure (HCC)    Moderate protein-calorie malnutrition ([address]    Diabetic ulcer of ankle ([address]    DNR (do not resuscitate)    Anemia    Sepsis ([address]    COLLETTE (acute kidney injury) ([address]    Ankle wound    COVID-19          Assessment     Right ankle wound     Medical Decision Making:     Images and chart have been reviewed. The patient was discussed with Dr. Eunice Eric. The patient has a nonhealing wound of the right medial ankle. This is been evaluated by vascular surgery and felt to be nonoperative from their standpoint. I did discuss the possibility of an above-the-knee amputation with the patient and he is not interested in this at this time. I discussed following up with one of our foot and ankle surgeons in the office to get their opinion. Currently he has COVID and will continue medical mgmt per hospitalist for this. The wound and lower leg does not appear any worse then when he was seen a month ago by Dr. Lori Beckford.            DIRK Pendleton, PA  6/27/2022,  9:33 AM

## 2022-06-27 NOTE — CARE COORDINATION
06/27/22 0942   Service Assessment   History Provided By Child/Family   Primary [address]   PCP Verified by CM Yes   Prior Functional Level Independent in ADLs/IADLs   Current Functional Level Assistance with the following:   Can patient return to prior living arrangement No   Ability to make needs known: Good   Family able to assist with home care needs: Yes   Social/Functional History   Lives With [address] One level   Home Access Ramped entrance   Bathroom Shower/Tub Walk-in shower   ADL Assistance Needs assistance   [address] Needs assistance   Ambulation Assistance Needs assistance   Active  No   Patient's  Info son drives patient to appointments   Mode of Transportation Car   CM called patient's son to discuss discharge plans. Patient came to the hospital from [facility]. Son reports that patient has been in and out of the hospital and rehab for several months. Patient told his son yesterday that he wants to discharge home rather than returning to rehab. Prior to his illness patient was living alone and using a rollator or wc to ambulate. His son and son's father in law built a ramp and a walk in shower to accommodate patient's needs. CM met with the patient and he doesn't want to return to rehab. He requested HH but knows that he will need his son's assistance. CM will contact patient's son and discuss this plan. PT/OT consulted. CM following.

## 2022-06-27 NOTE — PROGRESS NOTES
OCCUPATIONAL THERAPY Daily Note     OT Visit Days: 2   Time  OT Charge Capture  Rehab Caseload Tracker  OT Orders    Yarelis Wellington is a 80 y.o. male   PRIMARY DIAGNOSIS: Sepsis ([facility]  COLLETTE (acute kidney injury) ([facility] [N17.9]  Cellulitis of right leg [L03.115]  Acute on chronic combined systolic and diastolic congestive heart failure (HCC) [I50.43]  Sepsis ([facility] [A41.9]  Severe sepsis ([address] [A41.9, R65.20]  Acute renal failure, unspecified acute renal failure type ([address] [N17.9]  Pneumonia of left lower lobe due to infectious organism [J18.9]  Sepsis, due to unspecified organism, unspecified whether acute organ dysfunction present ([address] [A41.9]       Inpatient: Payor: MEDICARE / Plan: MEDICARE PART A AND B / Product Type: *No Product type* /     ASSESSMENT:     REHAB RECOMMENDATIONS: CURRENT LEVEL OF FUNCTION:  (Most Recently Demonstrated)   Recommendation to date pending progress:  Setting:   Short-term Rehab    Equipment:     To Be Determined Bathing:   Not Tested  Dressing:   Not Tested  Feeding/Grooming:   Not Tested  Toileting:   Not Tested  Functional Mobility:   Minimal Assist X 2     ASSESSMENT:  Mr. Justin Hill was supine in bed upon arrival. Pt completed bed mobility with mod-max A X 2. Pt sat EOB with good sitting balance. Pt stood with min A X 2 and completed functional mobility with rolling walker. Continue POC.         SUBJECTIVE:     Mr. Justin Hill states, \"Hey\"     Social/Functional Lives With: Alone  Type of Home: House  Home Layout: One level  Home Access: Ramped entrance  Entrance Stairs - Rails: Both  Bathroom Shower/Tub: Walk-in shower  Bathroom Toilet: Standard  Bathroom Equipment: Grab bars in shower,Shower chair  Bathroom Accessibility: Accessible  Home Equipment: Grab bars,Hospital bed,Reacher,Rollator,Walker, rolling,Wheelchair-manual  Has the patient had two or more falls in the past year or any fall with injury in the past year?: Yes  Receives Help From: Family  ADL Assistance: Needs assistance  Homemaking Assistance: Needs assistance  Ambulation Assistance: Needs assistance  Transfer Assistance: Independent  Active : No  Patient's  Info: son drives patient to appointments  Mode of Transportation: Car    OBJECTIVE:     LINES / Elvera Breslow / AIRWAY: IV    RESTRICTIONS/PRECAUTIONS:  Restrictions/Precautions  Restrictions/Precautions: Fall Risk  Required Braces or Orthoses? :  (PRAFOS FOR SKIN)        PAIN: VITALS / O2:   Pre Treatment: 0             Post Treatment: 0 Vitals          Oxygen            MOBILITY: I Mod I S SBA CGA Min Mod Max Total  NT x2 Comments:   Bed Mobility    Rolling [] [] [] [] [] [] [] [] [] [] []    Supine to Sit [] [] [] [] [] [] [x] [x] [] [] [x]    Scooting [] [] [] [] [] [] [] [] [] [] []    Sit to Supine [] [] [] [] [] [] [x] [x] [] [] [x]    Transfers    Sit to Stand [] [] [] [] [] [x] [] [] [] [] [x]    Bed to Chair [] [] [] [] [] [x] [] [] [] [] [x]    Stand to Sit [] [] [] [] [] [x] [] [] [] [] [x]    Tub/Shower [] [] [] [] [] [] [] [] [] [] []     Toilet [] [] [] [] [] [] [] [] [] [] []      [] [] [] [] [] [] [] [] [] [] []    I=Independent, Mod I=Modified Independent, S=Supervision/Setup, SBA=Standby Assistance, CGA=Contact Guard Assistance, Min=Minimal Assistance, Mod=Moderate Assistance, Max=Maximal Assistance, Total=Total Assistance, NT=Not Tested    ACTIVITIES OF DAILY LIVING: I Mod I S SBA CGA Min Mod Max Total NT Comments   BASIC ADLs:              Upper Body   Bathing [] [] [] [] [] [] [] [] [] []    Lower Body Bathing [] [] [] [] [] [] [] [] [] []    Toileting [] [] [] [] [] [] [] [] [] []    Upper Body Dressing [] [] [] [] [] [] [] [] [] []    Lower Body Dressing [] [] [] [] [] [] [] [] [] []    Feeding [] [] [] [] [] [] [] [] [] []    Grooming [] [] [] [] [] [] [] [] [] []    Personal Device Care [] [] [] [] [] [] [] [] [] []    Functional Mobility [] [] [] [] [] [] [] [] [] []    I=Independent, Mod I=Modified Independent, S=Supervision/Setup, SBA=Standby Assistance, CGA=Contact Guard Assistance, Min=Minimal Assistance, Mod=Moderate Assistance, Max=Maximal Assistance, Total=Total Assistance, NT=Not Tested    BALANCE: Good Fair+ Fair Fair- Poor NT Comments   Sitting Static [x] [] [] [] [] []    Sitting Dynamic [] [] [] [] [] []              Standing Static [x] [] [] [] [] []    Standing Dynamic [] [] [] [] [] []        PLAN:     FREQUENCY/DURATION   OT Plan of Care: 3 times/week for duration of hospital stay or until stated goals are met, whichever comes first.    ACUTE OCCUPATIONAL THERAPY GOALS:   (Developed with and agreed upon by patient and/or caregiver.)  1) Patient will complete lower body bathing and dressing with min A and adaptive equipment as needed. 2) Patient will complete toileting with min A. 3) Patient will complete functional transfers with min A and adaptive equipment as needed. 4) Patient will tolerate at least 23 minutes of OT activity with less than 3 rest breaks while maintaining O2 sats >90%. 5) Patient will verbalize at least 3 energy conservation technique to utilize during ADL/IADL. 6) Patient will complete grooming tasks in supporting sitting after setup. TREATMENT:     TREATMENT:   Neuromuscular Re-education (38 Minutes): Neuromuscular Re-education included Balance Training, Coordination training, Sitting balance training and Standing balance training to improve Balance, Coordination and Functional Mobility.     TREATMENT GRID:  N/A    AFTER TREATMENT PRECAUTIONS: Bed, Bed/Chair Locked, Call light within reach, Needs within reach, RN notified and Side rails x3    INTERDISCIPLINARY COLLABORATION:  RN/ PCT, PT/ PTA and OT/ COLON    EDUCATION:       TOTAL TREATMENT DURATION AND TIME:  Time In: 1430  Time Out: [address]  Minutes: [facility], [facility]

## 2022-06-27 NOTE — PROGRESS NOTES
PHYSICAL THERAPY Daily Note and PM  (Link to Caseload Tracking: PT Visit Days : 2  Time In/Out PT Charge Capture  Rehab Caseload Tracker  Orders      Janice Olivares is a 80 y.o. male   PRIMARY DIAGNOSIS: Sepsis ([facility] [address]  COLLETTE (acute kidney injury) ([address] [N17.9]  Cellulitis of right leg [L03.115]  Acute on chronic combined systolic and diastolic congestive heart failure (HCC) [I50.43]  Sepsis ([address] [A41.9]  Severe sepsis ([address] [A41.9, R65.20]  Acute renal failure, unspecified acute renal failure type ([address] [N17.9]  Pneumonia of left lower lobe due to infectious organism [J18.9]  Sepsis, due to unspecified organism, unspecified whether acute organ dysfunction present ([address] [A41.9]       Inpatient: Payor: MEDICARE / Plan: MEDICARE PART A AND B / Product Type: *No Product type* /     ASSESSMENT:     REHAB RECOMMENDATIONS:   Recommendation to date pending progress:  Setting:   Short-term Rehab    Equipment:     To Be Determined     ASSESSMENT:  Mr. Britany Tyler is supine, initially declined mobility stating too weak/tired, agreeable with encouragement. Pt with L UE edema noted this date, RN aware. Pt reports limited mobility on L side due to arthritis in knee/hip; pt with 3/5 L knee flexion, weaker hip flexors at 1/5 in sitting. Pt with mod A x 2 to sit to EOB, fair+ to good static sitting, stiff with general mobility. Pt MIN A x 2 with bed elevated for 2 sit to stand attempts, able to stand well, slightly flexed posture, cues to stand fully, L LE weakness noted in standing. Pt is able to perform side steps to R side to [facility] with use of RW and MIN A x 2 support. Pt unwilling to attempt transfer to chair this date, encouraged for next session. Pt making good progress this date, less assist required with activity. PT to cont to follow for acute care needs.       SUBJECTIVE:   Mr. Britany Tyler states, \"I just don't know\"     Social/Functional Lives With: Alone  Type of Home: House  Home Layout: One level  Home Access: Ramped entrance  Entrance Stairs - Rails: Both  Bathroom Shower/Tub: Walk-in shower  Bathroom Toilet: Standard  Bathroom Equipment: Grab bars in shower,Shower chair  Bathroom Accessibility: Accessible  Home Equipment: Grab bars,Hospital bed,Reacher,Rollator,Walker, rolling,Wheelchair-manual  Has the patient had two or more falls in the past year or any fall with injury in the past year?: Yes  Receives Help From: Family  ADL Assistance: Needs assistance  Homemaking Assistance: Needs assistance  Ambulation Assistance: Needs assistance  Transfer Assistance: Independent  Active : No  Patient's  Info: son drives patient to appointments  Mode of Transportation: Car  OBJECTIVE:     PAIN: VITALS / O2: Machelle Dome / Marybelle Broaden / DRAINS:   Pre Treatment:   Pain Assessment: None - Denies Pain      Post Treatment: 0/10 Vitals        Oxygen  O2 Therapy: Oxygen  O2 Flow Rate (L/min): 1 L/min  SpO2: 95 % Purewick    RESTRICTIONS/PRECAUTIONS:  Restrictions/Precautions  Restrictions/Precautions: None  Required Braces or Orthoses? :  (PRAFOS FOR SKIN)  Restrictions/Precautions: None  Required Braces or Orthoses? :  (PRAFOS FOR SKIN)     MOBILITY: I Mod I S SBA CGA Min Mod Max Total  NT x2 Comments:   Bed Mobility    Rolling [] [] [] [] [] [] [x] [] [] [] []    Supine to Sit [] [] [] [] [] [] [x] [] [] [] [x]    Scooting [] [] [] [] [] [] [x] [x] [] [] [x]    Sit to Supine [] [] [] [] [] [] [] [x] [] [] [x]    Transfers    Sit to Stand [] [] [] [] [] [x] [] [] [] [] [x] Bed elevated   Bed to Chair [] [] [] [] [] [] [] [] [] [x] []    Stand to Sit [] [] [] [] [] [x] [] [] [] [] [x]     [] [] [] [] [] [] [] [] [] [] []    I=Independent, Mod I=Modified Independent, S=Supervision, SBA=Standby Assistance, CGA=Contact Guard Assistance,   Min=Minimal Assistance, Mod=Moderate Assistance, Max=Maximal Assistance, Total=Total Assistance, NT=Not Tested    BALANCE: Good Fair+ Fair Fair- Poor NT Comments   Sitting Static [x] [x] [] [] [] []    Sitting Dynamic [] [x] [x] [] [] []              Standing Static [] [] [x] [] [] []    Standing Dynamic [] [] [x] [x] [] []      GAIT: I Mod I S SBA CGA Min Mod Max Total  NT x2 Comments:   Level of Assistance [] [] [] [] [] [x] [] [] [] [] [x]    Distance 3 (side steps to [facility]) feet    DME Rolling Walker    Gait Quality Decreased step clearance, Decreased stance, Shuffling  and Steppage    Weightbearing Status      Stairs      I=Independent, Mod I=Modified Independent, S=Supervision, SBA=Standby Assistance, CGA=Contact Guard Assistance,   Min=Minimal Assistance, Mod=Moderate Assistance, Max=Maximal Assistance, Total=Total Assistance, NT=Not Tested    PLAN:   ACUTE PHYSICAL THERAPY GOALS:   (Developed with and agreed upon by patient and/or caregiver.)  STG:  (1.)Mr. Cook will move from supine to sit and sit to supine , scoot up and down and roll side to side with MINIMAL ASSIST within 4 treatment day(s). (2.)Mr. Cook will transfer from bed to chair and chair to bed with MINIMAL ASSIST using the least restrictive device within 4 treatment day(s). (3.)Mr. Cook will ambulate with MINIMAL ASSIST for 15 feet with the least restrictive device within 4 treatment day(s).      LTG:  (1.)Mr. Cook will move from supine to sit and sit to supine , scoot up and down and roll side to side in bed with CONTACT GUARD ASSIST within 7 treatment day(s). (2.)Mr. Cook will transfer from bed to chair and chair to bed with CONTACT GUARD ASSIST using the least restrictive device within 7 treatment day(s). (3.)Mr. Cook will ambulate with CONTACT GUARD ASSIST for 100 feet with the least restrictive device within 7 treatment day(s).     FREQUENCY AND DURATION: 3 times/week for duration of hospital stay or until stated goals are met, whichever comes first.    TREATMENT:   TREATMENT:   Co-Treatment PT/OT necessary due to patient's decreased overall endurance/tolerance levels, as well as need for high level skilled assistance to complete functional transfers/mobility and functional tasks  Therapeutic Activity (38 Minutes): Therapeutic activity included Rolling, Supine to Sit, Sit to Supine, Transfer Training, Ambulation on level ground, Sitting balance  and Standing balance to improve functional Activity tolerance, Balance, Mobility and Strength.     TREATMENT GRID:  N/A    AFTER TREATMENT PRECAUTIONS: Bed, Bed/Chair Locked, Call light within reach, Heels floated, Needs within reach, RN notified and Side rails x2    INTERDISCIPLINARY COLLABORATION:  RN/ PCT, PT/ PTA and OT/ COLON    EDUCATION: Education Given To: Patient  Education Provided: Transfer Training  Education Method: Verbal  Barriers to Learning: None  Education Outcome: Continued education needed    TIME IN/OUT:  Time In: 1430  Time Out: [address]  Minutes: [address] PT

## 2022-06-27 NOTE — PROGRESS NOTES
VANCO DAILY FOLLOW UP RENAL INSUFFICIENCY PATIENT   [address] [facility] Pharmacokinetic Monitoring Service - Vancomycin    Consulting Provider: Dr. Kay Sahu   Indication: HAP  Target Concentration: Random level ? 15 mg/L  Day of Therapy: 4 of 7  Additional Antimicrobials: cefepime    Pertinent Laboratory Values: Wt Readings from Last 1 Encounters:   06/24/22 167 lb (75.8 kg)     Temp Readings from Last 1 Encounters:   06/27/22 97.8 °F (36.6 °C) (Oral)     Recent Labs     06/24/22  1515 06/24/22  1515 06/24/22  1823 06/24/22  2338 06/25/22  0338 06/25/22  0737 06/26/22  0400 06/27/22  0401   *   < >  --   --  112*  --  120* 122*   CREATININE 3.00*   < >  --   --  3.20*  --  3.10* 3.10*   WBC 15.0*   < >  --   --  18.4*  --  13.1* 12.9*   PROCAL 1.69*  --   --   --   --   --   --   --    LACACIDPL 2.6*  --    < > 1.8 1.6 1.4  --   --     < > = values in this interval not displayed. Lab Results   Component Value Date    VANCORANDOM 16.3 06/26/2022     MRSA Nasal Swab: not ordered. Order placed by pharmacy, awaiting result.     Assessment:  Date:  Dose/Freq Admin Times Level/Time:   6/24 2000 mg  1845    6/25 6/26   Rd @ 0400 = 16.3   6/27 750 mg x 1 0419    6/28   Rd @ (04)     Plan:  Concentration-guided dosing due to renal impairment  Vancomycin dose of 750 mg x 1 given this morning  Repeat vancomycin concentration ordered for 6/28 @ 0400  Pharmacy will continue to monitor patient and adjust therapy as indicated    Thank you for the consult,  Trang Alexander, [facility]

## 2022-06-27 NOTE — PROGRESS NOTES
Progress Note    Patient: Kristine Gill MRN: [id number]  SSN: [id number]    YOB: 1934  Age: 80 y.o. Sex: male      Admit Date: 6/24/2022    LOS: 3 days     Assessment and Plan:   80 y.o. male with medical history of DM2, AFIB s/p watchman with PPM/ICD, ICM, HTN, CAD, CKD3, pulmonary HTN, RLE cellulitis/ medial malleolus ulcer 5-30-22 and admitted 5-30-22 to 6-3-22 and discharged to rehab who is re-evaluated with wound issues and COVID pneumonia    1. Sepsis likely in setting of bacteremia concerning of right lower extremity wound infection   -Continue cefepime and vancomycin  -Follow blood cultures - Enterobacteriaceae, Proteus   -Repeat blood cultures  -Infectious disease consult    2. Covid infection and concerns of CAP  -Continue cefepime and vancomycin  -Continue Decadron  -Elevated CRP but saturating 100% on 2 L, so we will hold off on baricitinib for now  -Symptomatic management    3. Coronary artery disease  -Continue aspirin and statin    4. Diabetes mellitus  -Continue insulin sliding scale  -Blood sugar checks before meals and at bedtime    5. Hypertension / Heart failure with a reduced ejection fraction  -Continue metoprolol  -Holding losartan for now    6. Right lower extremity medial malleolus ulcer with concerns of wound infection and exposed hardware  -Wound care recs  -On vancomycin and cefepime  -Ortho consulted, no acute interventions at this time    7. COLLETTE on CKD stage III  -IVF  -Renal US without hydronephrosis  -Monitor renal function  -Avoid nephrotoxic agents  -Nephrology recommendations appreciated    DVT prophylaxis with SCD    Subjective:   80 y.o. male with medical history of DM2, AFIB s/p watchman with PPM/ICD, ICM, HTN, CAD, CKD3, pulmonary HTN, RLE cellulitis/ medial malleolus ulcer 5-30-22 and admitted 5-30-22 to 6-3-22 and discharged to rehab who is re-evaluated with wound issues and COVID pneumonia. Patient seen and examined at bedside.   This morning the patient having some right lower extremity pain. Improved shortness of breath and cough. Denies any chest pain, no abdominal pain, no nausea vomiting or diarrhea. Objective:     Vitals:    06/27/22 0729 06/27/22 1030 06/27/22 1116 06/27/22 1453   BP: (!) 157/77 (!) 168/78 (!) 169/76 (!) 171/77   Pulse: 73 70 70 69   Resp: 18  20 20   Temp: 97.8 °F (36.6 °C)  98.5 °F (36.9 °C) 98.8 °F (37.1 °C)   TempSrc: Oral  Oral Oral   SpO2: 98%  98% 97%   Weight:       Height:            Intake and Output:  Current Shift: 06/27 0701 - 06/27 1900  In: 600 [P.O.:600]  Out: 700 [Urine:700]  Last three shifts: 06/25 1901 - 06/27 0700  In: 099 [P.O.:708;  I.V.:10]  Out: 2055 [Urine:2055]    ROS  10 ROS negative except from stated on subjective    Physical Exam:   General: Alert, oriented, NAD  HEENT: NC/AT, EOM are intact  Neck: supple, no JVD  Cardiovascular: RRR, S1, S2, no murmurs  Respiratory: Lungs are clear, no wheezes or rales  Abdomen: Soft, NT, ND  Back: No CVA tenderness, no paraspinal tenderness  Extremities: Right lower extremity wound  Neuro: A&O, CN are intact, no focal deficits  Skin: no rash   Psych: good mood and affect    Lab/Data Review:  I have personally reviewed patients laboratory data showing  Recent Results (from the past 24 hour(s))   POCT Glucose    Collection Time: 06/26/22  5:21 PM   Result Value Ref Range    POC Glucose 329 (H) 65 - 100 mg/dL    Performed by: Carol    POCT Glucose    Collection Time: 06/26/22  9:25 PM   Result Value Ref Range    POC Glucose 389 (H) 65 - 100 mg/dL    Performed by: Brook    CBC with Auto Differential    Collection Time: 06/27/22  4:01 AM   Result Value Ref Range    WBC 12.9 (H) 4.3 - 11.1 K/uL    RBC 2.70 (L) 4.23 - 5.6 M/uL    Hemoglobin 8.6 (L) 13.6 - 17.2 g/dL    Hematocrit 26.4 (L) 41.1 - 50.3 %    MCV 97.8 79.6 - 97.8 FL    MCH 31.9 26.1 - 32.9 PG    MCHC 32.6 31.4 - 35.0 g/dL    RDW 15.9 (H) 11.9 - 14.6 %    Platelets 602 999 - 432 K/uL MPV 9.9 9.4 - 12.3 FL    nRBC 0.00 0.0 - 0.2 K/uL    Differential Type AUTOMATED      Seg Neutrophils 90 (H) 43 - 78 %    Lymphocytes 4 (L) 13 - 44 %    Monocytes 5 4.0 - 12.0 %    Eosinophils % 0 (L) 0.5 - 7.8 %    Basophils 0 0.0 - 2.0 %    Immature Granulocytes 1 0.0 - 5.0 %    Segs Absolute 11.7 (H) 1.7 - 8.2 K/UL    Absolute Lymph # 0.5 0.5 - 4.6 K/UL    Absolute Mono # 0.6 0.1 - 1.3 K/UL    Absolute Eos # 0.0 0.0 - 0.8 K/UL    Basophils Absolute 0.0 0.0 - 0.2 K/UL    Absolute Immature Granulocyte 0.1 0.0 - 0.5 K/UL   Basic Metabolic Panel    Collection Time: 06/27/22  4:01 AM   Result Value Ref Range    Sodium 133 (L) 136 - 145 mmol/L    Potassium 5.0 3.5 - 5.1 mmol/L    Chloride 108 (H) 98 - 107 mmol/L    CO2 18 (L) 21 - 32 mmol/L    Anion Gap 7 7 - 16 mmol/L    Glucose 307 (H) 65 - 100 mg/dL     (H) 8 - 23 MG/DL    CREATININE 3.10 (H) 0.8 - 1.5 MG/DL    GFR African American 25 (L) >60 ml/min/1.73m2    GFR Non- 20 (L) >60 ml/min/1.73m2    Calcium 9.2 8.3 - 10.4 MG/DL   POCT Glucose    Collection Time: 06/27/22  7:21 AM   Result Value Ref Range    POC Glucose 327 (H) 65 - 100 mg/dL    Performed by: Jill    POCT Glucose    Collection Time: 06/27/22  8:56 AM   Result Value Ref Range    POC Glucose 340 (H) 65 - 100 mg/dL    Performed by: Andrea    POCT Glucose    Collection Time: 06/27/22 11:09 AM   Result Value Ref Range    POC Glucose 399 (H) 65 - 100 mg/dL    Performed by: Bebe Brown    Phosphorus    Collection Time: 06/27/22 12:04 PM   Result Value Ref Range    Phosphorus 3.5 2.3 - 3.7 MG/DL      [unfilled]     Image:  I have personally reviewed patients imaging showing  US RETROPERITONEAL COMPLETE   Final Result   No hydronephrosis. Nonobstructing left renal collecting system stone   and bilateral simple renal cysts. XR CHEST PORTABLE   Final Result   1. Cardiomegaly, pulmonary edema and left pleural effusion.    2.  Left basilar atelectasis versus infiltrate.          XR CHEST PORTABLE   Final Result   Small left pleural effusion              Current Facility-Administered Medications   Medication Dose Route Frequency Provider Last Rate Last Admin    losartan (COZAAR) tablet 50 mg  50 mg Oral Daily Fadia Moncada MD        aspirin EC tablet 81 mg  81 mg Oral Daily Domenic Painter MD   81 mg at 06/27/22 0859    atorvastatin (LIPITOR) tablet 80 mg  80 mg Oral Nightly Domenic Painter MD   80 mg at 06/26/22 2219    metoprolol succinate (TOPROL XL) extended release tablet 50 mg  50 mg Oral Daily Domenic Painter MD   50 mg at 06/27/22 0859    montelukast (SINGULAIR) tablet 10 mg  10 mg Oral Daily Domenic Painter MD   10 mg at 06/27/22 0859    sodium chloride flush 0.9 % injection 5-40 mL  5-40 mL IntraVENous 2 times per day Domenic Painter MD   10 mL at 06/27/22 0900    sodium chloride flush 0.9 % injection 5-40 mL  5-40 mL IntraVENous PRN Domenic Painter MD        0.9 % sodium chloride infusion   IntraVENous PRN Domenic Painter MD        ondansetron (ZOFRAN-ODT) disintegrating tablet 4 mg  4 mg Oral Q8H PRN Domenic Painter MD        Or    ondansetron (ZOFRAN) injection 4 mg  4 mg IntraVENous Q6H PRN Domenic Painter MD        polyethylene glycol (GLYCOLAX) packet 17 g  17 g Oral Daily PRN Domenic Painter MD        acetaminophen (TYLENOL) tablet 650 mg  650 mg Oral Q6H PRN Domenic Painter MD   650 mg at 06/26/22 2219    Or    acetaminophen (TYLENOL) suppository 650 mg  650 mg Rectal Q6H PRN Domenic Painter MD        glucose chewable tablet 16 g  4 tablet Oral PRN Domenic Painter MD        dextrose bolus 10% 125 mL  125 mL IntraVENous PRN Domenic Painter MD        Or    dextrose bolus 10% 250 mL  250 mL IntraVENous PRN Domenic Painter MD        glucagon injection 1 mg  1 mg IntraMUSCular PRN Domenic Painter MD        dextrose 5 % solution  100 mL/hr IntraVENous PRN Ashtyn Truong MD Rachna        insulin lispro (HUMALOG) injection vial 0-4 Units  0-4 Units SubCUTAneous TID WC Hernesto Mercado MD   4 Units at 06/27/22 1141    insulin lispro (HUMALOG) injection vial 0-4 Units  0-4 Units SubCUTAneous Nightly Hernesto Mercado MD   4 Units at 06/26/22 2215    dexamethasone (DECADRON) tablet 6 mg  6 mg Oral Daily Hernesto Mercado MD   6 mg at 06/27/22 0859    cefepime (MAXIPIME) 1000 mg IVPB minibag in NS  1,000 mg IntraVENous Q12H Hernesto Mercado MD   Stopped at 06/27/22 0900    vancomycin (VANCOCIN) intermittent dosing (placeholder)   Other RX Diane Bardales MD            Hospital problems     Patient Active Problem List   Diagnosis    Hypertension    Dyspnea on exertion    Atrial flutter (HCC)    Chest pain    Dilated cardiomyopathy ([facility] [address]    Longstanding persistent atrial fibrillation (HCC)    History of kidney stones    Persistent atrial fibrillation (HCC)    Automatic implantable cardioverter-defibrillator in situ    Coronary artery disease    CKD (chronic kidney disease) stage 3, GFR 30-59 ml/min (HCC)    Diabetes ([facility] [address]    TIA (transient ischemic attack)    Numbness and tingling in right hand    Orthostatic hypotension    Hx of long term use of blood thinners    Microhematuria    Osteoarthritis    Presence of Watchman left atrial appendage closure device    Cardiomyopathy, ischemic    Preop cardiovascular exam    S/P total knee arthroplasty    Renal insufficiency    Abnormal EKG    Type 2 diabetes with nephropathy (HCC)    Anticoagulant long-term use    History of BPH    Hypercholesteremia    Pneumonia    LBBB (left bundle branch block)    Breast lump    Acute diastolic heart failure (HCC)    Localized edema    Bilateral leg edema    Cor pulmonale (chronic) (HCC)    Pulmonary HTN (HCC)    Cellulitis of right leg    Chronic combined systolic and diastolic heart failure (HCC)    Acute on chronic right-sided congestive heart failure (HCC)    Moderate protein-calorie malnutrition ([address]    Diabetic ulcer of ankle ([address]    DNR (do not resuscitate)    Anemia    Sepsis ([address]    COLLETTE (acute kidney injury) ([address]    Ankle wound    COVID-19        I have reviewed, updated, and verified this note's content and spent 36 minutes of my 40 minutes visit performing counseling and coordination of care regarding medical management.        Signed By: Bassem Ratliff MD     June 27, 2022

## 2022-06-27 NOTE — CONSULTS
Nephrology consult    Admission Date:  6/24/2022    Admission Diagnosis  COLLETTE (acute kidney injury) ([address] [N17.9]  Cellulitis of right leg [L03.115]  Acute on chronic combined systolic and diastolic congestive heart failure (HCC) [I50.43]  Sepsis (HCC) [A41.9]  Severe sepsis (HCC) [A41.9, R65.20]  Acute renal failure, unspecified acute renal failure type ([facility] [N17.9]  Pneumonia of left lower lobe due to infectious organism [J18.9]  Sepsis, due to unspecified organism, unspecified whether acute organ dysfunction present ([facility] [A41.9]    We are asked by Dr. Shreyas Bentley     History of Present Illness: Mr. Malu Leon is a 80 y.o male with PMH significant for HTN, CAD, DM2, AFIB s/p watchman, CKD 3b, Pulm HTN, PPM/ICD, ICM and medial malleolus ulcer recent admission 5/30-6/3 d/c to rehab and admitted on 6/78 with wound complications and COVID pneumonia. Blood Cx + enterobacteriaceae proteus has been on cefepime, vancomycin and decadron. We are consulted for COLLETTE/CKD 3b. From a renal standpoint his Cr/BUN on admission was 3.0/109, and has been holding in the low 3s with a baseline Cr of 1.60-2. 2. noted 1415 cc uop recorded last 24 hrs. Last ECHO EF 48%, RVSP 33 mmHg (3/29/22). Renal US no evidence of obstructive nephropathy, normal echotexture, left non obstructing stone and cysts noted. No recent contrast exposure, vanc levels have been appropriate, home meds significant for lasix, allopurinol, ARB, invokana. Pt seen and examined in room sitting up in bed on 2 L O2 NC, reports exertional SOB, cough, + LE edema, weakness and malaise. Denies CP, N/V/D, no fever/chills, dizziness or syncope. No dysuria, urinary hesitancy or urgency.      Past Medical History:   Diagnosis Date    A-fib [facility]) 12/17/2019    Abnormal EKG     Acute diastolic heart failure (HCC)     Atrial fibrillation ([facility] 9/10/2011    Atrial flutter ([facility] 9/2013    Biotronik biventricular implantable cardioverter defibrillator    Automatic implantable cardioverter-defibrillator in situ 3/30/2016    Breast lump     right- pt states bx was neg-- m. d. \"released\" him    CAD (coronary artery disease)     mi w angioplasty--1995---- mi then cabg--2008    Cardiomyopathy, ischemic 3/30/2016    Chronic    Chest pain     Chronic systolic heart failure ([address]     \"stable\" per cardiology office note (1/2015) and on lasix daily    CKD (chronic kidney disease) stage 3, GFR 30-59 ml/min ([address] 9/12/2011    Coronary artery disease 9/10/2011    Diabetes ([address] diag 2002    type2, oral meds, range 96-98, does not know last hgba1c, hypo s/s <70    Heart attack ([address] 1995/2008    History of kidney stones     none in years     Hypercholesteremia 9/10/2011    Hypercholesterolemia     no meds currently    Hypertension diag 1981    controlled with med    Kidney stone     LBBB (left bundle branch block) 9/17/2013    Moderate protein-calorie malnutrition ([address] 3/30/2022    Orthostatic hypotension 3/30/2016    Osteoarthritis     hands    Pneumonia 9/10/2011    Preop cardiovascular exam     Renal insufficiency     S/P total knee arthroplasty 12/11/2015    TIA (transient ischemic attack) 9/18/2019    Unstable angina [facility])       Past Surgical History:   Procedure Laterality Date    ANKLE FRACTURE SURGERY Right 5/11/2005    BREAST BIOPSY Right 7/2013    BUNIONECTOMY Left 2001    CARDIAC DEFIBRILLATOR PLACEMENT  9/17/2013    biotronik    CARPAL TUNNEL RELEASE Bilateral 1972    CATARACT REMOVAL  2006/2009    COLONOSCOPY  2003/2010    CORONARY ARTERY BYPASS GRAFT  12/19/2008    3 vessel    KNEE ARTHROSCOPY Left 9/17/2009    KNEE ARTHROSCOPY Right 4/30/2014    LITHOTRIPSY      x 5    ORTHOPEDIC SURGERY  2005    right hip    OTHER SURGICAL HISTORY  2002    hydrocele repair and circumcision    PACEMAKER      pacemaker- defib    TOTAL HIP ARTHROPLASTY Right 12/28/2005    TOTAL KNEE ARTHROPLASTY Right 12/2015    TOTAL KNEE ARTHROPLASTY Left 4/8/2010      Current Facility-Administered Medications   Medication Dose Route Frequency    lactated ringers infusion   IntraVENous Continuous    aspirin EC tablet 81 mg  81 mg Oral Daily    atorvastatin (LIPITOR) tablet 80 mg  80 mg Oral Nightly    metoprolol succinate (TOPROL XL) extended release tablet 50 mg  50 mg Oral Daily    montelukast (SINGULAIR) tablet 10 mg  10 mg Oral Daily    sodium chloride flush 0.9 % injection 5-40 mL  5-40 mL IntraVENous 2 times per day    sodium chloride flush 0.9 % injection 5-40 mL  5-40 mL IntraVENous PRN    0.9 % sodium chloride infusion   IntraVENous PRN    ondansetron (ZOFRAN-ODT) disintegrating tablet 4 mg  4 mg Oral Q8H PRN    Or    ondansetron (ZOFRAN) injection 4 mg  4 mg IntraVENous Q6H PRN    polyethylene glycol (GLYCOLAX) packet 17 g  17 g Oral Daily PRN    acetaminophen (TYLENOL) tablet 650 mg  650 mg Oral Q6H PRN    Or    acetaminophen (TYLENOL) suppository 650 mg  650 mg Rectal Q6H PRN    glucose chewable tablet 16 g  4 tablet Oral PRN    dextrose bolus 10% 125 mL  125 mL IntraVENous PRN    Or    dextrose bolus 10% 250 mL  250 mL IntraVENous PRN    glucagon injection 1 mg  1 mg IntraMUSCular PRN    dextrose 5 % solution  100 mL/hr IntraVENous PRN    insulin lispro (HUMALOG) injection vial 0-4 Units  0-4 Units SubCUTAneous TID WC    insulin lispro (HUMALOG) injection vial 0-4 Units  0-4 Units SubCUTAneous Nightly    dexamethasone (DECADRON) tablet 6 mg  6 mg Oral Daily    cefepime (MAXIPIME) 1000 mg IVPB minibag in NS  1,000 mg IntraVENous Q12H    vancomycin (VANCOCIN) intermittent dosing (placeholder)   Other RX Placeholder     Allergies   Allergen Reactions    Iodine Swelling      Social History     Tobacco Use    Smoking status: Never Smoker    Smokeless tobacco: Never Used   Substance Use Topics    Alcohol use: No      Family History   Problem Relation Age of Onset    Heart Disease Sister     Heart Disease Brother     Other Neg Hx     Post-op Cognitive Dysfunction Neg Hx     Emergence Delirium Neg Hx     Post-op Nausea/Vomiting Neg Hx     Delayed Awakening Neg Hx     Pseudochol.  Deficiency Neg Hx     Malig Hypertherm Neg Hx     Heart Disease Other     Asthma Father     Stroke Sister     Heart Disease Brother     Cancer Brother         Review of Systems  Gen - no fever, no chills, appetite okay  HEENT - no sore throat, no decreased vision, no hearing loss  CV - no chest pain, no palpitation, no orthopnea  Lung - + exertional shortness of breath, no cough  Abd - no tenderness, no nausea/vomiting, no bloody stool  Ext - + BLE edema, no clubbing, no cyanosis  Musculoskeletal - no joint pain, no back pain  Neurologic - no headaches, no dizziness, no seizures  Psychiatric - no anxiety, no depression  Skin - no rashes, no pupura  Genitourinary - no decreased urine output, no hematuria, no dysuria     Objective:     Vitals:    06/26/22 1941 06/26/22 2337 06/27/22 0316 06/27/22 0729   BP: (!) 156/74 (!) 157/75 (!) 150/85 (!) 157/77   Pulse: 70 69 71 73   Resp: 14 14 14 18   Temp: 97.9 °F (36.6 °C) 98.1 °F (36.7 °C) 97.7 °F (36.5 °C) 97.8 °F (36.6 °C)   TempSrc:    Oral   SpO2: 100% 98% 99% 98%   Weight:       Height:           Intake/Output Summary (Last 24 hours) at 6/27/2022 6637  Last data filed at 6/27/2022 0915  Gross per 24 hour   Intake 1068 ml   Output 1415 ml   Net -347 ml       Physical Exam  GEN :in no distress, alert and oriented  HEENT: anicteric sclerae, Mucous membranes are moist.  Neck - supple without JVD  CV - regular rate, S1, S2, no Rub   Lung - diminished lung sound bases bilaterally, lungs expand symmetrically  Abd - soft, nontender, bowel sounds present, no hepatosplenomegaly  Ext - no clubbing, no cyanosis, 1-2 + BLE edema  Neurologic - nonfocal  Genitourinary - bladder nonpalpable  Skin - no rashes, no purpura  Psychiatric: calm, following commands       Data Review:   Recent Labs     06/25/22  0338 06/26/22  0400 06/27/22  0401   WBC 18.4* 13.1* 12.9*   HGB 8.5* 8.5* 8.6*   HCT 26.2* 25.9* 26.4*    186 219     Recent Labs     06/25/22  0338 06/26/22  0400 06/27/22  0401   * 135* 133*   K 4.5 4.5 5.0   * 106 108*   CO2 17* 20* 18*   * 120* 122*   CREATININE 3.20* 3.10* 3.10*     No results for input(s): PH, PCO2, PO2, PCO2 in the last 72 hours.     Problem List:     Patient Active Problem List    Diagnosis Date Noted    Sepsis ([facility] 06/24/2022    COLLETTE (acute kidney injury) ([facility] 06/24/2022    Ankle wound 06/24/2022    COVID-19 06/24/2022    Diabetic ulcer of ankle ([address] 05/30/2022    DNR (do not resuscitate) 05/30/2022    Anemia 05/30/2022    Moderate protein-calorie malnutrition ([address] 03/30/2022    Bilateral leg edema 03/28/2022    Cor pulmonale (chronic) ([address] 03/28/2022    Pulmonary HTN ([facility] 03/28/2022    Cellulitis of right leg 03/28/2022    Acute on chronic right-sided congestive heart failure ([address] 03/28/2022    Localized edema 03/21/2022    Dilated cardiomyopathy ([address] 06/22/2021    Presence of Watchman left atrial appendage closure device 01/21/2020    Type 2 diabetes with nephropathy ([address] 11/06/2019    Persistent atrial fibrillation ([facility] 09/18/2019    TIA (transient ischemic attack) 09/18/2019    Numbness and tingling in right hand 09/18/2019    Dyspnea on exertion 04/08/2019    Hx of long term use of blood thinners 10/02/2018    Microhematuria 10/02/2018    History of BPH 10/02/2018    Anticoagulant long-term use 06/28/2017    Chest pain     History of kidney stones     Preop cardiovascular exam     Renal insufficiency     Abnormal EKG     Breast lump     Acute diastolic heart failure (HCC)     Automatic implantable cardioverter-defibrillator in situ 03/30/2016    Orthostatic hypotension 03/30/2016    Cardiomyopathy, ischemic 03/30/2016    S/P total knee arthroplasty 12/11/2015    Osteoarthritis 12/03/2015    Atrial flutter ([facility] 09/17/2013    LBBB (left bundle branch block) 09/17/2013    Chronic combined systolic and diastolic heart failure ([facility] 09/17/2013    CKD (chronic kidney disease) stage 3, GFR 30-59 ml/min ([facility]) 09/12/2011    Hypertension 09/10/2011    Longstanding persistent atrial fibrillation ([facility] 09/10/2011    Coronary artery disease 09/10/2011    Diabetes ([facility] 09/10/2011    Hypercholesteremia 09/10/2011    Pneumonia 09/10/2011       Impression:    Plan:   1. COLLETTE/CKD 3b likely multifactorial ATN in the setting of bacteremia sepsis and COVID 19 +   Baseline Cr of 1.60-2.2  No hydronephrosis on renal imaging  -d/c IVF with edema and hx of Pulm HTN, HFrEF, PO intake improving DW RN will give a dose of lasix today  - Cr has been holding in the low 3s, no indication for acute RRT  Trend renal indices with strict I&O  Check urine studies, light chains and SPEP      2. Leukocytosis/bacteremia- has been on cefepime, vancomycin per hosp    3. COVID 19 + on decadron, 2 L O2 NC    4. DM type II- SSI per hosp    5. HTN- ARB on hold, lasix dose today, can add norvasc as need     6. RLE medial malleolus wound- wound care     7.  Hx: CAD, A fib s/p- watchman, Pulm HTN, PPM/ICD, ICM

## 2022-06-28 PROBLEM — R78.81 BACTEREMIA: Status: ACTIVE | Noted: 2022-01-01

## 2022-06-28 NOTE — CONSULTS
Palliative Care    Patient: Diana Swanson MRN: [id number]  SSN: [id number]    YOB: 1934  Age: 80 y.o. Sex: male       Date of Request: 6/28/2022  Date of Consult:  6/28/2022  Reason for Consult:  goals of care  Requesting Physician: Dr Dora Martin     Assessment/Plan:     Principal Diagnosis:     Fatigue, Lethargy  R53.83    Additional Diagnoses:   · Debility, Unspecified  R53.81  · Failure to Thrive  R62.7  · Frailty  R54  · Pain, limb  M79.609  · Counseling, Encounter for Medical Advice  Z71.9  · Encounter for Palliative Care  Z51.5    Palliative Performance Scale (PPS):       Medical Decision Making:   Reviewed and summarized notes from admission to present   Discussed case with appropriate providers- ADDISON Goodman  Reviewed laboratory and x-ray data from admission to present     Pt lethargic, appears comfortable. No family at bedside. Pt easily awakened but appeared very fatigued through the visit. He endorses severe fatigue, and states he does not feel good. Pt also endorses right LE burning pain at present. Pt states he wants to go home, and someone is arranging this for today. He states he does want his infection treated. He seems mildly confused about the plan and could not give any more details. Assured him of our ongoing care. Discussed with Jhonatan Motley, [address] [place] [address] Avenue has been accepted by [facility] for ongoing antibiotics and wound care. She has discussed with pt, and is agreeable. If pt declines, hospice could be pursued but he would need 24 hour care in his home and IV antibiotics would need to be discontinued. [facility] seems like the best option at this point. We will follow loosely.     Will discuss findings with members of the interdisciplinary team.      Thank you for this referral.          .    Subjective:     History obtained from:  Patient, Care Provider and Chart    Chief Complaint: PNA, wound infection   History of Present Illness:  Mr Manuel Spear is an 81 yo male with PMH of atrial fibrillation, diastolic HF, atrial fibrillation, CAD, CKD, DM, HTN, HLD, and other conditions listed below, who presented to the ER on 6/24/2022 with c/o increasing dyspnea for several days, as well as right ankle/LE pain due to wounds. Pt recently tested positive for COVID 19, and finished quarantine a few days prior to coming to the ED. He was started on Levaquin for pneumonia on 6/23/2022. Pt denied fevers, n/v/d, chest pain. Work up in the ED revealed pneumonia, sepsis, exposed hardware in his RLE, and COLLETTE. Pt was admitted for further management. He was evaluated orthopedics, who recommended outpatient follow up with their ankle specialist.  Cultures positive for Proteus. ID was consulted, and has recommended removal of the hardware and IV antibiotics. Advance Directive: Yes       Code Status:  DNR            Health Care Power of : Yes - Copy of [address] on file. Past Medical History:   Diagnosis Date    A-fib [facility]) 12/17/2019    Abnormal EKG     Acute diastolic heart failure (HCC)     Atrial fibrillation ([address] 9/10/2011    Atrial flutter ([address] 9/2013    Biotronik biventricular implantable cardioverter defibrillator    Automatic implantable cardioverter-defibrillator in situ 3/30/2016    Breast lump     right- pt states bx was neg-- m. d. \"released\" him    CAD (coronary artery disease)     mi w angioplasty--1995---- mi then cabg--2008    Cardiomyopathy, ischemic 3/30/2016    Chronic    Chest pain     Chronic systolic heart failure ([address]     \"stable\" per cardiology office note (1/2015) and on lasix daily    CKD (chronic kidney disease) stage 3, GFR 30-59 ml/min ([address] 9/12/2011    Coronary artery disease 9/10/2011    Diabetes ([address] diag 2002    type2, oral meds, range 96-98, does not know last hgba1c, hypo s/s <70    Heart attack ([address] 1995/2008    History of kidney stones     none in years     Hypercholesteremia 9/10/2011    Hypercholesterolemia     no meds currently    Hypertension diag 1981    controlled with med    Kidney stone     LBBB (left bundle branch block) 9/17/2013    Moderate protein-calorie malnutrition ([address] 3/30/2022    Orthostatic hypotension 3/30/2016    Osteoarthritis     hands    Pneumonia 9/10/2011    Preop cardiovascular exam     Renal insufficiency     S/P total knee arthroplasty 12/11/2015    TIA (transient ischemic attack) 9/18/2019    Unstable angina [facility])       Past Surgical History:   Procedure Laterality Date    ANKLE FRACTURE SURGERY Right 5/11/2005    BREAST BIOPSY Right 7/2013    BUNIONECTOMY Left 2001    CARDIAC DEFIBRILLATOR PLACEMENT  9/17/2013    biotronik    CARPAL TUNNEL RELEASE Bilateral 1972    CATARACT REMOVAL  2006/2009    COLONOSCOPY  2003/2010    CORONARY ARTERY BYPASS GRAFT  12/19/2008    3 vessel    KNEE ARTHROSCOPY Left 9/17/2009    KNEE ARTHROSCOPY Right 4/30/2014    LITHOTRIPSY      x 5    ORTHOPEDIC SURGERY  2005    right hip    OTHER SURGICAL HISTORY  2002    hydrocele repair and circumcision    PACEMAKER      pacemaker- defib    TOTAL HIP ARTHROPLASTY Right 12/28/2005    TOTAL KNEE ARTHROPLASTY Right 12/2015    TOTAL KNEE ARTHROPLASTY Left 4/8/2010     Family History   Problem Relation Age of Onset    Heart Disease Sister     Heart Disease Brother     Other Neg Hx     Post-op Cognitive Dysfunction Neg Hx     Emergence Delirium Neg Hx     Post-op Nausea/Vomiting Neg Hx     Delayed Awakening Neg Hx     Pseudochol. Deficiency Neg Hx     Malig Hypertherm Neg Hx     Heart Disease Other     Asthma Father     Stroke Sister     Heart Disease Brother     Cancer Brother       Social History     Tobacco Use    Smoking status: Never Smoker    Smokeless tobacco: Never Used   Substance Use Topics    Alcohol use: No     Prior to Admission medications    Medication Sig Start Date End Date Taking?  Authorizing Provider   furosemide (LASIX) 40 MG tablet Take 1 tablet by mouth daily 6/3/22   Michael Yu MD   allopurinol (ZYLOPRIM) 100 MG tablet Take 100 mg by mouth daily    Historical Provider, MD   aspirin 81 MG EC tablet Take by mouth daily    Ar Automatic Reconciliation   atorvastatin (LIPITOR) 80 MG tablet Take 80 mg by mouth daily 10/28/21   Ar Automatic Reconciliation   canagliflozin (INVOKANA) 100 MG TABS tablet Take 100 mg by mouth every morning (before breakfast)  Patient not taking: Reported on 5/30/2022 1/26/22   Ar Automatic Reconciliation   diclofenac sodium (VOLTAREN) 1 % GEL Apply topically 4 times daily 7/22/20   Ar Automatic Reconciliation   losartan (COZAAR) 25 MG tablet Take 25 mg by mouth daily 4/6/22   Ar Automatic Reconciliation   metoprolol succinate (TOPROL XL) 50 MG extended release tablet Take 50 mg by mouth daily 2/23/21   Ar Automatic Reconciliation   montelukast (SINGULAIR) 10 MG tablet Take 10 mg by mouth daily 3/10/22   Ar Automatic Reconciliation   nateglinide (STARLIX) 120 MG tablet Take 120 mg by mouth 3 times daily (before meals)    Ar Automatic Reconciliation   nitroGLYCERIN (NITROSTAT) 0.4 MG SL tablet Place 1 sl under the tongue q 5 min prn cp, max 3 sl in a 15-min time period. Call 911 if no relief after the 3rd sl. 12/17/20   Ar Automatic Reconciliation   pramox-PE-glycerin-petrolatum 1-0.25-14.4-15 % cream Place rectally as needed 1/24/22   Ar Automatic Reconciliation       Allergies   Allergen Reactions    Iodine Swelling        Review of Systems:  A comprehensive review of systems was negative except for:   Constitutional: Positive for fatigue, weakness. Musculoskeletal: Positive for right leg pain      Objective:     Visit Vitals  BP (!) 152/80   Pulse 70   Temp 97.5 °F (36.4 °C) (Oral)   Resp 16   Ht 6' (1.829 m)   Wt 167 lb (75.8 kg)   SpO2 99%   BMI 22.65 kg/m²        Physical Exam:    General:  Lethargic. Debilitated. No acute distress. Eyes:  Conjunctivae/corneas clear    Nose: Nares normal. Septum midline.  O2 via NC   Neck: Supple, symmetrical, trachea midline   Lungs:   Decreased bilaterally, unlabored   Heart:  Regular rate and rhythm   Abdomen:   Soft, non-tender, non-distended   Extremities: Normal, atraumatic, no cyanosis or edema.  Wound to right LE with dressing in place   Skin: Skin color, texture, turgor normal.    Neurologic: Nonfocal   Psych: Alert and oriented      Assessment:     [unfilled]    Signed By: All Logan, YIMI - CNP     June 28, 2022

## 2022-06-28 NOTE — PROGRESS NOTES
[address]  Admission Date: 6/24/2022         [facility] Nephrology Progress Note: 6/28/2022    Follow-up for: COLLETTE/CKD 3b    The patient's chart is reviewed and the patient is discussed with the staff. Subjective:   Pt seen and examined in room son at the bedside pt reports exertional SOB, + cough and weakness.      ROS:  Gen - no fever, no chills, appetite better  CV - no chest pain, no palpitation  Lung - + exertional shortness of breath, + cough  Abd - no tenderness, no nausea/vomiting, no diarrhea  Ext - + edema    Current Facility-Administered Medications   Medication Dose Route Frequency    [START ON 6/29/2022] cefepime (MAXIPIME) 1000 mg IVPB minibag in NS  1,000 mg IntraVENous Q24H    losartan (COZAAR) tablet 50 mg  50 mg Oral Daily    insulin glargine (LANTUS) injection vial 10 Units  10 Units SubCUTAneous BID    insulin lispro (HUMALOG) injection vial 0-8 Units  0-8 Units SubCUTAneous TID WC    aspirin EC tablet 81 mg  81 mg Oral Daily    atorvastatin (LIPITOR) tablet 80 mg  80 mg Oral Nightly    metoprolol succinate (TOPROL XL) extended release tablet 50 mg  50 mg Oral Daily    montelukast (SINGULAIR) tablet 10 mg  10 mg Oral Daily    sodium chloride flush 0.9 % injection 5-40 mL  5-40 mL IntraVENous 2 times per day    sodium chloride flush 0.9 % injection 5-40 mL  5-40 mL IntraVENous PRN    0.9 % sodium chloride infusion   IntraVENous PRN    ondansetron (ZOFRAN-ODT) disintegrating tablet 4 mg  4 mg Oral Q8H PRN    Or    ondansetron (ZOFRAN) injection 4 mg  4 mg IntraVENous Q6H PRN    polyethylene glycol (GLYCOLAX) packet 17 g  17 g Oral Daily PRN    acetaminophen (TYLENOL) tablet 650 mg  650 mg Oral Q6H PRN    Or    acetaminophen (TYLENOL) suppository 650 mg  650 mg Rectal Q6H PRN    glucose chewable tablet 16 g  4 tablet Oral PRN    dextrose bolus 10% 125 mL  125 mL IntraVENous PRN    Or    dextrose bolus 10% 250 mL  250 mL IntraVENous PRN    glucagon injection 1 mg  1 mg IntraMUSCular PRN    dextrose 5 % solution  100 mL/hr IntraVENous PRN    insulin lispro (HUMALOG) injection vial 0-4 Units  0-4 Units SubCUTAneous Nightly    vancomycin (VANCOCIN) intermittent dosing (placeholder)   Other RX Placeholder         Objective:     Vitals:    06/27/22 1917 06/27/22 2338 06/28/22 0324 06/28/22 0722   BP: (!) 140/57 (!) 168/76 (!) 161/80 (!) 163/82   Pulse: 69 70 70 70   Resp: 14 14 14 16   Temp: 97.7 °F (36.5 °C) 97.9 °F (36.6 °C) 97.3 °F (36.3 °C) 98.1 °F (36.7 °C)   TempSrc:    Oral   SpO2: 99% 96% 95% 98%   Weight:       Height:         Intake and Output:   06/26 1901 - 06/28 0700  In: 8907 [P.O.:1076]  Out: 2705 [Urine:2705]  No intake/output data recorded. Physical Exam:   Constitutional:  the patient is well developed and in no acute distress, alert and oriented   HEENT:  Sclera clear, pupils equal, oral mucosa moist  Lungs: clear low lung volumes   Cardiovascular:  Regular rate, S1, S2, no Rub   Abd/GI: soft and non-tender; with positive bowel sounds. Ext: warm without cyanosis. There is +1 BLE edema. Skin:  no jaundice or rashes  Neuro: no gross neuro deficits   Psychiatric: Calm. LAB  Recent Labs     06/26/22  0400 06/27/22  0401 06/28/22  0340   WBC 13.1* 12.9* 14.9*   HGB 8.5* 8.6* 8.7*   HCT 25.9* 26.4* 27.2*    219 248     Recent Labs     06/26/22  0400 06/27/22  0401 06/27/22  1204 06/28/22  0340   * 133*  --  134*   K 4.5 5.0  --  4.8    108*  --  106   CO2 20* 18*  --  20*   * 122*  --  118*   CREATININE 3.10* 3.10*  --  2.80*   PHOS  --   --  3.5  --      No results for input(s): PH, PCO2, PO2, HCO3 in the last 72 hours. Assessment:  (Medical Decision Making)     COLLETTE/CKD 3b    Plan:  (Medical Decision Making)   1.  COLLETTE/CKD 3b likely multifactorial ATN in the setting of bacteremia sepsis and COVID 19 +   Baseline Cr of 1.60-2.2  No hydronephrosis on renal imaging  -d/c IVF with edema and hx of Pulm HTN, HFrEF, PO intake improving continue daily lasix   - Cr down some, Bun high pt on steroids, no indication for acute RRT  Trend renal indices with strict I&O  - urine studies, light chains and SPEP pending      2. Leukocytosis/bacteremia- has been on cefepime, vancomycin per hosp  vanc level 16.6 today      3. COVID 19 + on decadron, 2 L O2 NC     4. DM type II- SSI per hosp     5. HTN- back on ARB      6. RLE medial malleolus wound- wound care      7.  Hx: CAD, A fib s/p- watchman, Pulm HTN, PPM/ICD, ICM    Jesús William, APRN - [id number] [facility] [place]

## 2022-06-28 NOTE — CONSULTS
Infectious Disease Consult    Today's Date: 6/28/2022   Admit Date: 6/24/2022    Impression:   · Proteus bacteremia in the setting of chronic RLE ulcer/cellulitis with exposed orthopedic hardware  · COVID PNA- currently on Decadron, Cefepime/Vanc  · DM2  · COLLETTE on CKD    Plan:   · Continue current antibiotics while awaiting speciation and susceptibilities on blood culture. · Would at least recommend/consider removal of ortho hardware that is exposed at the wound as this is unlikely to heal and presents an entry for bacteria to enter and cause recurrent infections. · ID will continue to follow. Anti-infectives:   · Cefepime 6/24-  · Vanc 6/24-    Subjective:   Date of Consultation:  June 28, 2022  Referring Physician: Dr. Sheri Wood    Patient is a 80 y.o. male who presented to ED on 6/23 from facility with increased shortness of breath. He had recently been diagnosed with COVID pneumonia and had been started on Levaquin. He was recently admitted to [facility] from 5/30/22-6/3/22 for RLE cellulitis and medial malleolus ulcer and was discharged to rehab facility on PO antibiotics (doxy/keflex x 7 days). During that hospitalization he was evaluated by Vascular surgery who recommended no surgical intervention given poor wound healing potential. In ED he was found to have increased LE edema and CXR with small effusion. He was started on IV Cefepime and Vanc. He met sepsis criteria due to lactic acidosis and leukocytosis. Admission blood cultures with one set growing enterobacteriaceae and proteus on PCR. ID has been consulted for assistance. Ortho has been consulted for the wound and recommend medical management. Today the patient is seen resting in bed with son at bedside. He denies any n/v/d/f/c/s. He reports feeling unwell and generally fatigued.     Patient Active Problem List   Diagnosis    Hypertension    Dyspnea on exertion    Atrial flutter ([address]    Chest pain    Dilated cardiomyopathy ([address]    Longstanding persistent atrial fibrillation (HCC)    History of kidney stones    Persistent atrial fibrillation (HCC)    Automatic implantable cardioverter-defibrillator in situ    Coronary artery disease    CKD (chronic kidney disease) stage 3, GFR 30-59 ml/min (HCC)    Diabetes (HCC)    TIA (transient ischemic attack)    Numbness and tingling in right hand    Orthostatic hypotension    Hx of long term use of blood thinners    Microhematuria    Osteoarthritis    Presence of Watchman left atrial appendage closure device    Cardiomyopathy, ischemic    Preop cardiovascular exam    S/P total knee arthroplasty    Renal insufficiency    Abnormal EKG    Type 2 diabetes with nephropathy (HCC)    Anticoagulant long-term use    History of BPH    Hypercholesteremia    Pneumonia    LBBB (left bundle branch block)    Breast lump    Acute diastolic heart failure (HCC)    Localized edema    Bilateral leg edema    Cor pulmonale (chronic) (HCC)    Pulmonary HTN (HCC)    Cellulitis of right leg    Chronic combined systolic and diastolic heart failure (HCC)    Acute on chronic right-sided congestive heart failure (HCC)    Moderate protein-calorie malnutrition (HCC)    Diabetic ulcer of ankle ([address]    DNR (do not resuscitate)    Anemia    Sepsis ([address]    COLLETTE (acute kidney injury) ([address]    Ankle wound    COVID-19     Past Medical History:   Diagnosis Date    A-fib ([address] 12/17/2019    Abnormal EKG     Acute diastolic heart failure (HCC)     Atrial fibrillation (HCC) 9/10/2011    Atrial flutter ([address] 9/2013    Biotronik biventricular implantable cardioverter defibrillator    Automatic implantable cardioverter-defibrillator in situ 3/30/2016    Breast lump     right- pt states bx was neg-- m. d. \"released\" him    CAD (coronary artery disease)     mi w angioplasty--1995---- mi then cabg--2008    Cardiomyopathy, ischemic 3/30/2016    Chronic    Chest pain     Chronic systolic heart failure ([address] \"stable\" per cardiology office note (1/2015) and on lasix daily    CKD (chronic kidney disease) stage 3, GFR 30-59 ml/min ([address] 9/12/2011    Coronary artery disease 9/10/2011    Diabetes ([address] diag 2002    type2, oral meds, range 96-98, does not know last hgba1c, hypo s/s <70    Heart attack ([address] 1995/2008    History of kidney stones     none in years     Hypercholesteremia 9/10/2011    Hypercholesterolemia     no meds currently    Hypertension diag 1981    controlled with med    Kidney stone     LBBB (left bundle branch block) 9/17/2013    Moderate protein-calorie malnutrition ([address] 3/30/2022    Orthostatic hypotension 3/30/2016    Osteoarthritis     hands    Pneumonia 9/10/2011    Preop cardiovascular exam     Renal insufficiency     S/P total knee arthroplasty 12/11/2015    TIA (transient ischemic attack) 9/18/2019    Unstable angina (HCC)       Family History   Problem Relation Age of Onset    Heart Disease Sister     Heart Disease Brother     Other Neg Hx     Post-op Cognitive Dysfunction Neg Hx     Emergence Delirium Neg Hx     Post-op Nausea/Vomiting Neg Hx     Delayed Awakening Neg Hx     Pseudochol.  Deficiency Neg Hx     Malig Hypertherm Neg Hx     Heart Disease Other     Asthma Father     Stroke Sister     Heart Disease Brother     Cancer Brother       Social History     Tobacco Use    Smoking status: Never Smoker    Smokeless tobacco: Never Used   Substance Use Topics    Alcohol use: No     Past Surgical History:   Procedure Laterality Date    ANKLE FRACTURE SURGERY Right 5/11/2005    BREAST BIOPSY Right 7/2013    BUNIONECTOMY Left 2001    CARDIAC DEFIBRILLATOR PLACEMENT  9/17/2013    biotronik    CARPAL TUNNEL RELEASE Bilateral 1972    CATARACT REMOVAL  2006/2009    COLONOSCOPY  2003/2010    CORONARY ARTERY BYPASS GRAFT  12/19/2008    3 vessel    KNEE ARTHROSCOPY Left 9/17/2009    KNEE ARTHROSCOPY Right 4/30/2014    LITHOTRIPSY      x 5    ORTHOPEDIC SURGERY  2005    right hip    OTHER SURGICAL HISTORY  2002    hydrocele repair and circumcision    PACEMAKER      pacemaker- defib    TOTAL HIP ARTHROPLASTY Right 12/28/2005    TOTAL KNEE ARTHROPLASTY Right 12/2015    TOTAL KNEE ARTHROPLASTY Left 4/8/2010      Prior to Admission medications    Medication Sig Start Date End Date Taking? Authorizing Provider   furosemide (LASIX) 40 MG tablet Take 1 tablet by mouth daily 6/3/22   Daisy Arellano MD   allopurinol (ZYLOPRIM) 100 MG tablet Take 100 mg by mouth daily    Historical Provider, MD   aspirin 81 MG EC tablet Take by mouth daily    Ar Automatic Reconciliation   atorvastatin (LIPITOR) 80 MG tablet Take 80 mg by mouth daily 10/28/21   Ar Automatic Reconciliation   canagliflozin (INVOKANA) 100 MG TABS tablet Take 100 mg by mouth every morning (before breakfast)  Patient not taking: Reported on 5/30/2022 1/26/22   Ar Automatic Reconciliation   diclofenac sodium (VOLTAREN) 1 % GEL Apply topically 4 times daily 7/22/20   Ar Automatic Reconciliation   losartan (COZAAR) 25 MG tablet Take 25 mg by mouth daily 4/6/22   Ar Automatic Reconciliation   metoprolol succinate (TOPROL XL) 50 MG extended release tablet Take 50 mg by mouth daily 2/23/21   Ar Automatic Reconciliation   montelukast (SINGULAIR) 10 MG tablet Take 10 mg by mouth daily 3/10/22   Ar Automatic Reconciliation   nateglinide (STARLIX) 120 MG tablet Take 120 mg by mouth 3 times daily (before meals)    Ar Automatic Reconciliation   nitroGLYCERIN (NITROSTAT) 0.4 MG SL tablet Place 1 sl under the tongue q 5 min prn cp, max 3 sl in a 15-min time period.  Call 911 if no relief after the 3rd sl. 12/17/20   Ar Automatic Reconciliation   pramox-PE-glycerin-petrolatum 1-0.25-14.4-15 % cream Place rectally as needed 1/24/22   Ar Automatic Reconciliation       Allergies   Allergen Reactions    Iodine Swelling        Review of Systems:  As above    Objective:     Visit Vitals  BP (!) 163/82   Pulse 70   Temp 98.1 °F (36.7 °C) (Oral)   Resp 16   Ht 6' (1.829 m)   Wt 167 lb (75.8 kg)   SpO2 98%   BMI 22.65 kg/m²     Temp (24hrs), Av.1 °F (36.7 °C), Min:97.3 °F (36.3 °C), Max:98.8 °F (37.1 °C)       Lines:  Peripheral IV:       Physical Exam:    General:  Alert, cooperative, well noursished, well developed, appears stated age   Eyes:  Sclera anicteric. Pupils equally round and reactive to light. Mouth/Throat: Mucous membranes normal, oral pharynx clear   Neck: Supple   Lungs:   Diminished to auscultation bilaterally, good effort on O2 via NC   CV:  Regular rate and rhythm,no murmur, click, rub or gallop   Abdomen:   Soft, non-tender. bowel sounds normal. non-distended   Extremities: No cyanosis; RLE ankle wound with exposed ortho screw (photo included below); R great toe with blood blister   Skin: Skin color, texture, turgor normal. no acute rash; R heel ulcer well healing   Lymph nodes: Cervical and supraclavicular normal   Musculoskeletal: No swelling or deformity   Lines/Devices:  Intact, no erythema, drainage or tenderness   Psych: Alert and oriented, normal mood affect given the setting                Data Review:     CBC:  Recent Labs     22  0400 22  0401 22  0340   WBC 13.1* 12.9* 14.9*   HGB 8.5* 8.6* 8.7*   HCT 25.9* 26.4* 27.2*    219 248       BMP:  Recent Labs     22  0400 22  0401 22  0340   * 122* 118*   * 133* 134*   K 4.5 5.0 4.8    108* 106   CO2 20* 18* 20*       LFTS:  No results for input(s): ALT, TP, ALB in the last 72 hours.     Invalid input(s): TBILI, SGOT, AP      Imaging:   Reviewed    Signed By: Stacia Hauser APRN - CNP     2022

## 2022-06-28 NOTE — DISCHARGE SUMMARY
Date of Admission: 6/24/2022  Date of Discharge: 6/28/2022    Discharge Diagnoses:  Principal Problem:    Sepsis [facility])  Active Problems:    Diabetic ulcer of ankle ([facility]    DNR (do not resuscitate)    Anemia    COLLETTE (acute kidney injury) ([facility]    Ankle wound    COVID-19    Fatigue    Debility    Right leg pain    Frailty    Failure to thrive (child)    Bacteremia    Atrial flutter (HCC)    Automatic implantable cardioverter-defibrillator in situ    Coronary artery disease    CKD (chronic kidney disease) stage 3, GFR 30-59 ml/min (HCC)    Presence of Watchman left atrial appendage closure device    Type 2 diabetes with nephropathy (HCC)    Pneumonia    Acute diastolic heart failure ([facility]  Resolved Problems:    * No resolved hospital problems. *       Discharge Medications:  Current Discharge Medication List      START taking these medications    Details   insulin lispro (HUMALOG) 100 UNIT/ML SOLN injection vial Inject 0-8 Units into the skin 3 times daily (with meals) Sliding scale  Qty: 1 each, Refills: 0      cefepime (MAXIPIME) infusion Infuse 1,000 mg intravenously every 24 hours for 7 days Compound per protocol  Qty: 7 g, Refills: 0      vancomycin (VANCOCIN) infusion Infuse 750 mg intravenously every 24 hours for 7 days Compound per protocol.   Qty: 6 g, Refills: 0         CONTINUE these medications which have CHANGED    Details   losartan (COZAAR) 25 MG tablet Take 2 tablets by mouth daily  Qty: 30 tablet, Refills: 0         CONTINUE these medications which have NOT CHANGED    Details   furosemide (LASIX) 40 MG tablet Take 1 tablet by mouth daily  Qty: 1 tablet, Refills: 0      allopurinol (ZYLOPRIM) 100 MG tablet Take 100 mg by mouth daily      aspirin 81 MG EC tablet Take by mouth daily      atorvastatin (LIPITOR) 80 MG tablet Take 80 mg by mouth daily      diclofenac sodium (VOLTAREN) 1 % GEL Apply topically 4 times daily      metoprolol succinate (TOPROL XL) 50 MG extended release tablet Take 50 mg by mouth daily      montelukast (SINGULAIR) 10 MG tablet Take 10 mg by mouth daily      nitroGLYCERIN (NITROSTAT) 0.4 MG SL tablet Place 1 sl under the tongue q 5 min prn cp, max 3 sl in a 15-min time period. Call 911 if no relief after the 3rd sl.      pramox-PE-glycerin-petrolatum 1-0.25-14.4-15 % cream Place rectally as needed         STOP taking these medications       canagliflozin (INVOKANA) 100 MG TABS tablet Comments:   Reason for Stopping:         nateglinide (STARLIX) 120 MG tablet Comments:   Reason for Stopping:               Pending Labs:  None    Follow-up (including scheduled tests):  PMD    History of Present Illness:  80 y.o. male with medical history of DM2, AFIB s/p watchman with PPM/ICD, ICM, HTN, CAD, CKD3, pulmonary HTN, RLE cellulitis/ medial malleolus ulcer 5-30-22 and admitted 5-30-22 to 6-3-22 and discharged to rehab who is re-evaluated with wound issues and COVID pneumonia. He was seen by vascular surgery recent admit without need for intervention and completed antibiotics. While  Rehab the wound has worsened and hardware is now exposed. He has a pending appointment with orthopedics. At rehab he also was diagnosed with COVID 19 and completed 10 days isolation on 6-22-22. He was diagnosed with pneumonia 6-23-22 and started levaquin. Denies taking steroids. He became more short of breath with increased LE edema and was  Transferred to the ED. Past Medical History:  Past Medical History:   Diagnosis Date    A-fib ([facility] 12/17/2019    Abnormal EKG     Acute diastolic heart failure (HCC)     Atrial fibrillation ([facility] 9/10/2011    Atrial flutter ([facility] 9/2013    Biotronik biventricular implantable cardioverter defibrillator    Automatic implantable cardioverter-defibrillator in situ 3/30/2016    Breast lump     right- pt states bx was neg-- m. d. \"released\" him    CAD (coronary artery disease)     mi w angioplasty--1995---- mi then cabg--2008    Cardiomyopathy, ischemic 3/30/2016    Chronic  Chest pain     Chronic systolic heart failure ([facility]     \"stable\" per cardiology office note (1/2015) and on lasix daily    CKD (chronic kidney disease) stage 3, GFR 30-59 ml/min ([address] 9/12/2011    Coronary artery disease 9/10/2011    Diabetes ([facility] diag 2002    type2, oral meds, range 96-98, does not know last hgba1c, hypo s/s <70    Heart attack ([address] 1995/2008    History of kidney stones     none in years     Hypercholesteremia 9/10/2011    Hypercholesterolemia     no meds currently    Hypertension diag 1981    controlled with med    Kidney stone     LBBB (left bundle branch block) 9/17/2013    Moderate protein-calorie malnutrition ([address] 3/30/2022    Orthostatic hypotension 3/30/2016    Osteoarthritis     hands    Pneumonia 9/10/2011    Preop cardiovascular exam     Renal insufficiency     S/P total knee arthroplasty 12/11/2015    TIA (transient ischemic attack) 9/18/2019    Unstable angina (HCC)        Allergies: Allergies   Allergen Reactions    Iodine Swelling       Hospital Course:  CKD3, pulmonary HTN, RLE cellulitis/ medial malleolus ulcer 5-30-22 and admitted 5-30-22 to 6-3-22 and discharged to rehab who is re-evaluated with wound issues and COVID pneumonia     1. Sepsis likely in setting of bacteremia concerning of right lower extremity wound infection   -Started on cefepime and vancomycin  -Follow blood cultures - Enterobacteriaceae, Proteus   -Infectious disease consulted  -Follow repeated blood cultures-no growth to date  -Hemodynamically stable on discharge  -Continue [facility] at [facility]  -Monitor vancomycin levels     2. Covid infection and concerns of CAP  -Started on cefepime and vancomycin  -Started on Decadron  -No symptomatology  -Improved oxygen saturation     3.   Coronary artery disease  -Continued aspirin and statin     4.  Right lower extremity medial malleolus ulcer with concerns of wound infection and exposed hardware  -Wound care consulted  -On vancomycin and cefepime  -Ortho consulted, no acute interventions      7.   COLLETTE on CKD stage III  -Started on IVF  -Renal US without hydronephrosis  -Nephrology consulted  -Renal function improved       Procedures:  None    Discharge Day Information:  Follow with PMD    Diet: Cardiac    Activity: As tolerated    Discharge Physical Exam:  General: Alert, oriented, NAD  HEENT: NC/AT, EOM are intact  Neck: supple, no JVD  Cardiovascular: RRR, S1, S2, no murmurs  Respiratory: Lungs are clear, no wheezes or rales  Abdomen: Soft, NT, ND  Back: No CVA tenderness, no paraspinal tenderness  Extremities: Right lower extremity with clean dressing  Neuro: A&O, CN are intact, no focal deficits  Skin: no rash or ulcers  Psych: good mood and affect    Recent Results (from the past 24 hour(s))   Culture, Blood 1    Collection Time: 06/27/22  4:02 PM    Specimen: Blood   Result Value Ref Range    Special Requests RIGHT  Antecubital        Culture NO GROWTH AFTER 16 HOURS     POCT Glucose    Collection Time: 06/27/22  4:30 PM   Result Value Ref Range    POC Glucose 405 (H) 65 - 100 mg/dL    Performed by: Jill    POCT Glucose    Collection Time: 06/27/22  8:45 PM   Result Value Ref Range    POC Glucose 398 (H) 65 - 100 mg/dL    Performed by: Brook    CBC with Auto Differential    Collection Time: 06/28/22  3:40 AM   Result Value Ref Range    WBC 14.9 (H) 4.3 - 11.1 K/uL    RBC 2.78 (L) 4.23 - 5.6 M/uL    Hemoglobin 8.7 (L) 13.6 - 17.2 g/dL    Hematocrit 27.2 (L) 41.1 - 50.3 %    MCV 97.8 79.6 - 97.8 FL    MCH 31.3 26.1 - 32.9 PG    MCHC 32.0 31.4 - 35.0 g/dL    RDW 15.9 (H) 11.9 - 14.6 %    Platelets 719 369 - 551 K/uL    MPV 10.3 9.4 - 12.3 FL    nRBC 0.00 0.0 - 0.2 K/uL    Differential Type AUTOMATED      Seg Neutrophils 88 (H) 43 - 78 %    Lymphocytes 4 (L) 13 - 44 %    Monocytes 6 4.0 - 12.0 %    Eosinophils % 0 (L) 0.5 - 7.8 %    Basophils 0 0.0 - 2.0 %    Immature Granulocytes 2 0.0 - 5.0 %    Segs Absolute 13.1 (H) 1.7 - 8.2 K/UL    Absolute Lymph # 0.5 0.5 - 4.6 K/UL    Absolute Mono # 0.9 0.1 - 1.3 K/UL    Absolute Eos # 0.0 0.0 - 0.8 K/UL    Basophils Absolute 0.0 0.0 - 0.2 K/UL    Absolute Immature Granulocyte 0.3 0.0 - 0.5 K/UL   Basic Metabolic Panel    Collection Time: 06/28/22  3:40 AM   Result Value Ref Range    Sodium 134 (L) 138 - 145 mmol/L    Potassium 4.8 3.5 - 5.1 mmol/L    Chloride 106 98 - 107 mmol/L    CO2 20 (L) 21 - 32 mmol/L    Anion Gap 8 7 - 16 mmol/L    Glucose 363 (H) 65 - 100 mg/dL     (H) 8 - 23 MG/DL    CREATININE 2.80 (H) 0.8 - 1.5 MG/DL    GFR African American 28 (L) >60 ml/min/1.73m2    GFR Non- 23 (L) >60 ml/min/1.73m2    Calcium 9.0 8.3 - 10.4 MG/DL   Vancomycin Level, Random    Collection Time: 06/28/22  3:40 AM   Result Value Ref Range    Vancomycin Rm 16.6 UG/ML   POCT Glucose    Collection Time: 06/28/22  7:22 AM   Result Value Ref Range    POC Glucose 346 (H) 65 - 100 mg/dL    Performed by: Namrata Marie    POCT Glucose    Collection Time: 06/28/22 10:36 AM   Result Value Ref Range    POC Glucose 400 (H) 65 - 100 mg/dL    Performed by: Luisito    POCT Glucose    Collection Time: 06/28/22  1:35 PM   Result Value Ref Range    POC Glucose 390 (H) 65 - 100 mg/dL    Performed by: Haris Vargas Dr   Final Result   No hydronephrosis. Nonobstructing left renal collecting system stone   and bilateral simple renal cysts. XR CHEST PORTABLE   Final Result   1. Cardiomegaly, pulmonary edema and left pleural effusion. 2.  Left basilar atelectasis versus infiltrate.          XR CHEST PORTABLE   Final Result   Small left pleural effusion              Condition: Improved    Disposition: IRC    Consultants During This Hospitalization: Orthopedic surgery, nephrology, infectious disease            Spent 32 minutes on discharge services

## 2022-06-28 NOTE — PROGRESS NOTES
VANCO DAILY FOLLOW UP RENAL INSUFFICIENCY PATIENT   [address] [facility] Pharmacokinetic Monitoring Service - Vancomycin    Consulting Provider: Dr. Barby Hernández   Indication: HAP  Target Concentration: Random level ? 15 mg/L  Day of Therapy: 5 of 7  Additional Antimicrobials: cefepime    Pertinent Laboratory Values: Wt Readings from Last 1 Encounters:   06/24/22 167 lb (75.8 kg)     Temp Readings from Last 1 Encounters:   06/28/22 98.1 °F (36.7 °C) (Oral)     Recent Labs     06/26/22  0400 06/27/22  0401 06/28/22  0340   * 122* 118*   CREATININE 3.10* 3.10* 2.80*   WBC 13.1* 12.9* 14.9*     Lab Results   Component Value Date    VANCORANDOM 16.6 06/28/2022     MRSA Nasal Swab: not ordered. Order placed by pharmacy, awaiting result.     Assessment:  Date:  Dose/Freq Admin Times Level/Time:   6/24 2000 mg  1845    6/25 6/26   Rd @ 0400 = 16.3   6/27 750 mg x 1 0419    6/28   Rd @ 0340 = 16.6   6/29 750 mg x 1 (04)      Plan:  Concentration-guided dosing due to renal impairment  Random level is supra-therapeutic   Give vancomycin 750 mg x 1 in the morning  Repeat vancomycin concentrations will be ordered as clinically appropriate   Pharmacy will continue to monitor patient and adjust therapy as indicated    Thank you for the consult,  Carlo Barriga, [facility]

## 2022-06-28 NOTE — CARE COORDINATION
06/28/22 1546   Discharge Planning   Type of Residence Long-Term Acute Care   Living Arrangements Alone   Patient expects to be discharged to: [facility] [place] [facility]   [address] Provided? No   Condition of Participation: Discharge Planning   The Patient and/or Patient Representative was provided with a Choice of Provider? Patient   The Patient and/Or Patient Representative agree with the Discharge Plan? Yes   Freedom of Choice list was provided with basic dialogue that supports the patient's individualized plan of care/goals, treatment preferences, and shares the quality data associated with the providers? Yes   Patient is discharging to [facility]. Patient and patient's son is agreeable to this plan.

## 2022-06-28 NOTE — PROGRESS NOTES
TRANSFER - OUT REPORT:    Verbal report given to [address] [place] [address] Road on [place] [address]  being transferred to [facility]  for routine progression of patient care       Report consisted of patient's Situation, Background, Assessment and   Recommendations(SBAR). Information from the following report(s) Nurse Handoff Report was reviewed with the receiving nurse. Lines:   Peripheral IV 06/26/22 Right; Anterior Hand (Active)   Site Assessment Clean, dry & intact 06/28/22 0722   Line Status Intermittent infusions 06/28/22 0722   Line Care Connections checked and tightened 06/28/22 0722   Phlebitis Assessment No symptoms 06/28/22 0722   Infiltration Assessment 0 06/28/22 0722   Alcohol Cap Used Yes 06/28/22 0722   Dressing Status Clean, dry & intact 06/28/22 0722   Dressing Type Transparent 06/28/22 0722   Dressing Intervention Other (Comment) 06/28/22 0722        Opportunity for questions and clarification was provided.       Patient transported with: [facility]

## 2022-06-28 NOTE — PLAN OF CARE
Problem: Discharge Planning  Goal: Discharge to home or other facility with appropriate resources  Outcome: Progressing     Problem: Pain  Goal: Verbalizes/displays adequate comfort level or baseline comfort level  Outcome: Progressing     Problem: Safety - Adult  Goal: Free from fall injury  Outcome: Progressing  Flowsheets (Taken 6/28/2022 0722)  Free From Fall Injury: Instruct family/caregiver on patient safety     Problem: ABCDS Injury Assessment  Goal: Absence of physical injury  Outcome: Progressing  Flowsheets (Taken 6/28/2022 0722)  Absence of Physical Injury: Implement safety measures based on patient assessment     Problem: Metabolic/Fluid and Electrolytes - Adult  Goal: Electrolytes maintained within normal limits  Outcome: Progressing  Goal: Hemodynamic stability and optimal renal function maintained  Outcome: Progressing     Problem: Infection - Adult  Goal: Absence of infection at discharge  Outcome: Progressing  Flowsheets (Taken 6/28/2022 0722)  Absence of infection at discharge: Assess and monitor for signs and symptoms of infection     Problem: Neurosensory - Adult  Goal: Achieves maximal functionality and self care  Outcome: Progressing  Flowsheets (Taken 6/28/2022 8955)  Achieves maximal functionality and self care: Monitor swallowing and airway patency with patient fatigue and changes in neurological status     Problem: Chronic Conditions and Co-morbidities  Goal: Patient's chronic conditions and co-morbidity symptoms are monitored and maintained or improved  Outcome: Progressing

## 2022-07-13 NOTE — CARE COORDINATION
CTN outreach to patient's son regarding tentative discharge plan/date from [facility]. Son reports patient will possibly discharge 7/18/2022. Ctn will follow up with son and patient 7/19/2022.

## 2022-07-19 NOTE — CARE COORDINATION
CTN outreached to patient's son. Patient remains at [facility] with a possible discharge 7/22/2022. Will outreach per [facility] protocol.

## 2022-07-22 NOTE — TELEPHONE ENCOUNTER
[facility] d/c 7/22 dx resp failure, acute kidney, r ankle infection - using son's phone [phone] / VV requested by hospital as patient is not very ambulatory. Patient's son will be available to do the virtual with him.  Appt on 7/26

## 2022-07-22 NOTE — ED TRIAGE NOTES
Pt to er by ems, ems reports pt was discharged about 8 hrs ago, ems called out for AMS, pt alert and oriented.

## 2022-07-23 PROBLEM — N17.0 ACUTE KIDNEY INJURY (AKI) WITH ACUTE TUBULAR NECROSIS (ATN) (HCC): Status: ACTIVE | Noted: 2022-01-01

## 2022-07-23 NOTE — PROGRESS NOTES
Progress Note    Patient: Cristian Rashid MRN: [id number]  SSN: [id number]    YOB: 1934  Age: 80 y.o. Sex: male      Admit Date: 7/22/2022    LOS: 0 days     Assessment and Plan:   80 y.o. 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 Acute hypoxic respiratory failure likely multifactorial concerning of aspiration pneumonia as well as possible acute decompensated heart failure with decreased ejection fraction and recent COVID-19 pneumonia  -Oxygen therapy to maintain oxygen saturation more than 92%  -Start Unasyn  -Continue IV Lasix  -Strict input and output  -Daily weights  -Symptomatic management    2. Acute kidney injury on CKD stage IV  -Continue diuresis  -Avoid nephrotoxic agents  -Monitor renal function  -Nephrology recommendations appreciated    3. Acute metabolic encephalopathy  -CT of the brain without acute intracranial abnormality  -Monitor mental status  -Avoid sedatives  -Delirium precautions    4. Atrial fibrillation status post watchman  -Telemetry monitoring  -Continue metoprolol    5. Diabetes mellitus  -Insulin sliding scale  -Blood sugar checks before meals and at bedtime    6. Microcytic anemia  -No signs of active bleed  -Monitor H&H  -Decrease hemoglobin lower than 7    DVT prophylaxis with heparin subcu    Subjective:   80 y.o. 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 Patient seen and examined at bedside. This morning the patient somnolent but arousable. Platelets 198 370 - 754 K/uL    MPV 9.6 9.4 - 12.3 FL    nRBC 0.00 0.0 - 0.2 K/uL    Differential Type AUTOMATED      Seg Neutrophils 56 43 - 78 %    Lymphocytes 16 13 - 44 %    Monocytes 13 (H) 4.0 - 12.0 %    Eosinophils % 13 (H) 0.5 - 7.8 %    Basophils 1 0.0 - 2.0 %    Immature Granulocytes 1 0.0 - 5.0 %    Segs Absolute 4.2 1.7 - 8.2 K/UL    Absolute Lymph # 1.1 0.5 - 4.6 K/UL    Absolute Mono # 1.0 0.1 - 1.3 K/UL    Absolute Eos # 0.9 (H) 0.0 - 0.8 K/UL    Basophils Absolute 0.1 0.0 - 0.2 K/UL    Absolute Immature Granulocyte 0.1 0.0 - 0.5 K/UL   CMP    Collection Time: 07/22/22  7:55 PM   Result Value Ref Range    Sodium 133 (L) 136 - 145 mmol/L    Potassium 4.9 3.5 - 5.1 mmol/L    Chloride 102 98 - 107 mmol/L    CO2 22 21 - 32 mmol/L    Anion Gap 9 7 - 16 mmol/L    Glucose 189 (H) 65 - 100 mg/dL     (H) 8 - 23 MG/DL    Creatinine 4.40 (H) 0.8 - 1.5 MG/DL    GFR  16 (L) >60 ml/min/1.73m2    GFR Non- 14 (L) >60 ml/min/1.73m2    Calcium 9.7 8.3 - 10.4 MG/DL    Total Bilirubin 0.5 0.2 - 1.1 MG/DL    ALT 28 12 - 65 U/L    AST 39 (H) 15 - 37 U/L    Alk Phosphatase 244 (H) 50 - 136 U/L    Total Protein 7.4 6.3 - 8.2 g/dL    Albumin 2.7 (L) 3.2 - 4.6 g/dL    Globulin 4.7 (H) 2.3 - 3.5 g/dL    Albumin/Globulin Ratio 0.6 (L) 1.2 - 3.5     TSH with Reflex    Collection Time: 07/22/22  7:55 PM   Result Value Ref Range    TSH w Free Thyroid if Abnormal 17.50 (H) 0.358 - 3.740 UIU/ML   T4, Free    Collection Time: 07/22/22  7:55 PM   Result Value Ref Range    T4 Free 0.9 0.78 - 1.46 NG/DL   Lactic Acid    Collection Time: 07/23/22  2:46 AM   Result Value Ref Range    Lactic Acid, Plasma 1.1 0.4 - 2.0 MMOL/L   Troponin    Collection Time: 07/23/22  4:02 AM   Result Value Ref Range    Troponin, High Sensitivity 42.6 (H) 0 - 14 pg/mL   Troponin    Collection Time: 07/23/22  8:42 AM   Result Value Ref Range    Troponin, High Sensitivity 44.7 (H) 0 - 14 pg/mL   Lactic Acid    Collection Time: 07/23/22  8:42 AM   Result Value Ref Range    Lactic Acid, Plasma 1.5 0.4 - 2.0 MMOL/L      [unfilled]     Image:  I have personally reviewed patients imaging showing  US RETROPERITONEAL LIMITED   Final Result      No hydronephrosis. XR CHEST PORTABLE   Final Result      1. Persistent left lung base opacity, likely combination of small pleural   effusion and airspace disease (atelectasis or consolidation). 2. No significant change compared to prior. CT HEAD WO CONTRAST   Final Result      1. No CT evidence of acute intracranial process. XR CHEST PORTABLE   Final Result   Left pleural effusion and bilateral perihilar airspace opacities. Consider CHF.               Current Facility-Administered Medications   Medication Dose Route Frequency Provider Last Rate Last Admin    allopurinol (ZYLOPRIM) tablet 100 mg  100 mg Oral Daily Tawanna Mccoy MD   100 mg at 07/23/22 7916    aspirin EC tablet 81 mg  81 mg Oral Daily Tawanna Mccoy MD   81 mg at 07/23/22 9108    atorvastatin (LIPITOR) tablet 80 mg  80 mg Oral Daily Tawanna Mccoy MD   80 mg at 07/23/22 1561    metoprolol succinate (TOPROL XL) extended release tablet 50 mg  50 mg Oral Daily Tawanna Mccoy MD   50 mg at 07/23/22 0918    montelukast (SINGULAIR) tablet 10 mg  10 mg Oral Daily Tawanna Mccoy MD   10 mg at 07/23/22 6269    pramox-PE-glycerin-petrolatum cream   PeriANAL BID Tawanna Mccoy MD   1 Tube at 07/23/22 0920    sodium chloride flush 0.9 % injection 5-40 mL  5-40 mL IntraVENous 2 times per day Tawanna Mccoy MD   5 mL at 07/23/22 0919    sodium chloride flush 0.9 % injection 5-40 mL  5-40 mL IntraVENous PRN Tawanna Mccoy MD        0.9 % sodium chloride infusion   IntraVENous PRN Tawanna Mccoy MD        ondansetron (ZOFRAN-ODT) disintegrating tablet 4 mg  4 mg Oral Q8H PRN Tawanna Mccoy MD        Or    ondansetron [facility] injection 4 mg  4 mg IntraVENous Q6H PRN Tawanna Mccoy MD        polyethylene glycol (GLYCOLAX) packet 17 g  17 g Oral Daily PRN Soledad Manning MD        acetaminophen (TYLENOL) tablet 650 mg  650 mg Oral Q6H PRN Soledad Manning MD        Or    acetaminophen (TYLENOL) suppository 650 mg  650 mg Rectal Q6H PRN Soledad Manning MD        0.9 % sodium chloride infusion   IntraVENous Continuous Soledad Manning MD 75 mL/hr at 07/23/22 0330 New Bag at 07/23/22 0330    furosemide (LASIX) injection 40 mg  40 mg IntraVENous Daily Soledad Manning MD   40 mg at 07/23/22 0919    albumin human 25 % IV solution 50 g  50 g IntraVENous Q6H Soledad Manning  mL/hr at 07/23/22 1239 50 g at 07/23/22 1239    heparin (porcine) injection 5,000 Units  5,000 Units SubCUTAneous Q12H Soledad Manning MD   5,000 Units at 07/23/22 [facility] problems     Patient Active Problem List   Diagnosis    Hypertension    Dyspnea on exertion    Atrial flutter (HCC)    Chest pain    Dilated cardiomyopathy ([facility] [address]    Longstanding persistent atrial fibrillation ([facility] [address]    History of kidney stones    Persistent atrial fibrillation (HCC)    Automatic implantable cardioverter-defibrillator in situ    Coronary artery disease    CKD (chronic kidney disease) stage 3, GFR 30-59 ml/min (HCC)    Diabetes ([facility] [address]    TIA (transient ischemic attack)    Numbness and tingling in right hand    Orthostatic hypotension    Hx of long term use of blood thinners    Microhematuria    Osteoarthritis    Presence of Watchman left atrial appendage closure device    Cardiomyopathy, ischemic    Preop cardiovascular exam    S/P total knee arthroplasty    Renal insufficiency    Abnormal EKG    Type 2 diabetes with nephropathy (HCC)    Anticoagulant long-term use    History of BPH    Hypercholesteremia    Pneumonia    LBBB (left bundle branch block)    Breast lump    Acute diastolic heart failure (HCC)    Localized edema    Bilateral leg edema    Cor pulmonale (chronic) (HCC)    Pulmonary HTN (HCC)    Cellulitis of right leg    Chronic combined

## 2022-07-23 NOTE — PLAN OF CARE
Problem: Confusion  Goal: Confusion, delirium, dementia, or psychosis is improved or at baseline  Description: INTERVENTIONS:  1. Assess for possible contributors to thought disturbance, including medications, impaired vision or hearing, underlying metabolic abnormalities, dehydration, psychiatric diagnoses, and notify attending LIP  2. [place] high risk fall precautions, as indicated  3. Provide frequent short contacts to provide reality reorientation, refocusing and direction  4. Decrease environmental stimuli, including noise as appropriate  5. Monitor and intervene to maintain adequate nutrition, hydration, elimination, sleep and activity  6. If unable to ensure safety without constant attention obtain sitter and review sitter guidelines with assigned personnel  7. Initiate Psychosocial CNS and Spiritual Care consult, as indicated  Outcome: Not Progressing     Problem: Discharge Planning  Goal: Discharge to home or other facility with appropriate resources  Outcome: Progressing  Flowsheets (Taken 7/23/2022 0700)  Discharge to home or other facility with appropriate resources: Identify barriers to discharge with patient and caregiver     Problem: Skin/Tissue Integrity  Goal: Absence of new skin breakdown  Description: 1. Monitor for areas of redness and/or skin breakdown  2. Assess vascular access sites hourly  3. Every 4-6 hours minimum:  Change oxygen saturation probe site  4. Every 4-6 hours:  If on nasal continuous positive airway pressure, respiratory therapy assess nares and determine need for appliance change or resting period. Outcome: Progressing     Problem: Safety - Adult  Goal: Free from fall injury  Outcome: Progressing     Problem: ABCDS Injury Assessment  Goal: Absence of physical injury  Outcome: Progressing     Problem: Confusion  Goal: Confusion, delirium, dementia, or psychosis is improved or at baseline  Description: INTERVENTIONS:  1.  Assess for possible contributors to thought disturbance, including medications, impaired vision or hearing, underlying metabolic abnormalities, dehydration, psychiatric diagnoses, and notify attending LIP  2. [place] high risk fall precautions, as indicated  3. Provide frequent short contacts to provide reality reorientation, refocusing and direction  4. Decrease environmental stimuli, including noise as appropriate  5. Monitor and intervene to maintain adequate nutrition, hydration, elimination, sleep and activity  6. If unable to ensure safety without constant attention obtain sitter and review sitter guidelines with assigned personnel  7.  Initiate Psychosocial CNS and Spiritual Care consult, as indicated  Outcome: Not Progressing

## 2022-07-23 NOTE — PROGRESS NOTES
TRANSFER - IN REPORT:    Verbal report received from [facility]) on [place] Watson  being received from ED(unit) for routine progression of patient care      Report consisted of patients Situation, Background, Assessment and   Recommendations(SBAR). Information from the following report(s) Nurse Handoff Report, ED SBAR, Intake/Output, MAR, Recent Results, and Alarm Parameters was reviewed with the receiving nurse. Opportunity for questions and clarification was provided.       Assessment completed upon patients arrival to unit and care assume

## 2022-07-23 NOTE — ED NOTES
TRANSFER - OUT REPORT:    Verbal report given to [facility], RN on Josephine Walker  being transferred to [facility] for urgent transfer       Report consisted of patient's Situation, Background, Assessment and   Recommendations(SBAR). Information from the following report(s) Nurse Handoff Report, Index, ED Encounter Summary, ED SBAR, Adult Overview, MAR, and Recent Results was reviewed with the receiving nurse. Lines:   Peripheral IV 07/23/22 Right Antecubital (Active)        Opportunity for questions and clarification was provided.       Patient transported with:  Registered Nurse       Nerissa Villar RN  07/23/22 0566

## 2022-07-23 NOTE — H&P
Hospitalist History and Physical   Admit Date:  2022  7:51 PM   Name:  Viviana Dodson   Age:  80 y.o. Sex:  male  :  1934   MRN:  [id number]   Room:  ER/    Presenting Complaint: Altered Mental Status     Reason(s) for Admission: Acute kidney injury (COLLETTE) with acute tubular necrosis (ATN) ([facility] [N17.0]     History of Present Illness:   Viviana Dodson is a 80 y.o. 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 The pt was recently admitted to [facility] and then transferred to [facility]. While at [facility], he had Acute resp failure that was treated with aggressive pulm toilet and treatment of covid 19. The pt's resp status improved but then his renal fxn worsened. They attempted to give IVF but CHF and UOP became problematic with cardio renal syndrome a concern. The pt was continued on IV diuresis and clinically stabilized and was approved for DC today. The pt was dc'd and thenn the pt's son noted him with decreased UOP and increased confusion. The pt was brought to the ED where he had an eval and found to have Cr over 4.4. he also had urinary retention and paez cath was placed with good output. The pt is confused but can be oriented. He denies any CP. He admits to SOB with exertion. He denies any nausea or vomiting. He denies any HA, photophobia or diarrhea. He does have decreased UOP with retention. He is now seen for further eval at the [facility] request.      Review of Systems:  10 systems reviewed and negative except as noted in HPI. Assessment & Plan:     Principal Problem:    Acute kidney injury (COLLETTE) with acute tubular necrosis (ATN) (HCC)      IMP:    1.) S/P Recent Acute Hypoxic Resp Failure with REECE - - due to COLLETTE and dec UOP with CHF and recent Covid. F/U CXR, ? New event, Ck swallow. 2. ) COLLETTE/CKD Stage IV - ? Cardio renal, ? Obstructive, ck renal US, Ck FENa, nephrology eval. DC ARB. I have d/w Scotty Landau concerning need for ? HD. He states that his father may not want that. Palliative eval    3.) Recent Enetrobacter and Proteus Bact/Sepsis - due to R foot ulcer - s/p Tx    4.) R Foot Ulcer - with ? Infected HW, ? Definitive tx. Repeat cx's    5.) AMS - due to met encephalopathy, ck met panel,     6.) Aflutter/fib/s/p ICD - s/p watchman, rate ok, No anticoag.    7.) DM2 - SSI, ck A1c    8.) HTN - BP borderline, dc ARB    9.) Anemia- CD, Hb ok    10.) S/P Recent Serratia UTI - s/p Tx    11.) Mild Hyponatremia/Borderline HyperK+ - prob due to above and diuretic, f/u BMP    12.) s/p L effusion - s/p thora. F/U CXR    13.) Acute on C LVSD EF 48%/LVDD/CHF - see above, start diuresis with albumin. 14.) Severe PCM (Protein Amor Malnutrition)    15.) Debility      Plan:    Admit/See orders  Ck BC  Ck Urine  Ck renal US  Dc ARB  Ck CXR  Ck Renal US  Start IVF and IV lasix with albumin  Nephrology consult    Pt is critically ill and CC Time for the care of this pt was 45 min. Case d/w Scotty Landau. Palliative eval for End of life discussions. Dispo/Discharge Planning:     HH v STR    Diet: No diet orders on file  VTE ppx: LMWH  Code status: Prior    Hospital Problems:  Principal Problem:    Acute kidney injury (COLLETTE) with acute tubular necrosis (ATN) (HCC)  Resolved Problems:    * No resolved hospital problems. *       Past History:     Past Medical History:   Diagnosis Date    A-fib ([facility] 12/17/2019    Abnormal EKG     Acute diastolic heart failure (HCC)     Atrial fibrillation ([facility] 9/10/2011    Atrial flutter ([facility] 9/2013    Biotronik biventricular implantable cardioverter defibrillator    Automatic implantable cardioverter-defibrillator in situ 3/30/2016    Breast lump     right- pt states bx was neg-- m. d. \"released\" him    CAD (coronary artery disease)     mi w angioplasty--1995---- mi then cabg--2008    Cardiomyopathy, ischemic 3/30/2016    Chronic    Chest pain     Chronic systolic heart failure ([address]     \"stable\" per cardiology office note (1/2015) and on lasix daily    CKD (chronic kidney disease) stage 3, GFR 30-59 ml/min ([address] 9/12/2011    Coronary artery disease 9/10/2011    Diabetes ([address] diag 2002    type2, oral meds, range 96-98, does not know last hgba1c, hypo s/s <70    Heart attack ([address] 1995/2008    History of kidney stones     none in years     Hypercholesteremia 9/10/2011    Hypercholesterolemia     no meds currently    Hypertension diag 1981    controlled with med    Kidney stone     LBBB (left bundle branch block) 9/17/2013    Moderate protein-calorie malnutrition ([address] 3/30/2022    Orthostatic hypotension 3/30/2016    Osteoarthritis     hands    Pneumonia 9/10/2011    Preop cardiovascular exam     Renal insufficiency     S/P total knee arthroplasty 12/11/2015    TIA (transient ischemic attack) 9/18/2019    Unstable angina ([address]      Past Surgical History:   Procedure Laterality Date    ANKLE FRACTURE SURGERY Right 5/11/2005    BREAST BIOPSY Right 7/2013    BUNIONECTOMY Left 2001    CARDIAC DEFIBRILLATOR PLACEMENT  9/17/2013    biotronik    CARPAL TUNNEL RELEASE Bilateral 1972    CATARACT REMOVAL  2006/2009    COLONOSCOPY  2003/2010    CORONARY ARTERY BYPASS GRAFT  12/19/2008    3 vessel    KNEE ARTHROSCOPY Left 9/17/2009    KNEE ARTHROSCOPY Right 4/30/2014    LITHOTRIPSY      x 5    ORTHOPEDIC SURGERY  2005    right hip    OTHER SURGICAL HISTORY  2002    hydrocele repair and circumcision    PACEMAKER      pacemaker- defib    TOTAL HIP ARTHROPLASTY Right 12/28/2005    TOTAL KNEE ARTHROPLASTY Right 12/2015    TOTAL KNEE ARTHROPLASTY Left 4/8/2010      Social History     Tobacco Use    Smoking status: Never    Smokeless tobacco: Never   Substance Use Topics    Alcohol use: No      Social History     Substance and Sexual Activity   Drug Use No     Family History   Problem Relation Age of Onset    Heart Disease Sister     Heart Disease Brother     Other Neg Hx     Post-op Cognitive Dysfunction Neg Hx     Emergence Delirium Neg Hx     Post-op Nausea/Vomiting Neg Hx     Delayed Awakening Neg Hx     Pseudochol. Deficiency Neg Hx     Malig Hypertherm Neg Hx     Heart Disease Other     Asthma Father     Stroke Sister     Heart Disease Brother     Cancer Brother       Family history reviewed and negative except as noted above. Immunization History   Administered Date(s) Administered    COVID-19, PFIZER PURPLE top, DILUTE for use, (age 15 y+), 30mcg/0.3mL 01/21/2021, 02/10/2021, 11/19/2021    Influenza Virus Vaccine 10/15/2008, 10/15/2008    Influenza, High Dose (Fluzone 65 yrs and older) 10/26/2017, 10/25/2018    Influenza, Quadv, adjuvanted, 65 yrs +, IM, PF (Fluad) 11/11/2021    PPD Test 12/10/2015, 03/29/2022, 05/31/2022    Pneumococcal Conjugate 13-valent ([id number]) 07/21/2015    Pneumococcal Polysaccharide ([id number]) 01/01/2004    Tdap (Boostrix, Adacel) 10/30/2015    Tst, Unspecified Formulation 12/10/2015     Allergies   Allergen Reactions    Iodine Swelling    Iodides      Prior to Admit Medications:  Current Outpatient Medications   Medication Instructions    allopurinol (ZYLOPRIM) 100 mg, Oral, DAILY    aspirin 81 MG EC tablet Oral, DAILY    atorvastatin (LIPITOR) 80 mg, Oral, DAILY    diclofenac sodium (VOLTAREN) 1 % GEL Topical, 4 TIMES DAILY    furosemide (LASIX) 40 mg, Oral, DAILY    insulin lispro (HUMALOG) 0-8 Units, SubCUTAneous, 3 TIMES DAILY WITH MEALS, Sliding scale    losartan (COZAAR) 50 mg, Oral, DAILY    metoprolol succinate (TOPROL XL) 50 mg, Oral, DAILY    montelukast (SINGULAIR) 10 mg, Oral, DAILY    nitroGLYCERIN (NITROSTAT) 0.4 MG SL tablet Place 1 sl under the tongue q 5 min prn cp, max 3 sl in a 15-min time period.  Call 911 if no relief after the 3rd sl.    pramox-PE-glycerin-petrolatum 1-0.25-14.4-15 % cream Rectal, PRN         Objective:   Patient Vitals for the past 24 hrs:   Temp Pulse Resp BP SpO2   07/22/22 2254 -- -- -- -- 97 %   07/22/22 2115 -- -- -- (!) 127/59 96 %   07/22/22 2100 -- -- -- (!) 120/57 96 %   07/22/22 2039 -- -- -- -- 95 %   07/22/22 1951 97.8 °F (36.6 °C) 70 14 (!) 122/57 97 %       Oxygen Therapy  SpO2: 97 %    Estimated body mass index is 22.65 kg/m² as calculated from the following:    Height as of 6/26/22: 6' (1.829 m). Weight as of 6/24/22: 167 lb (75.8 kg). No intake or output data in the 24 hours ending 07/23/22 0012      Physical Exam:  Pt seen and evaluated. Blood pressure (!) 127/59, pulse 70, temperature 97.8 °F (36.6 °C), temperature source Oral, resp. rate 14, SpO2 97 %. General:    Cachectic appearing with increased WOB   Head:  Normocephalic, atraumatic  Eyes:  Sclerae appear normal.  Pupils equally round. ENT:  Nares appear normal, no drainage. Moist oral mucosa  Neck:  No restricted ROM. Trachea midline   CV:   RRR. No m/r/g.  + jugular venous distension. Lungs:   Diminished with scat rhonchi and basilar rales. + Increased WOB  Abdomen: Bowel sounds present. Soft, nontender, nondistended. Extremities: No cyanosis or clubbing.  + edema, + R Ankle ulcer with dsg  Skin:     + R foot ulcer with dsg   Neuro:  CN II-XII grossly intact. Sensation intact.  Confused, ment dim, cog int  Psych:  Anxious      I have reviewed ordered lab tests and independently visualized imaging below:    Last 24hr Labs:  Recent Results (from the past 24 hour(s))   Urinalysis    Collection Time: 07/22/22  7:54 PM   Result Value Ref Range    Color, UA YELLOW/STRAW      Appearance CLOUDY      Specific [place], UA 1.016 1.001 - 1.023      pH, Urine 5.0 5.0 - 9.0      Protein, UA 30 (A) NEG mg/dL    Glucose, UA Negative mg/dL    Ketones, Urine TRACE (A) NEG mg/dL    Bilirubin Urine Negative NEG      Blood, Urine MODERATE (A) NEG      Urobilinogen, Urine 0.2 0.2 - 1.0 EU/dL    Nitrite, Urine Negative NEG      Leukocyte Esterase, Urine SMALL (A) NEG WBC, UA 3-5 0 /hpf    RBC, UA 10-20 0 /hpf    BACTERIA, URINE 0 0 /hpf    Casts HYALINE 0 /lpf   CBC with Auto Differential    Collection Time: 07/22/22  7:55 PM   Result Value Ref Range    WBC 7.3 4.3 - 11.1 K/uL    RBC 2.82 (L) 4.23 - 5.6 M/uL    Hemoglobin 9.0 (L) 13.6 - 17.2 g/dL    Hematocrit 28.2 (L) 41.1 - 50.3 %    .0 (H) 79.6 - 97.8 FL    MCH 31.9 26.1 - 32.9 PG    MCHC 31.9 31.4 - 35.0 g/dL    RDW 17.1 (H) 11.9 - 14.6 %    Platelets 552 182 - 533 K/uL    MPV 9.6 9.4 - 12.3 FL    nRBC 0.00 0.0 - 0.2 K/uL    Differential Type AUTOMATED      Seg Neutrophils 56 43 - 78 %    Lymphocytes 16 13 - 44 %    Monocytes 13 (H) 4.0 - 12.0 %    Eosinophils % 13 (H) 0.5 - 7.8 %    Basophils 1 0.0 - 2.0 %    Immature Granulocytes 1 0.0 - 5.0 %    Segs Absolute 4.2 1.7 - 8.2 K/UL    Absolute Lymph # 1.1 0.5 - 4.6 K/UL    Absolute Mono # 1.0 0.1 - 1.3 K/UL    Absolute Eos # 0.9 (H) 0.0 - 0.8 K/UL    Basophils Absolute 0.1 0.0 - 0.2 K/UL    Absolute Immature Granulocyte 0.1 0.0 - 0.5 K/UL   CMP    Collection Time: 07/22/22  7:55 PM   Result Value Ref Range    Sodium 133 (L) 136 - 145 mmol/L    Potassium 4.9 3.5 - 5.1 mmol/L    Chloride 102 98 - 107 mmol/L    CO2 22 21 - 32 mmol/L    Anion Gap 9 7 - 16 mmol/L    Glucose 189 (H) 65 - 100 mg/dL     (H) 8 - 23 MG/DL    Creatinine 4.40 (H) 0.8 - 1.5 MG/DL    GFR  16 (L) >60 ml/min/1.73m2    GFR Non- 14 (L) >60 ml/min/1.73m2    Calcium 9.7 8.3 - 10.4 MG/DL    Total Bilirubin 0.5 0.2 - 1.1 MG/DL    ALT 28 12 - 65 U/L    AST 39 (H) 15 - 37 U/L    Alk Phosphatase 244 (H) 50 - 136 U/L    Total Protein 7.4 6.3 - 8.2 g/dL    Albumin 2.7 (L) 3.2 - 4.6 g/dL    Globulin 4.7 (H) 2.3 - 3.5 g/dL    Albumin/Globulin Ratio 0.6 (L) 1.2 - 3.5     TSH with Reflex    Collection Time: 07/22/22  7:55 PM   Result Value Ref Range    TSH w Free Thyroid if Abnormal 17.50 (H) 0.358 - 3.740 UIU/ML   T4, Free    Collection Time: 07/22/22  7:55 PM   Result Value Ref Range    T4 Free 0.9 0.78 - 1.46 NG/DL       Other Studies:  CT HEAD WO CONTRAST    Result Date: 7/22/2022  Noncontrast CT of the brain. COMPARISON: December 16, 2021 INDICATION: Altered mental status TECHNIQUE: Contiguous axial images were obtained from the skull base through the vertex without IV contrast. Radiation dose reduction techniques were used for this study:  Our CT scanners use one or all of the following: Automated exposure control, adjustment of the mA and/or kVp according to patient's size, iterative reconstruction. FINDINGS: There is no acute intracranial hemorrhage or evidence for acute territorial infarction. There is no mass effect, midline shift or hydrocephalus. There is no extra-axial fluid collection. The cerebellum and brainstem are grossly unremarkable. Periventricular diffuse hypodensities are nonspecific and likely secondary to chronic small vessel changes. Mild generalized cerebral volume loss, age-related. Included globes appear intact. The paranasal sinuses and the mastoid air cells are aerated. There is no skull fracture. 1. No CT evidence of acute intracranial process. XR CHEST PORTABLE    Result Date: 7/22/2022  CHEST X-RAY, 1 VIEW INDICATION: Reason for exam:->Altered Mental Status COMPARISON: 7/16/2022 TECHNIQUE: Single AP view of the chest was obtained. FINDINGS: Lungs: Perihilar airspace opacities. Cardiomediastinal silhouette: Enlarged. Cardiac pacer/ICD leads present. Aortic atherosclerotic calcifications. Pleura: Left pleural effusion again demonstrated. . Osseous structures: Normal.     Left pleural effusion and bilateral perihilar airspace opacities. Consider CHF. Echocardiogram:  03/28/22    TRANSTHORACIC ECHOCARDIOGRAM (TTE) COMPLETE (CONTRAST/BUBBLE/3D PRN) 03/29/2022 12:41 PM (Final)    Narrative  This is a summary report. The complete report is available in the patient's medical record.  If you cannot access the medical record, please contact the sending organization for a detailed fax or copy. Left Ventricle: Left ventricle size is normal. Mildly increased wall thickness. Mild global hypokinesis present. Septal motion consistent with pacemaker activation. Mildly reduced left ventricular systolic function. EF by 2D Simpsons Biplane is 48%. Abnormal diastolic function. Right Ventricle: Right ventricle size is normal. Low normal systolic function. Lead present in the right ventricle. Aortic Valve: Moderately thickened cusps. Trace transvalvular regurgitation. No significant stenosis. AV mean gradient is 6 mmHg. AV peak gradient is 10 mmHg. Mitral Valve: Mildly thickened leaflet. Mild transvalvular regurgitation. Tricuspid Valve: Moderate transvalvular regurgitation. RVSP is 33 mmHg. IVC diameter is greater than 21 mm and decreases greater than 50% during inspiration; therefore the estimated right atrial pressure is intermediate (~8 mmHg). Contrast used: Definity. Signed by: Lavinia Parra MD on 3/29/2022 12:41 PM      Meds previously ordered:  Orders Placed This Encounter   Medications    albumin human 25 % IV solution 50 g       Blood Product Consent:  I have discussed with the son the rationale for blood component transfusion; its benefits in treating or preventing fatigue, organ damage, or death; and its risk which includes mild transfusion reactions, rare risk of blood borne infection, or more serious but rare reactions. I have discussed the alternatives to transfusion, including the risk and consequences of not receiving transfusion. The son had an opportunity to ask questions and had agreed to proceed with transfusion of blood components. Signed:      Chepe Kim. Bennie Carmichael MD, [address]  Internal Medicine - Hospitalist      Part of this note may have been written by using a voice dictation software. The note has been proof read but may still contain some grammatical/other typographical errors.

## 2022-07-23 NOTE — CONSULTS
Nephrology consult    Admission Date:  7/22/2022    Admission Diagnosis  Delirium [R41.0]  Peripheral edema [R60.9]  Acute renal failure, unspecified acute renal failure type ([facility] [N17.9]  Congestive heart failure, unspecified HF chronicity, unspecified heart failure type ([address] [I50.9]  Acute kidney injury (COLLETTE) with acute tubular necrosis (ATN) ([facility] [N17.0]    We are asked by Dr. Kary Michelle to see this patient of COLLETTE on CKD    History of Present Illness:    Mr. Kimberly Guillen is an 79 yo M known to our practice from his multiple prior admission for heart failure exacerbations often complicated by COLLETTE. He was discharged from [facility] yesterday and per his son, started having hallucinations at home and was brought back to the [facility] ER. Patient renal function had been slowly declining prior to discharge and diuretics had been held. Cr noted to be up to 4.4 on admission. He was started on IVF and albumin. Repeat labs are pending this AM.  On my interview, patient is laying in bed, resting comfortably. He denies any shortness of breath or pain, but does have persistent lower extremity edema. Past Medical History:   Diagnosis Date    A-fib ([facility] 12/17/2019    Abnormal EKG     Acute diastolic heart failure (HCC)     Atrial fibrillation ([address] 9/10/2011    Atrial flutter ([address] 9/2013    Biotronik biventricular implantable cardioverter defibrillator    Automatic implantable cardioverter-defibrillator in situ 3/30/2016    Breast lump     right- pt states bx was neg-- m. d. \"released\" him    CAD (coronary artery disease)     mi w angioplasty--1995---- mi then cabg--2008    Cardiomyopathy, ischemic 3/30/2016    Chronic    Chest pain     Chronic systolic heart failure ([address]     \"stable\" per cardiology office note (1/2015) and on lasix daily    CKD (chronic kidney disease) stage 3, GFR 30-59 ml/min ([address] 9/12/2011    Coronary artery disease 9/10/2011    Diabetes ([address] diag 2002    type2, oral meds, range 96-98, does not know last hgba1c, hypo s/s <70    Heart attack ([facility] 1995/2008    History of kidney stones     none in years     Hypercholesteremia 9/10/2011    Hypercholesterolemia     no meds currently    Hypertension diag 1981    controlled with med    Kidney stone     LBBB (left bundle branch block) 9/17/2013    Moderate protein-calorie malnutrition (HCC) 3/30/2022    Orthostatic hypotension 3/30/2016    Osteoarthritis     hands    Pneumonia 9/10/2011    Preop cardiovascular exam     Renal insufficiency     S/P total knee arthroplasty 12/11/2015    TIA (transient ischemic attack) 9/18/2019    Unstable angina (HCC)       Past Surgical History:   Procedure Laterality Date    ANKLE FRACTURE SURGERY Right 5/11/2005    BREAST BIOPSY Right 7/2013    BUNIONECTOMY Left 2001    CARDIAC DEFIBRILLATOR PLACEMENT  9/17/2013    biotronik    CARPAL TUNNEL RELEASE Bilateral 1972    CATARACT REMOVAL  2006/2009    COLONOSCOPY  2003/2010    CORONARY ARTERY BYPASS GRAFT  12/19/2008    3 vessel    KNEE ARTHROSCOPY Left 9/17/2009    KNEE ARTHROSCOPY Right 4/30/2014    LITHOTRIPSY      x 5    ORTHOPEDIC SURGERY  2005    right hip    OTHER SURGICAL HISTORY  2002    hydrocele repair and circumcision    PACEMAKER      pacemaker- defib    TOTAL HIP ARTHROPLASTY Right 12/28/2005    TOTAL KNEE ARTHROPLASTY Right 12/2015    TOTAL KNEE ARTHROPLASTY Left 4/8/2010      Current Facility-Administered Medications   Medication Dose Route Frequency    allopurinol (ZYLOPRIM) tablet 100 mg  100 mg Oral Daily    aspirin EC tablet 81 mg  81 mg Oral Daily    atorvastatin (LIPITOR) tablet 80 mg  80 mg Oral Daily    metoprolol succinate (TOPROL XL) extended release tablet 50 mg  50 mg Oral Daily    montelukast (SINGULAIR) tablet 10 mg  10 mg Oral Daily    pramox-PE-glycerin-petrolatum cream   PeriANAL BID    sodium chloride flush 0.9 % injection 5-40 mL  5-40 mL IntraVENous 2 times per day    sodium chloride flush 0.9 % injection 5-40 mL  5-40 mL IntraVENous PRN 0.9 % sodium chloride infusion   IntraVENous PRN    ondansetron (ZOFRAN-ODT) disintegrating tablet 4 mg  4 mg Oral Q8H PRN    Or    ondansetron (ZOFRAN) injection 4 mg  4 mg IntraVENous Q6H PRN    polyethylene glycol (GLYCOLAX) packet 17 g  17 g Oral Daily PRN    acetaminophen (TYLENOL) tablet 650 mg  650 mg Oral Q6H PRN    Or    acetaminophen (TYLENOL) suppository 650 mg  650 mg Rectal Q6H PRN    0.9 % sodium chloride infusion   IntraVENous Continuous    furosemide (LASIX) injection 40 mg  40 mg IntraVENous Daily    albumin human 25 % IV solution 50 g  50 g IntraVENous Q6H    heparin (porcine) injection 5,000 Units  5,000 Units SubCUTAneous Q12H     Allergies   Allergen Reactions    Iodine Swelling    Iodides       Social History     Tobacco Use    Smoking status: Never    Smokeless tobacco: Never   Substance Use Topics    Alcohol use: No      Family History   Problem Relation Age of Onset    Heart Disease Sister     Heart Disease Brother     Other Neg Hx     Post-op Cognitive Dysfunction Neg Hx     Emergence Delirium Neg Hx     Post-op Nausea/Vomiting Neg Hx     Delayed Awakening Neg Hx     Pseudochol. Deficiency Neg Hx     Malig Hypertherm Neg Hx     Heart Disease Other     Asthma Father     Stroke Sister     Heart Disease Brother     Cancer Brother         Review of Systems  Per HPI    Objective:     Vitals:    07/23/22 0430 07/23/22 0618 07/23/22 0800 07/23/22 0900   BP: (!) 120/53 (!) 120/56 (!) 108/54 123/60   Pulse: 69 69 69 69   Resp:  16 13 21   Temp:  97.7 °F (36.5 °C)     TempSrc:  Axillary     SpO2: 93% 95% 95% 93%     No intake or output data in the 24 hours ending 07/23/22 0921    Physical Exam  GEN :Awake and alert, appears fatigued.   HEENT: anicteric sclerae, Mucous membranes are moist.  Neck - supple without JVD  CV - regular rate and rhythm, no murmur, no rub  Lung - clear bilaterally, lungs expand symmetrically  Abd - soft, nontender, bowel sounds present  Ext - no clubbing, no cyanosis, 2+ edema  Neurologic - nonfocal  Genitourinary - bladder nonpalpable  Skin - no rashes, no purpura  Psychiatric: Normal mood and affect. Data Review:   Recent Labs     07/22/22 1955   WBC 7.3   HGB 9.0*   HCT 28.2*        Recent Labs     07/21/22  1004 07/22/22 1955   * 133*   K 4.5 4.9    102   CO2 24 22   * 114*   CREATININE 4.00* 4.40*     No results for input(s): PH, PCO2, PO2, PCO2 in the last 72 hours.     Problem List:     Patient Active Problem List    Diagnosis Date Noted    Acute kidney injury (COLLETTE) with acute tubular necrosis (ATN) ([address] 07/23/2022    Bacteremia 06/28/2022    Fatigue     Debility     Right leg pain     Frailty     Failure to thrive (child)     Sepsis ([address] 06/24/2022    COLLETTE (acute kidney injury) ([address] 06/24/2022    Ankle wound 06/24/2022    COVID-19 06/24/2022    Diabetic ulcer of ankle ([address] 05/30/2022    DNR (do not resuscitate) 05/30/2022    Anemia 05/30/2022    Moderate protein-calorie malnutrition ([address] 03/30/2022    Bilateral leg edema 03/28/2022    Cor pulmonale (chronic) ([address] 03/28/2022    Pulmonary HTN ([address] 03/28/2022    Cellulitis of right leg 03/28/2022    Acute on chronic right-sided congestive heart failure ([address] 03/28/2022    Localized edema 03/21/2022    Dilated cardiomyopathy ([address] 06/22/2021    Presence of Watchman left atrial appendage closure device 01/21/2020    Type 2 diabetes with nephropathy ([address] 11/06/2019    Persistent atrial fibrillation ([address] 09/18/2019    TIA (transient ischemic attack) 09/18/2019    Numbness and tingling in right hand 09/18/2019    Dyspnea on exertion 04/08/2019    Hx of long term use of blood thinners 10/02/2018    Microhematuria 10/02/2018    History of BPH 10/02/2018    Anticoagulant long-term use 06/28/2017    Chest pain     History of kidney stones     Preop cardiovascular exam     Renal insufficiency     Abnormal EKG     Breast lump     Acute diastolic heart failure (HCC)     Automatic implantable cardioverter-defibrillator in situ 03/30/2016    Orthostatic hypotension 03/30/2016    Cardiomyopathy, ischemic 03/30/2016    S/P total knee arthroplasty 12/11/2015    Osteoarthritis 12/03/2015    Atrial flutter ([facility] 09/17/2013    LBBB (left bundle branch block) 09/17/2013    Chronic combined systolic and diastolic heart failure ([facility] 09/17/2013    CKD (chronic kidney disease) stage 3, GFR 30-59 ml/min ([facility]) 09/12/2011    Hypertension 09/10/2011    Longstanding persistent atrial fibrillation ([facility] 09/10/2011    Coronary artery disease 09/10/2011    Diabetes ([facility] 09/10/2011    Hypercholesteremia 09/10/2011    Pneumonia 09/10/2011       Impression:    Plan:     1) COLLETTE on CKD IV-  Patient has had a longstanding wet/dry problem and has had several admission since my initial encounter with him in March. At that time, we had discussed the possibility of dialysis, and patient was not interested. I discussed dialysis with his son again this morning, and that dialysis is unlikely to improve his quality of life or meaningfully extend his life. he confirmed patient would not want dialysis. Treatment options are extremely limited at this point and I think palliation is appropriate at this point. Son stated understanding and was in agreement. Discussed with hospitalist, who has consulted palliative care.

## 2022-07-23 NOTE — ED NOTES
Pt sts he does not drink, son sts pt does not drink alcohol, md gave v/o for head 121 Abi Prescott RN  07/22/22 2126

## 2022-07-23 NOTE — PROGRESS NOTES
Speech-Language Pathology    Speech therapy consulted to eval/treat. When engaged on this date, patient declined all PO presentations; evaluation unable to be performed. Speech therapy will follow up and perform assessment, as patient is able to participate.      Waleska Trent M.S., [id number]-[id number]

## 2022-07-23 NOTE — ED PROVIDER NOTES
home today, seeing things are not there, hearing music coming out of o'clock. Increasing confusion. Symptoms moderate. Intermittent. No exacerbating leaving factors or other modifiers. Has a history of steadily decreasing renal function and is currently on Lasix. Has a history of heart failure as well with moderate peripheral edema. No recent fever. No difficulty breathing. No chest pain. No new skin rash. Has chronic shallow ulcer x2 on the right lower extremity. Review of Systems   All other systems reviewed and are negative. Past Medical History:   Diagnosis Date    A-fib ([address] 12/17/2019    Abnormal EKG     Acute diastolic heart failure (HCC)     Atrial fibrillation ([address] 9/10/2011    Atrial flutter ([address] 9/2013    Biotronik biventricular implantable cardioverter defibrillator    Automatic implantable cardioverter-defibrillator in situ 3/30/2016    Breast lump     right- pt states bx was neg-- m. d. \"released\" him    CAD (coronary artery disease)     mi w angioplasty--1995---- mi then cabg--2008    Cardiomyopathy, ischemic 3/30/2016    Chronic    Chest pain     Chronic systolic heart failure ([address]     \"stable\" per cardiology office note (1/2015) and on lasix daily    CKD (chronic kidney disease) stage 3, GFR 30-59 ml/min ([address] 9/12/2011    Coronary artery disease 9/10/2011    Diabetes ([address] diag 2002    type2, oral meds, range 96-98, does not know last hgba1c, hypo s/s <70    Heart attack ([address] 1995/2008    History of kidney stones     none in years     Hypercholesteremia 9/10/2011    Hypercholesterolemia     no meds currently    Hypertension diag 1981    controlled with med    Kidney stone     LBBB (left bundle branch block) 9/17/2013    Moderate protein-calorie malnutrition ([address] 3/30/2022    Orthostatic hypotension 3/30/2016    Osteoarthritis     hands    Pneumonia 9/10/2011    Preop cardiovascular exam     Renal insufficiency     S/P total knee arthroplasty 12/11/2015    TIA (transient ischemic attack) 9/18/2019    Unstable angina [facility])         Past Surgical History:   Procedure Laterality Date    ANKLE FRACTURE SURGERY Right 5/11/2005    BREAST BIOPSY Right 7/2013    BUNIONECTOMY Left 2001    CARDIAC DEFIBRILLATOR PLACEMENT  9/17/2013    biotronik    CARPAL TUNNEL RELEASE Bilateral 1972    CATARACT REMOVAL  2006/2009    COLONOSCOPY  2003/2010    CORONARY ARTERY BYPASS GRAFT  12/19/2008    3 vessel    KNEE ARTHROSCOPY Left 9/17/2009    KNEE ARTHROSCOPY Right 4/30/2014    LITHOTRIPSY      x 5    ORTHOPEDIC SURGERY  2005    right hip    OTHER SURGICAL HISTORY  2002    hydrocele repair and circumcision    PACEMAKER      pacemaker- defib    TOTAL HIP ARTHROPLASTY Right 12/28/2005    TOTAL KNEE ARTHROPLASTY Right 12/2015    TOTAL KNEE ARTHROPLASTY Left 4/8/2010        Family History   Problem Relation Age of Onset    Heart Disease Sister     Heart Disease Brother     Other Neg Hx     Post-op Cognitive Dysfunction Neg Hx     Emergence Delirium Neg Hx     Post-op Nausea/Vomiting Neg Hx     Delayed Awakening Neg Hx     Pseudochol. Deficiency Neg Hx     Malig Hypertherm Neg Hx     Heart Disease Other     Asthma Father     Stroke Sister     Heart Disease Brother     Cancer Brother         Social History     Socioeconomic History    Marital status:      Spouse name: None    Number of children: None    Years of education: None    Highest education level: None   Tobacco Use    Smoking status: Never    Smokeless tobacco: Never   Substance and Sexual Activity    Alcohol use: No    Drug use: No   Social History Narrative    , lives with wife. Was in [address] [place] [address] for 4 years. Worked in sales. Now works with [employer] part time.           Iodine and Iodides     Previous Medications    ALLOPURINOL (ZYLOPRIM) 100 MG TABLET    Take 100 mg by mouth daily    ASPIRIN 81 MG EC TABLET    Take by mouth daily    ATORVASTATIN (LIPITOR) 80 MG TABLET    Take 80 mg by mouth daily    DICLOFENAC SODIUM (VOLTAREN) 1 % GEL Apply topically 4 times daily    FUROSEMIDE (LASIX) 40 MG TABLET    Take 1 tablet by mouth daily    INSULIN LISPRO (HUMALOG) 100 UNIT/ML SOLN INJECTION VIAL    Inject 0-8 Units into the skin 3 times daily (with meals) Sliding scale    LOSARTAN (COZAAR) 25 MG TABLET    Take 2 tablets by mouth daily    METOPROLOL SUCCINATE (TOPROL XL) 50 MG EXTENDED RELEASE TABLET    Take 50 mg by mouth daily    MONTELUKAST (SINGULAIR) 10 MG TABLET    Take 10 mg by mouth daily    NITROGLYCERIN (NITROSTAT) 0.4 MG SL TABLET    Place 1 sl under the tongue q 5 min prn cp, max 3 sl in a 15-min time period. Call 911 if no relief after the 3rd sl. PRAMOX-PE-GLYCERIN-PETROLATUM 1-0.25-14.4-15 % CREAM    Place rectally as needed        Vitals signs and nursing note reviewed. Patient Vitals for the past 4 hrs:   Temp Pulse Resp BP SpO2   07/22/22 1951 97.8 °F (36.6 °C) 70 14 (!) 122/57 97 %          Physical Exam  Vitals reviewed. Constitutional:       Appearance: Normal appearance. Comments: Appears tired but nontoxic   HENT:      Head: Normocephalic and atraumatic. Mouth/Throat:      Mouth: Mucous membranes are moist.      Pharynx: Oropharynx is clear. Eyes:      Extraocular Movements: Extraocular movements intact. Conjunctiva/sclera: Conjunctivae normal.      Pupils: Pupils are equal, round, and reactive to light. Cardiovascular:      Rate and Rhythm: Normal rate and regular rhythm. Heart sounds: Normal heart sounds. Pulmonary:      Effort: Pulmonary effort is normal.   Abdominal:      General: Abdomen is flat. There is no distension. Palpations: Abdomen is soft. Tenderness: There is no abdominal tenderness. Musculoskeletal:         General: Swelling present. No tenderness or deformity. Cervical back: Normal range of motion and neck supple. Comments: Bilateral lower extremity swelling 2+ pitting edema extending proximally to the knees.   There are 2 shallow ulcers under bandages on the right lower extremity. No weeping. Skin:     General: Skin is warm and dry. Capillary Refill: Capillary refill takes less than 2 seconds. Coloration: Skin is not jaundiced. Neurological:      General: No focal deficit present. Mental Status: He is alert. Mental status is at baseline. Procedures      Labs Reviewed   CBC WITH AUTO DIFFERENTIAL - Abnormal; Notable for the following components:       Result Value    RBC 2.82 (*)     Hemoglobin 9.0 (*)     Hematocrit 28.2 (*)     .0 (*)     RDW 17.1 (*)     Monocytes 13 (*)     Eosinophils % 13 (*)     Absolute Eos # 0.9 (*)     All other components within normal limits   COMPREHENSIVE METABOLIC PANEL - Abnormal; Notable for the following components:    Sodium 133 (*)     Glucose 189 (*)      (*)     Creatinine 4.40 (*)     GFR  16 (*)     GFR Non- 14 (*)     AST 39 (*)     Alk Phosphatase 244 (*)     Albumin 2.7 (*)     Globulin 4.7 (*)     Albumin/Globulin Ratio 0.6 (*)     All other components within normal limits   TSH WITH REFLEX - Abnormal; Notable for the following components:    TSH w Free Thyroid if Abnormal 17.50 (*)     All other components within normal limits   URINALYSIS - Abnormal; Notable for the following components:    Protein, UA 30 (*)     Ketones, Urine TRACE (*)     Blood, Urine MODERATE (*)     Leukocyte Esterase, Urine SMALL (*)     All other components within normal limits   T4, FREE        CT HEAD WO CONTRAST   Final Result      1. No CT evidence of acute intracranial process. XR CHEST PORTABLE   Final Result   Left pleural effusion and bilateral perihilar airspace opacities. Consider CHF. Voice dictation software was used during the making of this note. This software is not perfect and grammatical and other typographical errors may be present. This note has not been completely proofread for errors.      Jaycob Bailey DO  07/22/22

## 2022-07-24 NOTE — PROGRESS NOTES
Julisa Connell  Admission Date: 7/22/2022         [facility] Progress Note: 7/24/2022    Follow-up for:     Subjective:     Patient seen and examined. Feeling weak, but breathing comfortably. Current Facility-Administered Medications   Medication Dose Route Frequency    furosemide (LASIX) tablet 40 mg  40 mg Oral Daily    morphine injection 2 mg  2 mg IntraVENous Q4H PRN    sodium chloride flush 0.9 % injection 5-40 mL  5-40 mL IntraVENous PRN    0.9 % sodium chloride infusion   IntraVENous PRN    ondansetron (ZOFRAN-ODT) disintegrating tablet 4 mg  4 mg Oral Q8H PRN    Or    ondansetron (ZOFRAN) injection 4 mg  4 mg IntraVENous Q6H PRN    polyethylene glycol (GLYCOLAX) packet 17 g  17 g Oral Daily PRN    acetaminophen (TYLENOL) tablet 650 mg  650 mg Oral Q6H PRN    Or    acetaminophen (TYLENOL) suppository 650 mg  650 mg Rectal Q6H PRN         Objective:     Vitals:    07/24/22 0447 07/24/22 0502 07/24/22 0700 07/24/22 0800   BP:  (!) 134/59 122/60 134/64   Pulse: 69 69 69 69   Resp: (!) 34 (!) 34 23 24   Temp:    97.8 °F (36.6 °C)   TempSrc:    Oral   SpO2: 95% 95% 96% 97%   Weight:       Height:         Intake and Output:   07/22 1901 - 07/24 0700  In: 2260.8 [P.O.:240; I.V.:1087.5]  Out: 475 [Urine:475]  No intake/output data recorded. Physical Exam:   Constitutional:  the patient is well developed and in no acute distress  HEENT:  Sclera clear, pupils equal, oral mucosa moist  Lungs: diminished at the bases  Cardiovascular:  Regular rate, S1, S2, no Rub   Abd/GI: soft and non-tender; with positive bowel sounds. Ext: warm without cyanosis. There is 2+ lower leg edema. Skin:  no jaundice or rashes  Neuro: no gross neuro deficits   Psychiatric: Calm.          LAB  Recent Labs     07/22/22  1955 07/24/22  0458   WBC 7.3 7.0   HGB 9.0* 7.0*   HCT 28.2* 22.3*    210   INR  --  1.3     Recent Labs     07/21/22  1004 07/22/22 1955 07/23/22 2038 07/24/22  0458   * 133* 134* 135*   K 4.5 4.9  --  4.8    102  --  105   CO2 24 22  --  22   * 114*  --  116*   CREATININE 4.00* 4.40* 4.20* 4.40*     No results for input(s): PH, PCO2, PO2, HCO3 in the last 72 hours. Plan:  (Medical Decision Making)   1) COLLETTE on CKD IV-  BUN/Cr elevated,  but stable. Discussed with Son again at bedside. He is conferring with his sister to discuss hospice. We will continue to follow in the interim. 2) Volume-  restarted on diuretics. 3) Anemia-  Hb is down. No obvious bleeding.  Transfusion threshold per hospitalist.    Billy Trent MD  [facility]

## 2022-07-24 NOTE — ACP (ADVANCE CARE PLANNING)
Advance Care Planning   Admit Date:  2022  7:51 PM   Name:  Francisco J Stewart   Age:  80 y.o. Sex:  male  :  1934   MRN:  [id number]   Room:  [address] [place] [address]    Francisco J Stewart is able to make his own decisions:   No    If pt unable to make decisions, POA/surrogate decision maker:  Son, daughter    Patient / surrogate decision-maker directed:  Comfort measures only    Patient or surrogate consented to discussion of the current conditions, workup, management plans, prognosis, and understand the risk for further deterioration. Time spent: 16 minutes in direct discussion (face to face and/or over phone).       Signed:  Viv Ledezma MD

## 2022-07-24 NOTE — PROGRESS NOTES
Pt.s HGB went from 9.0 to 7.0. Pt. Has small amount of bleeding in his paez throughout the night but no other overt signs of bleeding. Brian Roberts MD notified of Pt.s drop of HGB and no new orders were given. Will continue to monitor Pt.

## 2022-07-24 NOTE — PROGRESS NOTES
Progress Note    Patient: Tammy Lange MRN: [id number]  SSN: [id number]    YOB: 1934  Age: 80 y.o. Sex: male      Admit Date: 7/22/2022    LOS: 1 day     Assessment and Plan:   80 y.o. 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 Acute hypoxic respiratory failure likely multifactorial concerning of aspiration pneumonia as well as possible acute decompensated heart failure with decreased ejection fraction and recent COVID-19 pneumonia  -Oxygen therapy to maintain oxygen saturation more than 92%  -Comfort measures    2. Acute kidney injury on CKD stage IV  -Comfort measures    3. Acute metabolic encephalopathy  -Comfort measures    4. Atrial fibrillation status post watchman  -Comfort measures    5. Diabetes mellitus  -Comfort measures    6. Microcytic anemia  -Comfort measures    Hospice consultation    DVT prophylaxis with heparin subcu    Subjective:   80 y.o. 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 Patient seen and examined at bedside. This morning the patient awake but confused. Denies any chest pain, no abdominal pain. After a long discussion with son and daughter they decided to make the patient comfort measures. We will consult hospice.     Objective:     Vitals:    07/24/22 0447 07/24/22 0502 07/24/22 0700 07/24/22 0800   BP:  (!) 134/59 122/60 134/64   Pulse: 69 69 69 69   Resp: (!) 34 (!) 34 23 24   Temp:    97.8 °F (36.6 °C)   TempSrc:    Oral   SpO2: 95% 95% 96% 97% Weight:       Height:            Intake and Output:  Current Shift: No intake/output data recorded. Last three shifts: 07/22 1901 - 07/24 0700  In: 2260.8 [P.O.:240;  I.V.:1087.5]  Out: 475 [Urine:475]    ROS  Negative except for stated on subjective    Physical Exam:   General: Alert, confused NAD  HEENT: NC/AT, EOM are intact  Neck: supple, no JVD  Cardiovascular: RRR, S1, S2, no murmurs  Respiratory: Lungs are clear, no wheezes or rales  Abdomen: Soft, NT, ND  Back: No CVA tenderness, no paraspinal tenderness  Extremities: LE without pedal edema, no erythema  Neuro: CN are intact, no focal deficits  Skin: no rash or ulcers    Lab/Data Review:  I have personally reviewed patients laboratory data showing  Recent Results (from the past 24 hour(s))   Troponin    Collection Time: 07/23/22 12:55 PM   Result Value Ref Range    Troponin, High Sensitivity 49.2 (H) 0 - 14 pg/mL   POCT Glucose    Collection Time: 07/23/22  7:44 PM   Result Value Ref Range    POC Glucose 139 (H) 65 - 100 mg/dL    Performed by: Johnson    Lactic Acid    Collection Time: 07/23/22  8:38 PM   Result Value Ref Range    Lactic Acid, Plasma 1.3 0.4 - 2.0 MMOL/L   Sodium    Collection Time: 07/23/22  8:38 PM   Result Value Ref Range    Sodium 134 (L) 136 - 145 mmol/L   Creatinine and GFR    Collection Time: 07/23/22  8:38 PM   Result Value Ref Range    Creatinine 4.20 (H) 0.8 - 1.5 MG/DL    GFR African American 17 (L) >60 ml/min/1.73m2    GFR Non- 14 (L) >60 ml/min/1.73m2   Comprehensive Metabolic Panel w/ Reflex to MG    Collection Time: 07/24/22  4:58 AM   Result Value Ref Range    Sodium 135 (L) 136 - 145 mmol/L    Potassium 4.8 3.5 - 5.1 mmol/L    Chloride 105 98 - 107 mmol/L    CO2 22 21 - 32 mmol/L    Anion Gap 8 7 - 16 mmol/L    Glucose 151 (H) 65 - 100 mg/dL     (H) 8 - 23 MG/DL    Creatinine 4.40 (H) 0.8 - 1.5 MG/DL    GFR  16 (L) >60 ml/min/1.73m2    GFR Non- 14 (L) >60 ml/min/1.73m2    Calcium 9.7 8.3 - 10.4 MG/DL    Total Bilirubin 0.7 0.2 - 1.1 MG/DL    ALT 18 12 - 65 U/L    AST 21 15 - 37 U/L    Alk Phosphatase 145 (H) 50 - 136 U/L    Total Protein 7.2 6.3 - 8.2 g/dL    Albumin 4.4 3.2 - 4.6 g/dL    Globulin 2.8 2.3 - 3.5 g/dL    Albumin/Globulin Ratio 1.6 1.2 - 3.5     Lactic Acid    Collection Time: 07/24/22  4:58 AM   Result Value Ref Range    Lactic Acid, Plasma 1.2 0.4 - 2.0 MMOL/L   CBC with Auto Differential    Collection Time: 07/24/22  4:58 AM   Result Value Ref Range    WBC 7.0 4.3 - 11.1 K/uL    RBC 2.23 (L) 4.23 - 5.6 M/uL    Hemoglobin 7.0 (L) 13.6 - 17.2 g/dL    Hematocrit 22.3 (L) 41.1 - 50.3 %    .0 (H) 79.6 - 97.8 FL    MCH 31.4 26.1 - 32.9 PG    MCHC 31.4 31.4 - 35.0 g/dL    RDW 17.4 (H) 11.9 - 14.6 %    Platelets 297 344 - 142 K/uL    MPV 10.1 9.4 - 12.3 FL    nRBC 0.00 0.0 - 0.2 K/uL    Differential Type AUTOMATED      Seg Neutrophils 67 43 - 78 %    Lymphocytes 14 13 - 44 %    Monocytes 13 (H) 4.0 - 12.0 %    Eosinophils % 4 0.5 - 7.8 %    Basophils 1 0.0 - 2.0 %    Immature Granulocytes 1 0.0 - 5.0 %    Segs Absolute 4.8 1.7 - 8.2 K/UL    Absolute Lymph # 1.0 0.5 - 4.6 K/UL    Absolute Mono # 0.9 0.1 - 1.3 K/UL    Absolute Eos # 0.3 0.0 - 0.8 K/UL    Basophils Absolute 0.0 0.0 - 0.2 K/UL    Absolute Immature Granulocyte 0.1 0.0 - 0.5 K/UL   Protime-INR    Collection Time: 07/24/22  4:58 AM   Result Value Ref Range    Protime 16.8 (H) 12.6 - 14.5 sec    INR 1.3     APTT    Collection Time: 07/24/22  4:58 AM   Result Value Ref Range    PTT 39.7 (H) 24.1 - 35.1 SEC   Albumin    Collection Time: 07/24/22  4:58 AM   Result Value Ref Range    Albumin 4.4 3.2 - 4.6 g/dL      [unfilled]     Image:  I have personally reviewed patients imaging showing  US RETROPERITONEAL LIMITED   Final Result      No hydronephrosis. XR CHEST PORTABLE   Final Result      1.  Persistent left lung base opacity, likely combination of small pleural   effusion and airspace device    Cardiomyopathy, ischemic    Preop cardiovascular exam    S/P total knee arthroplasty    Renal insufficiency    Abnormal EKG    Type 2 diabetes with nephropathy (HCC)    Anticoagulant long-term use    History of BPH    Hypercholesteremia    Pneumonia    LBBB (left bundle branch block)    Breast lump    Acute diastolic heart failure (HCC)    Localized edema    Bilateral leg edema    Cor pulmonale (chronic) (HCC)    Pulmonary HTN (HCC)    Cellulitis of right leg    Chronic combined systolic and diastolic heart failure (HCC)    Acute on chronic right-sided congestive heart failure (HCC)    Moderate protein-calorie malnutrition (HCC)    Diabetic ulcer of ankle ([address]    DNR (do not resuscitate)    Anemia    Sepsis ([address]    COLLETTE (acute kidney injury) ([address]    Ankle wound    COVID-19    Fatigue    Debility    Right leg pain    Frailty    Failure to thrive (child)    Bacteremia    Acute kidney injury (COLLETTE) with acute tubular necrosis (ATN) ([address]        I have reviewed, updated, and verified this note's content and spent 38 minutes of my 42 minutes visit performing counseling and coordination of care regarding medical management.        Signed By: Shey Griffin MD     July 24, 2022

## 2022-07-25 NOTE — PROGRESS NOTES
Dnaica Aguirre  Admission Date: 7/22/2022         [address] Nephrology Progress Note: 7/25/2022    Follow-up for: COLLETTE/CKD    Subjective:   Patient seen and examined in room sitting up in bed, exertional SOB, LE edema, poor appetite, weakness and fatigue. On 2 L O2 NC.    ROS:  General: no fever/chills  CV: no CP  Lung: +exertional SOB, + cough  GI: no N/V/D  : no dysuria  Ext: + edema        Current Facility-Administered Medications   Medication Dose Route Frequency    furosemide (LASIX) tablet 40 mg  40 mg Oral Daily    morphine injection 2 mg  2 mg IntraVENous Q4H PRN    sodium chloride flush 0.9 % injection 5-40 mL  5-40 mL IntraVENous PRN    0.9 % sodium chloride infusion   IntraVENous PRN    ondansetron (ZOFRAN-ODT) disintegrating tablet 4 mg  4 mg Oral Q8H PRN    Or    ondansetron (ZOFRAN) injection 4 mg  4 mg IntraVENous Q6H PRN    polyethylene glycol (GLYCOLAX) packet 17 g  17 g Oral Daily PRN    acetaminophen (TYLENOL) tablet 650 mg  650 mg Oral Q6H PRN    Or    acetaminophen (TYLENOL) suppository 650 mg  650 mg Rectal Q6H PRN         Objective:     Vitals:    07/24/22 2139 07/25/22 0559 07/25/22 0725 07/25/22 0904   BP: 123/63  (!) 122/56    Pulse: 70  70    Resp: 20  16 24   Temp: 98.8 °F (37.1 °C)  98.4 °F (36.9 °C)    TempSrc: Axillary  Oral    SpO2: 97%  97%    Weight:  212 lb (96.2 kg)     Height:         Intake and Output:   07/23 1901 - 07/25 0700  In: 200   Out: 325 [Urine:325]  No intake/output data recorded. Physical Exam:   Constitutional:  the patient is well developed and in no acute distress, awake, following commands  HEENT:  Sclera clear, pupils equal, oral mucosa moist  Lungs: diminished at the bases  Cardiovascular:  Regular rate, S1, S2, no Rub   Abd/GI: soft and non-tender; with positive bowel sounds. Ext: warm without cyanosis. There is 1+ lower leg edema. Skin:  no jaundice or rashes  Neuro: no gross neuro deficits   Psychiatric: Calm.          LAB  Recent Labs 07/22/22 1955 07/24/22 0458   WBC 7.3 7.0   HGB 9.0* 7.0*   HCT 28.2* 22.3*    210   INR  --  1.3       Recent Labs     07/22/22 1955 07/23/22 2038 07/24/22 0458   * 134* 135*   K 4.9  --  4.8     --  105   CO2 22  --  22   *  --  116*   CREATININE 4.40* 4.20* 4.40*       No results for input(s): PH, PCO2, PO2, HCO3 in the last 72 hours. Plan:  (Medical Decision Making)   1) COLLETTE on CKD IV-  Cr/Bun trending up, non oliguric, pt reports that he would not take dialysis if imminent   Hospice consulted per primary, further family discussion with patient for goals of care per primary     2) Volume- edema, on diuretics. 3) Anemia-  Hb is down. No obvious bleeding.  Transfuse hgb < 7.0     4) COVID- supportive care, on O2 NC 2L    Carlos Cutler, [address] [facility]

## 2022-07-25 NOTE — CARE COORDINATION
07/25/22 0910   Service Assessment   Patient Orientation Alert and Oriented   Cognition Alert   Primary Caregiver Self   Support Systems Children   PCP Verified by CM Yes   Prior Functional Level Assistance with the following:   Current Functional Level Assistance with the following:   Can patient return to prior living arrangement Unknown at present   Ability to make needs known: Good   Family able to assist with home care needs: Yes   Would you like for me to discuss the discharge plan with any other family members/significant others, and if so, who? Yes   Social/Functional History   Lives With Alone   Type of [address] [facility]   ADL Assistance Needs assistance   Ambulation Assistance Needs assistance   Transfer Assistance Independent   Active  No   Patient lives alone some of the time and sometimes his son will stay with him. His son drives him to appointments. Patient uses a walker or cane to ambulate. Son assists patient with ADLs. Confirmed patient has a pcp. Patient has a history of HH and rehab. DME: walker, cane, nebulizer  CM following for discharge needs.

## 2022-07-25 NOTE — PROGRESS NOTES
Pt resting comfortably in bed. Eyes closed. Respirations even and unlabored. NAD. Full head to toe assessment deferred for now due to patients comfort measures only status.

## 2022-07-25 NOTE — TELEPHONE ENCOUNTER
Called son today for a reminder call and he requested to cancel the appointment, did not give reason, did not reschedule at this time.

## 2022-07-25 NOTE — PROGRESS NOTES
SPEECH PATHOLOGY NOTE    Speech therapy consult received. Patient has now transitioned to comfort measures only. Will discontinue therapy orders. Please consider re-consult if new speech therapy needs arise.      FAB Linn, CCC-SLP  Speech Language Pathologist  Acute Rehabilitation Services  Contact: Charmaine

## 2022-07-25 NOTE — CARE COORDINATION
Per chart [facility]. Taken back to ED 7/22/2022. Currently inpatient with diagnosis COLLETTE with acute tubular necrosis. Possible reassignment upon discharge.

## 2022-07-25 NOTE — PROGRESS NOTES
Comprehensive Nutrition Assessment    Type and Reason for Visit: Reassess, Positive Nutrition Screen, Patient Education  Malnutrition Screening Tool: Malnutrition Screen  Have you recently lost weight without trying?: 14 to 23 pounds (2 points)  Have you been eating poorly because of a decreased appetite?: Yes (1 point)  Malnutrition Screening Tool Score: 3    Nutrition Recommendations/Plan:   Meals and Snacks:  Diet: Continue current order  Nutrition Supplement Therapy:  Medical food supplement therapy:  Continue Glucerna Shake three times per day (this provides 220 kcal and 10 grams protein per bottle)       Malnutrition Assessment:  Malnutrition Status: Insufficient data (lacks NFPE, no wt loss, poor po for limited time)    Nutrition Assessment:  Nutrition History: Unable to obtain information from patient who is confused and unable to connect with family at this time. Do You Have Any Cultural, Mormon, or Ethnic Food Preferences?: No   Nutrition Background:   PMH: CHF, CKD4. Patient presented from [facility] with heart failure exacerbation and covid. Nutrition Interval:  Spoke with RN who reports patient is having worsening breathing trouble and on morphine. She reports he only took a few bites of meals and few sips of glucerna today. She reports no N/V or coughing with meals, however SLP attempted to see a few days ago with no success. Family has elected to proceed with comfort care, however patient does not qualify for hospice house so debating between home with hospice or hospice at a facility. Nutrition Related Findings:   NFPE deferred due to covid isolation Wound Type: Diabetic Ulcer (ankle)    Current Nutrition Therapies:  ADULT DIET;  Dysphagia - Soft and Bite Sized  ADULT ORAL NUTRITION SUPPLEMENT; Breakfast, Lunch, Dinner; Diabetic Oral Supplement    Current Intake:   Average Meal Intake: 1-25% Average Supplements Intake: 1-25%      Anthropometric Measures:  Height: 6' (182.9 cm)  Current Body Wt: 212 lb 1.3 oz (96.2 kg) (7/25), Weight source: Not Specified  BMI: 28.8, Overweight (BMI 25.0-29. 9)  Admission Body Weight: 204 lb 5.9 oz (92.7 kg) (7/23; bed scale)  Ideal Body Weight (Kg) (Calculated): 81 kg (178 lbs), 119.1 %  Usual Body Wt: 181 lb (82.1 kg) (via EMR 3,6 month and 1 year weights), Percent weight change: 17.2       Edema:Peripheral Vascular  Peripheral Vascular (WDL): Exceptions to WDL  Edema: Left lower extremity;Right lower extremity  RLE Edema: +3  LLE Edema: +2   BMI Category Overweight (BMI 25.0-29. 9)  Estimated Daily Nutrient Needs:  Energy (kcal/day): 4050-4629 (15-20 kcals/kg) (Kcal/kg Weight used: 96.2 kg Current  Protein (g/day): 77-96 (0.8-1 g/kg) Weight Used: (Current) 96.2 kg  Fluid (ml/day):   (1 ml/kcal)    Nutrition Diagnosis:   Inadequate oral intake related to inadequate protein-energy intake as evidenced by  (pooor appetite, breathing difficulty)  Nutrition Interventions:   Food and/or Nutrient Delivery: Continue Current Diet, Continue Oral Nutrition Supplement     Coordination of Nutrition Care: Continue to monitor while inpatient  Plan of Care discussed with: Lamberto Wood RN    Goals: Active Goal: PO intake 50% or greater, by next RD assessment       Nutrition Monitoring and Evaluation:      Food/Nutrient Intake Outcomes: Food and Nutrient Intake, Supplement Intake  Physical Signs/Symptoms Outcomes: Weight, Fluid Status or Edema    Discharge Planning:     Too soon to determine    Electronically signed by Leticia Jade MS, RD, LD on 7/25/2022 at 2:56 PM.

## 2022-07-25 NOTE — PROGRESS NOTES
TRANSFER - IN REPORT:    Verbal report received from James Cuba RN on Timoteo Simental  being received from ICU for routine progression of patient care      Report consisted of patient's Situation, Background, Assessment and   Recommendations(SBAR). Information from the following report(s) Nurse Handoff Report was reviewed with the receiving nurse. Opportunity for questions and clarification was provided. Assessment completed upon patient's arrival to unit and care assumed.

## 2022-07-25 NOTE — PROGRESS NOTES
Patient sleeping  he has napped most of afternoon  respirations unlabored at present  oxygen at 2l via cannula

## 2022-07-25 NOTE — PROGRESS NOTES
Hospitalist Progress Note   Admit Date:  2022  7:51 PM   Name:  Ana Luisa Fitch   Age:  80 y.o. Sex:  male  :  1934   MRN:  [id number]   Room:  821/    Presenting Complaint: Altered Mental Status     Reason(s) for Admission: Delirium [R41.0]  Peripheral edema [R60.9]  Acute renal failure, unspecified acute renal failure type ([facility] [N17.9]  Congestive heart failure, unspecified HF chronicity, unspecified heart failure type ([address] [I50.9]  Acute kidney injury (COLLETTE) with acute tubular necrosis (ATN) ([facility] [N17.0]     Hospital Course & Interval History:     Patient with past medical history of    DM type 2  Atrial fibrillation    S/P Watchman device placement   On no OAC   S/P ICD placement   EF 48%  Anemia   CKD stage 4  Recent COVID infection. Recent enterobacter and Proteus bacteremia due to right ankle ulcer     Patient came to hospital due to worsening mentation. He was admitted to ICU. During the course of treatment in ICU, patient was doing poorly and with confusion. Son opted for comfort care. Subjective/24hr Events (22):     22   Patient is able to answer simple questions. No fever. No shaking. No chills. No shortness of breath. Assessment & Plan:     Principal Problem:    Acute kidney injury (COLLETTE) with acute tubular necrosis (ATN) (HCC)  Plan:   BUN and creatinine continues to not showing improvement. Anemia as well. No acute bleeding. Family opted for comfort care.  is helping with set up for hospice. I have discussed the plan of care with patient and care team.        Discharge Planning:      Hospice     Diet:  ADULT DIET; Dysphagia - Soft and Bite Sized  ADULT ORAL NUTRITION SUPPLEMENT; Breakfast, Lunch, Dinner; Diabetic Oral Supplement  DVT PPx: none   Code status: DNR    Hospital Problems:  Principal Problem:    Acute kidney injury (COLLETTE) with acute tubular necrosis (ATN) (HCC)  Resolved Problems:    * No resolved hospital problems. *      Objective:   Patient Vitals for the past 24 hrs:   Temp Pulse Resp BP SpO2   07/25/22 0904 -- -- 24 -- --   07/25/22 0725 98.4 °F (36.9 °C) 70 16 (!) 122/56 97 %   07/24/22 2139 98.8 °F (37.1 °C) 70 20 123/63 97 %   07/24/22 2000 -- 69 14 (!) 107/55 98 %   07/24/22 1603 -- 69 -- (!) 116/58 97 %   07/24/22 1545 -- 69 25 (!) 123/56 97 %   07/24/22 1530 -- 69 28 (!) 116/57 96 %   07/24/22 1200 -- 69 19 (!) 119/57 99 %       Oxygen Therapy  SpO2: 97 %  Pulse Oximetry Type: Continuous  Pulse via Oximetry: 70 beats per minute  Pulse Oximeter Device Mode: Continuous  Pulse Oximeter Device Location: Finger  O2 Device: Nasal cannula  Skin Assessment: Clean, dry, & intact  O2 Flow Rate (L/min): 2 L/min  Oxygen Therapy: Supplemental oxygen  O2 Delivery Method: Nasal cannula    Estimated body mass index is 28.75 kg/m² as calculated from the following:    Height as of this encounter: 6' (1.829 m). Weight as of this encounter: 212 lb (96.2 kg). Intake/Output Summary (Last 24 hours) at 7/25/2022 1157  Last data filed at 7/25/2022 0951  Gross per 24 hour   Intake 0 ml   Output 100 ml   Net -100 ml         Physical Exam:     Blood pressure (!) 122/56, pulse 70, temperature 98.4 °F (36.9 °C), temperature source Oral, resp. rate 24, height 6' (1.829 m), weight 212 lb (96.2 kg), SpO2 97 %. General:    Lethargic, slow to respond verbally. Answering some simple questions. Head:  Normocephalic, atraumatic, pale. No jaundice. Eyes:  Sclerae appear normal.  Pupils equally round. ENT:  Nares appear normal, no drainage. Moist oral mucosa  Neck:  No restricted ROM. Trachea midline   CV:   RRR. No m/r/g. No jugular venous distension. Device in subcutaneous space in anterior chest wall on the left chest   Lungs:   CTAB. No wheezing, rhonchi, or rales. Symmetric expansion. Abdomen: Bowel sounds present. Soft, nontender, nondistended. Extremities: No cyanosis or clubbing.   No edema  Skin:     No rashes and normal coloration. Warm and dry. Neuro:  CN II-XII grossly intact. Alert. Answering some simple questions.        I have reviewed ordered lab tests and independently visualized imaging below:    Recent Labs:  Recent Results (from the past 48 hour(s))   Troponin    Collection Time: 07/23/22 12:55 PM   Result Value Ref Range    Troponin, High Sensitivity 49.2 (H) 0 - 14 pg/mL   POCT Glucose    Collection Time: 07/23/22  7:44 PM   Result Value Ref Range    POC Glucose 139 (H) 65 - 100 mg/dL    Performed by: Johnson    Lactic Acid    Collection Time: 07/23/22  8:38 PM   Result Value Ref Range    Lactic Acid, Plasma 1.3 0.4 - 2.0 MMOL/L   Sodium    Collection Time: 07/23/22  8:38 PM   Result Value Ref Range    Sodium 134 (L) 136 - 145 mmol/L   Creatinine and GFR    Collection Time: 07/23/22  8:38 PM   Result Value Ref Range    Creatinine 4.20 (H) 0.8 - 1.5 MG/DL    GFR African American 17 (L) >60 ml/min/1.73m2    GFR Non- 14 (L) >60 ml/min/1.73m2   Comprehensive Metabolic Panel w/ Reflex to MG    Collection Time: 07/24/22  4:58 AM   Result Value Ref Range    Sodium 135 (L) 136 - 145 mmol/L    Potassium 4.8 3.5 - 5.1 mmol/L    Chloride 105 98 - 107 mmol/L    CO2 22 21 - 32 mmol/L    Anion Gap 8 7 - 16 mmol/L    Glucose 151 (H) 65 - 100 mg/dL     (H) 8 - 23 MG/DL    Creatinine 4.40 (H) 0.8 - 1.5 MG/DL    GFR  16 (L) >60 ml/min/1.73m2    GFR Non- 14 (L) >60 ml/min/1.73m2    Calcium 9.7 8.3 - 10.4 MG/DL    Total Bilirubin 0.7 0.2 - 1.1 MG/DL    ALT 18 12 - 65 U/L    AST 21 15 - 37 U/L    Alk Phosphatase 145 (H) 50 - 136 U/L    Total Protein 7.2 6.3 - 8.2 g/dL    Albumin 4.4 3.2 - 4.6 g/dL    Globulin 2.8 2.3 - 3.5 g/dL    Albumin/Globulin Ratio 1.6 1.2 - 3.5     Lactic Acid    Collection Time: 07/24/22  4:58 AM   Result Value Ref Range    Lactic Acid, Plasma 1.2 0.4 - 2.0 MMOL/L   CBC with Auto Differential    Collection Time: 07/24/22  4:58 AM   Result Value Ref IntraVENous PRN    0.9 % sodium chloride infusion   IntraVENous PRN    ondansetron (ZOFRAN-ODT) disintegrating tablet 4 mg  4 mg Oral Q8H PRN    Or    ondansetron (ZOFRAN) injection 4 mg  4 mg IntraVENous Q6H PRN    polyethylene glycol (GLYCOLAX) packet 17 g  17 g Oral Daily PRN    acetaminophen (TYLENOL) tablet 650 mg  650 mg Oral Q6H PRN    Or    acetaminophen (TYLENOL) suppository 650 mg  650 mg Rectal Q6H PRN       Signed:  Flaca Robbins MD    Part of this note may have been written by using a voice dictation software. The note has been proof read but may still contain some grammatical/other typographical errors.

## 2022-07-26 NOTE — PROGRESS NOTES
Hospitalist Progress Note   Admit Date:  2022  7:51 PM   Name:  Aletha Buckner   Age:  80 y.o. Sex:  male  :  1934   MRN:  [id number]   Room:  [facility]/    Presenting Complaint: Altered Mental Status     Reason(s) for Admission: Delirium [R41.0]  Peripheral edema [R60.9]  Acute renal failure, unspecified acute renal failure type ([facility] [address] [N17.9]  Congestive heart failure, unspecified HF chronicity, unspecified heart failure type ([address] [I50.9]  Acute kidney injury (COLLETTE) with acute tubular necrosis (ATN) ([facility] [address] [N17.0]     Hospital Course & Interval History:     Patient with past medical history of    DM type 2  Atrial fibrillation    S/P Watchman device placement   On no OAC   S/P ICD placement   EF 48%  Anemia   CKD stage 4  Recent COVID infection. Recent enterobacter and Proteus bacteremia due to right ankle ulcer     Patient came to hospital due to worsening mentation. He was admitted to ICU. During the course of treatment in ICU, patient was doing poorly and with confusion. Son opted for comfort care. Subjective/24hr Events (22):     22   Patient is able to answer simple questions. No fever. No shaking. No chills. No shortness of breath.      22   Patient is resting comfortably in bed. Afebrile. No chest pain. No shortness of breath. Assessment & Plan:     Principal Problem:    Acute kidney injury (COLLETTE) with acute tubular necrosis (ATN) ([facility])  Plan:   BUN and creatinine continues to not showing improvement. Anemia as well. No acute bleeding. Family opted for comfort care. No blood further blood draws are attempted.  is helping with set up for hospice. Pending further decision from family members regarding discharge plan. I have discussed the plan of care with patient. Discharge Planning:      Hospice care     Diet:  ADULT DIET;  Dysphagia - Soft and Bite Sized  ADULT ORAL NUTRITION SUPPLEMENT; Breakfast, Lunch, Dinner; Diabetic Oral Supplement  DVT PPx: none   Code status: DNR    Hospital Problems:  Principal Problem:    Acute kidney injury (COLLETTE) with acute tubular necrosis (ATN) (H[facility])  Resolved Problems:    * No resolved hospital problems. *      Objective:   Patient Vitals for the past 24 hrs:   Temp Pulse Resp BP SpO2   07/26/22 0722 97.5 °F (36.4 °C) 70 16 (!) 120/53 96 %   07/25/22 2012 -- -- -- -- 99 %   07/25/22 1931 97.5 °F (36.4 °C) 69 16 (!) 119/56 99 %         Oxygen Therapy  SpO2: 96 %  Pulse Oximetry Type: Continuous  Pulse via Oximetry: 70 beats per minute  Pulse Oximeter Device Mode: Intermittent  Pulse Oximeter Device Location: Finger  O2 Device: Nasal cannula  Skin Assessment: Clean, dry, & intact  O2 Flow Rate (L/min): 2 L/min  Oxygen Therapy: Supplemental oxygen  O2 Delivery Method: Nasal cannula    Estimated body mass index is 28.75 kg/m² as calculated from the following:    Height as of this encounter: 6' (1.829 m). Weight as of this encounter: 212 lb (96.2 kg). Intake/Output Summary (Last 24 hours) at 7/26/2022 0920  Last data filed at 7/26/2022 0643  Gross per 24 hour   Intake 120 ml   Output 375 ml   Net -255 ml           Physical Exam:     Blood pressure (!) 120/53, pulse 70, temperature 97.5 °F (36.4 °C), resp. rate 16, height 6' (1.829 m), weight 212 lb (96.2 kg), SpO2 96 %. General:    Lethargic, slow to respond verbally. Answering some simple questions. Head:  Normocephalic, atraumatic, pale. No jaundice. Eyes:  Sclerae appear normal.  Pupils equally round. ENT:  Nares appear normal, no drainage. Moist oral mucosa  Neck:  No restricted ROM. Trachea midline   CV:   RRR. No m/r/g. No jugular venous distension. Device in subcutaneous space in anterior chest wall on the left chest   Lungs:   CTAB. No wheezing, rhonchi, or rales. Symmetric expansion. Abdomen: Bowel sounds present. Soft, nontender, nondistended. Extremities: No cyanosis or clubbing.   No edema  Skin:     No rashes and normal coloration. Warm and dry. Neuro:  CN II-XII grossly intact. Alert. Answering some simple questions. I have reviewed ordered lab tests and independently visualized imaging below:    Recent Labs:  No results found for this or any previous visit (from the past 48 hour(s)). Other Studies:  US RETROPERITONEAL LIMITED   Final Result      No hydronephrosis. XR CHEST PORTABLE   Final Result      1. Persistent left lung base opacity, likely combination of small pleural   effusion and airspace disease (atelectasis or consolidation). 2. No significant change compared to prior. CT HEAD WO CONTRAST   Final Result      1. No CT evidence of acute intracranial process. XR CHEST PORTABLE   Final Result   Left pleural effusion and bilateral perihilar airspace opacities. Consider CHF. Current Meds:  Current Facility-Administered Medications   Medication Dose Route Frequency    LORazepam (ATIVAN) tablet 1 mg  1 mg Oral Q4H PRN    diphenhydrAMINE (BENADRYL) injection 25 mg  25 mg IntraVENous Q6H PRN    scopolamine (TRANSDERM-SCOP) transdermal patch 1 patch  1 patch TransDERmal Once    furosemide (LASIX) tablet 40 mg  40 mg Oral Daily    sodium chloride flush 0.9 % injection 5-40 mL  5-40 mL IntraVENous PRN    0.9 % sodium chloride infusion   IntraVENous PRN    ondansetron (ZOFRAN-ODT) disintegrating tablet 4 mg  4 mg Oral Q8H PRN    Or    ondansetron (ZOFRAN) injection 4 mg  4 mg IntraVENous Q6H PRN    polyethylene glycol (GLYCOLAX) packet 17 g  17 g Oral Daily PRN    acetaminophen (TYLENOL) tablet 650 mg  650 mg Oral Q6H PRN    Or    acetaminophen (TYLENOL) suppository 650 mg  650 mg Rectal Q6H PRN       Signed:  Panchito Chen MD    Part of this note may have been written by using a voice dictation software. The note has been proof read but may still contain some grammatical/other typographical errors.

## 2022-07-26 NOTE — PROGRESS NOTES
[facility]    Phone call with son, Genet Leong, who states they left the hospital an hour ago. Briefly discussed  his expectation for discharge and he stated later this week. Arranged a meeting for tomorrow at noon per his request and with an anticipated discharge day of Thursday. He states although his father is alert and oriented he defers to him for legal paper work. He states he is the durable POA and HCPOA and paperwork is on file with .   Reassured him hospice will order needed equipment which is a BSC. He offers they already have a hospital bed and a lift at home. He states he does have some rearranging to do at home to accommodate his father. [facility] numbers given in case of change in his plans.     Eliane Held RN  [phone]

## 2022-07-26 NOTE — PROGRESS NOTES
Pt resting in bed with eyes closed. Awakens when spoken to. Alert and oriented times 3. 2L NC in place. No s/sx of distress noted. Denies any pain. Encouraged to call for assistance as needed. Call light within reach. Will continue to monitor.

## 2022-07-26 NOTE — CONSULTS
Consult received, chart reviewed. Pt noted to be comfort care, and hospice is engaged. No PC needs identified- consult will not be completed.     Lawanda Hodgkin, JARETTP-C  Palliative Care

## 2022-07-26 NOTE — PROGRESS NOTES
Pt resting comfortably in bed. 2L NC in place. No s/sx of distress noted. No evidence of pain. Will continue to monitor.

## 2022-07-27 NOTE — PROGRESS NOTES
Discharge plans noted to go home with hospice in AM. CM made aware regarding deactivation of ICD.  Will reassess and follow

## 2022-07-27 NOTE — PROGRESS NOTES
completed follow up visit. Patient was short of breath at time and had a difficult time talking, but requested prayer. Family had been present throughout the day and supportive, but was not currently present.  provided pastoral presence, prayer and empathetic listening.    Signed by  Lissett Jorge M.Div.

## 2022-07-27 NOTE — PROGRESS NOTES
Bedside meeting with patient, son Tamara Vaca, and daughter Que Manzo. Mr Johan Hogan has been approved for Routine Hospice care in his home, with his son and DIL to provide care giving. On 7/27/2022 Dr. Manuel Perez MD verbally certified that Danica Aguirre is terminally ill with a life expectancy of 6 months or less if the terminal illness runs its normal course. Verbal certification received by: Lia Nj NP/Patricia Feldman RN on 7/27/2022 @ 1300 hours. DME (O2 concentrator, BSC, over bed table) to be delivered this afternoon 7/27. Transport is arranged for 9am tomorrow 7/28 morning, via [facility], for a morning admission to [facility] . Discussed with Milvia BLANCO, who agreed to d/c time. [address] states she will arrange d/c prescriptions for Ativan/Roxanol for home use (for intermittent agitation and dyspnea) until prescriptions can be obtained from hospice provider. [address] also attempting ICD deactivation before patient is d/c from hospital. All family (Jake Montoya, Shannon Mcfarlandwood) in agreement with ICD deactivation. Alexander Cutler and Luciano Lopez.      Thank you for this referral,  Chloe Sawyer RN  [facility]   [phone]

## 2022-07-27 NOTE — PROGRESS NOTES
Physical Therapy Note:    Attempted to see patient this AM for physical therapy evaluation session. Patient is discharging home on hospice and therapy is not indicated at this time. Will discharge from therapy services.  Thank you,    Charlee Mosqueda, PT     [facility]

## 2022-07-27 NOTE — PROGRESS NOTES
Order received, chart reviewed. Pt is hospice/ comfort care and OT services are not indicated at this time.      Shivam Rasmussen OT

## 2022-07-27 NOTE — CARE COORDINATION
MSN, CM:  patient/family agrees to go home with [facility]. Family request patient to be discharged Thursday so room could be made in the home for patient to return. [facility] to have meeting with family today. Case Management will continue to follow.

## 2022-07-27 NOTE — PROGRESS NOTES
Hospitalist Progress Note   Admit Date:  2022  7:51 PM   Name:  Danica Aguirre   Age:  80 y.o. Sex:  male  :  1934   MRN:  [id number]   Room:  [facility]/    Presenting Complaint: Altered Mental Status     Reason(s) for Admission: Delirium [R41.0]  Peripheral edema [R60.9]  Acute renal failure, unspecified acute renal failure type ([facility] [address] [N17.9]  Congestive heart failure, unspecified HF chronicity, unspecified heart failure type ([address] [I50.9]  Acute kidney injury (COLLETTE) with acute tubular necrosis (ATN) ([facility] [address] [N17.0]     Hospital Course & Interval History:     Patient with past medical history of    DM type 2  Atrial fibrillation    S/P Watchman device placement   On no OAC   S/P ICD placement   EF 48%  Anemia   CKD stage 4  Recent COVID infection. Recent enterobacter and Proteus bacteremia due to right ankle ulcer     Patient came to hospital due to worsening mentation. He was admitted to ICU. During the course of treatment in ICU, patient was doing poorly and with confusion. Son opted for comfort care. Subjective/24hr Events (22):     22   Patient is able to answer simple questions. No fever. No shaking. No chills. No shortness of breath.      22   Patient is resting comfortably in bed. Afebrile. No chest pain. No shortness of breath.      22   Patient is resting in bed. No new complaints. No fever. No shaking. No chills. No shortness of breath. No distress. Assessment & Plan:     Principal Problem:    Acute kidney injury (COLLETTE) with acute tubular necrosis (ATN) ([facility])  Plan:   BUN and creatinine continues to not showing improvement. Patient has anemia as well. No acute bleeding. Family opted for comfort care. No further blood draws are attempted.  is helping with set up for hospice. Pending further decision from family members regarding discharge plan. Continue to make sure that patient is comfortable.      I have discussed the plan of care with patient. Discharge Planning:      Hospice care     Diet:  ADULT DIET; Dysphagia - Soft and Bite Sized  ADULT ORAL NUTRITION SUPPLEMENT; Breakfast, Lunch, Dinner; Diabetic Oral Supplement  DVT PPx: none   Code status: DNR    Hospital Problems:  Principal Problem:    Acute kidney injury (COLLETTE) with acute tubular necrosis (ATN) (H[facility])  Resolved Problems:    * No resolved hospital problems. *      Objective:   Patient Vitals for the past 24 hrs:   Temp Pulse Resp BP SpO2   07/27/22 0737 97.7 °F (36.5 °C) 70 20 (!) 135/57 96 %   07/26/22 1923 97.7 °F (36.5 °C) 70 20 (!) 144/61 100 %         Oxygen Therapy  SpO2: 96 %  Pulse Oximetry Type: Continuous  Pulse via Oximetry: 70 beats per minute  Pulse Oximeter Device Mode: Intermittent  Pulse Oximeter Device Location: Finger  O2 Device: Nasal cannula  Skin Assessment: Clean, dry, & intact  O2 Flow Rate (L/min): 2 L/min  Oxygen Therapy: Supplemental oxygen  O2 Delivery Method: Nasal cannula    Estimated body mass index is 28.75 kg/m² as calculated from the following:    Height as of this encounter: 6' (1.829 m). Weight as of this encounter: 212 lb (96.2 kg). Intake/Output Summary (Last 24 hours) at 7/27/2022 0909  Last data filed at 7/27/2022 0640  Gross per 24 hour   Intake --   Output 450 ml   Net -450 ml           Physical Exam:     Blood pressure (!) 135/57, pulse 70, temperature 97.7 °F (36.5 °C), temperature source Temporal, resp. rate 20, height 6' (1.829 m), weight 212 lb (96.2 kg), SpO2 96 %. General:    Lethargic, slow to respond verbally. Answering some simple questions. Patient is lying comfortably in bed. Head:  Normocephalic, atraumatic, pale. No jaundice. Eyes:  Sclerae appear normal.  Pupils equally round. ENT:  Nares appear normal, no drainage. Moist oral mucosa  Neck:  No restricted ROM. Trachea midline   CV:   RRR. No m/r/g. No jugular venous distension.   Device in subcutaneous space in anterior chest wall on the left chest   Lungs:   CTAB. No wheezing, rhonchi, or rales. Symmetric expansion. Abdomen: Bowel sounds present. Soft, nontender, nondistended. Extremities: No cyanosis or clubbing. No edema  Skin:     No rashes and normal coloration. Warm and dry. Neuro:  CN II-XII grossly intact. Alert. Answering some simple questions. I have reviewed ordered lab tests and independently visualized imaging below:    Recent Labs:  No results found for this or any previous visit (from the past 48 hour(s)). Other Studies:  US RETROPERITONEAL LIMITED   Final Result      No hydronephrosis. XR CHEST PORTABLE   Final Result      1. Persistent left lung base opacity, likely combination of small pleural   effusion and airspace disease (atelectasis or consolidation). 2. No significant change compared to prior. CT HEAD WO CONTRAST   Final Result      1. No CT evidence of acute intracranial process. XR CHEST PORTABLE   Final Result   Left pleural effusion and bilateral perihilar airspace opacities. Consider CHF.              Current Meds:  Current Facility-Administered Medications   Medication Dose Route Frequency    LORazepam (ATIVAN) tablet 1 mg  1 mg Oral Q4H PRN    tuberculin injection 5 Units  5 Units IntraDERmal Once    diphenhydrAMINE (BENADRYL) injection 25 mg  25 mg IntraVENous Q6H PRN    scopolamine (TRANSDERM-SCOP) transdermal patch 1 patch  1 patch TransDERmal Once    furosemide (LASIX) tablet 40 mg  40 mg Oral Daily    sodium chloride flush 0.9 % injection 5-40 mL  5-40 mL IntraVENous PRN    0.9 % sodium chloride infusion   IntraVENous PRN    ondansetron (ZOFRAN-ODT) disintegrating tablet 4 mg  4 mg Oral Q8H PRN    Or    ondansetron (ZOFRAN) injection 4 mg  4 mg IntraVENous Q6H PRN    polyethylene glycol (GLYCOLAX) packet 17 g  17 g Oral Daily PRN    acetaminophen (TYLENOL) tablet 650 mg  650 mg Oral Q6H PRN    Or    acetaminophen (TYLENOL) suppository 650 mg  650 mg Rectal Q6H PRN Signed:  Panchito Chen MD    Part of this note may have been written by using a voice dictation software. The note has been proof read but may still contain some grammatical/other typographical errors.

## 2022-07-27 NOTE — PROGRESS NOTES
Resting quietly at present. NAD noted. Safety maintained through out the shift. Reported off to on coming nurse.

## 2022-07-28 NOTE — DISCHARGE SUMMARY
Hospitalist Discharge Summary   Admit Date:  2022  7:51 PM   DC Note date: 2022  Name:  Ras Castillo   Age:  80 y.o. Sex:  male  :  1934   MRN:  [id number]   Room:  [facility]  PCP:  Latanya Morejon MD    Presenting Complaint: Altered Mental Status     Initial Admission Diagnosis: Delirium [R41.0]  Peripheral edema [R60.9]  Acute renal failure, unspecified acute renal failure type ([address] [N17.9]  Congestive heart failure, unspecified HF chronicity, unspecified heart failure type ([address] [I50.9]  Acute kidney injury (COLLETTE) with acute tubular necrosis (ATN) ([address] [N17.0]     Problem List for this Hospitalization (present on admission):    Principal Problem:    Acute kidney injury (COLLETTE) with acute tubular necrosis (ATN) ([address]  Resolved Problems:    * No resolved hospital problems. [facility] Course:  Copied from HPI from H&P:  \"80 y.o. 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 The pt was recently admitted to [facility] and then transferred to [facility]. While at [facility], he had Acute resp failure that was treated with aggressive pulm toilet and treatment of covid 19. The pt's resp status improved but then his renal fxn worsened. They attempted to give IVF but CHF and UOP became problematic with cardio renal syndrome a concern. The pt was continued on IV diuresis and clinically stabilized and was approved for DC today. The pt was dc'd and thenn the pt's son noted him with decreased UOP and increased confusion. The pt was brought to the ED where he had an eval and found to have Cr over 4.4. he also had urinary retention and paez cath was placed with good output. The pt is confused but can be oriented. He denies any CP. He admits to SOB with exertion. He denies any nausea or vomiting.  He denies any HA, photophobia or diarrhea. He does have decreased UOP with retention. He is now seen for further eval at the [facility] [employer] course:    Patient was admitted for acute respiratory failure likely multifactorial concerning for aspiration pneumonia as well as acute decompensated heart failure with decreased ejection fraction and recent COVID-19 pneumonia. He was treated with antibiotics and diuretics. He had COLLETTE on CKD stage IV on admission. Nephrology was consulted and per their discussion with him previously, he was not interested in dialysis. Nephrology also recommended that dialysis is unlikely to improve his quality of life or meaningfully extend his life and patient's son/POA confirmed patient would not want dialysis. Final decision from patient and family was for comfort measures only. Defibrillator was turned off on 7/27. Patient was discharged in stable condition to home with home hospice on comfort measures. Disposition: Home hospice  Diet: ADULT DIET; Dysphagia - Soft and Bite Sized  ADULT ORAL NUTRITION SUPPLEMENT; Breakfast, Lunch, Dinner; Diabetic Oral Supplement  Code Status: DNR      Time spent in patient discharge and coordination 45 minutes. Follow up labs/diagnostics (ultimately defer to outpatient provider):  [facility]    Plan was discussed with patient. All questions answered. Patient was stable at time of discharge. Instructions given to call a physician or return if any concerns. Current Discharge Medication List        START taking these medications    Details   polyethylene glycol (GLYCOLAX) 17 g packet Take 17 g by mouth daily as needed for Constipation  Qty: 527 g, Refills: 1      LORazepam (ATIVAN) 1 MG tablet Take 1 tablet by mouth every 6 hours as needed for Anxiety (comfort care; may crush and give sublingual) for up to 3 days.   Qty: 12 tablet, Refills: 0    Associated Diagnoses: Palliative care patient      oxyCODONE (ROXICODONE) 5 MG immediate release tablet Take 1 tablet by mouth every 6 hours as needed for Pain for up to 3 days. Intended supply: 3 days. Take lowest dose possible to manage pain  Qty: 12 tablet, Refills: 0    Comments: Reduce doses taken as pain becomes manageable  Associated Diagnoses: Palliative care patient           CONTINUE these medications which have NOT CHANGED    Details   furosemide (LASIX) 40 MG tablet Take 1 tablet by mouth daily  Qty: 1 tablet, Refills: 0           STOP taking these medications       insulin lispro (HUMALOG) 100 UNIT/ML SOLN injection vial Comments:   Reason for Stopping:         losartan (COZAAR) 25 MG tablet Comments:   Reason for Stopping:         allopurinol (ZYLOPRIM) 100 MG tablet Comments:   Reason for Stopping:         aspirin 81 MG EC tablet Comments:   Reason for Stopping:         atorvastatin (LIPITOR) 80 MG tablet Comments:   Reason for Stopping:         diclofenac sodium (VOLTAREN) 1 % GEL Comments:   Reason for Stopping:         metoprolol succinate (TOPROL XL) 50 MG extended release tablet Comments:   Reason for Stopping:         montelukast (SINGULAIR) 10 MG tablet Comments:   Reason for Stopping:         nitroGLYCERIN (NITROSTAT) 0.4 MG SL tablet Comments:   Reason for Stopping:         pramox-PE-glycerin-petrolatum 1-0.25-14.4-15 % cream Comments:   Reason for Stopping:               Procedures done this admission:  * No surgery found *    Consults this admission:  IP CONSULT TO NEPHROLOGY  IP CONSULT TO SOCIAL WORK  IP CONSULT TO PALLIATIVE CARE  IP CONSULT TO HOSPICE  IP CONSULT TO CASE MANAGEMENT  IP CONSULT TO CARDIOLOGY    Echocardiogram results:  03/28/22    TRANSTHORACIC ECHOCARDIOGRAM (TTE) COMPLETE (CONTRAST/BUBBLE/3D PRN) 03/29/2022 12:41 PM (Final)    Narrative  This is a summary report. The complete report is available in the patient's medical record. If you cannot access the medical record, please contact the sending organization for a detailed fax or copy.       Left suspected volume overload/edema. XR CHEST 1 VIEW    Result Date: 7/13/2022  1. Left retrocardiac infiltrate. 2.  Stable cardiac enlargement, pulmonary edema. 3.  Decreasing small left pleural effusion. XR CHEST 1 VIEW    Result Date: 6/30/2022  Unchanged vascular congestion and small pleural effusions. US RETROPERITONEAL COMPLETE    Result Date: 7/14/2022  Bilateral renal cysts. There is a 0.8 cm calcified structure in the left lower pole cortex. This likely represents a non-obstructing stone. Other findings as above. US RETROPERITONEAL LIMITED    Result Date: 7/23/2022  No hydronephrosis. Vascular duplex lower extremity venous bilateral    Result Date: 7/17/2022  Negative bilateral lower extremity doppler ultrasound. Vascular duplex lower extremity venous bilateral    Result Date: 6/29/2022  No evidence of DVT in either leg. Recent Labs     07/23/22  0246   CULTURE NO GROWTH 5 DAYS  NO GROWTH 5 DAYS       Labs: Results:       BMP, Mg, Phos No results for input(s): NA, K, CL, CO2, ANIONGAP, BUN, CREATININE, LABGLOM, GFRAA, CALCIUM, GLUCOSE, MG, PHOS in the last 72 hours. CBC No results for input(s): WBC, RBC, HGB, HCT, MCV, MCH, MCHC, RDW, PLT, MPV, NRBC, SEGS, LYMPHOPCT, EOSRELPCT, MONOPCT, BASOPCT, IMMGRAN, SEGSABS, LYMPHSABS, EOSABS, MONOSABS, BASOSABS, ABSIMMGRAN in the last 72 hours. LFT No results for input(s): BILITOT, BILIDIR, ALKPHOS, AST, ALT, PROT, LABALBU, GLOB in the last 72 hours.    Cardiac  Lab Results   Component Value Date/Time    NTPROBNP 15,298 06/24/2022 03:15 PM    TROPHS 49.2 07/23/2022 12:55 PM    TROPHS 44.7 07/23/2022 08:42 AM    TROPHS 42.6 07/23/2022 04:02 AM      Coags Lab Results   Component Value Date/Time    PROTIME 16.8 07/24/2022 04:58 AM    PROTIME 25.9 01/17/2020 08:39 AM    PROTIME 16.2 12/18/2019 04:46 AM    INR 1.3 07/24/2022 04:58 AM    INR 2.3 01/17/2020 08:39 AM    INR 1.3 12/18/2019 04:46 AM    APTT 39.7 07/24/2022 04:58 AM    APTT 44.9 09/17/2019 11:24 PM      A1c Lab Results   Component Value Date/Time    LABA1C 6.8 06/24/2022 11:38 PM    LABA1C 8.1 10/14/2021 01:58 PM    LABA1C 7.4 09/19/2019 05:55 AM     06/24/2022 11:38 PM     10/14/2021 01:58 PM     09/19/2019 05:55 AM      Lipids Lab Results   Component Value Date/Time    CHOL 98 10/14/2021 01:58 PM    LDLCALC 35 10/14/2021 01:58 PM    LABVLDL 10.2 12/18/2019 04:46 AM    HDL 51 10/14/2021 01:58 PM    CHOLHDLRATIO 1.7 12/18/2019 04:46 AM    TRIG 49 10/14/2021 01:58 PM      Thyroid  Lab Results   Component Value Date/Time    TSHELE 17.50 07/22/2022 07:55 PM        Most Recent UA Lab Results   Component Value Date/Time    COLORU YELLOW/STRAW 07/22/2022 07:54 PM    APPEARANCE CLOUDY 07/22/2022 07:54 PM    SPECGRAV 1.016 07/22/2022 07:54 PM    LABPH 5.0 07/22/2022 07:54 PM    PROTEINU 30 07/22/2022 07:54 PM    GLUCOSEU Negative 07/22/2022 07:54 PM    KETUA TRACE 07/22/2022 07:54 PM    BILIRUBINUR Negative 07/22/2022 07:54 PM    BILIRUBINUR Negative 04/01/2022 08:20 AM    BLOODU MODERATE 07/22/2022 07:54 PM    UROBILINOGEN 0.2 07/22/2022 07:54 PM    NITRU Negative 07/22/2022 07:54 PM    LEUKOCYTESUR SMALL 07/22/2022 07:54 PM    WBCUA 3-5 07/22/2022 07:54 PM    RBCUA 10-20 07/22/2022 07:54 PM    EPITHUA 0-3 07/14/2022 11:35 AM    BACTERIA 0 07/22/2022 07:54 PM    LABCAST HYALINE 07/22/2022 07:54 PM    MUCUS 0 05/30/2022 04:16 PM          All Labs from Last 24 Hrs:  No results found for this or any previous visit (from the past 24 hour(s)).     Allergies   Allergen Reactions    Iodine Swelling    Iodides      Immunization History   Administered Date(s) Administered    COVID-19, PFIZER PURPLE top, DILUTE for use, (age 15 y+), 30mcg/0.3mL 01/21/2021, 02/10/2021, 11/19/2021    Hc Rx 637 07/23/2022    Influenza Virus Vaccine 10/15/2008, 10/15/2008    Influenza, High Dose (Fluzone 65 yrs and older) 10/26/2017, 10/25/2018    Influenza, Quadv, adjuvanted, 65 yrs +, IM, PF (Fluad) 11/11/2021    PPD Test 12/10/2015, 03/29/2022, 05/31/2022, 07/26/2022    Pneumococcal Conjugate 13-valent (Rplpsau01) 07/21/2015    Pneumococcal Polysaccharide ([id number]) 01/01/2004    Tdap (Boostrix, Adacel) 10/30/2015    Tst, Unspecified Formulation 12/10/2015       Recent Vital Data:  Patient Vitals for the past 24 hrs:   Temp Pulse Resp BP SpO2   07/28/22 0709 97.3 °F (36.3 °C) 70 17 136/67 99 %   07/27/22 1943 97.5 °F (36.4 °C) 70 20 139/68 97 %   07/27/22 1511 97.3 °F (36.3 °C) 70 21 (!) 150/61 98 %   07/27/22 1051 97.5 °F (36.4 °C) 70 20 (!) 144/58 99 %       Oxygen Therapy  SpO2: 99 %  Pulse Oximetry Type: Continuous  Pulse via Oximetry: 70 beats per minute  Pulse Oximeter Device Mode: Intermittent  Pulse Oximeter Device Location: Finger  O2 Device: Nasal cannula  Skin Assessment: Clean, dry, & intact  O2 Flow Rate (L/min): 2 L/min  Oxygen Therapy: Supplemental oxygen  O2 Delivery Method: Nasal cannula    Estimated body mass index is 29.58 kg/m² as calculated from the following:    Height as of this encounter: 6' (1.829 m). Weight as of this encounter: 218 lb 1.6 oz (98.9 kg). Intake/Output Summary (Last 24 hours) at 7/28/2022 0827  Last data filed at 7/28/2022 0550  Gross per 24 hour   Intake --   Output 350 ml   Net -350 ml         Physical Exam:    General:    Frail appearing. Lethargic. Oriented x2 and answering simple questions. Head:               Normocephalic, atraumatic, pale  Eyes:               Sclerae appear normal.  Pupils equally round. ENT:                Nares appear normal, no drainage. Moist oral mucosa  Neck:               No restricted ROM. Trachea midline   CV:                  RRR. No m/r/g. No jugular venous distension. Device in subcutaneous space in anterior chest wall on the left chest  Lungs:             CTAB. No wheezing, rhonchi, or rales. Symmetric expansion. Abdomen: Bowel sounds present. Soft, nontender, nondistended.   Extremities:     No cyanosis or clubbing. No edema  Skin:                No rashes and normal coloration. Warm and dry. Neuro:             CN II-XII grossly intact. Alert. Answering some simple questions. Signed: Pelon Haynes DO    Part of this note may have been written by using a voice dictation software. The note has been proof read but may still contain some grammatical/other typographical errors.

## 2022-07-28 NOTE — CARE COORDINATION
MSN, CM:  patient to be discharged home with [facility] this AM.  Patient and family agree with this discharge plan. Patient has met all CM milestones for this admission. [facility] to transport patient home.       07/28/22 0850   Service Assessment   Patient Orientation Alert and [facility] Discharge   Transition of Care Consult (CM Consult) Hospice  ([facility])   Internal Hospice Yes   Mode of Transport at Discharge BLS  ([address] [facility])   Condition of Participation: Discharge Planning   The Patient and/or Patient Representative was provided with a Choice of Provider? Patient;Patient Representative   Name of the Patient Representative who was provided with the Choice of Provider and agrees with the Discharge Plan? Children   The Patient and/Or Patient Representative agree with the Discharge Plan? Yes   Freedom of Choice list was provided with basic dialogue that supports the patient's individualized plan of care/goals, treatment preferences, and shares the quality data associated with the providers?   Yes

## 2022-07-28 NOTE — PROGRESS NOTES
Pt resting in bed awake. Alert and oriented times 3 at this time. 2L NC in place. No s/sx of distress noted. Denies any pain. Encouraged to call for assistance as needed. Call light within reach. Will continue to monitor.

## 2022-07-29 NOTE — HOSPICE
Mr. Carmelo Hines is an 79 y/o  male who was admitted to [address] [place] [address] on 22 with a primary diagnosis of heart failure with aflutter. Pt is a DNR code status. Initial SWA was completed in the home on 22 - SW was greeted at the home by Pt.'s son, Indy Andrews and invited into visit. Isacc's wife, Deloris Harris, their two daughters and Pt.'s daughter Joel Almanza were present along with Hernán Flores. Pt.'s family openly discussed his decline and multiple hospitalization(s) over the last several months and the decision to forgo any further aggressive treatments and focus on comfort care only going forward (family also reports a significant decline over the last 48 hrs. as well). Pt was seen in his room towards the end of the visit - Pt was lying in his hospital bed with eyes closed and appeared to be resting comfortably (we didn't attempt to wake him). Social history: Pt and his wife, Virgil Hashimoto were  for 62 yrs. until she  in 2019. They have the 2 children - Dina Simental and his wife currently live in the home and are the identified primary caregivers. Their daughter, Joel Almanza lives in [place] and will be returning home in a couple of days. Pt worked in the [place] [id number] and then worked for [employer] before retiring in his 66's. Pt is member of [employer].  arrangements are in place with Hawthorne's  home - [facility] location. Hospice services, contact information and role of SW reviewed with Pt.'s family and all questions answered. Plan of care developed and agreed upon with Pt.'s family as well. SW provided emotional support for Pt.'s family via active listening along with education (family provided with a copy of the G[employer]  booklet) , validation of their feelings and reassurance. Pt.'s family are aware that volunteer services are currently not available due the COVID-19 pandemic and will be re-evaluated at a later time.  SW will plan to visit weekly due to Pt.'s rapid decline to provide ongoing emotional support and continued assessment of psychosocial and bereavement concerns and needs.

## 2022-07-29 NOTE — HOSPICE
Patient is an 60-year-old man admitted to hospice with a primary diagnosis of heart failure with aflutter on 7/28/2022. KPS/PPS 30. NYHA 4. LMAC 27. He is admitted to home from [facility] where he was hospitalized for acute kidney injury with acute tubular necrosis. Consents and DNR signed by patient's son/HCPOA, Karyn Park r/t altered mental status. Patient's son, Karyn Park and Ashwini [place] will care for patient in home. Patient's daughter also present on admission and supportive in care, however she states she will be returning to her home [place] in a few days. Patient is a DNR. He tested positive 6/22, isolation is complete. COVID vaccine received x 2 doses + booster. He has had six hospitalizations since March of this year and has continued to decline. He has had several hospitalizations related to a diabetic ulcer on his right ankle and complications with infections to that wound. Most recently he was discharged from [facility] and was readmitted to hospital the same day with increased confusion, hallucinations, and decreased urine output. During that admission he was diagnosed with cardiorenal syndrome, per Nephrology he is not a candidate for dialysis. He is now bed dependent, requiring assistance with personal care. On admission visit patient is oriented x 3, but lethargic, exhibiting intermittent periods of confusion. His family states that he has been much more \"fidgety\" today and that he appears anxious. Ativan ordered on hospital discharge, spoke with family about administering as soon as it is picked up from pharmacy for agitation and anxiety. Patient denies pain at time of admission. They state patient has a decreased appetite eating about 25% of his meals. Family states that patient does require some assistance with feeding as he has had difficulty concentrating on tasks. Right ankle wound assessed, family states that it looks improved, wound cleansed and dressing applied.  Patient also has small ulcerations to left arm and right leg, these were cleansed with wound cleanser and foam dressing applied to help manage drainage. Patient has 12 F paez catheter that was placed during hospitalization on 7/23/2022 will need to be changed by 8/20/2022. His ICD which was de-activated before he left the hospital. Patient has experienced dyspnea with exertion and was discharged on 2-4 L oxygen PRN shortness of breath. Discussed oxygen safety with family. Patient is member of [employer], no immediate spiritual concerns identified. Prescription for Ativan and Oxycodone sent home with patient on discharge, to be continued on hospice admission with increased frequency if needed. Lasix 40 mg called into [address] in [place] [address] for local fill. Renal Comfort Aakash and Senna profiled and to be delivered via [employer]. DME ordered: BSC, over bed table, O2 (2-4 liters PRN). Supplies left in home: wash basin, soap, lotion, wound cleanser, gauze 2x2s, urinal, chux pads. RN educated [facility] Cam Jiménez and family on hospice process and philosophy, medication management, pain control, skin care and protection, fall precautions, anxiety strategies, home safety, and social distancing due to COVID-19. Pt history, assessment, meds and status reviewed with YIMI Guzman. Family made aware of volunteer services but pending at this time due to COVID-19; plans to reassess volunteer needs once volunteer services become available. Patient is appropriate for hospice services at this time. HHA to begin at 2x weekly for ADL support . SW and SC services accepted. Caregiver made aware that an RN will be completing a follow-up visit within 24 hours. Handwritten medication list, [facility] book and magnet with [facility] phone number left in home. RN educated Caregiver to call [facility] 24/7 phone line with any changes, concerns, or falls with verbalized understanding.

## 2022-07-30 NOTE — HOSPICE
24 hour admission follow up visit completed   RN is met at the door by patient's son, Jarett Valdez, upon arrival today   Pt. is lying in his hospital bed asleep throughout majority of visit. He did wake up a few times, but went right back to sleep. Daughter reports severe agitation yesterday, however, since adding ativan to regimen every 6 hours, he has been much more comfortable. He does show some signs of dyapnea at times with rapid/deep breathing. He is having difficulty swallowing now, so oxycodone was replaced today by Roxanol. Son will  at local pharmacy. Ativan also changed to liquid. He has been sleeping much more. Family states he has had a very large decline over the past 2 days. He is wearing oxygen via NC at 3LPM   He has noted edema to BLE and abdomen  His paez is intact and patent with about 300ml blood tinged urine. He has some clots from pulling on his catheter tubing. His lungs are course   CG understanding of all education and verb. understanding to call [facility] with any new needs, changes or concerns. [facility] passes.

## 2022-08-01 NOTE — HOSPICE
Routine home visit conducted today. Present for visit were pt and pt's son, Jigar Alfonso. Pt resting in hospital bed upon arrival. Disoriented and lethargic. Opens eyes to command. Son, Jigar Alfonso states that pt is occasionally lucid from time to time. Able to nod head yes/no when asked if in pain. Full assessment completed, see ROS. Education provided, see POC. No medication or DME needs expressed at this time. Chucks and briefs ordered via [employer] Family reminded of 24/7 RN availability and encouraged to call Janes Rothman at any time with questions or concerns.

## 2022-08-03 NOTE — HOSPICE
RN informed by on call RN that family had called to inform that they believed pt had passed. Upon arrival pt was peaceful in hospital bed, eyes fixed, mouth open. No apical heartbeat auscultated for >4 minutes. No spontaneous respirations observed. Skin mottled. [address] notified and will  equipment today. Medical director, supervisor, and team notified of pt's passing. Aide visits discontinued. [facility] servicing pt and family. Pt  peacefully at home with family present. Family grieving appropriately.

## 2024-07-19 NOTE — H&P
Hospitalist History and Physical   Admit Date:  2022  3:46 PM   Name:  Edson Cornejo   Age:  80 y.o. Sex:  male  :  1934   MRN:  [id number]   Room:      Presenting Complaint: Leg Pain     Reason(s) for Admission: Diabetic ulcer of ankle ([facility] [H80.683, L97.309]  Cellulitis of right leg [L03.115]  Cellulitis, unspecified cellulitis site [L03.90]     History of Present Illness:     Edson Cornejo is a 80 y.o. male with medical history of DM2, AFIB s/p watchman with PPM/ICD, ICM, HTN, CAD, CKD3, pulmonary hypertension evaluated with worsening of RLE ulcer/ wound. He initially had some possible trauma to RLE hitting it on chair while at rehab for cellulitis. Wound has worsened over prior week. Jerry Clark present and gives history as well as chart reviewed. He had been admitted for LE cellulitis 2022, discharged to rehab where he spent 42 days then recently returned to home. He lives independently with family help. area of right medial ankle is draining and painful with surrounding redness. Has neuropathy. Has been receiving outpatient wound care and referred for MARLENY, not yet completed. No recent antibiotics. Xray right ankle/ foot are negative. of note he also mentions recent bright red rectal bleeding in the setting of constipation and hemorrhoids, currently using topical agent. Had some bleeding while in the ED. HGB is 11.8      He is a DNR- has ICD. Jerry Clark aware of wishes. Home medications reviewed. Jerry Clark [phone]    Review of Systems:  10 systems reviewed and negative except as noted in HPI.     Has some clear sputum, no anorexia, no edema, has constipation, no chest pain or dyspnea, no vision changes,     Assessment & Plan:     Principal Problem:    Diabetic ulcer of ankle ([facility]  Plan:    Cellulitis of right leg  Plan:   · Admit to medical bed  · D1 vancomycin, zosyn  · followup blood cultures ]  · Wound care  · Vascular surgery consult  · Defer MRI RLE pending vascular evaluation  · Check MARLENY             Rectal bleeding  Plan:   · Suspect hemorrhoidal  · Add rectal steroid cream  · Trend HGB  · GI consult              Active Problems:    DNR (do not resuscitate)  Plan:   · Ordered  · Discussed talking to cardiology about his longterm ICD at next visit             Anemia  Plan:   · Check iron panel, occult stool, B12, folate   · Trend HGB          Hypertension  Plan:   · Resume home meds         Chronic combined systolic and diastolic heart failure ([facility]  Plan:     Atrial flutter ([facility]  Plan:     Dilated cardiomyopathy ([facility]  Plan:     Automatic implantable cardioverter-defibrillator in situ  Plan:     Coronary artery disease  Plan:   Presence of Watchman left atrial appendage closure device  Plan:     Cardiomyopathy, ischemic  Plan:   Pulmonary HTN ([facility]  Plan:   · Hold asa pending rectal bleeding workup  continued lipitor, lasix, cozaar , metoprol  · As needed IV hydralazine added           CKD (chronic kidney disease) stage 3, GFR 30-59 ml/min ([facility])  Plan:   · Trend Bmp             ·   Type 2 diabetes with nephropathy ([facility]  · Plan:   · Hold oral agents   · Add sliding scale insulin           Gout:  · Continued allopurinol        OA:  · Continued lidoderm patch        Dispo/Discharge Planning:     PPD, Pending, PT,OT    Diet: ADULT DIET; Regular; 3 carb choices (45 gm/meal);  Low Fat/Low Chol/High Fiber/ASHLEY  ADULT ORAL NUTRITION SUPPLEMENT; Breakfast, Lunch, Dinner; Diabetic Oral Supplement  VTE ppx: SCD held due to wounds, heparin held due to rectal bleeding   Code status: DNR    Hospital Problems:  Principal Problem:    Diabetic ulcer of ankle ([facility]  Active Problems:    DNR (do not resuscitate)    Rectal bleeding    Anemia    Hypertension    Atrial flutter ([facility]    Dilated cardiomyopathy ([facility]    Automatic implantable cardioverter-defibrillator in situ    Coronary artery disease    CKD (chronic kidney disease) stage 3, GFR 30-59 ml/min (HCC)    Presence of Watchman left atrial appendage closure device    Cardiomyopathy, ischemic    Type 2 diabetes with nephropathy ([address]    Pulmonary HTN (HCC)    Cellulitis of right leg    Chronic combined systolic and diastolic heart failure (HCC)  Resolved Problems:    * No resolved hospital problems. *       Past History:     Past Medical History:   Diagnosis Date    A-fib ([address] 12/17/2019    Abnormal EKG     Acute diastolic heart failure (HCC)     Atrial fibrillation (HCC) 9/10/2011    Atrial flutter ([address] 9/2013    Biotronik biventricular implantable cardioverter defibrillator    Automatic implantable cardioverter-defibrillator in situ 3/30/2016    Breast lump     right- pt states bx was neg-- m. d. \"released\" him    CAD (coronary artery disease)     mi w angioplasty--1995---- mi then cabg--2008    Cardiomyopathy, ischemic 3/30/2016    Chronic    Chest pain     Chronic systolic heart failure ([address]     \"stable\" per cardiology office note (1/2015) and on lasix daily    CKD (chronic kidney disease) stage 3, GFR 30-59 ml/min ([address] 9/12/2011    Coronary artery disease 9/10/2011    Diabetes ([address] diag 2002    type2, oral meds, range 96-98, does not know last hgba1c, hypo s/s <70    Heart attack ([address] 1995/2008    History of kidney stones     none in years     Hypercholesteremia 9/10/2011    Hypercholesterolemia     no meds currently    Hypertension diag 1981    controlled with med    Kidney stone     LBBB (left bundle branch block) 9/17/2013    Moderate protein-calorie malnutrition ([address] 3/30/2022    Orthostatic hypotension 3/30/2016    Osteoarthritis     hands    Pneumonia 9/10/2011    Preop cardiovascular exam     Renal insufficiency     S/P total knee arthroplasty 12/11/2015    TIA (transient ischemic attack) 9/18/2019    Unstable angina [facility])      Past Surgical History:   Procedure Laterality Date    ANKLE FRACTURE SURGERY Right 5/11/2005    BREAST BIOPSY Right 7/2013    BUNIONECTOMY Left 2001    CARDIAC DEFIBRILLATOR PLACEMENT  9/17/2013    biotronik    CARPAL TUNNEL RELEASE Bilateral 1972    CATARACT REMOVAL  2006/2009    COLONOSCOPY  2003/2010    CORONARY ARTERY BYPASS GRAFT  12/19/2008    3 vessel    KNEE ARTHROSCOPY Left 9/17/2009    KNEE ARTHROSCOPY Right 4/30/2014    LITHOTRIPSY      x 5    ORTHOPEDIC SURGERY  2005    right hip    OTHER SURGICAL HISTORY  2002    hydrocele repair and circumcision    PACEMAKER      pacemaker- defib    TOTAL HIP ARTHROPLASTY Right 12/28/2005    TOTAL KNEE ARTHROPLASTY Right 12/2015    TOTAL KNEE ARTHROPLASTY Left 4/8/2010      Allergies   Allergen Reactions    Iodine Swelling      Social History     Tobacco Use    Smoking status: Never Smoker    Smokeless tobacco: Never Used   Substance Use Topics    Alcohol use: No      Family History   Problem Relation Age of Onset    Heart Disease Sister     Heart Disease Brother     Other Neg Hx     Post-op Cognitive Dysfunction Neg Hx     Emergence Delirium Neg Hx     Post-op Nausea/Vomiting Neg Hx     Delayed Awakening Neg Hx     Pseudochol. Deficiency Neg Hx     Malig Hypertherm Neg Hx     Heart Disease Other     Asthma Father     Stroke Sister     Heart Disease Brother     Cancer Brother       Family history reviewed and negative except as noted above.       Immunization History   Administered Date(s) Administered    COVID-19, Pfizer Purple top, DILUTE for use, 12+ yrs, 30mcg/0.3mL dose 01/21/2021, 02/10/2021, 11/19/2021    Influenza Virus Vaccine 10/15/2008, 10/15/2008    Influenza, High Dose (Fluzone 65 yrs and older) 10/26/2017, 10/25/2018    Influenza, Quadv, adjuvanted, 65 yrs +, IM, PF (Fluad) 11/11/2021    PPD Test 12/10/2015, 03/29/2022    Pneumococcal Conjugate 13-valent ([id number]) 07/21/2015    Pneumococcal Polysaccharide ([id number]) 01/01/2004    Tdap (Boostrix, Adacel) 10/30/2015    Tst, Unspecified Formulation 12/10/2015     Prior to Admit Medications:  Current Outpatient Medications   Medication Instructions    allopurinol (ZYLOPRIM) 100 mg, Oral, DAILY    aspirin 81 MG EC tablet Oral, DAILY    atorvastatin (LIPITOR) 80 mg, Oral, DAILY    canagliflozin (INVOKANA) 100 mg, DAILY BEFORE BREAKFAST    diclofenac sodium (VOLTAREN) 1 % GEL Topical, 4 TIMES DAILY    furosemide (LASIX) 40 mg, Oral, 2 TIMES DAILY    losartan (COZAAR) 25 mg, Oral, DAILY    metoprolol succinate (TOPROL XL) 50 mg, Oral, DAILY    montelukast (SINGULAIR) 10 mg, Oral, DAILY    nateglinide (STARLIX) 120 mg, Oral, 3 TIMES DAILY BEFORE MEALS    nitroGLYCERIN (NITROSTAT) 0.4 MG SL tablet Place 1 sl under the tongue q 5 min prn cp, max 3 sl in a 15-min time period. Call 911 if no relief after the 3rd sl.  pramox-PE-glycerin-petrolatum 1-0.25-14.4-15 % cream Rectal, PRN    traMADol (ULTRAM) 50 mg, Oral, EVERY 12 HOURS PRN         Objective:     Patient Vitals for the past 24 hrs:   Temp Pulse Resp BP SpO2   05/30/22 1919 98.2 °F (36.8 °C) 71 17 (!) 137/59 98 %   05/30/22 1831 -- -- -- 125/60 99 %   05/30/22 1816 -- -- -- (!) 116/54 97 %   05/30/22 1806 -- -- -- (!) 132/56 98 %   05/30/22 1800 -- -- -- 118/78 98 %   05/30/22 1746 -- -- -- (!) 141/59 97 %   05/30/22 1731 -- -- -- (!) 144/64 97 %   05/30/22 1723 -- 69 18 139/68 98 %   05/30/22 1715 -- -- -- 139/68 98 %   05/30/22 1701 -- -- -- (!) 161/81 97 %   05/30/22 1626 -- -- -- (!) 157/87 100 %   05/30/22 1616 -- -- -- -- 93 %   05/30/22 1615 -- -- -- (!) 170/71 --   05/30/22 1601 -- -- -- (!) 143/80 100 %   05/30/22 1525 98.6 °F (37 °C) 69 18 (!) 144/76 100 %       Oxygen Therapy  SpO2: 98 %  O2 Device: None (Room air)    Estimated body mass index is 21.44 kg/m² as calculated from the following:    Height as of this encounter: 6' 2\" (1.88 m). Weight as of this encounter: 167 lb (75.8 kg).   No intake or output data in the 24 hours ending 05/30/22 1950      Physical Exam:    Blood pressure (!) 137/59, pulse 71, temperature 98.2 °F (36.8 °C), temperature source Axillary, resp. rate 17, height 6' 2\" (1.88 m), weight 167 lb (75.8 kg), SpO2 98 %. General:    Well nourished. No overt distress, pleasant, elderly   Head:  Normocephalic, atraumatic  Eyes:  Sclerae appear normal.  Pupils equally round. ENT:  Nares appear normal, no drainage. Moist oral mucosa  Neck:  No restricted ROM. Trachea midline   CV:   RRR. No m/r/g. No jugular venous distension. Decreased 1+ bilateral DP  Lungs:   CTAB. No wheezing, rhonchi, or rales. Respirations even, unlabored  Abdomen: Bowel sounds present. Soft, nontender, nondistended. Extremities: No cyanosis or clubbing. No edema  Skin:     Large ulcer right inner ankle with erythema up shin , bandaged area left shin    Neuro:   grossly intact. Psych:  Normal mood and affect.       I have reviewed ordered lab tests and independently visualized imaging below:    Last 24hr Labs:  Recent Results (from the past 24 hour(s))   Lactic Acid    Collection Time: 05/30/22  3:33 PM   Result Value Ref Range    Lactic Acid, Plasma 1.6 0.4 - 2.0 MMOL/L   CBC with Auto Differential    Collection Time: 05/30/22  3:33 PM   Result Value Ref Range    WBC 12.3 (H) 4.3 - 11.1 K/uL    RBC 3.64 (L) 4.23 - 5.6 M/uL    Hemoglobin 11.8 (L) 13.6 - 17.2 g/dL    Hematocrit 36.9 (L) 41.1 - 50.3 %    .4 (H) 79.6 - 97.8 FL    MCH 32.4 26.1 - 32.9 PG    MCHC 32.0 31.4 - 35.0 g/dL    RDW 14.9 (H) 11.9 - 14.6 %    Platelets 965 609 - 132 K/uL    MPV 9.1 (L) 9.4 - 12.3 FL    nRBC 0.00 0.0 - 0.2 K/uL    Differential Type AUTOMATED      Seg Neutrophils 78 43 - 78 %    Lymphocytes 13 13 - 44 %    Monocytes 8 4.0 - 12.0 %    Eosinophils % 1 0.5 - 7.8 %    Basophils 0 0.0 - 2.0 %    Immature Granulocytes 0 0.0 - 5.0 %    Segs Absolute 9.4 (H) 1.7 - 8.2 K/UL    Absolute Lymph # 1.6 0.5 - 4.6 K/UL    Absolute Mono # 1.0 0.1 - 1.3 K/UL    Absolute Eos # 0.2 0.0 - 0.8 K/UL    Basophils Absolute 0.1 0.0 - 0.2 K/UL    Absolute Immature Granulocyte 0.1 0.0 - 0.5 K/UL   CMP    Collection Time: 05/30/22  3:33 PM   Result Value Ref Range    Sodium 137 (L) 138 - 145 mmol/L    Potassium 3.9 3.5 - 5.1 mmol/L    Chloride 105 98 - 107 mmol/L    CO2 26 21 - 32 mmol/L    Anion Gap 6 (L) 7 - 16 mmol/L    Glucose 190 (H) 65 - 100 mg/dL    BUN 29 (H) 8 - 23 MG/DL    CREATININE 1.60 (H) 0.8 - 1.5 MG/DL    GFR  53 (L) >60 ml/min/1.73m2    GFR Non- 44 (L) >60 ml/min/1.73m2    Calcium 9.8 8.3 - 10.4 MG/DL    Total Bilirubin 0.8 0.2 - 1.1 MG/DL    ALT 27 12 - 65 U/L    AST 25 15 - 37 U/L    Alk Phosphatase 191 (H) 50 - 136 U/L    Total Protein 7.4 6.3 - 8.2 g/dL    Albumin 2.9 (L) 3.2 - 4.6 g/dL    Globulin 4.5 (H) 2.3 - 3.5 g/dL    Albumin/Globulin Ratio 0.6 (L) 1.2 - 3.5     Procalcitonin    Collection Time: 05/30/22  3:33 PM   Result Value Ref Range    Procalcitonin 0.06 0.00 - 0.49 ng/mL   Urinalysis w rflx microscopic    Collection Time: 05/30/22  4:16 PM   Result Value Ref Range    Color, UA YELLOW      Appearance CLEAR      Specific Gravity, UA 1.020 1.001 - 1.023      pH, Urine 6.0 5.0 - 9.0      Protein, UA Negative NEG mg/dL    Glucose,  mg/dL    Ketones, Urine Negative NEG mg/dL    Bilirubin Urine Negative NEG      Blood, Urine TRACE (A) NEG      Urobilinogen, Urine 1.0 0.2 - 1.0 EU/dL    Nitrite, Urine Negative NEG      Leukocyte Esterase, Urine Negative NEG     Urinalysis, Micro    Collection Time: 05/30/22  4:16 PM   Result Value Ref Range    WBC, UA 0-3 0 /hpf    RBC, UA 3-5 0 /hpf    Epithelial Cells UA 0-3 0 /hpf    BACTERIA, URINE TRACE 0 /hpf    Casts HYALINE 0 /lpf    Crystals 0 0 /LPF    Mucus, UA 0 0 /lpf    OTHER OBSERVATIONS RESULTS VERIFIED MANUALLY     POCT Glucose    Collection Time: 05/30/22  7:25 PM   Result Value Ref Range    POC Glucose 155 (H) 65 - 100 mg/dL    Performed by: Jose Londono        Other Studies:  XR ANKLE RIGHT (MIN 3 VIEWS)    Result Date: 5/30/2022  Right ankle Right foot Clinical indication: and marginal erosions at the first metatarsal-phalangeal joint with metatarsal primus varus and hallux valgus deformity, overlying soft tissue swelling. Scattered vascular calcifications are noted within soft tissues. Chronic mild degenerative arthropathic changes are noted elsewhere in the foot, not unusual for age. Right ankle: Screw-plate fixation hardware again traverses the distal fibula, fixation screws and wires again traverse the medial malleolus. Chronic old healed fracture deformities are again noted at these levels. No acute fracture, dislocation or osseous erosion is seen. Chronic joint space narrowing, osteophytes, cortical sclerosis are noted at the ankle and hindfoot. Radiographs can be insensitive in early osteomyelitis. If this is a persistent clinical concern, consider followup radiographs in 7-10 days, or nuclear medicine bone scan. 1. No radiographic evidence of acute osseous abnormality. 2. Chronic changes. Echocardiogram:  03/28/22    TRANSTHORACIC ECHOCARDIOGRAM (TTE) COMPLETE (CONTRAST/BUBBLE/3D PRN) 03/29/2022 12:41 PM (Final)    Narrative  This is a summary report. The complete report is available in the patient's medical record. If you cannot access the medical record, please contact the sending organization for a detailed fax or copy.   Left Ventricle: Left ventricle size is normal. Mildly increased wall thickness. Mild global hypokinesis present. Septal motion consistent with pacemaker activation. Mildly reduced left ventricular systolic function. EF by 2D Simpsons Biplane is 48%. Abnormal diastolic function.   Right Ventricle: Right ventricle size is normal. Low normal systolic function. Lead present in the right ventricle.   Aortic Valve: Moderately thickened cusps. Trace transvalvular regurgitation. No significant stenosis. AV mean gradient is 6 mmHg. AV peak gradient is 10 mmHg.   Mitral Valve: Mildly thickened leaflet. Mild transvalvular regurgitation.    Tricuspid Valve: Moderate transvalvular regurgitation. RVSP is 33 mmHg.   IVC diameter is greater than 21 mm and decreases greater than 50% during inspiration; therefore the estimated right atrial pressure is intermediate (~8 mmHg).   Contrast used: Definity.     Signed by: Markel Andrews MD on 3/29/2022 12:41 PM      Meds previously ordered:  Orders Placed This Encounter   Medications    acetaminophen (TYLENOL) tablet 1,000 mg    DISCONTD: piperacillin-tazobactam (ZOSYN) 3,375 mg in sodium chloride 0.9 % 50 mL IVPB (mini-bag)     Order Specific Question:   Antimicrobial Indications     Answer:   Skin and Soft Tissue Infection    piperacillin-tazobactam (ZOSYN) 4,500 mg in sodium chloride 0.9 % 100 mL IVPB (mini-bag)     Order Specific Question:   Antimicrobial Indications     Answer:   Skin and Soft Tissue Infection    aspirin EC tablet 81 mg    DISCONTD: atorvastatin (LIPITOR) tablet 80 mg    DISCONTD: furosemide (LASIX) tablet 40 mg    losartan (COZAAR) tablet 25 mg    metoprolol succinate (TOPROL XL) extended release tablet 50 mg    DISCONTD: montelukast (SINGULAIR) tablet 10 mg    sodium chloride flush 0.9 % injection 5-40 mL    sodium chloride flush 0.9 % injection 5-40 mL    0.9 % sodium chloride infusion    OR Linked Order Group     ondansetron (ZOFRAN-ODT) disintegrating tablet 4 mg     ondansetron (ZOFRAN) injection 4 mg    polyethylene glycol (GLYCOLAX) packet 17 g    OR Linked Order Group     acetaminophen (TYLENOL) tablet 650 mg     acetaminophen (TYLENOL) suppository 650 mg    atorvastatin (LIPITOR) tablet 80 mg    furosemide (LASIX) tablet 40 mg    montelukast (SINGULAIR) tablet 10 mg    hydrocortisone (ANUSOL-HC) 2.5 % rectal cream    piperacillin-tazobactam (ZOSYN) 4,500 mg in sodium chloride 0.9 % 100 mL IVPB (mini-bag)     Order Specific Question:   Antimicrobial Indications     Answer:   Skin and Soft Tissue Infection     Order Specific Question:   Skin duration of Urinary Tract Infection    A urinary tract infection (UTI) is an infection of any part of the urinary tract, which includes the kidneys, ureters, bladder, and urethra. Risk factors include ignoring your need to urinate, wiping back to front if female, being an uncircumcised male, and having diabetes or a weak immune system. Symptoms include frequent urination, pain or burning with urination, foul smelling urine, cloudy urine, pain in the lower abdomen, blood in the urine, and fever. If you were prescribed an antibiotic medicine, take it as told by your health care provider. Do not stop taking the antibiotic even if you start to feel better.    SEEK IMMEDIATE MEDICAL CARE IF YOU HAVE ANY OF THE FOLLOWING SYMPTOMS: severe back or abdominal pain, fever, inability to keep fluids or medicine down, dizziness/lightheadedness, or a change in mental status.

## (undated) DEVICE — Z DUPLICATE USE 2275497 DRSG POSTOP PRMSL AG 3.5X6IN

## (undated) DEVICE — ILLUMINATOR ELECTROCAUTERY RETRACTED L8IN EXT L11IN DYN 10PK

## (undated) DEVICE — SUTURE ABSORBABLE MONOFILAMENT 4-0 CV15 6 IN PUR V-LOC 90 VLOCM1203

## (undated) DEVICE — SYSTEM SURG HEMSTAT PWD 1 GM POLYSACCHARIDE HEMOSPHERES

## (undated) DEVICE — SUTURE STRATAFIX SPRL SZ 3-0 L9IN ABSRB VLT FS L26MM 3/8 SXPD2B419

## (undated) DEVICE — DERMABOND SKIN ADH 0.7ML -- DERMABOND ADVANCED 12/BX